# Patient Record
Sex: FEMALE | Race: WHITE | Employment: OTHER | URBAN - METROPOLITAN AREA
[De-identification: names, ages, dates, MRNs, and addresses within clinical notes are randomized per-mention and may not be internally consistent; named-entity substitution may affect disease eponyms.]

---

## 2017-04-20 ENCOUNTER — ALLSCRIPTS OFFICE VISIT (OUTPATIENT)
Dept: OTHER | Facility: OTHER | Age: 72
End: 2017-04-20

## 2017-04-20 ENCOUNTER — TRANSCRIBE ORDERS (OUTPATIENT)
Dept: ADMINISTRATIVE | Facility: HOSPITAL | Age: 72
End: 2017-04-20

## 2017-04-20 DIAGNOSIS — R41.3 MEMORY LOSS: Primary | ICD-10-CM

## 2017-04-20 DIAGNOSIS — R41.3 OTHER AMNESIA: ICD-10-CM

## 2017-05-09 ENCOUNTER — HOSPITAL ENCOUNTER (OUTPATIENT)
Dept: MRI IMAGING | Facility: HOSPITAL | Age: 72
Discharge: HOME | End: 2017-05-09
Attending: PSYCHIATRY & NEUROLOGY
Payer: COMMERCIAL

## 2017-05-09 DIAGNOSIS — R41.3 OTHER AMNESIA: ICD-10-CM

## 2017-05-09 PROCEDURE — 70551 MRI BRAIN STEM W/O DYE: CPT

## 2017-05-11 ENCOUNTER — GENERIC CONVERSION - ENCOUNTER (OUTPATIENT)
Dept: OTHER | Facility: OTHER | Age: 72
End: 2017-05-11

## 2017-05-23 ENCOUNTER — ALLSCRIPTS OFFICE VISIT (OUTPATIENT)
Dept: OTHER | Facility: OTHER | Age: 72
End: 2017-05-23

## 2017-07-14 ENCOUNTER — ALLSCRIPTS OFFICE VISIT (OUTPATIENT)
Dept: OTHER | Facility: OTHER | Age: 72
End: 2017-07-14

## 2017-08-15 ENCOUNTER — ALLSCRIPTS OFFICE VISIT (OUTPATIENT)
Dept: OTHER | Facility: OTHER | Age: 72
End: 2017-08-15

## 2017-08-15 DIAGNOSIS — R41.3 OTHER AMNESIA: ICD-10-CM

## 2017-08-15 DIAGNOSIS — Z13.220 ENCOUNTER FOR SCREENING FOR LIPOID DISORDERS: ICD-10-CM

## 2017-08-15 DIAGNOSIS — M81.0 AGE-RELATED OSTEOPOROSIS WITHOUT CURRENT PATHOLOGICAL FRACTURE: ICD-10-CM

## 2017-09-05 ENCOUNTER — GENERIC CONVERSION - ENCOUNTER (OUTPATIENT)
Dept: OTHER | Facility: OTHER | Age: 72
End: 2017-09-05

## 2017-09-21 ENCOUNTER — GENERIC CONVERSION - ENCOUNTER (OUTPATIENT)
Dept: OTHER | Facility: OTHER | Age: 72
End: 2017-09-21

## 2017-09-21 DIAGNOSIS — E78.2 MIXED HYPERLIPIDEMIA: ICD-10-CM

## 2017-10-26 ENCOUNTER — ALLSCRIPTS OFFICE VISIT (OUTPATIENT)
Dept: OTHER | Facility: OTHER | Age: 72
End: 2017-10-26

## 2017-10-27 NOTE — PROGRESS NOTES
Assessment  1  MCI (mild cognitive impairment) (331 83) (G31 84)    Plan   MCI (mild cognitive impairment)    · (Q) APOLIPOPROTEIN EVALUATION; Status:Active; Requested GLT:[id number];    Perform:[facility]; CQK:[id number]; Ordered; For:MCI (mild cognitive impairment); Ordered By:Anton Wilson;   · Continue with our present treatment plan ; Status:Complete;   Done: [id number]   Ordered; For:MCI (mild cognitive impairment); Ordered By:Anton Wilson; Follow-up visit in 3 months Evaluation and Treatment  Follow-up  Status: Hold For - Scheduling  Requested for: [id number]  Ordered; For: MCI (mild cognitive impairment); Ordered By: Agatha Marsh  Performed:   Due: 20FWQ1103     Discussion/Summary  Discussion Summary:   Patient suffers from mild cognitive impairment compounded with anxiety disorder and is advised to continue her present medications mainly in the form of nutritional supplements  She does not wish to take any medications  Apoprotein e- evaluation will also be requested and the patient is advised to return back to see me in 2-3 months  1       Chief Complaint  Chief Complaint Free Text Note Form: Patient returns in follow up for memory difficulties  History of Present Illness  HPI: Patient is here for a follow-up visit and was last seen by one of my colleagues 4 months ago with a history of memory difficulty, and had an extensive workup done in the form of an MRI of the brain which showed evidence of mild right hippocampal volume loss possibly congenital versus true volume loss due to degenerative process and a repeat MRI in one year was recommended  She also had neuropsychological testing which showed no evidence of any neuro degenerative process but revealed that she does have anxiety  Patient continues to experience difficulty with short-term memory, as well as forgetfulness, although she has not seen any worsening since her last visit   She is on several herbal supplements, and both her bed and suffered from Alzheimer's type of dementia which makes her extremely concerned  She is also requesting apoprotein e- 4 assay , and otherwise denies any new neurological symptoms  Review of Systems  Neurological ROS:   Constitutional: no fever, no chills, no recent weight gain, no recent weight loss, no complaints of feeling tired, no changes in appetite  HEENT:  no sinus problems, not feeling congested, no blurred vision, no dryness of the eyes, no eye pain, no hearing loss, no tinnitus, no mouth sores, no sore throat, no hoarseness, no dysphagia, no masses, no bleeding  Cardiovascular:  no chest pain or pressure, no palpitations present, the heart rate was not rapid or irregular, no swelling in the arms or legs, no poor circulation  Respiratory:  no unusual or persistant cough, no shortness of breath with or without exertion  Gastrointestinal:  no nausea, no vomiting, no diarrhea, no abdominal pain, no changes in bowel habits, no melena, no loss of bowel control  Genitourinary:  no incontinence, no feelings of urinary urgency, no increase in frequency, no urinary hesitancy, no dysuria, no hematuria  Musculoskeletal:  no arthralgias, no myalgias, no immobility or loss of function, no head/neck/back pain, no pain while walking  Integumentary  no masses, no rash, no skin lesions, no livedo reticularis  Psychiatric:  no anxiety, no depression, no mood swings, no psychiatric hospitalizations, no sleep problems  Endocrine  no unusual weight loss or gain, no excessive urination, no excessive thirst, no hair loss or gain, no hot or cold intolerance, no menstrual period change or irregularity, no loss of sexual ability or drive, no erection difficulty, no nipple discharge  Hematologic/Lymphatic:  no unusual bleeding, no tendency for easy bruising, no clotting skin or lumps  Neurological General: lightheadedness     Neurological Mental Status:  no confusion, no mood swings, no alteration or loss of consciousness, no difficulty expressing/understanding speech, no memory problems  Neurological Cranial Nerves:  no blurry or double vision, no loss of vision, no face drooping, no facial numbness or weakness, no taste or smell loss/changes, no hearing loss or ringing, no vertigo or dizziness, no dysphagia, no slurred speech  Neurological Motor findings include:  no tremor, no twitching, no cramping(pre/post exercise), no atrophy  Neurological Coordination: balance difficulties  Neurological Sensory:  no numbness, no pain, no tingling, does not fall when eyes closed or taking a shower  Neurological Gait:  no difficulty walking, not falling to one side, no sensation of being pushed, has not had falls  ROS Reviewed:   ROS reviewed  Active Problems  1  Basal cell carcinoma of forehead (173 31) (C44 319)   2  Dog Bite (E906 0)   3  History of Exposure to cigarette smoke (V87 39) (Z77 22)   4  Lipid screening (V77 91) (Z13 220)   5  Memory difficulties (780 93) (R41 3)   6  Mixed hyperlipidemia (272 2) (E78 2)   7  Osteoporosis (733 00) (M81 0)   8  Poor concentration (799 51) (R41 840)   9  Short-term memory loss (780 93) (R41 3)   10  Urinary urgency (788 63) (R39 15)    Past Medical History  1  History of Changing skin lesion (709 9) (L98 9)   2  H/O nonmelanoma skin cancer (V10 83) (M37 097)   3  History of diarrhea (V12 79) (Z87 898)   4  History of low back pain (V13 59) (Z87 39)   5  History of Polio (045 90) (A80 9)  Active Problems And Past Medical History Reviewed: The active problems and past medical history were reviewed and updated today  Surgical History  1  History of Anal Fissurectomy   2  History of Appendectomy   3  History of Cataract Surgery   4  History of Hysterectomy    Family History  Mother    1  Family history of    2  Family history of myocardial infarction (V17 3) (Z82 49)  Father    3  Family history of    4   Family history of malignant neoplasm (V16 9) (Z80 9)  Sister    5  Family history of    6  Family history of myocardial infarction (V17 3) (Z82 49)    Social History   · History of Exposure to cigarette smoke (V87 39) (Z77 22)   · Former smoker (V15 82) (D91 644)   · No illicit drug use   · Occasional alcohol use   · Self-employed   ·   Social History Reviewed: The social history was reviewed and updated today  Current Meds   1  Acetyl L-Carnitine 500 MG Oral Capsule; Therapy: (Recorded:2016) to Recorded   2  Calcium TABS; Therapy: (Recorded:2017) to Recorded   3  Chelated Magnesium TABS; TAKE 1 TABLET Daily TDD:500; Therapy: (Recorded:2016) to Recorded   4  Fish Oil CAPS; TAKE 1 CAPSULE Daily TDD:2500; Therapy: (Recorded:2017) to Recorded   5  Kelp 225 MCG TABS; TAKE 1 TABLET  3x a week; Therapy: (Recorded:2016) to Recorded   6  Lipoic Acid CAPS; Therapy: (Recorded:2016) to Recorded   7  Pantethine Plus TABS; Therapy: (Recorded:2017) to Recorded   8  Probiotic CAPS; Therapy: (Recorded:2016) to Recorded   9  Resveratrol CAPS; Therapy: (Recorded:2016) to Recorded   10  Turmeric Curcumin CAPS; TAKE 1 CAPSULE Daily 1 Meriva SF Curcumin  TDD:360; Therapy: (Recorded:2016) to Recorded   11  Vinpocetine Powder; Therapy: (Recorded:2016) to Recorded   12  Vitamin B12 TABS; Therapy: (Recorded:2016) to Recorded   13  Vitamin B6 TABS; Therapy: (Recorded:2016) to Recorded   14  Vitamin C TABS; Therapy: (Recorded:2017) to Recorded   15  Vitamin D3 5000 UNIT Oral Capsule; take 1 capsule daily; Therapy: (Recorded:2017) to Recorded   16  Vitamin E CAPS; Therapy: (Recorded:2016) to Recorded   17  Zinc CAPS; take 1 capsule daily; Therapy: (Recorded:2017) to Recorded  Medication List Reviewed: The medication list was reviewed and updated today  Allergies  1  Sulfamethoxazole POWD   2  Sulfa Drugs  Denied    3  Iron TABS   4  Pedros CAPS    Vitals  Signs   Recorded: 70IQE5791 03:37PM   Heart Rate: 74  Systolic: 144, LUE, Sitting  Diastolic: 62, LUE, Sitting  Height: 5 ft 4 in  Pain Scale: 0    Physical Exam    Constitutional   General appearance: No acute distress, well appearing and well nourished  Musculoskeletal   Gait and station: Normal gait, stance and balance  Muscle strength: Normal strength throughout  Muscle tone: No atrophy, abnormal movements, flaccidity, cogwheeling or spasticity  Neurologic   Orientation to person, place, and time: Normal     Recent and remote memory: Demonstrates normal memory  -- [place] cognitive assessment scale reveals a score of 30/30 as compared to worse similar score of 21/30 during her neuropsychological testing  Language: Names objects, able to repeat phrases and speaks spontaneously  3rd, 4th, and 6th cranial nerves: Normal     7th cranial nerve: Normal     Sensation: Normal     Reflexes: Normal     Coordination: Normal        Future Appointments    Date/Time Provider Specialty Site   01/18/2018 02:00 PM Ghada Lawrence MD Neurology [facility] [address] [place] [address]   10/31/2017 02:00 PM Rachael Fabry, [facility]   11/07/2017 01:45 PM JUAN Person  Dermatology [facility]     Signatures   Electronically signed by :  Marianela Leonard MD; Oct 26 2017  4:30PM EST                       (Author)

## 2017-10-31 ENCOUNTER — ALLSCRIPTS OFFICE VISIT (OUTPATIENT)
Dept: OTHER | Facility: OTHER | Age: 72
End: 2017-10-31

## 2017-11-01 NOTE — PROGRESS NOTES
Assessment  1  Memory difficulties (780 93) (R41 3)   2  Osteoporosis (733 00) (M81 0)   3  Poor concentration (799 51) (R41 840)   4  Short-term memory loss (780 93) (R41 3)    Discussion/Summary    Thyroid- normalterm memory- seeing Dr Checo Strong now  taking supplementup in 6 months  The patient was counseled regarding  Self Referrals: No      Chief Complaint  Patient is here today for follow up on medical issues      History of Present Illness  Patient is here for follow up   Since last visit, she has been seen by Neurology for her memory loss  She saw DR Carissa Khan ordered, looking for a lab that can do this genetic testing  distress with dating in her personal life low  Takes Pantothene which can suppress thyroid and adrenalswould like to re-review her labswok from August  Labs are all normal  Sed rate is normal--which had previously been elevatedto the gym 4-5 days a week  she does abd, aerobics, and weight training      Review of Systems    Constitutional: No fever, no chills, feels well, no tiredness, no recent weight gain or weight loss  Eyes: No complaints of eye pain, no red eyes, no eyesight problems, no discharge, no dry eyes, no itching of eyes  Cardiovascular: No complaints of slow heart rate, no fast heart rate, no chest pain, no palpitations, no leg claudication, no lower extremity edema  Respiratory: No complaints of shortness of breath, no wheezing, no cough, no SOB on exertion, no orthopnea, no PND  Musculoskeletal: No complaints of arthralgias, no myalgias, no joint swelling or stiffness, no limb pain or swelling  Neurological: memory loss, but-- as noted in HPI  Psychiatric: anxiety, but-- as noted in HPI  Active Problems  1  Basal cell carcinoma of forehead (173 31) (C44 319)   2  Dog Bite (E906 0)   3  History of Exposure to cigarette smoke (V87 39) (Z77 22)   4  Lipid screening (V77 91) (Z13 220)   5  MCI (mild cognitive impairment) (331 83) (G31 84)   6   Memory difficulties (780 93) (R41 3)   7  Mixed hyperlipidemia (272 2) (E78 2)   8  Osteoporosis (733 00) (M81 0)   9  Poor concentration (799 51) (R41 840)   10  Short-term memory loss (780 93) (R41 3)   11  Urinary urgency (788 63) (R39 15)    Past Medical History  1  History of Changing skin lesion (709 9) (L98 9)   2  H/O nonmelanoma skin cancer (V10 83) (G55 167)   3  History of diarrhea (V12 79) (Z87 898)   4  History of low back pain (V13 59) (Z87 39)   5  History of Polio (045 90) (A80 9)    The active problems and past medical history were reviewed and updated today  Surgical History  1  History of Anal Fissurectomy   2  History of Appendectomy   3  History of Cataract Surgery   4  History of Hysterectomy    The surgical history was reviewed and updated today  Family History  Mother    1  Family history of    2  Family history of myocardial infarction (V17 3) (Z82 49)  Father    3  Family history of    4  Family history of malignant neoplasm (V16 9) (Z80 9)  Sister    5  Family history of    6  Family history of myocardial infarction (V17 3) (Z82 49)    The family history was reviewed and updated today  Social History   · History of Exposure to cigarette smoke (V87 39) (Z77 22)   · Former smoker (V15 82) (Z26 168)   · No illicit drug use   · Occasional alcohol use   · Self-employed   ·   The social history was reviewed and updated today  The social history was reviewed and is unchanged  Current Meds   1  Acetyl L-Carnitine 500 MG Oral Capsule; Therapy: (Recorded:11Npu1084) to Recorded   2  Calcium TABS; Therapy: (Recorded:86Tbn1296) to Recorded   3  Chelated Magnesium TABS; TAKE 1 TABLET Daily TDD:500; Therapy: (Recorded:2016) to Recorded   4  Fish Oil CAPS; TAKE 1 CAPSULE Daily TDD:2500; Therapy: (Recorded:56Bpd0697) to Recorded   5  Kelp 225 MCG TABS; TAKE 1 TABLET  3x a week; Therapy: (Recorded:2016) to Recorded   6   Lipoic Acid CAPS; Therapy: (Recorded:05Apr2016) to Recorded   7  Pantethine Plus TABS; Therapy: (Recorded:20Apr2017) to Recorded   8  Probiotic CAPS; Therapy: (Recorded:05Apr2016) to Recorded   9  Resveratrol CAPS; Therapy: (Recorded:05Apr2016) to Recorded   10  Turmeric Curcumin CAPS; TAKE 1 CAPSULE Daily 1 Meriva SF Curcumin  TDD:360; Therapy: (Recorded:03Nov2016) to Recorded   11  Vinpocetine Powder; Therapy: (Recorded:05Apr2016) to Recorded   12  Vitamin B12 TABS; Therapy: (Recorded:05Apr2016) to Recorded   13  Vitamin B6 TABS; Therapy: (Recorded:05Apr2016) to Recorded   14  Vitamin C TABS; Therapy: (Recorded:77Gkj8657) to Recorded   15  Vitamin D3 5000 UNIT Oral Capsule; take 1 capsule daily; Therapy: (Recorded:20Apr2017) to Recorded   16  Vitamin E CAPS; Therapy: (Recorded:05Apr2016) to Recorded   17  Zinc CAPS; take 1 capsule daily; Therapy: (Recorded:26Oct2017) to Recorded    The medication list was reviewed and updated today  Allergies  1  Sulfamethoxazole POWD   2  Sulfa Drugs  Denied    3  Iron TABS   4  Orudis CAPS    Vitals  Vital Signs    Recorded: 49MAP5564 01:30PM   Temperature 97 7 F   Heart Rate 68   Systolic 603   Diastolic 62   Height 5 ft 3 5 in   Weight 121 lb 8 0 oz   BMI Calculated 21 18   BSA Calculated 1 57   O2 Saturation 95     Physical Exam    Constitutional   General appearance: No acute distress, well appearing and well nourished  Eyes   Conjunctiva and lids: No swelling, erythema or discharge  Pupils and irises: Equal, round and reactive to light  Pulmonary   Respiratory effort: No increased work of breathing or signs of respiratory distress  Auscultation of lungs: Clear to auscultation  Cardiovascular   Auscultation of heart: Normal rate and rhythm, normal S1 and S2, without murmurs  Examination of extremities for edema and/or varicosities: Abnormal  -- + LE edema     Musculoskeletal   Gait and station: Normal     Digits and nails: Normal without clubbing or cyanosis  Inspection/palpation of joints, bones, and muscles: Normal     Skin   Skin and subcutaneous tissue: Normal without rashes or lesions  Psychiatric   Orientation to person, place, and time: Normal     Mood and affect: Normal          Future Appointments    Date/Time Provider Specialty Site   01/18/2018 02:00 PM [facility] Hull [facility]   11/28/2017 02:00 PM DERECK Mehta  [facility]   11/07/2017 01:45 PM JUAN Skelton   Dermatology [address]     Signatures   Electronically signed by : Akanksha Loyola NP; Oct 31 2017  2:22PM EST                       (Author)    Electronically signed by : Senia Roblero MD; Oct 31 2017  4:41PM EST                       (Co-author)

## 2017-11-07 ENCOUNTER — ALLSCRIPTS OFFICE VISIT (OUTPATIENT)
Dept: OTHER | Facility: OTHER | Age: 72
End: 2017-11-07

## 2017-11-17 ENCOUNTER — ALLSCRIPTS OFFICE VISIT (OUTPATIENT)
Dept: OTHER | Facility: OTHER | Age: 72
End: 2017-11-17

## 2017-11-17 DIAGNOSIS — M79.89 OTHER SPECIFIED SOFT TISSUE DISORDERS (CODE): ICD-10-CM

## 2017-11-18 NOTE — PROGRESS NOTES
Assessment    1  Swelling of left thumb (524 17) (I66 98)    Plan  Swelling of left thumb    · Doxycycline Monohydrate 100 MG Oral Tablet; TAKE 1 TABLET TWICE DAILY   · Triple Antibiotic Plus 1 % External Ointment; APPLY SPARINGLY TO AFFECTEDAREA(S) TWICE DAILY   · * XR HAND 3+ VIEW LEFT; Status:Active; Requested for:17Nov2017;     Discussion/Summary    Infection of left thumb- given a script ifr topical antibiotic, if symptoms do not improve given a script ifr doxycycline at this time  fo symptoms do not improve  up as needed  Possible side effects of new medications were reviewed with the patient/guardian today  The treatment plan was reviewed with the patient/guardian  The patient/guardian understands and agrees with the treatment plan      Chief Complaint  Patient seen in office today for a c/o having a wound on left thumb - she stated that she accidently snipped it with a scissors  History of Present Illness  Patient is here because she cut a small flap of her skin on her left thumb with her scissors, she says that her Thumb has been red and erythematous since she had the cut and has been painful, red and swollen  Review of Systems   Constitutional: No fever, no chills, feels well, no tiredness, no recent weight gain or weight loss  Eyes: No complaints of eye pain, no red eyes, no eyesight problems, no discharge, no dry eyes, no itching of eyes  Cardiovascular: No complaints of slow heart rate, no fast heart rate, no chest pain, no palpitations, no leg claudication, no lower extremity edema  Respiratory: No complaints of shortness of breath, no wheezing, no cough, no SOB on exertion, no orthopnea, no PND  Musculoskeletal: as noted in HPI  Integumentary: as noted in HPI  Neurological: No complaints of headache, no confusion, no convulsions, no numbness, no dizziness or fainting, no tingling, no limb weakness, no difficulty walking  ROS reviewed  Active Problems  1   Basal cell carcinoma of forehead (173 31) (C44 319)   2  Dog Bite (E906 0)   3  History of Exposure to cigarette smoke (V87 39) (Z77 22)   4  Lipid screening (V77 91) (Z13 220)   5  MCI (mild cognitive impairment) (331 83) (G31 84)   6  Memory difficulties (780 93) (R41 3)   7  Mixed hyperlipidemia (272 2) (E78 2)   8  Osteoporosis (733 00) (M81 0)   9  Poor concentration (799 51) (R41 840)   10  Screening for skin condition (V82 0) (Z13 89)   11  Seborrheic keratosis (702 19) (L82 1)   12  Short-term memory loss (780 93) (R41 3)   13  Urinary urgency (788 63) (R39 15)    Past Medical History  1  History of Changing skin lesion (709 9) (L98 9)   2  H/O nonmelanoma skin cancer (V10 83) (A21 718)   3  History of diarrhea (V12 79) (Z87 898)   4  History of low back pain (V13 59) (Z87 39)   5  History of Polio (045 90) (A80 9)    The active problems and past medical history were reviewed and updated today  Surgical History  1  History of Anal Fissurectomy   2  History of Appendectomy   3  History of Cataract Surgery   4  History of Hysterectomy    Family History  Mother    1  Family history of    2  Family history of myocardial infarction (V17 3) (Z82 49)  Father    3  Family history of    4  Family history of malignant neoplasm (V16 9) (Z80 9)  Sister    5  Family history of    6  Family history of myocardial infarction (V17 3) (Z82 49)    Social History     · History of Exposure to cigarette smoke (V87 39) (Z77 22)   · Former smoker (V15 82) (T73 806)   · No illicit drug use   · Occasional alcohol use   · Self-employed   ·     Current Meds   1  Acetyl L-Carnitine 500 MG Oral Capsule; Therapy: (Recorded:93Wes1418) to Recorded   2  Calcium TABS; Therapy: (Recorded:40Fll2516) to Recorded   3  Chelated Magnesium TABS; TAKE 1 TABLET Daily TDD:500; Therapy: (Recorded:36Tdo4366) to Recorded   4  Fish Oil CAPS; TAKE 1 CAPSULE Daily TDD:2500; Therapy: (Recorded:2017) to Recorded   5   Kelp 225 MCG TABS; TAKE 1 TABLET  3x a week; Therapy: (Recorded:03Nov2016) to Recorded   6  Lipoic Acid CAPS; Therapy: (Recorded:05Apr2016) to Recorded   7  Pantethine Plus TABS; Therapy: (Recorded:20Apr2017) to Recorded   8  Probiotic CAPS; Therapy: (Recorded:05Apr2016) to Recorded   9  Resveratrol CAPS; Therapy: (Recorded:05Apr2016) to Recorded   10  Turmeric Curcumin CAPS; TAKE 1 CAPSULE Daily 1 Meriva SF Curcumin  TDD:360; Therapy: (Recorded:03Nov2016) to Recorded   11  Vinpocetine Powder; Therapy: (Recorded:05Apr2016) to Recorded   12  Vitamin B12 TABS; Therapy: (Recorded:05Apr2016) to Recorded   13  Vitamin B6 TABS; Therapy: (Recorded:05Apr2016) to Recorded   14  Vitamin C TABS; Therapy: (Recorded:15Kwk2356) to Recorded   15  Vitamin D3 5000 UNIT Oral Capsule; take 1 capsule daily; Therapy: (Recorded:20Apr2017) to Recorded   16  Vitamin E CAPS; Therapy: (Recorded:05Apr2016) to Recorded   17  Zinc CAPS; take 1 capsule daily; Therapy: (Recorded:26Oct2017) to Recorded    Allergies  1  Sulfamethoxazole POWD   2  Sulfa Drugs  Denied    3  Iron TABS   4  Orudis CAPS    Vitals  Vital Signs    Recorded: 88JNW1887 01:15PM   Temperature 98 6 F   Heart Rate 532   Systolic 240   Diastolic 70   Height 5 ft 3 5 in   Weight 124 lb 8 0 oz   BMI Calculated 21 71   BSA Calculated 1 58   O2 Saturation 98       Physical Exam   Constitutional  General appearance: No acute distress, well appearing and well nourished  Eyes  Conjunctiva and lids: No swelling, erythema or discharge  -- PERRLA  Pulmonary  Respiratory effort: No increased work of breathing or signs of respiratory distress  Auscultation of lungs: Clear to auscultation  Cardiovascular  Auscultation of heart: Normal rate and rhythm, normal S1 and S2, without murmurs  Musculoskeletal  Gait and station: Normal    Skin left thumb swelling and tenderness          Future Appointments    Date/Time Provider Specialty Site   01/18/2018 02:00 PM Meagan Ewing MD Neurology NEUROLOGY [facility]   11/28/2017 02:00 PM DERECK Cotto  Family Medicine [address] [place] [address]   11/28/2017 02:20 PM DERECK Cotto  [facility]   05/01/2018 01:50 PM JUAN Rolon   Dermatology [facility]       Signatures   Electronically signed by : Vishnu Og MD; Nov 17 2017  1:47PM EST                       (Author)

## 2017-11-28 ENCOUNTER — GENERIC CONVERSION - ENCOUNTER (OUTPATIENT)
Dept: OTHER | Facility: OTHER | Age: 72
End: 2017-11-28

## 2017-11-28 ENCOUNTER — ALLSCRIPTS OFFICE VISIT (OUTPATIENT)
Dept: OTHER | Facility: OTHER | Age: 72
End: 2017-11-28

## 2018-01-02 ENCOUNTER — TRANSCRIBE ORDERS (OUTPATIENT)
Dept: RADIOLOGY | Facility: CLINIC | Age: 73
End: 2018-01-02

## 2018-01-02 ENCOUNTER — ALLSCRIPTS OFFICE VISIT (OUTPATIENT)
Dept: OTHER | Facility: OTHER | Age: 73
End: 2018-01-02

## 2018-01-02 ENCOUNTER — HOSPITAL ENCOUNTER (OUTPATIENT)
Dept: MRI IMAGING | Facility: CLINIC | Age: 73
Discharge: HOME | End: 2018-01-02
Payer: COMMERCIAL

## 2018-01-02 DIAGNOSIS — S16.1XXA STRAIN OF MUSCLE, FASCIA AND TENDON AT NECK LEVEL, INITIAL ENCOUNTER: ICD-10-CM

## 2018-01-02 PROCEDURE — 72141 MRI NECK SPINE W/O DYE: CPT

## 2018-01-03 NOTE — PROGRESS NOTES
Assessment   1  MCI (mild cognitive impairment) (331 83) (G31 84)   2  Cervical strain (847 0) (S16 1XXA)    Plan    Cervical strain    · Continue with our present treatment plan ; Status:Complete;   Done: 62SYT1936   Ordered; For:Cervical strain; Ordered By:Anton Wilson;      * MRI CERVICAL SPINE WO CONTRAST; Status:Need Information - Financial Authorization; Requested FIS:[id number]; Perform:[facility]; ZII:[id number];GVGQQLI; For:Cervical strain; Ordered By:Anton Wilson;        Discussion/Summary   Discussion Summary:    Patient with a history of mild cognitive impairment and persistent cervical strain after a slip and fall injury is advised an MRI of the cervical spine to rule out any structural injury  She has no evidence of any radiculopathy at this time  Patient is advised control continue chiropractic treatments, she does not wish to take any medications, and she will continue home exercise program  She has an appointment to return back to see me in 1 month  Chief Complaint   Chief Complaint Free Text Note Form: Patient is here for follow up visit for her history of MCI  History of Present Illness   HPI: Patient is here for a follow-up visit and since her last visit she also had a trip and fall injury few weeks ago with resultant neck pain  Since then she has been seeing a chiropractor on a regular basis and continues to experience left-sided neck pain, radiating to the left shoulder and denies any motor or sensory symptoms in the upper lower extremities  She has been seeing the chiropractor 2 to 3 times a week with not much relief  She also denies any lower extremity symptoms of bladder bowel symptoms  From the cognitive standpoint she is remains stable with herbal supplementation and had an MRI of the brain which showed evidence of bilateral hippocampal volume loss without ex vacuo dilatation of the ventricles  Repeat MRI was recommended 1 year later   Patient also had a strong family history of dementia and apoprotein EEG phenotype was E3/E3  Patient was explained these test results  Review of Systems   Neurological ROS:      Constitutional: no fever, no chills, no recent weight gain, no recent weight loss, no complaints of feeling tired, no changes in appetite  HEENT: eyes trouble opening  Cardiovascular:  no chest pain or pressure, no palpitations present, the heart rate was not rapid or irregular, no swelling in the arms or legs, no poor circulation  Respiratory:  no unusual or persistant cough, no shortness of breath with or without exertion  Gastrointestinal: diarrhea  Genitourinary:  no incontinence, no feelings of urinary urgency, no increase in frequency, no urinary hesitancy, no dysuria, no hematuria  Musculoskeletal: head/neck/back pain  Integumentary  no masses, no rash, no skin lesions, no livedo reticularis  Psychiatric:  no anxiety, no depression, no mood swings, no psychiatric hospitalizations, no sleep problems  Endocrine  no unusual weight loss or gain, no excessive urination, no excessive thirst, no hair loss or gain, no hot or cold intolerance, no menstrual period change or irregularity, no loss of sexual ability or drive, no erection difficulty, no nipple discharge  Hematologic/Lymphatic:  no unusual bleeding, no tendency for easy bruising, no clotting skin or lumps  Neurological General: trouble falling asleep  Neurological Mental Status: memory problems  Neurological Cranial Nerves:  no blurry or double vision, no loss of vision, no face drooping, no facial numbness or weakness, no taste or smell loss/changes, no hearing loss or ringing, no vertigo or dizziness, no dysphagia, no slurred speech  Neurological Motor findings include:  no tremor, no twitching, no cramping(pre/post exercise), no atrophy  Neurological Coordination: balance difficulties-- and-- clumsiness  Neurological Sensory: numbness-- and-- tingling  Neurological Gait: has had falls  ROS Reviewed:    ROS reviewed  Active Problems   1  MCI (mild cognitive impairment) (331 83) (G31 84)   2  Memory difficulties (780 93) (R41 3)   3  Mixed hyperlipidemia (272 2) (E78 2)   4  Osteoporosis (733 00) (M81 0)   5  Poor concentration (799 51) (R41 840)   6  Short-term memory loss (780 93) (R41 3)   7  Swelling of left thumb (729 81) (M79 89)   8  Urinary urgency (788 63) (R39 15)    Past Medical History   1  History of Changing skin lesion (709 9) (L98 9)   2  H/O nonmelanoma skin cancer (V10 83) (Q46 327)   3  History of diarrhea (V12 79) (Z87 898)   4  History of low back pain (V13 59) (Z87 39)   5  History of Polio (045 90) (A80 9)    Surgical History   1  History of Anal Fissurectomy   2  History of Appendectomy   3  History of Cataract Surgery   4  History of Hysterectomy    Family History   Mother    1  Family history of    2  Family history of myocardial infarction (V17 3) (Z82 49)  Father    3  Family history of    4  Family history of malignant neoplasm (V16 9) (Z80 9)  Sister    5  Family history of    6  Family history of myocardial infarction (V17 3) (Z82 49)    Social History    · Former smoker (V15 82) (V87 361)   · No illicit drug use   · Occasional alcohol use   · Self-employed   ·   Social History Reviewed: The social history was reviewed and updated today  Current Meds    1  Acetyl L-Carnitine 500 MG Oral Capsule; Therapy: (Recorded:46Jcp5997) to Recorded   2  Calcium TABS; Therapy: (Recorded:36Faq8703) to Recorded   3  Chelated Magnesium TABS; TAKE 1 TABLET Daily TDD:500; Therapy: (Recorded:2016) to Recorded   4  Fish Oil CAPS; TAKE 1 CAPSULE Daily TDD:2500; Therapy: (Recorded:29Zjc3086) to Recorded   5  Kelp 225 MCG TABS; TAKE 1 TABLET  3x a week; Therapy: (Recorded:2016) to Recorded   6  Lipoic Acid CAPS;      Therapy: (Recorded:05Apr2016) to Recorded   7  Pantethine Plus TABS; Therapy: (Recorded:20Apr2017) to Recorded   8  Probiotic CAPS; Therapy: (Recorded:05Apr2016) to Recorded   9  Resveratrol CAPS; Therapy: (Recorded:05Apr2016) to Recorded   10  Turmeric Curcumin CAPS; TAKE 1 CAPSULE Daily 1 Meriva SF Curcumin  TDD:360; Therapy: (Recorded:03Nov2016) to Recorded   11  Vinpocetine Powder; Therapy: (Recorded:05Apr2016) to Recorded   12  Vitamin B12 TABS; Therapy: (Recorded:05Apr2016) to Recorded   13  Vitamin B6 TABS; Therapy: (Recorded:05Apr2016) to Recorded   14  Vitamin C TABS; Therapy: (Recorded:12Jda3121) to Recorded   15  Vitamin D3 5000 UNIT Oral Capsule; take 1 capsule daily; Therapy: (Recorded:20Apr2017) to Recorded   16  Vitamin E CAPS; Therapy: (Recorded:05Apr2016) to Recorded   17  Zinc CAPS; take 1 capsule daily; Therapy: (Recorded:26Oct2017) to Recorded  Medication List Reviewed: The medication list was reviewed and updated today  Allergies   1  Sulfamethoxazole POWD   2  Sulfa Drugs  Denied    3  Iron TABS   4  Orudis CAPS    Vitals   Signs   Recorded: 02Jan2018 01:52PM   Heart Rate: 77  Systolic: 989  Diastolic: 70  Height: 5 ft 3 5 in  Weight: 128 lb   BMI Calculated: 22 32  BSA Calculated: 1 61    Physical Exam        Constitutional      General appearance: No acute distress, well appearing and well nourished  Musculoskeletal      Gait and station: Normal gait, stance and balance  Muscle strength: Normal strength throughout  Muscle tone: No atrophy, abnormal movements, flaccidity, cogwheeling or spasticity  Neurologic      Orientation to person, place, and time: Normal        Language: Names objects, able to repeat phrases and speaks spontaneously         3rd, 4th, and 6th cranial nerves: Normal        7th cranial nerve: Normal        Sensation: Normal        Reflexes: Normal        Coordination: Normal   Patient has significant lower cervical paraspinal tenderness with associated spasm of the left trapezius  Range of motion in the cervical spine is preserved  Future Appointments      Date/Time Provider Specialty Site   02/06/2018 02:20 PM Mateus Haque MD Neurology [facility] [address] [place] [address]   01/23/2018 01:40 PM DERECK Arnold [facility]   05/01/2018 01:50 PM JUAN Mandel  Dermatology [facility]     Signatures    Electronically signed by :  Mingo Crain MD; Jan 2 2018  3:11PM EST                       (Author)

## 2018-01-12 VITALS — HEART RATE: 74 BPM | DIASTOLIC BLOOD PRESSURE: 62 MMHG | SYSTOLIC BLOOD PRESSURE: 116 MMHG | HEIGHT: 64 IN

## 2018-01-12 VITALS
SYSTOLIC BLOOD PRESSURE: 112 MMHG | HEIGHT: 63 IN | DIASTOLIC BLOOD PRESSURE: 66 MMHG | WEIGHT: 125 LBS | BODY MASS INDEX: 22.15 KG/M2 | HEART RATE: 72 BPM | RESPIRATION RATE: 16 BRPM

## 2018-01-12 NOTE — MISCELLANEOUS
Message  Discussed with the insurance physician, even after repeatedly telling them that I feel it is medically necessary they want to deny the MRI scan of the brain        Signatures   Electronically signed by : Junior Pedraza MD; Nov 16 2016  3:57PM EST                       (Author)

## 2018-01-13 VITALS
DIASTOLIC BLOOD PRESSURE: 62 MMHG | TEMPERATURE: 97.7 F | HEIGHT: 64 IN | OXYGEN SATURATION: 95 % | HEART RATE: 68 BPM | SYSTOLIC BLOOD PRESSURE: 112 MMHG | WEIGHT: 121.5 LBS | BODY MASS INDEX: 20.74 KG/M2

## 2018-01-13 VITALS
BODY MASS INDEX: 21 KG/M2 | HEIGHT: 64 IN | OXYGEN SATURATION: 97 % | DIASTOLIC BLOOD PRESSURE: 66 MMHG | WEIGHT: 123 LBS | SYSTOLIC BLOOD PRESSURE: 112 MMHG | TEMPERATURE: 97.8 F | HEART RATE: 66 BPM

## 2018-01-13 NOTE — PROGRESS NOTES
Discussion/Summary    Healthy female  Very good diet and exercise regimen  COntinue current vitamins and exercise regimen  Sees Neurology for short term memory deficit  Impression: Initial Annual Wellness Visit, with preventive exam as well as age and risk appropriate counseling completed  Cardiovascular screening and counseling: screening is current, counseling was given on maintaining a healthy diet and counseling was given on maintaining a healthy weight  Diabetes screening and counseling: screening is current and counseling was given on maintaining a healthy diet  Colorectal cancer screening and counseling: screening is current  Breast cancer screening and counseling: screening is current  Cervical cancer screening and counseling: screening is current  Osteoporosis screening and counseling: screening is current, counseling was given on obtaining adequate amounts of calcium and vitamin D on a daily basis and counseling was given on the importance of regular weightbearing exercise  Abdominal aortic aneurysm screening and counseling: screening is current  Glaucoma screening and counseling: screening is current  Self Referrals: No      Chief Complaint  Patient is here for Medicare wellness      History of Present Illness  Pt is here for Medicare Wellness  She feels well  She exercises regularly  The patient is being seen for the initial annual wellness visit  Medicare Screening and Risk Factors   Hospitalizations: no previous hospitalizations  Once per lifetime medicare screening tests: ECG has not been done and AAA screening US has not yet been done  Medicare Screening Tests Risk Questions   Abdominal aortic aneurysm risk assessment: none indicated  Osteoporosis risk assessment: , female gender, over 48years of age and the patient's weight is too low (<127 lbs)  HIV risk assessment: none indicated  Drug and Alcohol Use: The patient is a former cigarette smoker   The patient reports occasional alcohol use  She has never used illicit drugs  Diet and Physical Activity: Current diet includes well balanced meals, 3 servings of fruit per day, 6 servings of vegetables per day, 1 servings of meat per day, 2 cups of coffee per day and 1 cups of tea per day  She exercises 4 times per week  Exercise: walking, stretching, strength training 60 minutes per day  Mood Disorder and Cognitive Impairment Screening:   Depression screening  negative for symptoms  She denies feeling down, depressed, or hopeless over the past two weeks  She denies feeling little interest or pleasure in doing things over the past two weeks  Cognitive impairment screening: denies difficulty learning/retaining new information, denies difficulty handling complex tasks, denies difficulty with reasoning, denies difficulty with spatial ability and orientation, denies difficulty with language and denies difficulty with behavior  Functional Ability/Level of Safety: Hearing is normal bilaterally  She denies hearing difficulties  She does not use a hearing aid  The patient is currently able to do activities of daily living without limitations  Activities of daily living details: does not need help using the phone, no transportation help needed, does not need help shopping, no meal preparation help needed, does not need help doing housework, does not need help doing laundry, does not need help managing medications and does not need help managing money  Fall risk factors: The patient fell o times in the past 12 months  none  Injury History: no polypharmacy, no alcohol use, no mobility impairment, no antidepressant use, no deconditioning, no postural hypotension, no sedative use, no visual impairment, no urinary incontinence, no cognitive impairment, up and go test was normal and no previous fall     Home safety risk factors:  no unfamiliar surroundings, no loose rugs, no poor household lighting, no uneven floors, no household clutter, grab bars in the bathroom and handrails on the stairs  Advance Directives: Advance directives: no living will, no durable power of  for health care directives and no advance directives  end of life decisions were reviewed with the patient and I agree with the patient's decisions  Concerns with the patient's end of life decisions: Pt would like to be a Full Code  Co-Managers and Medical Equipment/Suppliers: See Patient Care Team   The patient is currently asymptomatic Symptoms Include:  Associated symptoms:  No associated symptoms are reported no pain from the injury  Urinary Incontinence Symptoms includes: urinary urgency        Patient Care Team    Care Team Member Role Specialty Office Number   Jewels PARDO  Dermatology [phone]   Yelena Cordoba MD Specialist Neurology [phone]   Pardeep COYLE  Family Medicine [phone]     Review of Systems    Constitutional: negative  Head and Face: negative  Eyes: negative  ENT: negative  Cardiovascular: negative  Respiratory: negative  Gastrointestinal: negative  Genitourinary: urinary urgency, but as noted in HPI  Musculoskeletal: negative  Integumentary and Breasts: negative  Neurological: as noted in HPI  Psychiatric: short term memory loss, but negative  Hematologic and Lymphatic: negative  Over the past 2 weeks, how often have you been bothered by the following problems? 1 ) Little interest or pleasure in doing things? Not at all    2 ) Feeling down, depressed or hopeless? Not at all    3 ) Trouble falling asleep or sleeping too much? Several days  4 ) Feeling tired or having little energy? Not at all    5 ) Poor appetite or overeating? Not at all    6 ) Feeling bad about yourself, or that you are a failure, or have let yourself or your family down? Not at all    7 ) Trouble concentrating on things, such as reading a newspaper or watching television?  Not at all    8 ) Moving or speaking so slowly that other people could have noticed, or the opposite, moving or speaking faster than usual? Not at all    9 ) Thoughts that you would be better off dead or of hurting yourself in some way? Not at all  Active Problems    1  MCI (mild cognitive impairment) (331 83) (G31 84)   2  Mixed hyperlipidemia (272 2) (E78 2)   3  Poor concentration (799 51) (R41 840)   4  Short-term memory loss (780 93) (R41 3)   5  Swelling of left thumb (729 81) (M79 89)    Past Medical History    1  History of Changing skin lesion (709 9) (L98 9)   2  H/O nonmelanoma skin cancer (V10 83) (Q30 228)   3  History of diarrhea (V12 79) (Z87 898)   4  History of low back pain (V13 59) (Z87 39)   5  History of Polio (045 90) (A80 9)    The active problems and past medical history were reviewed and updated today  Surgical History    1  History of Anal Fissurectomy   2  History of Appendectomy   3  History of Cataract Surgery   4  History of Hysterectomy    The surgical history was reviewed and updated today  Family History  Mother    1  Family history of    2  Family history of myocardial infarction (V17 3) (Z82 49)  Father    3  Family history of    4  Family history of malignant neoplasm (V16 9) (Z80 9)  Sister    5  Family history of    6  Family history of myocardial infarction (V17 3) (Z82 49)    The family history was reviewed and updated today  Social History    · Former smoker (Q15 95) (F56 085)   · No illicit drug use   · Occasional alcohol use   · Self-employed   ·   The social history was reviewed and updated today  The social history was reviewed and is unchanged  Current Meds   1  Acetyl L-Carnitine 500 MG Oral Capsule; Therapy: (Recorded:36Qca6530) to Recorded   2  Calcium TABS; Therapy: (Recorded:94Xwr3206) to Recorded   3  Chelated Magnesium TABS; TAKE 1 TABLET Daily TDD:500; Therapy: (Recorded:86Pxa6622) to Recorded   4   Doxycycline Monohydrate 100 MG Oral Tablet; TAKE 1 TABLET TWICE DAILY; Therapy: 20PIF3504 to (Evaluate:24Nov2017)  Requested for: 03GKT9032; Last   Rx:17Nov2017 Ordered   5  Fish Oil CAPS; TAKE 1 CAPSULE Daily TDD:2500; Therapy: (Recorded:26Oct2017) to Recorded   6  Kelp 225 MCG TABS; TAKE 1 TABLET  3x a week; Therapy: (Recorded:03Nov2016) to Recorded   7  Lipoic Acid CAPS; Therapy: (Recorded:05Apr2016) to Recorded   8  Pantethine Plus TABS; Therapy: (Recorded:20Apr2017) to Recorded   9  Probiotic CAPS; Therapy: (Recorded:05Apr2016) to Recorded   10  Resveratrol CAPS; Therapy: (Recorded:05Apr2016) to Recorded   11  Triple Antibiotic Plus 1 % External Ointment; APPLY SPARINGLY TO AFFECTED AREA(S)    TWICE DAILY; Therapy: 41SZQ9053 to (Last Rx:17Nov2017)  Requested for: 29JYS0817 Ordered   12  Turmeric Curcumin CAPS; TAKE 1 CAPSULE Daily 1 Meriva SF Curcumin  TDD:360; Therapy: (Recorded:03Nov2016) to Recorded   13  Vinpocetine Powder; Therapy: (Recorded:05Apr2016) to Recorded   14  Vitamin B12 TABS; Therapy: (Recorded:05Apr2016) to Recorded   15  Vitamin B6 TABS; Therapy: (Recorded:05Apr2016) to Recorded   16  Vitamin C TABS; Therapy: (Recorded:98Bwr6778) to Recorded   17  Vitamin D3 5000 UNIT Oral Capsule; take 1 capsule daily; Therapy: (Recorded:20Apr2017) to Recorded   18  Vitamin E CAPS; Therapy: (Recorded:05Apr2016) to Recorded   19  Zinc CAPS; take 1 capsule daily; Therapy: (Recorded:26Oct2017) to Recorded    The medication list was reviewed and updated today  Allergies    1  Sulfamethoxazole POWD   2  Sulfa Drugs  Denied    3  Iron TABS   4  Orudis CAPS    Immunizations  Influenza --- [facility]: Temporarily Deferred: Pt requests deferral, As of: [id number], Defer for 1  Years;  Heather Aguilar: [id number] Roes: 28-Oct-2014   PPSV --- [place]ge: 28-Oct-2014   Tetanus --- Series1: 20-Dec-2013   Zoster --- Series1: 03-Nov-2014     Vitals  Signs   Recorded: 45VBL9760 02:24PM   Temperature: 97 8 F  Heart Rate: 66  Systolic: 482  Diastolic: 66  Height: 5 ft 3 5 in  Weight: 123 lb   BMI Calculated: 21 45  BSA Calculated: 1 58  O2 Saturation: 97    Future Appointments    Date/Time Provider Specialty Site   01/18/2018 02:00 PM Bridgett De La Cruz MD [facility]   01/23/2018 01:40 PM [facility]   05/01/2018 01:50 PM JUAN Chadwick   [facility]     Signatures   Electronically signed by : Christy Martinez NP; Nov 28 2017  3:43PM EST                       (Author)    Electronically signed by : Rose Pena MD; Nov 28 2017  3:45PM EST                       (Co-author)

## 2018-01-14 VITALS
HEART RATE: 103 BPM | WEIGHT: 124.5 LBS | SYSTOLIC BLOOD PRESSURE: 116 MMHG | BODY MASS INDEX: 21.25 KG/M2 | DIASTOLIC BLOOD PRESSURE: 70 MMHG | TEMPERATURE: 98.6 F | OXYGEN SATURATION: 98 % | HEIGHT: 64 IN

## 2018-01-14 VITALS
SYSTOLIC BLOOD PRESSURE: 116 MMHG | HEART RATE: 73 BPM | WEIGHT: 127.13 LBS | OXYGEN SATURATION: 97 % | BODY MASS INDEX: 21.71 KG/M2 | HEIGHT: 64 IN | TEMPERATURE: 98.4 F | DIASTOLIC BLOOD PRESSURE: 64 MMHG

## 2018-01-14 VITALS
RESPIRATION RATE: 16 BRPM | BODY MASS INDEX: 21.51 KG/M2 | SYSTOLIC BLOOD PRESSURE: 128 MMHG | DIASTOLIC BLOOD PRESSURE: 70 MMHG | HEART RATE: 68 BPM | HEIGHT: 64 IN | WEIGHT: 126 LBS

## 2018-01-16 NOTE — PROGRESS NOTES
Assessment    1  Encounter for preventive health examination (V70 0) (Z00 00)   2  Osteoporosis (733 00) (M81 0)   3  Lipid screening (V77 91) (Z13 220)    Plan  Lipid screening    · (1) CBC/PLT/DIFF; Status:Active; Requested for:[id number];    · (1) COMPREHENSIVE METABOLIC PANEL; Status:Active; Requested for:[id number];    · (1) C-REACTIVE PROTEIN; Status:Active; Requested for:[id number];    · (1) SED RATE; Status:Active; Requested for:05Cjs7503;    · (1) T3 TOTAL; Status:Active; Requested for:[id number];    · (1) TSH WITH FT4 REFLEX; Status:Active; Requested for:[id number];    · (Q) CARDIO IQ ADVANCED LIPID PANEL AND INFLAMMATION PANEL; Status:Active -  Perform Order; Requested for:15Aug2017;   Lipid screening, Memory difficulties, Short-term memory loss    · (1) HOMOCYSTEINE; Status:Active; Requested for:15Aug2017;   Lipid screening, Osteoporosis    · (1) VITAMIN D 25-HYDROXY; Status:Active; Requested for:15Aug2017;     Discussion/Summary  health maintenance visit Currently, she eats a healthy diet, eats an adequate diet, eats a poor diet and has an adequate exercise regimen  the risks and benefits of cervical cancer screening were discussed Breast cancer screening: the risks and benefits of breast cancer screening were discussed and mammogram has been ordered  Osteoporosis screening: the risks and benefits of osteoporosis screening were discussed and the next bone mineral density test is due   Screening lab work includes hemoglobin, glucose, lipid profile and thyroid function testing  The immunizations are up to date  The patient was counseled regarding diagnostic results, prognosis, risks and benefits of treatment options  Possible side effects of new medications were reviewed with the patient/guardian today  The treatment plan was reviewed with the patient/guardian  The patient/guardian understands and agrees with the treatment plan      History of Present Illness  HM, Adult Female:  The patient is being seen for a health maintenance evaluation  The last health maintenance visit was one year(s) ago  General Health: The patient's health since the last visit is described as good  She has regular dental visits  She complains of vision problems  She denies hearing loss  Immunizations status: up to date  Lifestyle:  She consumes a diverse and healthy diet  She does not have any weight concerns  She exercises regularly  She does not use tobacco  She denies alcohol use  She denies drug use  Screening: cancer screening reviewed and current  metabolic screening reviewed and current  risk screening reviewed and current  HPI: Pt here for Physical  Pateint reports that overall she feels well  She takes multiple supplements  She does have symptoms of urinary urgency  She sees Neurology for short term memory loss and would like genetic testing for Alzheimers  She does have osteoporosis and has had most recent Dexa scan done Nov 2016  She refuses prescription therapy      Review of Systems    Constitutional: No fever, no chills, feels well, no tiredness, no recent weight gain or weight loss  Eyes: No complaints of eye pain, no red eyes, no eyesight problems, no discharge, no dry eyes, no itching of eyes  ENT: no complaints of earache, no loss of hearing, no nose bleeds, no nasal discharge, no sore throat, no hoarseness  Cardiovascular: No complaints of slow heart rate, no fast heart rate, no chest pain, no palpitations, no leg claudication, no lower extremity edema  Respiratory: No complaints of shortness of breath, no wheezing, no cough, no SOB on exertion, no orthopnea, no PND  Gastrointestinal: No complaints of abdominal pain, no constipation, no nausea or vomiting, no diarrhea, no bloody stools  Genitourinary: as noted in HPI  Musculoskeletal: No complaints of arthralgias, no myalgias, no joint swelling or stiffness, no limb pain or swelling     Integumentary: No complaints of skin rash or lesions, no itching, no skin wounds, no breast pain or lump  Neurological: as noted in HPI  Psychiatric: as noted in HPI  Active Problems    1  Basal cell carcinoma of forehead (173 31) (C44 319)   2  Dog Bite (E906 0)   3  History of Exposure to cigarette smoke (V87 39) (Z77 22)   4  Memory difficulties (780 93) (R41 3)   5  Osteoporosis (733 00) (M81 0)   6  Poor concentration (799 51) (R41 840)   7  Short-term memory loss (780 93) (R41 3)   8  Urinary urgency (788 63) (R39 15)    Past Medical History    · History of Changing skin lesion (709 9) (L98 9)   · H/O nonmelanoma skin cancer (V10 83) (X96 884)   · History of diarrhea (V12 79) (Z87 898)   · History of low back pain (V13 59) (Z87 39)   · History of Polio (045 90) (A80 9)    Surgical History    · History of Anal Fissurectomy   · History of Appendectomy   · History of Cataract Surgery   · History of Hysterectomy    Family History  Mother    · Family history of    · Family history of myocardial infarction (V17 3) (Z80 55)  Father    · Family history of    · Family history of malignant neoplasm (V16 9) (Z80 9)  Sister    · Family history of    · Family history of myocardial infarction (V17 3) (Z82 49)    Social History    · History of Exposure to cigarette smoke (V87 39) (Z77 22)   · Former smoker (V15 82) (A28 634)   · Occasional alcohol use   · wine   · Self-employed   ·     Current Meds   1  Acetyl L-Carnitine 500 MG Oral Capsule; Therapy: (Recorded:2016) to Recorded   2  Calcium TABS; Therapy: (Recorded:82Vng4831) to Recorded   3  Chelated Magnesium TABS; TAKE 1 TABLET Daily TDD:500; Therapy: (Recorded:2016) to Recorded   4  Chelated Magnesium TABS; Therapy: (Recorded:73Czb5099) to Recorded   5  Fish Oil CAPS; Therapy: (Recorded:09Qdk1392) to Recorded   6  Kelp 225 MCG TABS; TAKE 1 TABLET  3x a week; Therapy: (Recorded:2016) to Recorded   7  Lipoic Acid CAPS;    Therapy: (Recorded:2016) to Recorded   8  Omega-3 CAPS; Therapy: (Recorded:05Apr2016) to Recorded   9  Pantethine Plus TABS; Therapy: (Recorded:20Apr2017) to Recorded   10  Probiotic CAPS; Therapy: (Recorded:05Apr2016) to Recorded   11  Resveratrol CAPS; Therapy: (Recorded:05Apr2016) to Recorded   12  Turmeric Curcumin CAPS; TAKE 1 CAPSULE Daily 1 Meriva SF Curcumin  TDD:360; Therapy: (Recorded:03Nov2016) to Recorded   13  Vinpocetine Powder; Therapy: (Recorded:05Apr2016) to Recorded   14  Vitamin B12 TABS; Therapy: (Recorded:05Apr2016) to Recorded   15  Vitamin B6 TABS; Therapy: (Recorded:05Apr2016) to Recorded   16  Vitamin C TABS; Therapy: (Recorded:15Aug2017) to Recorded   17  Vitamin D3 5000 UNIT Oral Capsule; take 1 capsule daily; Therapy: (Recorded:20Apr2017) to Recorded   18  Vitamin E CAPS; Therapy: (Recorded:05Apr2016) to Recorded   19  Zinc CAPS; Therapy: (Recorded:20Apr2017) to Recorded    Allergies    1  Sulfamethoxazole POWD   2  Orudis CAPS   3  Sulfa Drugs  Denied    4  Iron TABS    Physical Exam    Constitutional   General appearance: No acute distress, well appearing and well nourished  Head and Face   Head and face: Normal     Palpation of the face and sinuses: No sinus tenderness  Eyes   Conjunctiva and lids: No swelling, erythema or discharge  Pupils and irises: Equal, round, reactive to light  Ophthalmoscopic examination: Normal fundi and optic discs  Ears, Nose, Mouth, and Throat   External inspection of ears and nose: Normal     Otoscopic examination: Tympanic membranes translucent with normal light reflex  Canals patent without erythema  Hearing: Normal     Nasal mucosa, septum, and turbinates: Normal without edema or erythema  Lips, teeth, and gums: Normal, good dentition  Oropharynx: Normal with no erythema, edema, exudate or lesions  Neck   Neck: Supple, symmetric, trachea midline, no masses  Thyroid: Normal, no thyromegaly      Pulmonary Respiratory effort: No increased work of breathing or signs of respiratory distress  Percussion of chest: Normal     Palpation of chest: Normal     Auscultation of lungs: Clear to auscultation  Cardiovascular   Palpation of heart: Normal PMI, no thrills  Auscultation of heart: Normal rate and rhythm, normal S1 and S2, no murmurs  Carotid pulses: 2+ bilaterally  Pedal pulses: 2+ bilaterally  Peripheral vascular exam: Normal     Examination of extremities for edema and/or varicosities: Normal     Abdomen   Abdomen: Non-tender, no masses  Liver and spleen: No hepatomegaly or splenomegaly  Lymphatic   Palpation of lymph nodes in neck: No lymphadenopathy  Palpation of lymph nodes in axillae: No lymphadenopathy  Musculoskeletal   Gait and station: Normal     Digits and nails: Normal without clubbing or cyanosis  Joints, bones, and muscles: Normal     Range of motion: Normal     Stability: Normal     Muscle strength/tone: Normal     Skin   Skin and subcutaneous tissue: Normal without rashes or lesions  Palpation of skin and subcutaneous tissue: Normal turgor  Neurologic   Cranial nerves: Cranial nerves II-XII intact  Cortical function: Normal mental status  Reflexes: 2+ and symmetric  Sensation: No sensory loss  Coordination: Normal finger to nose and heel to shin  Psychiatric   Judgment and insight: Normal     Orientation to person, place, and time: Normal     Recent and remote memory: Intact  Mood and affect: Normal        Future Appointments    Date/Time Provider Specialty Site   10/26/2017 03:40 PM Rosalinda Cherry MD [facility]rology [facility] [address]   09/05/2017 02:00 PM DERECK [facility]   11/07/2017 01:45 PM JUAN Lewis   Dermatology [facility]     Signatures   Electronically signed by : Adwoa Houston NP; Aug 15 2017  3:29PM EST                       (Author) Electronically signed by : Daniele Griffiths MD; Aug 15 2017  5:03PM EST                       (Co-author)

## 2018-01-22 VITALS
WEIGHT: 124.13 LBS | SYSTOLIC BLOOD PRESSURE: 114 MMHG | HEART RATE: 67 BPM | TEMPERATURE: 97.8 F | BODY MASS INDEX: 21.19 KG/M2 | HEIGHT: 64 IN | DIASTOLIC BLOOD PRESSURE: 68 MMHG | OXYGEN SATURATION: 97 %

## 2018-01-22 VITALS
TEMPERATURE: 98.4 F | HEIGHT: 64 IN | WEIGHT: 123.38 LBS | BODY MASS INDEX: 21.06 KG/M2 | OXYGEN SATURATION: 100 % | DIASTOLIC BLOOD PRESSURE: 64 MMHG | SYSTOLIC BLOOD PRESSURE: 118 MMHG | HEART RATE: 64 BPM

## 2018-01-22 VITALS
HEART RATE: 77 BPM | DIASTOLIC BLOOD PRESSURE: 70 MMHG | WEIGHT: 128 LBS | HEIGHT: 64 IN | BODY MASS INDEX: 21.85 KG/M2 | SYSTOLIC BLOOD PRESSURE: 120 MMHG

## 2018-01-22 VITALS
BODY MASS INDEX: 21 KG/M2 | WEIGHT: 123 LBS | TEMPERATURE: 97.8 F | OXYGEN SATURATION: 97 % | HEIGHT: 64 IN | HEART RATE: 66 BPM | DIASTOLIC BLOOD PRESSURE: 66 MMHG | SYSTOLIC BLOOD PRESSURE: 112 MMHG

## 2018-01-25 ENCOUNTER — OFFICE VISIT (OUTPATIENT)
Dept: FAMILY MEDICINE CLINIC | Facility: CLINIC | Age: 73
End: 2018-01-25
Payer: COMMERCIAL

## 2018-01-25 VITALS
HEIGHT: 63 IN | DIASTOLIC BLOOD PRESSURE: 82 MMHG | TEMPERATURE: 98.8 F | BODY MASS INDEX: 21.71 KG/M2 | OXYGEN SATURATION: 98 % | SYSTOLIC BLOOD PRESSURE: 116 MMHG | WEIGHT: 122.5 LBS | HEART RATE: 83 BPM | RESPIRATION RATE: 18 BRPM

## 2018-01-25 DIAGNOSIS — R10.32 LEFT LOWER QUADRANT PAIN: ICD-10-CM

## 2018-01-25 DIAGNOSIS — M81.0 AGE-RELATED OSTEOPOROSIS WITHOUT CURRENT PATHOLOGICAL FRACTURE: Primary | ICD-10-CM

## 2018-01-25 DIAGNOSIS — Z12.11 SCREENING FOR COLON CANCER: ICD-10-CM

## 2018-01-25 DIAGNOSIS — S16.1XXS STRAIN OF NECK MUSCLE, SEQUELA: ICD-10-CM

## 2018-01-25 PROBLEM — R41.840 POOR CONCENTRATION: Status: ACTIVE | Noted: 2017-08-15

## 2018-01-25 PROBLEM — E78.2 MIXED HYPERLIPIDEMIA: Status: ACTIVE | Noted: 2017-09-21

## 2018-01-25 PROBLEM — R41.3 SHORT-TERM MEMORY LOSS: Status: ACTIVE | Noted: 2017-08-15

## 2018-01-25 PROCEDURE — 99214 OFFICE O/P EST MOD 30 MIN: CPT | Performed by: NURSE PRACTITIONER

## 2018-01-25 RX ORDER — CALCIUM CARBONATE/VITAMIN D3 500-10/5ML
1 LIQUID (ML) ORAL DAILY
COMMUNITY

## 2018-01-25 RX ORDER — MAG HYDROX/ALUMINUM HYD/SIMETH 400-400-40
1 SUSPENSION, ORAL (FINAL DOSE FORM) ORAL DAILY
COMMUNITY

## 2018-01-25 RX ORDER — MULTIVITAMIN WITH IRON
TABLET ORAL
COMMUNITY
End: 2018-08-27 | Stop reason: CLARIF

## 2018-01-25 RX ORDER — MAGNESIUM 200 MG
TABLET ORAL 2 TIMES DAILY
Status: CANCELLED | OUTPATIENT
Start: 2018-01-25

## 2018-01-25 RX ORDER — MULTIVIT WITH MINERALS/LUTEIN
TABLET ORAL DAILY
COMMUNITY

## 2018-01-25 RX ORDER — MULTIVIT WITH MINERALS/LUTEIN
1000 TABLET ORAL DAILY
COMMUNITY

## 2018-01-25 NOTE — PROGRESS NOTES
Assessment/Plan:    No problem-specific Assessment & Plan notes found for this encounter  Diagnoses and all orders for this visit:    Age-related osteoporosis without current pathological fracture    Strain of neck muscle, sequela    Left lower quadrant pain    Screening for colon cancer  -     Cancel: Ambulatory referral to Gastroenterology; Future    Other orders  -     Cancel: Vitamin B-12 SUBL; Place under the tongue 2 (two) times a day      Neck: continue Chiopractor and exercise at gym  Osteoporosis- Be sure you are taking OTC calcium 1200mg daily with at last 800 iu Vitamin d3  Left lower quadrant abdominal pain- symptoms are not frequent  Monitor pattern and frequency  Colonoscopy- pt reports she is not due for testing for 10 years since previous  Subjective:      Patient ID: Irene Elam is a 67 y o  female  Pt is here for follow up  She tells me she fell face first about a month ago--at the pet store  She tripped going into the store  She saw Chiro and Massage therapy for whiplash injury  Pt is still having pain on the left side of her neck with pain level "6"  She still goes to he gym  She does feel a pulling sensation of the left side of the neck, depending on her mvmnt  She sees Dr Sweetie Justin 2-3 times a week  Pt having pain LLQ  Feels like a pain, occurs intermittently and unexpectedly  She has had this pain for over a year  Osteoporosis- Between March 2015-2016--3 7 percent decrease in bone mineral density of the left hip (she has brought copies of the report)   She chose to not use prescription medication  Next dexa is due November 2018  Other than this, she feels well  She takes many OTC supplements           The following portions of the patient's history were reviewed and updated as appropriate: allergies, current medications, past family history, past medical history, past social history, past surgical history and problem list     Review of Systems   Constitutional: Negative      HENT: Negative  Respiratory: Negative  Cardiovascular: Negative  Musculoskeletal: Positive for neck pain  Neurological: Negative for dizziness and light-headedness  Pt thinks she has cognitive impairment and stm loss--she sees Neurology         Objective:     Physical Exam   Constitutional: She is oriented to person, place, and time  She appears well-developed and well-nourished  HENT:   Head: Normocephalic and atraumatic  Eyes: Conjunctivae are normal  Pupils are equal, round, and reactive to light  Neck: Normal range of motion  Cardiovascular: Normal rate, regular rhythm and normal heart sounds  Pulmonary/Chest: Effort normal and breath sounds normal    Abdominal: Soft  Bowel sounds are normal    Musculoskeletal: Normal range of motion  Neurological: She is alert and oriented to person, place, and time  Skin: Skin is warm and dry  Psychiatric: She has a normal mood and affect  Her behavior is normal  Judgment and thought content normal    Nursing note and vitals reviewed

## 2018-01-25 NOTE — PATIENT INSTRUCTIONS
Calcium and Osteoporosis   WHAT YOU NEED TO KNOW:   Calcium is important for osteoporosis because calcium helps build bone mass  Osteoporosis is a long-term medical condition that causes your body to break down more bone than it makes  Your bones become weak, brittle, and more likely to fracture  DISCHARGE INSTRUCTIONS:   Follow up with your healthcare provider or dietitian as directed:  Write down your questions so you remember to ask them during your visits  Your calcium needs:   · Women:      ¨ 19 to 50 years: 1,000 mg    ¨ Over 50: 1,200 mg    ¨ Pregnant or breastfeeding, 19 years to 50 years: 1,000 mg    · Men:      ¨ 19 to 70: 1,000 mg    ¨ Over 70: 1,200 mg  Foods that are high in calcium: The following list shows the number of calcium milligrams (mg) per serving  Your dietitian or healthcare provider can help you create a balanced meal plan for your calcium needs  · Dairy:      ¨ 1 cup of low-fat plain yogurt (415 mg) or low-fat fruit yogurt (245 to 384 mg)    ¨ 1½ ounces of shredded cheddar cheese (306 mg) or part skim mozzarella cheese (275 mg)    ¨ 1 cup of skim, 2%, or whole milk (300 mg)    ¨ 1 cup of cottage cheese made with 2% milk fat (138 mg)    ¨ ½ cup of frozen yogurt (103 mg)    · Other foods:      ¨ 1 cup of calcium-fortified orange juice (300 mg)    ¨ ½ cup of cooked trace greens (220 mg)    ¨ 4 canned sardines, with bones (242 mg)    ¨ ½ cup of tofu (with added calcium) (204 mg)  How to get extra calcium:   · Add powdered milk to puddings, cocoa, custard, or hot cereal     · Sift powdered milk into flour when you make cakes, cookies, or breads  · Use low-fat or fat-free milk instead of water in pancake mix, mashed potatoes, pudding, or hot breakfast cereal     · Add low-fat or fat-free cheese to salad, soup, or pasta  · Add tofu (with added calcium) to vegetable stir-soto  · Take calcium supplements if you cannot get enough calcium from the foods you eat   Your body can absorb the most calcium from supplements when you take 500 mg or less at one time  Do not take more than 2,500 mg of calcium supplements each day  © 2017 [employer] Information is for End User's use only and may not be sold, redistributed or otherwise used for commercial purposes  All illustrations and images included in CareNotes® are the copyrighted property of [employer] M [employer]  or [employer]  The above information is an  only  It is not intended as medical advice for individual conditions or treatments  Talk to your doctor, nurse or pharmacist before following any medical regimen to see if it is safe and effective for you

## 2018-02-01 ENCOUNTER — OFFICE VISIT (OUTPATIENT)
Dept: NEUROLOGY | Facility: CLINIC | Age: 73
End: 2018-02-01
Payer: COMMERCIAL

## 2018-02-01 VITALS
WEIGHT: 126 LBS | HEART RATE: 68 BPM | SYSTOLIC BLOOD PRESSURE: 112 MMHG | BODY MASS INDEX: 22.32 KG/M2 | DIASTOLIC BLOOD PRESSURE: 54 MMHG

## 2018-02-01 DIAGNOSIS — M54.2 CERVICALGIA: Primary | ICD-10-CM

## 2018-02-01 DIAGNOSIS — G31.84 MCI (MILD COGNITIVE IMPAIRMENT): ICD-10-CM

## 2018-02-01 DIAGNOSIS — M47.812 SPONDYLOSIS OF CERVICAL REGION WITHOUT MYELOPATHY OR RADICULOPATHY: ICD-10-CM

## 2018-02-01 PROCEDURE — 99214 OFFICE O/P EST MOD 30 MIN: CPT | Performed by: PSYCHIATRY & NEUROLOGY

## 2018-02-01 RX ORDER — VINPOCETINE 100 %
40 POWDER (GRAM) MISCELLANEOUS
COMMUNITY
End: 2018-08-27 | Stop reason: CLARIF

## 2018-02-01 RX ORDER — MELOXICAM 7.5 MG/1
7.5 TABLET ORAL 2 TIMES DAILY PRN
Qty: 60 TABLET | Refills: 1 | Status: SHIPPED | OUTPATIENT
Start: 2018-02-01 | End: 2018-08-23

## 2018-02-01 RX ORDER — AMOXICILLIN 500 MG
1 CAPSULE ORAL DAILY
COMMUNITY

## 2018-02-01 RX ORDER — CYCLOBENZAPRINE HCL 5 MG
5 TABLET ORAL
Qty: 30 TABLET | Refills: 1 | Status: SHIPPED | OUTPATIENT
Start: 2018-02-01 | End: 2018-04-10

## 2018-02-01 RX ORDER — ACETYLCARNITINE 500 MG
2 CAPSULE ORAL
COMMUNITY
End: 2018-08-27 | Stop reason: CLARIF

## 2018-02-01 NOTE — PROGRESS NOTES
Progress Note - Neurology   Dhara Yañez 67 y o  female MRN: [id number]  Unit/Bed#:  Encounter: [id number]      Subjective:   Patient is here for a follow-up visit with a history of neck pain subsequent to a slip and fall injury and since her last visit has had worsening neck pain with difficulty maneuvering her left shoulder and arm at times in spite of going for chiropractic treatment as well as massage therapy  She did have an MRI of the cervical spine done in the interim  Which showed evidence of multilevel degenerative spondylitic changes with disc osteophytic complexes, multilevel neural foraminal stenosis more prominent on the left than the right at C4-C5  Patient denies any motor or sensory symptoms in the upper lower extremities or any bladder bowel symptoms  From the cognitive standpoint she has remained stable  ROS: 12 system cued query was unchanged from org  consult note  Vitals:   Vitals:    02/01/18 1531   BP: 112/54   Pulse: 68   ,Body mass index is 22 32 kg/m²      MEDS:   Current Outpatient Prescriptions:     Acetylcarnitine HCl (ACETYL L-CARNITINE) 500 MG CAPS, Take 2 capsules by mouth, Disp: , Rfl:     Alpha-Lipoic Acid (LIPOIC ACID PO), Take 1 capsule by mouth, Disp: , Rfl:     Amino Acids (L-CARNITINE PO), Take 1 tablet by mouth, Disp: , Rfl:     Ascorbic Acid (VITAMIN C) 100 MG tablet, Take by mouth, Disp: , Rfl:     Cholecalciferol (VITAMIN D3) 5000 units CAPS, Take 1 capsule by mouth daily, Disp: , Rfl:     Glycerylphosphorylcholine (ALPHA-GPC 50%) POWD, 1 tablet by Does not apply route, Disp: , Rfl:     Omega-3 Fatty Acids (FISH OIL) 1000 MG CPDR, Take 2 5 capsules by mouth, Disp: , Rfl:     pyridoxine (VITAMIN B6) 100 mg tablet, Take by mouth, Disp: , Rfl:     Vinpocetine POWD, 40 mg by Does not apply route, Disp: , Rfl:     vitamin E 600 UNIT capsule, Take by mouth, Disp: , Rfl:     Zinc 30 MG CAPS, Take 1 capsule by mouth daily, Disp: , Rfl:   :  Social History Social History Narrative    Per [employer]:     Self-employed          Physical Exam:  General appearance: alert, appears stated age and cooperative  Head: Normocephalic, without obvious abnormality, atraumatic    Neurologic:  On neurological examination she has evidence of severe cervical paraspinal tenderness, spasm along the trapezius muscles bilaterally, with difficulty with maneuvering her left arm due to pain in her neck  Patient otherwise is alert awake oriented, Pancho cognitive assessment scale reveals a score of 29/30, cranial nerve examination remains preserved, and on motor and sensory exam there is no evidence of any weakness in the upper lower extremities, deep tendon reflexes are hypoactive throughout and no sensory loss was noted in the extremities to pinprick and light touch  No evidence of any dysmetria and her gait was normal based  Lab Results: I have personally reviewed pertinent reports  Imaging Studies: I have personally reviewed pertinent reports  Assessment:  1  Worsening cervical strain with cervical spondylosis , aggravated by her slip and fall injury  2  Mild cognitive impairment, subjective  Plan:  Patient is advised to continue chiropractic treatments and Massage therapy at this time, and will also prescribe her Mobic 7 5 mg twice a day along with Flexeril 5 mg at bedtime  She is advised to return back to see me in 2 months  2/1/2018,3:51 PM    Dictation voice to text software has been used in the creation of this document  Please consider this in light of any contextual or grammatical errors

## 2018-02-22 ENCOUNTER — OFFICE VISIT (OUTPATIENT)
Dept: FAMILY MEDICINE CLINIC | Facility: CLINIC | Age: 73
End: 2018-02-22
Payer: COMMERCIAL

## 2018-02-22 VITALS
TEMPERATURE: 100.1 F | OXYGEN SATURATION: 98 % | WEIGHT: 132 LBS | DIASTOLIC BLOOD PRESSURE: 80 MMHG | SYSTOLIC BLOOD PRESSURE: 132 MMHG | RESPIRATION RATE: 18 BRPM | HEIGHT: 63 IN | HEART RATE: 86 BPM | BODY MASS INDEX: 23.39 KG/M2

## 2018-02-22 DIAGNOSIS — J10.1 INFLUENZA B: ICD-10-CM

## 2018-02-22 DIAGNOSIS — R68.89 FLU-LIKE SYMPTOMS: Primary | ICD-10-CM

## 2018-02-22 LAB
SL AMB POCT RAPID FLU A: NEGATIVE
SL AMB POCT RAPID FLU B: POSITIVE

## 2018-02-22 PROCEDURE — 99214 OFFICE O/P EST MOD 30 MIN: CPT | Performed by: NURSE PRACTITIONER

## 2018-02-22 PROCEDURE — 87804 INFLUENZA ASSAY W/OPTIC: CPT | Performed by: NURSE PRACTITIONER

## 2018-02-22 RX ORDER — TRETINOIN 0.05 G/100G
GEL TOPICAL
COMMUNITY
End: 2019-10-09 | Stop reason: ALTCHOICE

## 2018-02-22 RX ORDER — BROMPHENIRAMINE MALEATE, PSEUDOEPHEDRINE HYDROCHLORIDE, AND DEXTROMETHORPHAN HYDROBROMIDE 2; 30; 10 MG/5ML; MG/5ML; MG/5ML
5 SYRUP ORAL 4 TIMES DAILY PRN
Qty: 120 ML | Refills: 0 | Status: SHIPPED | OUTPATIENT
Start: 2018-02-22 | End: 2018-08-23

## 2018-02-22 RX ORDER — VITAMIN E ACET./SAFFLOWER OIL
OIL (ML) TOPICAL
COMMUNITY
End: 2019-04-01 | Stop reason: SDUPTHER

## 2018-02-22 RX ORDER — ALPHA LIPOIC ACID 150 MG
CAPSULE ORAL
COMMUNITY
End: 2019-04-01 | Stop reason: SDUPTHER

## 2018-02-22 RX ORDER — ADHESIVE TAPE 3"X 2.3 YD
TAPE, NON-MEDICATED TOPICAL
COMMUNITY
End: 2019-04-01 | Stop reason: SDUPTHER

## 2018-02-22 RX ORDER — ASCORBIC ACID 125 MG
TABLET,CHEWABLE ORAL
COMMUNITY

## 2018-02-22 RX ORDER — TURMERIC 100 %
POWDER (GRAM) MISCELLANEOUS
COMMUNITY

## 2018-02-22 RX ORDER — UREA 10 %
LOTION (ML) TOPICAL EVERY OTHER DAY
COMMUNITY

## 2018-02-22 RX ORDER — ACETYLCARNITINE 500 MG
CAPSULE ORAL EVERY MORNING
COMMUNITY

## 2018-02-22 RX ORDER — MAGNESIUM AMINO ACID CHELATE 100 MG
TABLET ORAL DAILY
COMMUNITY

## 2018-02-22 RX ORDER — VINPOCETINE 100 %
POWDER (GRAM) MISCELLANEOUS
COMMUNITY

## 2018-02-22 RX ORDER — MULTIVITAMIN WITH IRON
TABLET ORAL
COMMUNITY

## 2018-02-22 RX ORDER — UBIDECARENONE/VIT E ACET 100MG-5
CAPSULE ORAL
COMMUNITY

## 2018-02-22 NOTE — PATIENT INSTRUCTIONS
Rest  Increase fluids  Influenza   AMBULATORY CARE:   Influenza  (the flu) is an infection caused by the influenza virus  The flu is easily spread when an infected person coughs, sneezes, or has close contact with others  You may be able to spread the flu to others for 1 week or longer after signs or symptoms appear  Common signs and symptoms include the following:   · Fever and chills    · Headaches, body aches, and muscle or joint pain    · Cough, runny nose, and sore throat    · Loss of appetite, nausea, vomiting, or diarrhea    · Tiredness    · Trouble breathing  Call 911 for any of the following:   · You have trouble breathing, and your lips look purple or blue  · You have a seizure  Seek care immediately if:   · You are dizzy, or you are urinating less or not at all  · You have a headache with a stiff neck, and you feel tired or confused  · You have new pain or pressure in your chest     · Your symptoms, such as shortness of breath, vomiting, or diarrhea, get worse  · Your symptoms, such as fever and coughing, seem to get better, but then get worse  Contact your healthcare provider if:   · You have new muscle pain or weakness  · You have questions or concerns about your condition or care  Treatment for influenza  may include any of the following:  · Acetaminophen  decreases pain and fever  It is available without a doctor's order  Ask how much to take and how often to take it  Follow directions  Acetaminophen can cause liver damage if not taken correctly  · NSAIDs , such as ibuprofen, help decrease swelling, pain, and fever  This medicine is available with or without a doctor's order  NSAIDs can cause stomach bleeding or kidney problems in certain people  If you take blood thinner medicine, always ask your healthcare provider if NSAIDs are safe for you  Always read the medicine label and follow directions  · Antivirals  help fight a viral infection    Manage your symptoms: · Rest  as much as you can to help you recover  · Drink liquids as directed  to help prevent dehydration  Ask how much liquid to drink each day and which liquids are best for you  Prevent the spread of the flu:   · Wash your hands often  Use soap and water  Wash your hands after you use the bathroom, change a child's diapers, or sneeze  Wash your hands before you prepare or eat food  Use gel hand cleanser when soap and water are not available  Do not touch your eyes, nose, or mouth unless you have washed your hands first            · Cover your mouth when you sneeze or cough  Cough into a tissue or the bend of your arm  · Clean shared items with a germ-killing   Clean table surfaces, doorknobs, and light switches  Do not share towels, silverware, and dishes with people who are sick  Wash bed sheets, towels, silverware, and dishes with soap and water  · Wear a mask  over your mouth and nose if you are sick or are near anyone who is sick  · Stay away from others  if you are sick  · Influenza vaccine  helps prevent influenza (flu)  Everyone older than 6 months should get a yearly influenza vaccine  Get the vaccine as soon as it is available, usually in September or October each year  Follow up with your healthcare provider as directed:  Write down your questions so you remember to ask them during your visits  © 2017 [employer] Information is for End User's use only and may not be sold, redistributed or otherwise used for commercial purposes  All illustrations and images included in CareNotes® are the copyrighted property of [employer]  or [employer]  The above information is an  only  It is not intended as medical advice for individual conditions or treatments  Talk to your doctor, nurse or pharmacist before following any medical regimen to see if it is safe and effective for you

## 2018-02-22 NOTE — PROGRESS NOTES
Assessment/Plan:    No problem-specific Assessment & Plan notes found for this encounter  Diagnoses and all orders for this visit:    Flu-like symptoms  -     POCT rapid flu A and B  -     brompheniramine-pseudoephedrine-DM 30-2-10 MG/5ML syrup; Take 5 mL by mouth 4 (four) times a day as needed for allergies    Influenza B    Other orders  -     Menaquinone-7 (VITAMIN K2) 100 MCG CAPS;   -     Vit D-Vit E-Safflower Oil (VITAMINS E & D BEAUTY OIL) 400-50391 UNIT/52ML OIL; 1 cap(s)  -     vitamin B-12 (CYANOCOBALAMIN) 100 MCG tablet; Take by mouth  -     Vinpocetine POWD; by Does not apply route  -     Turmeric POWD; 1  -     tretinoin (ALTRALIN) 0 05 %; 1 caitie  -     Resveratrol 50 MG CAPS; Take by mouth  -     pyridoxine (VITAMIN B6) 100 mg tablet; 1 tab(s)  -     Probiotic Product (PROBIOTIC-10) CAPS; Take by mouth  -     Misc Natural Products (PANTETHINE PLUS) TABS; Take by mouth  -     DOCOSAHEXAENOIC ACID PO; 1 cap(s)  -     Magnesium Oxide 200 MG TABS; 1  -     Lipoic Acid 150 MG CAPS; Take by mouth  -     Cholecalciferol 63631 units TABS; 1 tab(s)  -     Acetylcarnitine HCl (ACETYL L-CARNITINE) 500 MG CAPS; Take by mouth  -     Calcium Carb-Cholecalciferol (CALCIUM 1000 + D) 1000-800 MG-UNIT TABS; Take by mouth  -     Chelated Magnesium 100 MG TABS; Take by mouth Daily          Subjective:      Patient ID: Harlan Osborne is a 68 y o  female  Pt is here to discuss cough and cold symptoms for two days  She has had low grade fever  She has itchy, scratchy throat, ear fullness, and  Sinus congestion with slight headache        The following portions of the patient's history were reviewed and updated as appropriate:   She  has a past medical history of Changing skin lesion; Mixed hyperlipidemia; Nonmelanoma skin cancer; Osteoporosis; Polio; and Poor concentration    She   Patient Active Problem List    Diagnosis Date Noted    Flu-like symptoms 02/22/2018    Influenza B 02/22/2018    Mixed hyperlipidemia 09/21/2017    Poor concentration 08/15/2017    Short-term memory loss 08/15/2017    Osteoporosis 12/17/2013     She  has a past surgical history that includes Anal fissurectomy; Appendectomy; Hysterectomy; and Cataract extraction  Her family history includes Cancer in her father; Heart attack in her mother and sister  She  reports that she has quit smoking  She has never used smokeless tobacco  She reports that she drinks alcohol  She reports that she does not use drugs    Current Outpatient Prescriptions   Medication Sig Dispense Refill    Acetylcarnitine HCl (ACETYL L-CARNITINE) 500 MG CAPS Take 2 capsules by mouth      Acetylcarnitine HCl (ACETYL L-CARNITINE) 500 MG CAPS Take by mouth      Alpha-Lipoic Acid (LIPOIC ACID PO) Take 1 capsule by mouth      Amino Acids (L-CARNITINE PO) Take 1 tablet by mouth      Ascorbic Acid (VITAMIN C) 100 MG tablet Take by mouth      brompheniramine-pseudoephedrine-DM 30-2-10 MG/5ML syrup Take 5 mL by mouth 4 (four) times a day as needed for allergies 120 mL 0    Calcium Carb-Cholecalciferol (CALCIUM 1000 + D) 1000-800 MG-UNIT TABS Take by mouth      Chelated Magnesium 100 MG TABS Take by mouth Daily      Cholecalciferol (VITAMIN D3) 5000 units CAPS Take 1 capsule by mouth daily      Cholecalciferol 37683 units TABS 1 tab(s)      cyclobenzaprine (FLEXERIL) 5 mg tablet Take 1 tablet (5 mg total) by mouth daily at bedtime for 30 days 30 tablet 1    DOCOSAHEXAENOIC ACID PO 1 cap(s)      Glycerylphosphorylcholine (ALPHA-GPC 50%) POWD 1 tablet by Does not apply route      Lipoic Acid 150 MG CAPS Take by mouth      Magnesium Oxide 200 MG TABS 1      meloxicam (MOBIC) 7 5 mg tablet Take 1 tablet (7 5 mg total) by mouth 2 (two) times a day as needed for moderate pain for up to 60 days 60 tablet 1    Menaquinone-7 (VITAMIN K2) 100 MCG CAPS       Misc Natural Products (PANTETHINE PLUS) TABS Take by mouth      Omega-3 Fatty Acids (FISH OIL) 1000 MG CPDR Take 2 5 capsules by mouth      Probiotic Product (PROBIOTIC-10) CAPS Take by mouth      pyridoxine (VITAMIN B6) 100 mg tablet Take by mouth      pyridoxine (VITAMIN B6) 100 mg tablet 1 tab(s)      Resveratrol 50 MG CAPS Take by mouth      tretinoin (ALTRALIN) 0 05 % 1 caitie      Turmeric POWD 1      Vinpocetine POWD 40 mg by Does not apply route      Vinpocetine POWD by Does not apply route      Vit D-Vit E-Safflower Oil (VITAMINS E & D BEAUTY OIL) 400-59688 UNIT/52ML OIL 1 cap(s)      vitamin B-12 (CYANOCOBALAMIN) 100 MCG tablet Take by mouth      vitamin E 600 UNIT capsule Take by mouth      Zinc 30 MG CAPS Take 1 capsule by mouth daily       No current facility-administered medications for this visit  Current Outpatient Prescriptions on File Prior to Visit   Medication Sig    Acetylcarnitine HCl (ACETYL L-CARNITINE) 500 MG CAPS Take 2 capsules by mouth    Alpha-Lipoic Acid (LIPOIC ACID PO) Take 1 capsule by mouth    Amino Acids (L-CARNITINE PO) Take 1 tablet by mouth    Ascorbic Acid (VITAMIN C) 100 MG tablet Take by mouth    Cholecalciferol (VITAMIN D3) 5000 units CAPS Take 1 capsule by mouth daily    cyclobenzaprine (FLEXERIL) 5 mg tablet Take 1 tablet (5 mg total) by mouth daily at bedtime for 30 days    Glycerylphosphorylcholine (ALPHA-GPC 50%) POWD 1 tablet by Does not apply route    meloxicam (MOBIC) 7 5 mg tablet Take 1 tablet (7 5 mg total) by mouth 2 (two) times a day as needed for moderate pain for up to 60 days    Omega-3 Fatty Acids (FISH OIL) 1000 MG CPDR Take 2 5 capsules by mouth    pyridoxine (VITAMIN B6) 100 mg tablet Take by mouth    Vinpocetine POWD 40 mg by Does not apply route    vitamin E 600 UNIT capsule Take by mouth    Zinc 30 MG CAPS Take 1 capsule by mouth daily     No current facility-administered medications on file prior to visit  She is allergic to atomoxetine hcl and sulfa antibiotics       Review of Systems   Constitutional: Positive for fever   Negative for fatigue  HENT: Positive for congestion, ear pain, postnasal drip, sinus pain, sinus pressure and sore throat  Respiratory: Positive for cough  Negative for chest tightness and shortness of breath  Cardiovascular: Negative for chest pain and palpitations  Gastrointestinal: Negative for abdominal pain  Skin: Negative for color change, pallor and rash  Allergic/Immunologic: Negative for immunocompromised state  Neurological: Negative for headaches  Hematological: Negative for adenopathy  Objective:      /80 (BP Location: Left arm, Patient Position: Sitting)   Pulse 86   Temp 100 1 °F (37 8 °C)   Resp 18   Ht 5' 3" (1 6 m)   Wt 59 9 kg (132 lb)   SpO2 98%   BMI 23 38 kg/m²          Physical Exam   Constitutional: She is oriented to person, place, and time  She appears well-developed and well-nourished  No distress  Appears mildly ill   HENT:   Head: Normocephalic and atraumatic  Right Ear: External ear normal    Left Ear: External ear normal    Nose: Nose normal    Mouth/Throat: Oropharynx is clear and moist  No oropharyngeal exudate  Clear rhinorrhea   Eyes: Conjunctivae and EOM are normal  Pupils are equal, round, and reactive to light  Neck: Normal range of motion  Neck supple  Cardiovascular: Normal rate, regular rhythm and normal heart sounds  Exam reveals no gallop and no friction rub  No murmur heard  Pulmonary/Chest: Effort normal and breath sounds normal  No respiratory distress  She has no wheezes  She has no rales  Abdominal: Soft  Musculoskeletal: Normal range of motion  She exhibits edema  ble edema   Lymphadenopathy:     She has no cervical adenopathy  Neurological: She is alert and oriented to person, place, and time  Skin: Skin is warm and dry  No rash noted  She is not diaphoretic  Psychiatric: She has a normal mood and affect  Her behavior is normal  Judgment and thought content normal    Nursing note and vitals reviewed

## 2018-02-26 ENCOUNTER — OFFICE VISIT (OUTPATIENT)
Dept: FAMILY MEDICINE CLINIC | Facility: CLINIC | Age: 73
End: 2018-02-26
Payer: COMMERCIAL

## 2018-02-26 VITALS
RESPIRATION RATE: 18 BRPM | OXYGEN SATURATION: 98 % | DIASTOLIC BLOOD PRESSURE: 72 MMHG | TEMPERATURE: 98.4 F | HEIGHT: 63 IN | BODY MASS INDEX: 22.57 KG/M2 | HEART RATE: 62 BPM | WEIGHT: 127.38 LBS | SYSTOLIC BLOOD PRESSURE: 114 MMHG

## 2018-02-26 DIAGNOSIS — R10.9 FLANK PAIN: Primary | ICD-10-CM

## 2018-02-26 LAB
SL AMB  POCT GLUCOSE, UA: NEGATIVE
SL AMB LEUKOCYTE ESTERASE,UA: NEGATIVE
SL AMB POCT BILIRUBIN,UA: NEGATIVE
SL AMB POCT BLOOD,UA: NEGATIVE
SL AMB POCT CLARITY,UA: CLEAR
SL AMB POCT COLOR,UA: NORMAL
SL AMB POCT KETONES,UA: 15
SL AMB POCT NITRITE,UA: NEGATIVE
SL AMB POCT PH,UA: 5
SL AMB POCT SPECIFIC GRAVITY,UA: 1
SL AMB POCT URINE PROTEIN: NEGATIVE
SL AMB POCT UROBILINOGEN: 0.2

## 2018-02-26 PROCEDURE — 81002 URINALYSIS NONAUTO W/O SCOPE: CPT | Performed by: NURSE PRACTITIONER

## 2018-02-26 PROCEDURE — 99214 OFFICE O/P EST MOD 30 MIN: CPT | Performed by: NURSE PRACTITIONER

## 2018-02-26 NOTE — PROGRESS NOTES
Assessment/Plan:    No problem-specific Assessment & Plan notes found for this encounter  Diagnoses and all orders for this visit:    Flank pain  -     POCT urine dip          Subjective:      Patient ID: Amber Santos is a 68 y o  female  Pt was seen last week and treated for Flu  She is here for reports of low back pain  She woke up during the night last night with pain of both flanks  She has been consuming adequate liquids  Motrin does help with pain  No further cough  She wakes up with it in am and it goes away during day and returns at night  Swelling in legs has resolved since she started using compression stockings as recommended by her Chiro    Pt is still ocncerned about her memory  She beleives she has short term memory loss  She has been seen by Neurology  She wants to come back to the office here next week to discuss other testing she would like to have done  The following portions of the patient's history were reviewed and updated as appropriate:   She  has a past medical history of Changing skin lesion; Mixed hyperlipidemia; Nonmelanoma skin cancer; Osteoporosis; Polio; and Poor concentration  She   Patient Active Problem List    Diagnosis Date Noted    Flank pain 02/26/2018    Flu-like symptoms 02/22/2018    Influenza B 02/22/2018    Mixed hyperlipidemia 09/21/2017    Poor concentration 08/15/2017    Short-term memory loss 08/15/2017    Osteoporosis 12/17/2013     She  has a past surgical history that includes Anal fissurectomy; Appendectomy; Hysterectomy; and Cataract extraction  Her family history includes Cancer in her father; Heart attack in her mother and sister  She  reports that she has quit smoking  She has never used smokeless tobacco  She reports that she drinks alcohol  She reports that she does not use drugs    Current Outpatient Prescriptions   Medication Sig Dispense Refill    Acetylcarnitine HCl (ACETYL L-CARNITINE) 500 MG CAPS Take 2 capsules by mouth  Acetylcarnitine HCl (ACETYL L-CARNITINE) 500 MG CAPS Take by mouth      Alpha-Lipoic Acid (LIPOIC ACID PO) Take 1 capsule by mouth      Amino Acids (L-CARNITINE PO) Take 1 tablet by mouth      Ascorbic Acid (VITAMIN C) 100 MG tablet Take by mouth      brompheniramine-pseudoephedrine-DM 30-2-10 MG/5ML syrup Take 5 mL by mouth 4 (four) times a day as needed for allergies 120 mL 0    Calcium Carb-Cholecalciferol (CALCIUM 1000 + D) 1000-800 MG-UNIT TABS Take by mouth      Chelated Magnesium 100 MG TABS Take by mouth Daily      Cholecalciferol (VITAMIN D3) 5000 units CAPS Take 1 capsule by mouth daily      Cholecalciferol 54694 units TABS 1 tab(s)      cyclobenzaprine (FLEXERIL) 5 mg tablet Take 1 tablet (5 mg total) by mouth daily at bedtime for 30 days 30 tablet 1    DOCOSAHEXAENOIC ACID PO 1 cap(s)      Glycerylphosphorylcholine (ALPHA-GPC 50%) POWD 1 tablet by Does not apply route      Lipoic Acid 150 MG CAPS Take by mouth      Magnesium Oxide 200 MG TABS 1      meloxicam (MOBIC) 7 5 mg tablet Take 1 tablet (7 5 mg total) by mouth 2 (two) times a day as needed for moderate pain for up to 60 days 60 tablet 1    Menaquinone-7 (VITAMIN K2) 100 MCG CAPS       Misc Natural Products (PANTETHINE PLUS) TABS Take by mouth      Omega-3 Fatty Acids (FISH OIL) 1000 MG CPDR Take 2 5 capsules by mouth      Probiotic Product (PROBIOTIC-10) CAPS Take by mouth      pyridoxine (VITAMIN B6) 100 mg tablet Take by mouth      pyridoxine (VITAMIN B6) 100 mg tablet 1 tab(s)      Resveratrol 50 MG CAPS Take by mouth      tretinoin (ALTRALIN) 0 05 % 1 caitie      Turmeric POWD 1      Vinpocetine POWD 40 mg by Does not apply route      Vinpocetine POWD by Does not apply route      Vit D-Vit E-Safflower Oil (VITAMINS E & D BEAUTY OIL) 400-96176 UNIT/52ML OIL 1 cap(s)      vitamin B-12 (CYANOCOBALAMIN) 100 MCG tablet Take by mouth      vitamin E 600 UNIT capsule Take by mouth      Zinc 30 MG CAPS Take 1 capsule by mouth daily       No current facility-administered medications for this visit        Current Outpatient Prescriptions on File Prior to Visit   Medication Sig    Acetylcarnitine HCl (ACETYL L-CARNITINE) 500 MG CAPS Take 2 capsules by mouth    Acetylcarnitine HCl (ACETYL L-CARNITINE) 500 MG CAPS Take by mouth    Alpha-Lipoic Acid (LIPOIC ACID PO) Take 1 capsule by mouth    Amino Acids (L-CARNITINE PO) Take 1 tablet by mouth    Ascorbic Acid (VITAMIN C) 100 MG tablet Take by mouth    brompheniramine-pseudoephedrine-DM 30-2-10 MG/5ML syrup Take 5 mL by mouth 4 (four) times a day as needed for allergies    Calcium Carb-Cholecalciferol (CALCIUM 1000 + D) 1000-800 MG-UNIT TABS Take by mouth    Chelated Magnesium 100 MG TABS Take by mouth Daily    Cholecalciferol (VITAMIN D3) 5000 units CAPS Take 1 capsule by mouth daily    Cholecalciferol 01403 units TABS 1 tab(s)    cyclobenzaprine (FLEXERIL) 5 mg tablet Take 1 tablet (5 mg total) by mouth daily at bedtime for 30 days    DOCOSAHEXAENOIC ACID PO 1 cap(s)    Glycerylphosphorylcholine (ALPHA-GPC 50%) POWD 1 tablet by Does not apply route    Lipoic Acid 150 MG CAPS Take by mouth    Magnesium Oxide 200 MG TABS 1    meloxicam (MOBIC) 7 5 mg tablet Take 1 tablet (7 5 mg total) by mouth 2 (two) times a day as needed for moderate pain for up to 60 days    Menaquinone-7 (VITAMIN K2) 100 MCG CAPS     Misc Natural Products (PANTETHINE PLUS) TABS Take by mouth    Omega-3 Fatty Acids (FISH OIL) 1000 MG CPDR Take 2 5 capsules by mouth    Probiotic Product (PROBIOTIC-10) CAPS Take by mouth    pyridoxine (VITAMIN B6) 100 mg tablet Take by mouth    pyridoxine (VITAMIN B6) 100 mg tablet 1 tab(s)    Resveratrol 50 MG CAPS Take by mouth    tretinoin (ALTRALIN) 0 05 % 1 caitie    Turmeric POWD 1    Vinpocetine POWD 40 mg by Does not apply route    Vinpocetine POWD by Does not apply route    Vit D-Vit E-Safflower Oil (VITAMINS E & D BEAUTY OIL) 400-72634 UNIT/52ML OIL 1 cap(s)    vitamin B-12 (CYANOCOBALAMIN) 100 MCG tablet Take by mouth    vitamin E 600 UNIT capsule Take by mouth    Zinc 30 MG CAPS Take 1 capsule by mouth daily     No current facility-administered medications on file prior to visit  She is allergic to atomoxetine hcl and sulfa antibiotics       Review of Systems   Constitutional: Negative for fatigue and fever  HENT: Negative  Respiratory: Negative  Cardiovascular: Negative  Genitourinary: Positive for flank pain  Negative for difficulty urinating, frequency and genital sores  Musculoskeletal:        Neck pain from previous fall Nov 28, 2017   Allergic/Immunologic: Negative for immunocompromised state  Neurological:        Memory deficit, short term   Hematological: Negative for adenopathy  Does not bruise/bleed easily  All other systems reviewed and are negative  Objective:      /72 (BP Location: Left arm, Patient Position: Sitting)   Pulse 62   Temp 98 4 °F (36 9 °C)   Resp 18   Ht 5' 3" (1 6 m)   Wt 57 8 kg (127 lb 6 oz)   SpO2 98%   BMI 22 56 kg/m²          Physical Exam   Constitutional: She is oriented to person, place, and time  She appears well-developed and well-nourished  No distress  HENT:   Head: Normocephalic and atraumatic  Right Ear: External ear normal    Left Ear: External ear normal    Nose: Nose normal    Mouth/Throat: Oropharynx is clear and moist  No oropharyngeal exudate  Eyes: Conjunctivae and EOM are normal  Pupils are equal, round, and reactive to light  Neck: Normal range of motion  Neck supple  Cardiovascular: Normal rate, regular rhythm and normal heart sounds  Exam reveals no gallop and no friction rub  No murmur heard  Pulmonary/Chest: Effort normal and breath sounds normal  No respiratory distress  She has no wheezes  She has no rales  Abdominal: Soft     Genitourinary:   Genitourinary Comments: + right flank pain to light percussion   Musculoskeletal: Normal range of motion  She exhibits edema  Edema is improved   Lymphadenopathy:     She has no cervical adenopathy  Neurological: She is alert and oriented to person, place, and time  Skin: Skin is warm and dry  No rash noted  She is not diaphoretic  Psychiatric: She has a normal mood and affect  Her behavior is normal  Judgment and thought content normal    Nursing note and vitals reviewed

## 2018-02-26 NOTE — PATIENT INSTRUCTIONS
Right flank pain- use Motrin for pain  Apply warm moist heat for comfort  Urinalysis is wnl     OK for Chiropractor

## 2018-03-08 ENCOUNTER — OFFICE VISIT (OUTPATIENT)
Dept: FAMILY MEDICINE CLINIC | Facility: CLINIC | Age: 73
End: 2018-03-08
Payer: COMMERCIAL

## 2018-03-08 VITALS
RESPIRATION RATE: 18 BRPM | HEART RATE: 75 BPM | OXYGEN SATURATION: 100 % | SYSTOLIC BLOOD PRESSURE: 112 MMHG | DIASTOLIC BLOOD PRESSURE: 72 MMHG | WEIGHT: 129 LBS | BODY MASS INDEX: 22.86 KG/M2 | HEIGHT: 63 IN | TEMPERATURE: 98.2 F

## 2018-03-08 DIAGNOSIS — R51.9 ACUTE NONINTRACTABLE HEADACHE, UNSPECIFIED HEADACHE TYPE: ICD-10-CM

## 2018-03-08 DIAGNOSIS — E78.2 MIXED HYPERLIPIDEMIA: ICD-10-CM

## 2018-03-08 DIAGNOSIS — E55.9 VITAMIN D DEFICIENCY: ICD-10-CM

## 2018-03-08 DIAGNOSIS — M81.0 AGE-RELATED OSTEOPOROSIS WITHOUT CURRENT PATHOLOGICAL FRACTURE: ICD-10-CM

## 2018-03-08 DIAGNOSIS — R41.840 POOR CONCENTRATION: ICD-10-CM

## 2018-03-08 DIAGNOSIS — R41.3 SHORT-TERM MEMORY LOSS: Primary | ICD-10-CM

## 2018-03-08 PROCEDURE — 99214 OFFICE O/P EST MOD 30 MIN: CPT | Performed by: NURSE PRACTITIONER

## 2018-03-08 NOTE — PATIENT INSTRUCTIONS
Memory Loss in Older Adults   AMBULATORY CARE:   Some memory loss  is common with aging  You may have sharp long-term memories from many years ago but have trouble remembering new information  Normal memory loss does not get worse and does not affect daily activities  Memory loss that gets worse over time or affects daily activities can be a sign of a serious medical problem, such as Alzheimer disease  Talk with your healthcare provider if you or someone close to you notices that your memory is worsening  Signs and symptoms of memory loss that may happen with aging:   · Not remembering where you put your keys or glasses    · Trouble recalling a familiar person's name, but then remembering it    · Trouble remembering why you walked into a room    · Missing an appointment because you forgot about it    · Forgetting someone's birthday or an anniversary  Signs and symptoms of severe memory loss: The following may be signs of a more serious health problem that needs treatment:  · Not knowing how to do something you used to do    · Trouble learning new facts, or trouble learning skills that need you to remember steps    · Trouble following directions, or getting lost, even in an area you know    · Not remembering if you took your medicine or finished a task    · Not being able to remember events from your past, such as a trip you took    · Thinking events that happened years ago happened recently    · Forgetting to bathe, brush your teeth, or do other daily care tasks    · Not recognizing a family member or friend you have known a long time  You or someone close to you should contact your healthcare provider if:   · You have new, sudden, or worsening memory problems  · You have questions or concerns about your condition or care  Follow up with your healthcare provider as directed: You may need to have regular memory tests to check for new or worsening problems   Write down your questions so you remember to ask them during your visits  Manage memory loss:  Some memory loss cannot be treated, but you may be able to stop it from getting worse  Your healthcare provider may need to stop or change certain medicines you are taking, or change the dose  The provider may also recommend vitamins or supplements to help improve your memory  The following are ways to help manage memory loss:  · Ask someone to help you if needed  Ask the person to help you create lists of things you need to do or set up medicine reminders  The person might be able to call you to remind you of an upcoming task, event, or anniversary  · Find a place for items you use often  You may be able to remember where your keys, wallet, glasses, or other items are if you create a place for each item  It may also help if you say that you are returning an item to its place  This may trigger your memory later when you are looking for the item  · Set up reminders  Calendars, timers, or alarm clocks can help you remember to do tasks such as taking your medicine  Some medicine dispensers can be set to sound an alarm when it is time to take the medicine  Containers are also available that are labelled for each day of the week  These containers can help you remember if you need to take the medicine or already took it  You may be able to set up reminders with your bank to help you remember to pay your bills on time  · Write down anything you need to remember  Examples include upcoming healthcare appointments and medication refills  Put the reminders where you will see them, such as on your bathroom mirror or refrigerator  You may want to make a list of things you need to do each day  You can cross the item off when the task is finished  · Create a quiet learning environment  This may help you remember new information more easily  Try to find a place where you will not be distracted by noise, light, or other people   Go slowly and repeat the information to help you remember  Prevent your memory loss from getting worse:   · Play brain games  Games such as crossword puzzles, jigsaw puzzles, or math games can help sharpen your memory  · Spend time with other people  This can help strengthen your memory, especially if you talk about past or upcoming events  · Take a class to learn something new  This will help you think in new ways and make your memory work harder  · Eat a variety of healthy foods  Healthy foods include fruits, vegetables, low-fat dairy products, lean meats, fish, whole-grain breads, and cooked beans  Eat more foods with high amounts of omega-3 fatty acids  Examples include salmon, tuna, walnuts, and flaxseeds  Ask your healthcare provider for a list of foods that contain fatty acids and how much you should eat each day  · Exercise regularly  Exercise improves blood flow and can help you think and remember more easily  Most healthy adults should try to get at least 30 minutes of exercise on most days of the week  Ask your healthcare provider how much exercise you need and which exercises are best for you  · Create a sleep routine  Try to go to bed and wake up at the same times each day  Sleep is important for memory  Talk to your healthcare provider if you are having trouble sleeping  · Do not smoke  Nicotine and other chemicals in cigarettes and cigars can reduce the amount of oxygen going to your brain  This can make memory problems worse  Ask your healthcare provider for information if you currently smoke and need help to quit  E-cigarettes or smokeless tobacco still contain nicotine  Talk to your healthcare provider before you use these products  · Limit or do not drink alcohol  Alcohol can lead to both short-term and long-term memory problems  Ask your healthcare provider if alcohol is safe for you, and how much is safe to drink    © 2017 [employer] Information is for End User's use only and may not be sold, redistributed or otherwise used for commercial purposes  All illustrations and images included in CareNotes® are the copyrighted property of [employer]  or [employer]  The above information is an  only  It is not intended as medical advice for individual conditions or treatments  Talk to your doctor, nurse or pharmacist before following any medical regimen to see if it is safe and effective for you

## 2018-03-08 NOTE — PROGRESS NOTES
Assessment/Plan:    No problem-specific Assessment & Plan notes found for this encounter  Diagnoses and all orders for this visit:    Short-term memory loss  -     C-reactive protein; Future  -     TSH, 3rd generation with T4 reflex; Future  -     Copper Level; Future  -     Selenium serum; Future  -     Vitamin B12; Future  -     High sensitivity CRP; Future  -     Gamma GT; Future  -     Comprehensive metabolic panel; Future    Acute nonintractable headache, unspecified headache type  -     C-reactive protein; Future  -     TSH, 3rd generation with T4 reflex; Future  -     Copper Level; Future  -     Selenium serum; Future  -     Vitamin B12; Future  -     High sensitivity CRP; Future  -     Gamma GT; Future  -     Comprehensive metabolic panel; Future    Vitamin D deficiency  -     C-reactive protein; Future  -     TSH, 3rd generation with T4 reflex; Future  -     Copper Level; Future  -     Selenium serum; Future  -     Vitamin B12; Future  -     High sensitivity CRP; Future  -     Gamma GT; Future  -     Comprehensive metabolic panel; Future  -     Vitamin D 25 hydroxy; Future    Poor concentration    Mixed hyperlipidemia    Age-related osteoporosis without current pathological fracture          Subjective:      Patient ID: Payton Navarro is a 68 y o  female  Pt is here because she has requests for labs and diagnostic testing  She reports that she has a headache which started yesterday  Headaches are not common for her  She has taken Ibuprofen and this provides only temporary relief  She has pain in the left side of her neck  This has been persistent since she fell a few months ago at a local store  She has been going to the gym  Pt would like Alzheimers screening testing done  She kiaes that she has Alzheimers  She does see Dr Waqar Shine She did have workup done by him  She is still requesting further labs  She has brought a paper with recommended screening testing   She has been taking OTC supplements like Simpositine  She had the APOE4 DNA testing done  She has had an MRI in the past which showed a small hippocampi  She has follow up with Dr Kelsey Short next month  The following portions of the patient's history were reviewed and updated as appropriate:   She  has a past medical history of Changing skin lesion; Mixed hyperlipidemia; Nonmelanoma skin cancer; Osteoporosis; Polio; and Poor concentration  She   Patient Active Problem List    Diagnosis Date Noted    Acute nonintractable headache 03/08/2018    Vitamin D deficiency 03/08/2018    Flank pain 02/26/2018    Flu-like symptoms 02/22/2018    Influenza B 02/22/2018    Mixed hyperlipidemia 09/21/2017    Poor concentration 08/15/2017    Short-term memory loss 08/15/2017    Osteoporosis 12/17/2013     She  has a past surgical history that includes Anal fissurectomy; Appendectomy; Hysterectomy; and Cataract extraction  Her family history includes Cancer in her father; Heart attack in her mother and sister  She  reports that she has quit smoking  She has never used smokeless tobacco  She reports that she drinks alcohol  She reports that she does not use drugs    Current Outpatient Prescriptions   Medication Sig Dispense Refill    Acetylcarnitine HCl (ACETYL L-CARNITINE) 500 MG CAPS Take 2 capsules by mouth      Acetylcarnitine HCl (ACETYL L-CARNITINE) 500 MG CAPS Take by mouth      Alpha-Lipoic Acid (LIPOIC ACID PO) Take 1 capsule by mouth      Amino Acids (L-CARNITINE PO) Take 1 tablet by mouth      Ascorbic Acid (VITAMIN C) 100 MG tablet Take by mouth      brompheniramine-pseudoephedrine-DM 30-2-10 MG/5ML syrup Take 5 mL by mouth 4 (four) times a day as needed for allergies 120 mL 0    Calcium Carb-Cholecalciferol (CALCIUM 1000 + D) 1000-800 MG-UNIT TABS Take by mouth      Chelated Magnesium 100 MG TABS Take by mouth Daily      Cholecalciferol (VITAMIN D3) 5000 units CAPS Take 1 capsule by mouth daily      Cholecalciferol 27389 units TABS 1 tab(s)      cyclobenzaprine (FLEXERIL) 5 mg tablet Take 1 tablet (5 mg total) by mouth daily at bedtime for 30 days 30 tablet 1    DOCOSAHEXAENOIC ACID PO 1 cap(s)      Glycerylphosphorylcholine (ALPHA-GPC 50%) POWD 1 tablet by Does not apply route      Lipoic Acid 150 MG CAPS Take by mouth      Magnesium Oxide 200 MG TABS 1      meloxicam (MOBIC) 7 5 mg tablet Take 1 tablet (7 5 mg total) by mouth 2 (two) times a day as needed for moderate pain for up to 60 days 60 tablet 1    Menaquinone-7 (VITAMIN K2) 100 MCG CAPS       Misc Natural Products (PANTETHINE PLUS) TABS Take by mouth      Omega-3 Fatty Acids (FISH OIL) 1000 MG CPDR Take 2 5 capsules by mouth      Probiotic Product (PROBIOTIC-10) CAPS Take by mouth      pyridoxine (VITAMIN B6) 100 mg tablet Take by mouth      pyridoxine (VITAMIN B6) 100 mg tablet 1 tab(s)      Resveratrol 50 MG CAPS Take by mouth      tretinoin (ALTRALIN) 0 05 % 1 caitie      Turmeric POWD 1      Vinpocetine POWD 40 mg by Does not apply route      Vinpocetine POWD by Does not apply route      Vit D-Vit E-Safflower Oil (VITAMINS E & D BEAUTY OIL) 400-74625 UNIT/52ML OIL 1 cap(s)      vitamin B-12 (CYANOCOBALAMIN) 100 MCG tablet Take by mouth      vitamin E 600 UNIT capsule Take by mouth      Zinc 30 MG CAPS Take 1 capsule by mouth daily       No current facility-administered medications for this visit        Current Outpatient Prescriptions on File Prior to Visit   Medication Sig    Acetylcarnitine HCl (ACETYL L-CARNITINE) 500 MG CAPS Take 2 capsules by mouth    Acetylcarnitine HCl (ACETYL L-CARNITINE) 500 MG CAPS Take by mouth    Alpha-Lipoic Acid (LIPOIC ACID PO) Take 1 capsule by mouth    Amino Acids (L-CARNITINE PO) Take 1 tablet by mouth    Ascorbic Acid (VITAMIN C) 100 MG tablet Take by mouth    brompheniramine-pseudoephedrine-DM 30-2-10 MG/5ML syrup Take 5 mL by mouth 4 (four) times a day as needed for allergies    Calcium Carb-Cholecalciferol (CALCIUM 1000 + D) 1000-800 MG-UNIT TABS Take by mouth    Chelated Magnesium 100 MG TABS Take by mouth Daily    Cholecalciferol (VITAMIN D3) 5000 units CAPS Take 1 capsule by mouth daily    Cholecalciferol 03826 units TABS 1 tab(s)    cyclobenzaprine (FLEXERIL) 5 mg tablet Take 1 tablet (5 mg total) by mouth daily at bedtime for 30 days    DOCOSAHEXAENOIC ACID PO 1 cap(s)    Glycerylphosphorylcholine (ALPHA-GPC 50%) POWD 1 tablet by Does not apply route    Lipoic Acid 150 MG CAPS Take by mouth    Magnesium Oxide 200 MG TABS 1    meloxicam (MOBIC) 7 5 mg tablet Take 1 tablet (7 5 mg total) by mouth 2 (two) times a day as needed for moderate pain for up to 60 days    Menaquinone-7 (VITAMIN K2) 100 MCG CAPS     Misc Natural Products (PANTETHINE PLUS) TABS Take by mouth    Omega-3 Fatty Acids (FISH OIL) 1000 MG CPDR Take 2 5 capsules by mouth    Probiotic Product (PROBIOTIC-10) CAPS Take by mouth    pyridoxine (VITAMIN B6) 100 mg tablet Take by mouth    pyridoxine (VITAMIN B6) 100 mg tablet 1 tab(s)    Resveratrol 50 MG CAPS Take by mouth    tretinoin (ALTRALIN) 0 05 % 1 caitie    Turmeric POWD 1    Vinpocetine POWD 40 mg by Does not apply route    Vinpocetine POWD by Does not apply route    Vit D-Vit E-Safflower Oil (VITAMINS E & D BEAUTY OIL) 400-97838 UNIT/52ML OIL 1 cap(s)    vitamin B-12 (CYANOCOBALAMIN) 100 MCG tablet Take by mouth    vitamin E 600 UNIT capsule Take by mouth    Zinc 30 MG CAPS Take 1 capsule by mouth daily     No current facility-administered medications on file prior to visit  She is allergic to atomoxetine hcl and sulfa antibiotics       Review of Systems   Constitutional: Negative for fatigue and fever  HENT: Negative for congestion, ear pain, postnasal drip, sinus pain, sinus pressure and sore throat  Respiratory: Negative for cough, chest tightness and shortness of breath  Cardiovascular: Positive for leg swelling   Negative for chest pain and palpitations  Gastrointestinal: Negative for abdominal pain  Musculoskeletal: Positive for neck pain and neck stiffness  Skin: Negative for color change, pallor and rash  Allergic/Immunologic: Negative for immunocompromised state  Neurological: Positive for headaches  Hematological: Negative for adenopathy  All other systems reviewed and are negative  Objective:      /72 (BP Location: Right arm, Patient Position: Sitting)   Pulse 75   Temp 98 2 °F (36 8 °C)   Resp 18   Ht 5' 3" (1 6 m)   Wt 58 5 kg (129 lb)   SpO2 100%   BMI 22 85 kg/m²          Physical Exam   Constitutional: She is oriented to person, place, and time  She appears well-developed and well-nourished  No distress  HENT:   Head: Normocephalic and atraumatic  Right Ear: External ear normal    Left Ear: External ear normal    Nose: Nose normal    Mouth/Throat: Oropharynx is clear and moist  No oropharyngeal exudate  Eyes: Conjunctivae and EOM are normal  Pupils are equal, round, and reactive to light  Neck: Normal range of motion  Neck supple  Cardiovascular: Normal rate, regular rhythm and normal heart sounds  Exam reveals no gallop and no friction rub  No murmur heard  Pulmonary/Chest: Effort normal and breath sounds normal  No respiratory distress  She has no wheezes  She has no rales  Abdominal: Soft  Musculoskeletal: Normal range of motion  She exhibits edema  Lymphadenopathy:     She has no cervical adenopathy  Neurological: She is alert and oriented to person, place, and time  Skin: Skin is warm and dry  No rash noted  She is not diaphoretic  Psychiatric: She has a normal mood and affect  Her behavior is normal  Judgment and thought content normal    Nursing note and vitals reviewed

## 2018-04-10 ENCOUNTER — OFFICE VISIT (OUTPATIENT)
Dept: NEUROLOGY | Facility: CLINIC | Age: 73
End: 2018-04-10
Payer: COMMERCIAL

## 2018-04-10 VITALS
WEIGHT: 128 LBS | DIASTOLIC BLOOD PRESSURE: 60 MMHG | BODY MASS INDEX: 21.85 KG/M2 | HEIGHT: 64 IN | SYSTOLIC BLOOD PRESSURE: 112 MMHG | HEART RATE: 85 BPM

## 2018-04-10 DIAGNOSIS — G31.84 MILD COGNITIVE IMPAIRMENT: Primary | ICD-10-CM

## 2018-04-10 DIAGNOSIS — S16.1XXD STRAIN OF NECK MUSCLE, SUBSEQUENT ENCOUNTER: ICD-10-CM

## 2018-04-10 PROCEDURE — 99213 OFFICE O/P EST LOW 20 MIN: CPT | Performed by: PSYCHIATRY & NEUROLOGY

## 2018-04-10 RX ORDER — CARNOSINE 100 %
POWDER (GRAM) MISCELLANEOUS
COMMUNITY

## 2018-04-10 NOTE — PROGRESS NOTES
Progress Note - Neurology   Junior Crouch 68 y o  female MRN: [id number]  Unit/Bed#:  Encounter: [id number]      Subjective:   Patient is here for a follow-up visit with a history of mild cognitive impairment, chronic cervical strain and has been going to chiropractic treatment as well as massage therapy on a regular basis with significant resolution of symptoms  She does not use any pain medications at this time  She denies any motor or sensory symptoms in the upper lower extremities and denies any worsening difficulty with her short-term memory  Is on a regular home exercise program     ROS:   Review of Systems   Constitutional: Negative  HENT: Negative  Eyes: Positive for itching  Respiratory: Negative  Cardiovascular: Positive for leg swelling  Gastrointestinal: Negative  Endocrine: Negative  Genitourinary: Negative  Musculoskeletal: Negative  Skin: Negative  Allergic/Immunologic: Negative  Neurological: Positive for dizziness  Hematological: Negative  Psychiatric/Behavioral: Positive for confusion  The patient is hyperactive  Vitals:   Vitals:    04/10/18 1417   BP: 112/60   Pulse: 85   ,Body mass index is 22 67 kg/m²      MEDS:      Current Outpatient Prescriptions:     Acetylcarnitine HCl (ACETYL L-CARNITINE) 500 MG CAPS, Take 2 capsules by mouth, Disp: , Rfl:     Alpha-Lipoic Acid (LIPOIC ACID PO), Take 1 capsule by mouth, Disp: , Rfl:     Amino Acids (L-CARNITINE PO), Take 1 tablet by mouth, Disp: , Rfl:     Ascorbic Acid (VITAMIN C) 100 MG tablet, Take by mouth, Disp: , Rfl:     Calcium Carb-Cholecalciferol (CALCIUM 1000 + D) 1000-800 MG-UNIT TABS, Take by mouth, Disp: , Rfl:     Chelated Magnesium 100 MG TABS, Take by mouth Daily, Disp: , Rfl:     Cholecalciferol (VITAMIN D3) 5000 units CAPS, Take 1 capsule by mouth daily, Disp: , Rfl:     Glycerylphosphorylcholine (ALPHA-GPC 50%) POWD, 1 tablet by Does not apply route, Disp: , Rfl:     L-Carnosine POWD, by Does not apply route, Disp: , Rfl:     Lipoic Acid 150 MG CAPS, Take by mouth, Disp: , Rfl:     Menaquinone-7 (VITAMIN K2) 100 MCG CAPS, , Disp: , Rfl:     Misc Natural Products (PANTETHINE PLUS) TABS, Take by mouth, Disp: , Rfl:     Omega-3 Fatty Acids (FISH OIL) 1000 MG CPDR, Take 2 5 capsules by mouth, Disp: , Rfl:     Probiotic Product (PROBIOTIC-10) CAPS, Take by mouth, Disp: , Rfl:     pyridoxine (VITAMIN B6) 100 mg tablet, Take by mouth, Disp: , Rfl:     Resveratrol 50 MG CAPS, Take by mouth, Disp: , Rfl:     Turmeric POWD, 1, Disp: , Rfl:     Vinpocetine POWD, 40 mg by Does not apply route, Disp: , Rfl:     Vit D-Vit E-Safflower Oil (VITAMINS E & D BEAUTY OIL) 400-49137 UNIT/52ML OIL, 1 cap(s), Disp: , Rfl:     vitamin B-12 (CYANOCOBALAMIN) 100 MCG tablet, Take by mouth, Disp: , Rfl:     vitamin E 600 UNIT capsule, Take by mouth, Disp: , Rfl:     Zinc 30 MG CAPS, Take 1 capsule by mouth daily, Disp: , Rfl:     Acetylcarnitine HCl (ACETYL L-CARNITINE) 500 MG CAPS, Take by mouth, Disp: , Rfl:     brompheniramine-pseudoephedrine-DM 30-2-10 MG/5ML syrup, Take 5 mL by mouth 4 (four) times a day as needed for allergies, Disp: 120 mL, Rfl: 0    Cholecalciferol 93459 units TABS, 1 tab(s), Disp: , Rfl:     DOCOSAHEXAENOIC ACID PO, 1 cap(s), Disp: , Rfl:     Magnesium Oxide 200 MG TABS, 1, Disp: , Rfl:     meloxicam (MOBIC) 7 5 mg tablet, Take 1 tablet (7 5 mg total) by mouth 2 (two) times a day as needed for moderate pain for up to 60 days, Disp: 60 tablet, Rfl: 1    pyridoxine (VITAMIN B6) 100 mg tablet, 1 tab(s), Disp: , Rfl:     tretinoin (ALTRALIN) 0 05 %, 1 caitie, Disp: , Rfl:     Vinpocetine POWD, by Does not apply route, Disp: , Rfl:   :    Physical Exam:  General appearance: alert, appears stated age and cooperative  Head: Normocephalic, without obvious abnormality, atraumatic    Neurologic:  No new deficits were noted on cranial nerve, motor or sensory examination    Her gait is normal based, she has no significant cervical paraspinal tenderness at this time  Lab Results: I have personally reviewed pertinent reports  Imaging Studies: I have personally reviewed pertinent reports  Assessment:  1  Cervical strain, with cervical DJD and symptoms of a left cervical radiculopathy which have improved  2  Mild cognitive impairment  Plan:  Patient is advised to continue present treatment including exercise program, Massage therapy and chiropractic treatments, will return back to see me in 6 months during which time he should also consider repeating her MRI of the brain to see for worsening hippocampal loss of volume         4/10/2018,2:33 PM    Dictation voice to text software has been used in the creation of this document  Please consider this in light of any contextual or grammatical errors

## 2018-05-17 ENCOUNTER — OFFICE VISIT (OUTPATIENT)
Dept: FAMILY MEDICINE CLINIC | Facility: CLINIC | Age: 73
End: 2018-05-17
Payer: COMMERCIAL

## 2018-05-17 VITALS
WEIGHT: 123 LBS | BODY MASS INDEX: 21 KG/M2 | OXYGEN SATURATION: 98 % | SYSTOLIC BLOOD PRESSURE: 112 MMHG | DIASTOLIC BLOOD PRESSURE: 62 MMHG | HEIGHT: 64 IN | TEMPERATURE: 98.9 F | RESPIRATION RATE: 18 BRPM | HEART RATE: 90 BPM

## 2018-05-17 DIAGNOSIS — M81.0 AGE-RELATED OSTEOPOROSIS WITHOUT CURRENT PATHOLOGICAL FRACTURE: ICD-10-CM

## 2018-05-17 DIAGNOSIS — R41.840 POOR CONCENTRATION: ICD-10-CM

## 2018-05-17 DIAGNOSIS — E78.2 MIXED HYPERLIPIDEMIA: Primary | ICD-10-CM

## 2018-05-17 DIAGNOSIS — E55.9 VITAMIN D DEFICIENCY: ICD-10-CM

## 2018-05-17 DIAGNOSIS — R41.3 SHORT-TERM MEMORY LOSS: ICD-10-CM

## 2018-05-17 DIAGNOSIS — Z72.51 HIGH RISK SEXUAL BEHAVIOR: ICD-10-CM

## 2018-05-17 PROCEDURE — 99214 OFFICE O/P EST MOD 30 MIN: CPT | Performed by: NURSE PRACTITIONER

## 2018-05-17 NOTE — PATIENT INSTRUCTIONS
Vitamin d def- check labs  Short term memory deficit- f/b Neurology  Small hippocampus- MRI to be done by Neurology in 6 months  HLD- check labs  Osteoporosis- femur neck and l3,4 had T scores >-3 1   Last Dexa was 2015  Continue exercise at the gym

## 2018-08-23 ENCOUNTER — OFFICE VISIT (OUTPATIENT)
Dept: FAMILY MEDICINE CLINIC | Facility: CLINIC | Age: 73
End: 2018-08-23
Payer: COMMERCIAL

## 2018-08-23 VITALS
WEIGHT: 125.13 LBS | OXYGEN SATURATION: 99 % | BODY MASS INDEX: 21.36 KG/M2 | RESPIRATION RATE: 18 BRPM | HEART RATE: 70 BPM | SYSTOLIC BLOOD PRESSURE: 114 MMHG | HEIGHT: 64 IN | DIASTOLIC BLOOD PRESSURE: 68 MMHG

## 2018-08-23 DIAGNOSIS — E78.2 MIXED HYPERLIPIDEMIA: ICD-10-CM

## 2018-08-23 DIAGNOSIS — R41.840 POOR CONCENTRATION: ICD-10-CM

## 2018-08-23 DIAGNOSIS — E55.9 VITAMIN D DEFICIENCY: Primary | ICD-10-CM

## 2018-08-23 DIAGNOSIS — R41.3 SHORT-TERM MEMORY LOSS: ICD-10-CM

## 2018-08-23 PROCEDURE — 99214 OFFICE O/P EST MOD 30 MIN: CPT | Performed by: NURSE PRACTITIONER

## 2018-08-23 NOTE — PATIENT INSTRUCTIONS
Pt is here for follow up  Pt has contusion of her right eye  She tells me that yesterday, while at the [facility], she struck her face on some exercise equipment  She did apply ice  She denies vision changes  OK to take Ibuprofen or Advil for pain  Short term memory loss and decreased concentration- Sees neurology   No deficits exhibited during exam  HLD- continue daily exercise and current eating plan

## 2018-08-23 NOTE — PROGRESS NOTES
Assessment/Plan:    No problem-specific Assessment & Plan notes found for this encounter  Diagnoses and all orders for this visit:    Vitamin D deficiency  -     Comprehensive metabolic panel; Future  -     Vitamin D 25 hydroxy; Future    Short-term memory loss  -     Comprehensive metabolic panel; Future    Poor concentration  -     Comprehensive metabolic panel; Future    Mixed hyperlipidemia  -     Comprehensive metabolic panel; Future    Other orders  -     MENAQUINONE-7 PO; 1 tab          Subjective:      Patient ID: Ajit Rosario is a 68 y o  female  Pt is here for follow up and review of labs  Labs were done but I am unable to view all of them today  CRP is <3 0  Homocysteine level is 9 0  Pt has complaints of decreased concentration and short term memory loss for quite some time  She has had MRI of brain and seen Neurology  Symptoms are not worsening  HLD- exercises daily and follows very good eating plan  Neck pain- she is still experiencing episodes of " electric shock" left side of neck  This occurs intermittently  The following portions of the patient's history were reviewed and updated as appropriate:   She  has a past medical history of Cervical strain; Changing skin lesion; MCI (mild cognitive impairment); Mixed hyperlipidemia; Nonmelanoma skin cancer; Osteoporosis; Polio; Poor concentration; and Swelling of left thumb  She   Patient Active Problem List    Diagnosis Date Noted    High risk sexual behavior 05/17/2018    Acute nonintractable headache 03/08/2018    Vitamin D deficiency 03/08/2018    Flank pain 02/26/2018    Flu-like symptoms 02/22/2018    Influenza B 02/22/2018    Mixed hyperlipidemia 09/21/2017    Poor concentration 08/15/2017    Short-term memory loss 08/15/2017    Osteoporosis 12/17/2013     She  has a past surgical history that includes Anal fissurectomy; Appendectomy; Hysterectomy; and Cataract extraction    Her family history includes Cancer in her father; Heart attack in her mother and sister  She  reports that she has quit smoking  She has never used smokeless tobacco  She reports that she drinks alcohol  She reports that she does not use drugs    Current Outpatient Prescriptions   Medication Sig Dispense Refill    Acetylcarnitine HCl (ACETYL L-CARNITINE) 500 MG CAPS Take 2 capsules by mouth      Acetylcarnitine HCl (ACETYL L-CARNITINE) 500 MG CAPS Take by mouth      Alpha-Lipoic Acid (LIPOIC ACID PO) Take 1 capsule by mouth      Amino Acids (L-CARNITINE PO) Take 1 tablet by mouth      Ascorbic Acid (VITAMIN C) 100 MG tablet Take by mouth      Calcium Carb-Cholecalciferol (CALCIUM 1000 + D) 1000-800 MG-UNIT TABS Take by mouth      Chelated Magnesium 100 MG TABS Take by mouth Daily      Cholecalciferol (VITAMIN D3) 5000 units CAPS Take 1 capsule by mouth daily      Cholecalciferol 76860 units TABS 1 tab(s)      DOCOSAHEXAENOIC ACID PO 1 cap(s)      Glycerylphosphorylcholine (ALPHA-GPC 50%) POWD 1 tablet by Does not apply route      L-Carnosine POWD by Does not apply route      Lipoic Acid 150 MG CAPS Take by mouth      Magnesium Oxide 200 MG TABS 1      Menaquinone-7 (VITAMIN K2) 100 MCG CAPS       MENAQUINONE-7 PO 1 tab      Misc Natural Products (PANTETHINE PLUS) TABS Take by mouth      Omega-3 Fatty Acids (FISH OIL) 1000 MG CPDR Take 2 5 capsules by mouth      Probiotic Product (PROBIOTIC-10) CAPS Take by mouth      pyridoxine (VITAMIN B6) 100 mg tablet Take by mouth      pyridoxine (VITAMIN B6) 100 mg tablet 1 tab(s)      Resveratrol 50 MG CAPS Take by mouth      tretinoin (ALTRALIN) 0 05 % 1 caitie      Turmeric POWD 1      Vinpocetine POWD 40 mg by Does not apply route      Vinpocetine POWD by Does not apply route      Vit D-Vit E-Safflower Oil (VITAMINS E & D BEAUTY OIL) 400-35583 UNIT/52ML OIL 1 cap(s)      vitamin B-12 (CYANOCOBALAMIN) 100 MCG tablet Take by mouth      vitamin E 600 UNIT capsule Take by mouth      Zinc 30 MG CAPS Take 1 capsule by mouth daily       No current facility-administered medications for this visit        Current Outpatient Prescriptions on File Prior to Visit   Medication Sig    Acetylcarnitine HCl (ACETYL L-CARNITINE) 500 MG CAPS Take 2 capsules by mouth    Acetylcarnitine HCl (ACETYL L-CARNITINE) 500 MG CAPS Take by mouth    Alpha-Lipoic Acid (LIPOIC ACID PO) Take 1 capsule by mouth    Amino Acids (L-CARNITINE PO) Take 1 tablet by mouth    Ascorbic Acid (VITAMIN C) 100 MG tablet Take by mouth    Calcium Carb-Cholecalciferol (CALCIUM 1000 + D) 1000-800 MG-UNIT TABS Take by mouth    Chelated Magnesium 100 MG TABS Take by mouth Daily    Cholecalciferol (VITAMIN D3) 5000 units CAPS Take 1 capsule by mouth daily    Cholecalciferol 24536 units TABS 1 tab(s)    DOCOSAHEXAENOIC ACID PO 1 cap(s)    Glycerylphosphorylcholine (ALPHA-GPC 50%) POWD 1 tablet by Does not apply route    L-Carnosine POWD by Does not apply route    Lipoic Acid 150 MG CAPS Take by mouth    Magnesium Oxide 200 MG TABS 1    Menaquinone-7 (VITAMIN K2) 100 MCG CAPS     Misc Natural Products (PANTETHINE PLUS) TABS Take by mouth    Omega-3 Fatty Acids (FISH OIL) 1000 MG CPDR Take 2 5 capsules by mouth    Probiotic Product (PROBIOTIC-10) CAPS Take by mouth    pyridoxine (VITAMIN B6) 100 mg tablet Take by mouth    pyridoxine (VITAMIN B6) 100 mg tablet 1 tab(s)    Resveratrol 50 MG CAPS Take by mouth    tretinoin (ALTRALIN) 0 05 % 1 caitie    Turmeric POWD 1    Vinpocetine POWD 40 mg by Does not apply route    Vinpocetine POWD by Does not apply route    Vit D-Vit E-Safflower Oil (VITAMINS E & D BEAUTY OIL) 400-64380 UNIT/52ML OIL 1 cap(s)    vitamin B-12 (CYANOCOBALAMIN) 100 MCG tablet Take by mouth    vitamin E 600 UNIT capsule Take by mouth    Zinc 30 MG CAPS Take 1 capsule by mouth daily    [DISCONTINUED] brompheniramine-pseudoephedrine-DM 30-2-10 MG/5ML syrup Take 5 mL by mouth 4 (four) times a day as needed for allergies    [DISCONTINUED] meloxicam (MOBIC) 7 5 mg tablet Take 1 tablet (7 5 mg total) by mouth 2 (two) times a day as needed for moderate pain for up to 60 days     No current facility-administered medications on file prior to visit  She is allergic to sulfa antibiotics       Review of Systems   Constitutional: Negative  Negative for fatigue and fever  HENT: Negative  Negative for congestion, dental problem and ear pain  Eyes: Positive for pain  Negative for visual disturbance  Injured eye at the gym yesterday   Respiratory: Negative for cough, shortness of breath and wheezing  Cardiovascular: Negative  Negative for chest pain and leg swelling  Gastrointestinal: Negative  Negative for abdominal pain, constipation, diarrhea and nausea  Endocrine: Negative for polydipsia and polyuria  Musculoskeletal: Negative  Negative for arthralgias and back pain  Skin: Negative  Negative for color change and rash  Allergic/Immunologic: Negative for immunocompromised state  Neurological: Negative  Negative for dizziness, light-headedness and headaches  Hematological: Negative for adenopathy  Psychiatric/Behavioral: Positive for decreased concentration  All other systems reviewed and are negative  Objective:      /68 (BP Location: Left arm, Patient Position: Sitting)   Pulse 70   Resp 18   Ht 5' 3 5" (1 613 m)   Wt 56 8 kg (125 lb 2 oz)   SpO2 99%   BMI 21 82 kg/m²          Physical Exam   Constitutional: She is oriented to person, place, and time  She appears well-developed and well-nourished  No distress  HENT:   Head: Normocephalic and atraumatic  Right Ear: External ear normal    Left Ear: External ear normal    Nose: Nose normal    Mouth/Throat: Oropharynx is clear and moist    Scleral hemorrhage of the right eye and periorbital ecchymosis   Eyes: Conjunctivae and EOM are normal  Pupils are equal, round, and reactive to light  Neck: Normal range of motion  Neck supple  No thyromegaly present  Cardiovascular: Normal rate, regular rhythm, normal heart sounds and intact distal pulses  Exam reveals no gallop and no friction rub  No murmur heard  Pulmonary/Chest: Effort normal and breath sounds normal  No respiratory distress  She has no wheezes  Abdominal: Soft  Bowel sounds are normal  She exhibits no distension  There is no tenderness  Musculoskeletal: Normal range of motion  She exhibits no edema, tenderness or deformity  Lymphadenopathy:     She has no cervical adenopathy  Neurological: She is alert and oriented to person, place, and time  Skin: Skin is warm and dry  No rash noted  She is not diaphoretic  No pallor  Psychiatric: She has a normal mood and affect  Her behavior is normal  Judgment and thought content normal    Nursing note and vitals reviewed

## 2018-08-27 ENCOUNTER — OFFICE VISIT (OUTPATIENT)
Dept: DERMATOLOGY | Facility: CLINIC | Age: 73
End: 2018-08-27
Payer: COMMERCIAL

## 2018-08-27 DIAGNOSIS — Z13.89 SCREENING FOR SKIN CONDITION: ICD-10-CM

## 2018-08-27 DIAGNOSIS — L82.1 SEBORRHEIC KERATOSIS: Primary | ICD-10-CM

## 2018-08-27 DIAGNOSIS — Z85.828 HISTORY OF SKIN CANCER: ICD-10-CM

## 2018-08-27 PROCEDURE — 99213 OFFICE O/P EST LOW 20 MIN: CPT | Performed by: DERMATOLOGY

## 2018-08-27 NOTE — PROGRESS NOTES
[facility]  [address] [place] [address]  [phone]  [phone]     MRN: [id number] : 1945  Encounter: [id number]  Patient Information: Deangelo Coreas  Chief complaint: six-month checkup    History of present illness:  80-year-old female presents for overall skin check previous history of skin cancer it has been 3 years since her last skin cancer no specific concerns noted  Past Medical History:   Diagnosis Date    Cervical strain     Changing skin lesion     MCI (mild cognitive impairment)     Mixed hyperlipidemia     Nonmelanoma skin cancer     Osteoporosis     Polio     Poor concentration     Swelling of left thumb      Past Surgical History:   Procedure Laterality Date    ANAL FISSURECTOMY      APPENDECTOMY      CATARACT EXTRACTION      HYSTERECTOMY       Social History   History   Alcohol Use    Yes     Comment: OCCASIONAL- WINE      History   Drug Use No     History   Smoking Status    Former Smoker   Smokeless Tobacco    Never Used     Family History   Problem Relation Age of Onset    Heart attack Mother     Cancer Father     Heart attack Sister      Meds/Allergies   Allergies   Allergen Reactions    Sulfa Antibiotics Other (See Comments)       Meds:  Prior to Admission medications    Medication Sig Start Date End Date Taking?  Authorizing Provider   Acetylcarnitine HCl (ACETYL L-CARNITINE) 500 MG CAPS Take by mouth   Yes Historical Provider, MD   Alpha-Lipoic Acid (LIPOIC ACID PO) Take 1 capsule by mouth   Yes Historical Provider, MD   Amino Acids (L-CARNITINE PO) Take 1 tablet by mouth   Yes Historical Provider, MD   Ascorbic Acid (VITAMIN C) 100 MG tablet Take by mouth   Yes Historical Provider, MD   Calcium Carb-Cholecalciferol (CALCIUM 1000 + D) 1000-800 MG-UNIT TABS Take by mouth   Yes Historical Provider, MD   Chelated Magnesium 100 MG TABS Take by mouth Daily   Yes Historical Provider, MD   Cholecalciferol (VITAMIN D3) 5000 units CAPS Take 1 capsule by mouth daily   Yes Historical Provider, MD   DOCOSAHEXAENOIC ACID PO 1 cap(s)   Yes Historical Provider, MD   Glycerylphosphorylcholine (ALPHA-GPC 50%) POWD 1 tablet by Does not apply route   Yes Historical Provider, MD   L-Carnosine POWD by Does not apply route   Yes Historical Provider, MD   Lipoic Acid 150 MG CAPS Take by mouth   Yes Historical Provider, MD   Magnesium Oxide 200 MG TABS 1   Yes Historical Provider, MD   Menaquinone-7 (VITAMIN K2) 100 MCG CAPS    Yes Historical Provider, MD   Misc Natural Products (PANTETHINE PLUS) TABS Take by mouth   Yes Historical Provider, MD   Omega-3 Fatty Acids (FISH OIL) 1000 MG CPDR Take 2 5 capsules by mouth   Yes Historical Provider, MD   Probiotic Product (PROBIOTIC-10) CAPS Take by mouth   Yes Historical Provider, MD   pyridoxine (VITAMIN B6) 100 mg tablet 1 tab(s)   Yes Historical Provider, MD   Resveratrol 50 MG CAPS Take by mouth   Yes Historical Provider, MD   tretinoin (ALTRALIN) 0 05 % 1 caitie   Yes Historical Provider, MD   Turmeric POWD 1   Yes Historical Provider, MD Antoine POWD by Does not apply route   Yes Historical Provider, MD   Vit D-Vit E-Safflower Oil (VITAMINS E & D BEAUTY OIL) 400-02979 UNIT/52ML OIL 1 cap(s)   Yes Historical Provider, MD   vitamin B-12 (CYANOCOBALAMIN) 100 MCG tablet Take by mouth   Yes Historical Provider, MD   vitamin E 600 UNIT capsule Take by mouth   Yes Historical Provider, MD   Zinc 30 MG CAPS Take 1 capsule by mouth daily   Yes Historical Provider, MD   Acetylcarnitine HCl (ACETYL L-CARNITINE) 500 MG CAPS Take 2 capsules by mouth  8/27/18 Yes Historical Provider, MD   Cholecalciferol 41563 units TABS 1 tab(s)  8/27/18 Yes Historical Provider, MD   MENAQUINONE-7 PO 1 tab  8/27/18 Yes Historical Provider, MD   pyridoxine (VITAMIN B6) 100 mg tablet Take by mouth  8/27/18 Yes Historical Provider, MD Antoine POWD 40 mg by Does not apply route  8/27/18 Yes Historical Provider, MD       Subjective:     Review of Systems:    General: negative for - chills, fatigue, fever,  weight gain or weight loss  Psychological: negative for - anxiety, behavioral disorder, concentration difficulties, decreased libido, depression, irritability, memory difficulties, mood swings, sleep disturbances or suicidal ideation  ENT: negative for - hearing difficulties , nasal congestion, nasal discharge, oral lesions, sinus pain, sneezing, sore throat  Allergy and Immunology: negative for - hives, insect bite sensitivity,  Hematological and Lymphatic: negative for - bleeding problems, blood clots,bruising, swollen lymph nodes  Endocrine: negative for - hair pattern changes, hot flashes, malaise/lethargy, mood swings, palpitations, polydipsia/polyuria, skin changes, temperature intolerance or unexpected weight change  Respiratory: negative for - cough, hemoptysis, orthopnea, shortness of breath, or wheezing  Cardiovascular: negative for - chest pain, dyspnea on exertion, edema,  Gastrointestinal: negative for - abdominal pain, nausea/vomiting  Genito-Urinary: negative for - dysuria, incontinence, irregular/heavy menses or urinary frequency/urgency  Musculoskeletal: negative for - gait disturbance, joint pain, joint stiffness, joint swelling, muscle pain, muscular weakness  Dermatological:  As in HPI  Neurological: negative for confusion, dizziness, headaches, impaired coordination/balance, memory loss, numbness/tingling, seizures, speech problems, tremors or weakness       Objective: There were no vitals taken for this visit      Physical Exam:    General Appearance:    Alert, cooperative, no distress   Head:    Normocephalic, without obvious abnormality, atraumatic           Skin:   A full skin exam was performed including scalp, head scalp, eyes, ears, nose, lips, neck, chest, axilla, abdomen, back, buttocks, bilateral upper extremities, bilateral lower extremities, hands, feet, fingers, toes, fingernails, and toenails A good ahead previous sites of skin cancer well healed without recurrence normal keratotic papules greasy stuck on appearance nothing else atypical noted on exam     Assessment:     1  Seborrheic keratosis     2  Screening for skin condition     3  History of skin cancer           Plan:   Seborrheic keratosis patient reassured these are normal growths we acquire with age no treatment needed  History of skin cancer in no recurrence nothing else atypical sunblock recommended follow-up in 1 year  Screening for dermatologic disorders nothing else of concern noted on complete exam follow-up in 1 year      Zuleika Abel MD  8/27/2018,2:49 PM    Portions of the record may have been created with voice recognition software   Occasional wrong word or "sound a like" substitutions may have occurred due to the inherent limitations of voice recognition software   Read the chart carefully and recognize, using context, where substitutions have occurred

## 2018-09-07 ENCOUNTER — HOSPITAL ENCOUNTER (EMERGENCY)
Facility: HOSPITAL | Age: 73
Discharge: HOME | End: 2018-09-07
Attending: EMERGENCY MEDICINE
Payer: COMMERCIAL

## 2018-09-07 ENCOUNTER — APPOINTMENT (EMERGENCY)
Dept: CT IMAGING | Facility: HOSPITAL | Age: 73
End: 2018-09-07
Payer: COMMERCIAL

## 2018-09-07 VITALS
TEMPERATURE: 98 F | WEIGHT: 122 LBS | HEART RATE: 64 BPM | OXYGEN SATURATION: 100 % | SYSTOLIC BLOOD PRESSURE: 167 MMHG | HEIGHT: 64 IN | BODY MASS INDEX: 20.83 KG/M2 | DIASTOLIC BLOOD PRESSURE: 74 MMHG | RESPIRATION RATE: 18 BRPM

## 2018-09-07 DIAGNOSIS — S09.90XA MINOR HEAD INJURY, INITIAL ENCOUNTER: Primary | ICD-10-CM

## 2018-09-07 DIAGNOSIS — S01.81XA CHIN LACERATION, INITIAL ENCOUNTER: ICD-10-CM

## 2018-09-07 DIAGNOSIS — S00.83XA CONTUSION OF JAW, INITIAL ENCOUNTER: ICD-10-CM

## 2018-09-07 DIAGNOSIS — R91.1 INCIDENTAL PULMONARY NODULE: ICD-10-CM

## 2018-09-07 PROCEDURE — 70486 CT MAXILLOFACIAL W/O DYE: CPT

## 2018-09-07 PROCEDURE — 70450 CT HEAD/BRAIN W/O DYE: CPT

## 2018-09-07 PROCEDURE — 72125 CT NECK SPINE W/O DYE: CPT

## 2018-09-07 PROCEDURE — 99283 EMERGENCY DEPT VISIT LOW MDM: CPT

## 2018-09-07 RX ORDER — HYDROCODONE BITARTRATE AND ACETAMINOPHEN 5; 325 MG/1; MG/1
1 TABLET ORAL EVERY 6 HOURS PRN
Qty: 8 TABLET | Refills: 0 | Status: SHIPPED | OUTPATIENT
Start: 2018-09-07 | End: 2018-09-09

## 2018-09-07 RX ORDER — BACITRACIN, NEOMYCIN, POLYMYXIN B 400; 3.5; 5 [USP'U]/G; MG/G; [USP'U]/G
1 OINTMENT TOPICAL ONCE
Status: COMPLETED | OUTPATIENT
Start: 2018-09-07 | End: 2018-09-07

## 2018-09-07 RX ORDER — NAPROXEN 250 MG/1
250 TABLET ORAL 2 TIMES DAILY WITH MEALS
Qty: 20 TABLET | Refills: 0 | Status: SHIPPED | OUTPATIENT
Start: 2018-09-07 | End: 2022-06-08

## 2018-09-07 RX ORDER — LIDOCAINE HYDROCHLORIDE 10 MG/ML
10 INJECTION, SOLUTION EPIDURAL; INFILTRATION; INTRACAUDAL; PERINEURAL ONCE
Status: DISCONTINUED | OUTPATIENT
Start: 2018-09-07 | End: 2018-09-07 | Stop reason: HOSPADM

## 2018-09-07 RX ADMIN — Medication 1 APPLICATION: at 14:29

## 2018-09-07 RX ADMIN — BACITRACIN, NEOMYCIN, POLYMYXIN B 1 SMALL APPLICATION: 400; 3.5; 5 OINTMENT TOPICAL at 15:48

## 2018-09-07 NOTE — DISCHARGE INSTRUCTIONS
Please return if he developed worsening or other concerning symptoms, otherwise the stitches are to come out approximately 5-7 days by Urgent Care your family doctor or the emergency department, if you still have discomfort in 1 week please follow up with the oral maxillofacial surgeon for re-evaluation as instructed as discussed  Also please follow up in approximately 6 months to have repeat CT scan for the incidental noted pulmonary nodule as discussed      Contusion in [facility]:   A contusion is a bruise that appears on your skin after an injury  A bruise happens when small blood vessels tear but skin does not  When blood vessels tear, blood leaks into nearby tissue, such as soft tissue or muscle  DISCHARGE INSTRUCTIONS:   Return to the emergency department if:   · You have new trouble moving the injured area  · You have tingling or numbness in or near the injured area  · Your hand or foot below the bruise gets cold or turns pale  Contact your healthcare provider if:   · You find a new lump in the injured area  · Your symptoms do not improve with treatment after 4 to 5 days  · You have questions or concerns about your condition or care  Medicines: You may need any of the following:  · NSAIDs  help decrease swelling and pain or fever  This medicine is available with or without a doctor's order  NSAIDs can cause stomach bleeding or kidney problems in certain people  If you take blood thinner medicine, always ask your healthcare provider if NSAIDs are safe for you  Always read the medicine label and follow directions  · Prescription pain medicine  may be given  Do not wait until the pain is severe before you take your medicine  · Take your medicine as directed  Contact your healthcare provider if you think your medicine is not helping or if you have side effects  Tell him of her if you are allergic to any medicine   Keep a list of the medicines, vitamins, and herbs you take  Include the amounts, and when and why you take them  Bring the list or the pill bottles to follow-up visits  Carry your medicine list with you in case of an emergency  Follow up with your healthcare provider as directed: You may need to return within a week to check your injury again  Write down your questions so you remember to ask them during your visits  Help a contusion heal:   · Rest the injured area  or use it less than usual  If you bruised your leg or foot, you may need crutches or a cane to help you walk  This will help you keep weight off your injured body part  · Apply ice  to decrease swelling and pain  Ice may also help prevent tissue damage  Use an ice pack, or put crushed ice in a plastic bag  Cover it with a towel and place it on your bruise for 15 to 20 minutes every hour or as directed  · Use compression  to support the area and decrease swelling  Wrap an elastic bandage around the area over the bruised muscle  Make sure the bandage is not too tight  You should be able to fit 1 finger between the bandage and your skin  · Elevate (raise) your injured body part  above the level of your heart to help decrease pain and swelling  Use pillows, blankets, or rolled towels to elevate the area as often as you can  · Do not drink alcohol  as directed  Alcohol may slow healing  · Do not stretch injured muscles  right after your injury  Ask your healthcare provider when and how you may safely stretch after your injury  Gentle stretches can help increase your flexibility  · Do not massage the area or put heating pads  on the bruise right after your injury  Heat and massage may slow healing  Your healthcare provider may tell you to apply heat after several days  At that time, heat will start to help the injury heal   Prevent another contusion:   · Stretch and warm up before you play sports or exercise  · Wear protective gear when you play sports   Examples are shin guards and padding  · If you begin a new physical activity, start slowly to give your body a chance to adjust   © 2017 [employer] Information is for End User's use only and may not be sold, redistributed or otherwise used for commercial purposes  All illustrations and images included in CareNotes® are the copyrighted property of [employer]  or [employer]  The above information is an  only  It is not intended as medical advice for individual conditions or treatments  Please return if you worsening or other concerning symptoms otherwise the stitches are to come out 5-7 days by her primary care doctor urgent care or the emergency department he have persistent discomfort in her jaw after 1 week please follow up with the oral maxillofacial surgeon Talk to your doctor, nurse or pharmacist before following any medical regimen to see if it is safe and effective for you  Head Injury   WHAT YOU NEED TO KNOW:   A head injury is most often caused by a blow to the head  This may occur from a fall, bicycle injury, sports injury, being struck in the head, or a motor vehicle accident  DISCHARGE INSTRUCTIONS:   Call 911 or have someone else call for any of the following:   · You cannot be woken  · You have a seizure  · You stop responding to others or you faint  · You have blurry or double vision  · Your speech becomes slurred or confused  · You have arm or leg weakness, loss of feeling, or new problems with coordination  · Your pupils are larger than usual or one pupil is a different size than the other  · You have blood or clear fluid coming out of your ears or nose  Return to the emergency department if:   · You have repeated or forceful vomiting  · You feel confused  · Your headache gets worse or becomes severe      · You or someone caring for you notices that you are harder to wake than usual   Contact your healthcare provider if:   · Your symptoms last longer than 6 weeks after the injury  · You have questions or concerns about your condition or care  Medicines:   · Acetaminophen  decreases pain  Acetaminophen is available without a doctor's order  Ask how much to take and how often to take it  Follow directions  Acetaminophen can cause liver damage if not taken correctly  · Take your medicine as directed  Contact your healthcare provider if you think your medicine is not helping or if you have side effects  Tell him or her if you are allergic to any medicine  Keep a list of the medicines, vitamins, and herbs you take  Include the amounts, and when and why you take them  Bring the list or the pill bottles to follow-up visits  Carry your medicine list with you in case of an emergency  Self-care:   · Rest  or do quiet activities for 24 to 48 hours  Limit your time watching TV, using the computer, or doing tasks that require a lot of thinking  Slowly return to your normal activities as directed  Do not play sports or do activities that may cause you to get hit in the head  Ask your healthcare provider when you can return to sports  · Apply ice  on your head for 15 to 20 minutes every hour or as directed  Use an ice pack, or put crushed ice in a plastic bag  Cover it with a towel before you apply it to your skin  Ice helps prevent tissue damage and decreases swelling and pain  · Have someone stay with you for 24 hours  or as directed  This person can monitor you for complications and call [phone]  When you are awake the person should ask you a few questions to see if you are thinking clearly  An example would be to ask your name or your address  Prevent another head injury:   · Wear a helmet that fits properly  Do this when you play sports, or ride a bike, scooter, or skateboard  Helmets help decrease your risk of a serious head injury  Talk to your healthcare provider about other ways you can protect yourself if you play sports      · Wear your seat belt every time you are in a car  This helps to decrease your risk for a head injury if you are in a car accident  Follow up with your healthcare provider as directed:  Write down your questions so you remember to ask them during your visits  © 2017 [employer] Information is for End User's use only and may not be sold, redistributed or otherwise used for commercial purposes  All illustrations and images included in CareNotes® are the copyrighted property of [employer]  or [employer]  The above information is an  only  It is not intended as medical advice for individual conditions or treatments  Talk to your doctor, nurse or pharmacist before following any medical regimen to see if it is safe and effective for you  Facial Laceration   WHAT YOU NEED TO KNOW:   A facial laceration is a tear or cut in the skin caused by blunt or shearing forces, or sharp objects  Facial lacerations may be closed within 24 hours of injury  DISCHARGE INSTRUCTIONS:   Return to the emergency department if:   · You have a fever and the wound is painful, warm, or swollen  The wound area may be red, or fluid may come out of it  · You have heavy bleeding or bleeding that does not stop after 10 minutes of holding firm, direct pressure over the wound  Contact your healthcare provider if:   · Your wound reopens or your tape comes off  · Your wound is very painful  · Your wound is not healing, or you think there is an object in the wound  · The skin around your wound stays numb  · You have questions or concerns about your condition or care  Medicines:   · Antibiotics  may be given to prevent an infection if your wound was deep and had to be cleaned out  · Take your medicine as directed  Contact your healthcare provider if you think your medicine is not helping or if you have side effects  Tell him of her if you are allergic to any medicine   Keep a list of the medicines, vitamins, and herbs you take  Include the amounts, and when and why you take them  Bring the list or the pill bottles to follow-up visits  Carry your medicine list with you in case of an emergency  Care for your wound:  Care for your wound as directed to prevent infection and help it heal  Wash your hands with soap and warm water before and after you care for your wound  You may need to keep the wound dry for the first 24 to 48 hours  When your healthcare provider says it is okay, wash around your wound with soap and water, or as directed  Gently pat the area dry  Do not use alcohol or hydrogen peroxide to clean your wound unless you are directed to  · Do not take aspirin or NSAIDs for 24 hours after being injured  Aspirin and NSAIDs can increase blood flow  Your laceration may continue to bleed  · Do not take hot showers, eat or drink hot foods and liquids for 48 hours after being injured  Also, do not use a heating pad near your laceration  The heat can cause swelling in and around your laceration  · If your wound was covered with a bandage,  leave your bandage on as long as directed  Bandages keep your wound clean and protected  They can also prevent swelling  Ask when and how to change your bandage  Be careful not to apply the bandage or tape too tightly  This could cut off blood flow and cause more injury  · If your wound was closed with stitches,  keep your wound clean  Your healthcare provider may recommend that you apply antibiotic ointment after you clean your wound  · If your wound was closed with wound tape or medical strips,  keep the area clean and dry  The strips will usually fall off on their own after several days  · If your wound was closed with tissue glue,  do not use any ointments or lotions on the area  You may shower, but do not swim or soak in a bathtub  Gently pat the area dry after you take a shower  Do not pick at or scrub the glue area    Decrease scarring: The skin in the area of your wound may turn a different color if it is exposed to direct sunlight  After your wound is healed, use sunscreen over the area when you are out in the sun  You should do this for at least 6 months to 1 year after your injury  Some wounds scar less if they are covered while they heal   Follow up with your healthcare provider as directed: You may need to follow up with your healthcare provider in 24 to 48 hours to have your wound checked for infection  You may need to return in 3 to 5 days if you have stitches that need to be removed  Write down your questions so you remember to ask them during your visits  © 2017 [employer] Information is for End User's use only and may not be sold, redistributed or otherwise used for commercial purposes  All illustrations and images included in CareNotes® are the copyrighted property of [employer]  or [employer]  The above information is an  only  It is not intended as medical advice for individual conditions or treatments  Talk to your doctor, nurse or pharmacist before following any medical regimen to see if it is safe and effective for you  Care For Your Stitches   WHAT YOU NEED TO KNOW:   Stitches, or sutures, are used to close cuts and wounds on the skin  Stitches need to be removed after your wound has healed  DISCHARGE INSTRUCTIONS:   Return to the emergency department if:   · Your stitches come apart  · Blood soaks through your bandages  · You suddenly cannot move your injured joint  · You have sudden numbness around your wound  · You see red streaks coming from your wound  Contact your healthcare provider if:   · You have a fever and chills  · Your wound is red, warm, swollen, or leaking pus  · There is a bad smell coming from your wound  · You have increased pain in the wound area  · You have questions or concerns about your condition or care    Care for your stitches:  Keep your stitches clean and dry  You may need to cover your stitches with a bandage for 24 to 48 hours, or as directed  Do not bump or hit the suture area, as this could open the wound  Do not trim or shorten the ends of your stitches  If they rub on your clothing, put a gauze bandage between the stitches and your clothes  Clean your wound:  Carefully wash your wound with soap and water  For mouth and lip wounds, rinse your mouth after meals and at bedtime  Ask your healthcare provider what to use to rinse your mouth  If you have a scalp wound, you may gently wash your hair every 2 days with mild shampoo  Do not use hair products, such as hair spray  Help your wound heal:   · Elevate  your wound above the level of your heart as often as you can  This will help decrease swelling and pain  Prop your wound on pillows or blankets to keep it elevated comfortably  · Limit activity  Do not stretch the skin around your wound  This will help prevent bleeding and swelling of the wound area  Follow up with your healthcare provider as directed: You may need to return to have your stitches removed  Write down your questions so you remember to ask them during your visits  © 2017 [facility] Information is for End User's use only and may not be sold, redistributed or otherwise used for commercial purposes  All illustrations and images included in CareNotes® are the copyrighted property of [employer]  or [employer]  The above information is an  only  It is not intended as medical advice for individual conditions or treatments  Talk to your doctor, nurse or pharmacist before following any medical regimen to see if it is safe and effective for you

## 2018-09-07 NOTE — ED PROVIDER NOTES
History  Chief Complaint   Patient presents with    Facial Laceration     Pt presents with chin laceration and left jaw pain  Pt states she tripped on a cord and fell hitting her chin and head on the floor this afternoon  Pt denies LOC  - thinners  Pt states, "my jaw feels misaligned "      72-year-old female denies significant past medical history presenting for evaluation chin injury after falling approximately an hour half ago  The patient reports that she was at home she tripped on electrical cord she fell directly on her chin struck the back her head on the way down  She had immediate pain localized to her left posterior scalp and immediate neck that has subsequently improved however she since that time she feels as though her teeth are malaligned and she has some mild pain in her angle of her left mandible as well as her left TMJ area  She came the emergency department for further evaluation she does note a approximately 2 cm linear laceration on her inferior chin, as well as a minimal superficial abrasion on her right anterior knee without tenderness or pain,  otherwise she denies severe headache blood thinners she denies weakness paresthesias or anesthesia she denies anterior neck pain, auditory visual or balance complaint, denies dental injury, broken teeth, difficulty swallowing difficulty completely opening or closing her mouth intraoral injury or bleeding, denies syncope chest pain cough shortness of breath abdominal back pain other muscle skeletal complaint or injury  Complete review systems otherwise negative as noted  Her last tetanus shot was 2 years ago            Prior to Admission Medications   Prescriptions Last Dose Informant Patient Reported? Taking?    Acetylcarnitine HCl (ACETYL L-CARNITINE) 500 MG CAPS   Yes No   Sig: Take by mouth   Alpha-Lipoic Acid (LIPOIC ACID PO)  Self Yes No   Sig: Take 1 capsule by mouth   Amino Acids (L-CARNITINE PO)  Self Yes No   Sig: Take 1 tablet by mouth Ascorbic Acid (VITAMIN C) 100 MG tablet   Yes No   Sig: Take by mouth   Calcium Carb-Cholecalciferol (CALCIUM 1000 + D) 1000-800 MG-UNIT TABS   Yes No   Sig: Take by mouth   Chelated Magnesium 100 MG TABS   Yes No   Sig: Take by mouth Daily   Cholecalciferol (VITAMIN D3) 5000 units CAPS   Yes No   Sig: Take 1 capsule by mouth daily   DOCOSAHEXAENOIC ACID PO   Yes No   Si cap(s)   Glycerylphosphorylcholine (ALPHA-GPC 50%) POWD  Self Yes No   Si tablet by Does not apply route   L-Carnosine POWD  Self Yes No   Sig: by Does not apply route   Lipoic Acid 150 MG CAPS   Yes No   Sig: Take by mouth   Magnesium Oxide 200 MG TABS   Yes No   Si   Menaquinone-7 (VITAMIN K2) 100 MCG CAPS   Yes No   Misc Natural Products (PANTETHINE PLUS) TABS   Yes No   Sig: Take by mouth   Omega-3 Fatty Acids (FISH OIL) 1000 MG CPDR  Self Yes No   Sig: Take 2 5 capsules by mouth   Probiotic Product (PROBIOTIC-10) CAPS   Yes No   Sig: Take by mouth   Resveratrol 50 MG CAPS   Yes No   Sig: Take by mouth   Turmeric POWD   Yes No   Si   Vinpocetine POWD   Yes No   Sig: by Does not apply route   Vit D-Vit E-Safflower Oil (VITAMINS E & D BEAUTY OIL) 400-52387 UNIT/52ML OIL   Yes No   Si cap(s)   Zinc 30 MG CAPS   Yes No   Sig: Take 1 capsule by mouth daily   pyridoxine (VITAMIN B6) 100 mg tablet   Yes No   Si tab(s)   tretinoin (ALTRALIN) 0 05 %   Yes No   Si caitie   vitamin B-12 (CYANOCOBALAMIN) 100 MCG tablet   Yes No   Sig: Take by mouth   vitamin E 600 UNIT capsule   Yes No   Sig: Take by mouth      Facility-Administered Medications: None       Past Medical History:   Diagnosis Date    Cervical strain     Changing skin lesion     Hyperlipidemia     MCI (mild cognitive impairment)     Mixed hyperlipidemia     Nonmelanoma skin cancer     BCC    Osteoporosis     Polio     Poor concentration     Swelling of left thumb        Past Surgical History:   Procedure Laterality Date    ANAL FISSURECTOMY      APPENDECTOMY      CATARACT EXTRACTION      HYSTERECTOMY         Family History   Problem Relation Age of Onset    Heart attack Mother     Cancer Father     Heart attack Sister      I have reviewed and agree with the history as documented  Social History   Substance Use Topics    Smoking status: Former Smoker    Smokeless tobacco: Never Used    Alcohol use Yes      Comment: OCCASIONAL- WINE         Review of Systems   Constitutional: Negative for activity change, appetite change, chills and fever  HENT: Negative for congestion, drooling, ear discharge, ear pain, facial swelling, hearing loss, mouth sores, nosebleeds, rhinorrhea, sore throat and voice change  Teeth feel malaligned, left jaw pain   Eyes: Negative for photophobia, pain and visual disturbance  Respiratory: Negative for cough and shortness of breath  Cardiovascular: Negative for chest pain and palpitations  Gastrointestinal: Negative for abdominal pain, diarrhea, nausea and vomiting  Genitourinary: Negative for dysuria, frequency and urgency  Musculoskeletal: Positive for neck stiffness  Negative for arthralgias, back pain, gait problem, joint swelling, myalgias and neck pain  Skin: Positive for wound  Negative for color change and rash  Neurological: Positive for headaches  Negative for dizziness, weakness and numbness  Hematological: Negative for adenopathy  Does not bruise/bleed easily  Psychiatric/Behavioral: Negative for agitation and behavioral problems  All other systems reviewed and are negative  Physical Exam  Physical Exam   Constitutional: She is oriented to person, place, and time  She appears well-developed and well-nourished  No distress  Very well-appearing pleasant conversational no acute distress   HENT:   Head: Normocephalic     Right Ear: External ear normal    Left Ear: External ear normal    Patient has minimal tenderness on her posterior scalp appears atraumatic she also had minimally tender across her posterior superior neck no discrete midline bony tenderness no step-off or deformities there is no anterior neck pain or tenderness, she has a 2 cm linear gaping laceration on the inferior portion of her chin with some mild associated tenderness, minimal tenderness along the TMJ joint as well as the left angle of the mandible otherwise she has able to completely open and close her mouth there is no trismus dentition is intact there are no loose or chipped teeth there is no intraoral injury or bleeding a drooling stridor trismus she has phonating fluently TMs are clear bilaterally head neck and face otherwise unremarkable   Eyes: EOM are normal  Pupils are equal, round, and reactive to light  Neck: Normal range of motion  Neck supple  No tracheal deviation present  Cardiovascular: Normal rate, regular rhythm and normal heart sounds  Exam reveals no gallop and no friction rub  No murmur heard  Pulmonary/Chest: Effort normal and breath sounds normal  She has no wheezes  She has no rales  Abdominal: Soft  Bowel sounds are normal  She exhibits no distension  There is no tenderness  There is no rebound and no guarding  Musculoskeletal:   No tenderness over her back thoracic or lumbar spine no tenderness over her abdomen she has full active and passive range of motion globally the upper lower extremities without pain tenderness or injury no other acute ischemic infectious traumatic or inflammatory findings other absolutely minimal abrasion on her right anterior knee with out tenderness   Neurological: She is alert and oriented to person, place, and time  No cranial nerve deficit  She exhibits normal muscle tone  Coordination normal    Skin: Skin is warm and dry  No rash noted  Psychiatric: She has a normal mood and affect  Her behavior is normal    Nursing note and vitals reviewed        Vital Signs  ED Triage Vitals   Temperature Pulse Respirations Blood Pressure SpO2   09/07/18 1347 09/07/18 1343 09/07/18 1343 09/07/18 1343 09/07/18 1343   98 °F (36 7 °C) 64 18 167/74 100 %      Temp Source Heart Rate Source Patient Position - Orthostatic VS BP Location FiO2 (%)   09/07/18 1347 09/07/18 1343 09/07/18 1343 09/07/18 1343 --   Oral Monitor Lying Right arm       Pain Score       09/07/18 1343       7           Vitals:    09/07/18 1343   BP: 167/74   Pulse: 64   Patient Position - Orthostatic VS: Lying       Visual Acuity      ED Medications  Medications   lidocaine (PF) (XYLOCAINE-MPF) 1 % injection 10 mL (not administered)   LET gel 1 application (1 application Topical Given 9/7/18 1429)   neomycin-bacitracin-polymyxin b (NEOSPORIN) ointment 1 small application (1 small application Topical Given 9/7/18 1548)       Diagnostic Studies  Results Reviewed     None                 CT facial bones without contrast   Final Result by Mayte Alba MD (09/07 2007)      1  No fracture  2   Moderate paranasal sinus mucosal disease with complete left frontal sinus opacification  Workstation performed: [id number]         CT spine cervical without contrast   Final Result by Mayte Alba MD (09/07 7867)      1  No cervical spine fracture or traumatic malalignment  2   Right upper lobe nodule and groundglass opacities  Based on current Fleischner Society 2017 Guidelines on incidental pulmonary nodule, followup noncontrast CT is recommended at 6-12 months from the initial examination to confirm persistence; if    stable at that time, additional followup CT is recommended for every 2 years until 5 years of stability is demonstrated  The study was marked in EPIC for significant notification  Workstation performed: [id number]         CT head without contrast   Final Result by Mayte Alba MD (09/07 1440)      No acute intracranial abnormality  Microangiopathic changes                    Workstation performed: [id number]                    Procedures  Lac Repair  Date/Time: 9/7/2018 3:36 PM  Performed by: Real Fisher  Authorized by: Real Fisher   Consent: Verbal consent obtained  Risks and benefits: risks, benefits and alternatives were discussed  Consent given by: patient  Patient understanding: patient states understanding of the procedure being performed  Patient consent: the patient's understanding of the procedure matches consent given  Required items: required blood products, implants, devices, and special equipment available  Body area: head/neck  Location details: chin  Laceration length: 2 cm  Foreign bodies: no foreign bodies  Tendon involvement: none  Nerve involvement: none  Vascular damage: no  Anesthesia: local infiltration    Anesthesia:  Local Anesthetic: LET (lido,epi,tetracaine) and lidocaine 1% without epinephrine  Anesthetic total: 2 mL    Wound Dehiscence:  Superficial Wound Dehiscence: simple closure      Procedure Details:  Preparation: Patient was prepped and draped in the usual sterile fashion    Irrigation solution: saline  Irrigation method: syringe  Amount of cleaning: extensive  Debridement: none  Degree of undermining: none  Skin closure: 6-0 Prolene  Number of sutures: 6  Technique: simple  Approximation: close  Approximation difficulty: simple  Dressing: antibiotic ointment  Patient tolerance: Patient tolerated the procedure well with no immediate complications  Comments: Area was prepped and draped in usual fashion, wound was extensively irrigated explored to the base, repaired as noted, given extensive discharge return follow-up instructions wound care instructions             Phone Contacts  ED Phone Contact    ED Course  ED Course as of Sep 07 1637   Fri Sep 07, 2018   1537 No other injuries on re-evaluation patient underwear stands and agrees to follow up for pulmonary nodule repeat CT scan 6 months close return instructions all agreeable plan                                MDM  Number of Diagnoses or Management Options  Chin laceration, initial encounter:   Contusion of jaw, initial encounter:   Incidental pulmonary nodule:   Minor head injury, initial encounter:   Diagnosis management comments: 72-year-old female without significant past medical history with mechanical fall directly onto chin did apparently strike the back of her head, there is no loss of consciousness she was able to get up immediately she notes that she has a laceration on the inferior portion of her chin and she felt like her teeth felt mildly malaligned but she has no trismus or difficulty opening or closing her mouth no tooth fracture or other tooth injury, mild discomfort the back of her head otherwise no other acute injuries complaints or concerns, on exam here she is afebrile normal vital signs the exception of mildly elevated blood pressure she is clinically well-appearing conversational, her complete head-to-toe exam is as noted, given her advanced age mechanism of injury reported posterior headache and her posterior upper neck discomfort will include CT of her head neck in addition to her face, her tetanus is up-to-date she is declining pain medicine at this time her wound will repair primary closure, disposition pending further evaluation reassessment    CritCare Time    Disposition  Final diagnoses:   Minor head injury, initial encounter   Contusion of jaw, initial encounter   Chin laceration, initial encounter   Incidental pulmonary nodule     Time reflects when diagnosis was documented in both MDM as applicable and the Disposition within this note     Time User Action Codes Description Comment    9/7/2018  3:37 PM Fany Coronel Add [S09 90XA] Minor head injury, initial encounter     9/7/2018  3:38 PM Tyra Kendall Figures Add [S00 83XA] Contusion of jaw, initial encounter     9/7/2018  3:38 PM [facility], initial encounter     9/7/2018  3:38 PM Tyra Kendall Figures Add [R91 1] Incidental pulmonary nodule       ED Disposition     ED Disposition Condition Comment    Discharge  Flaca Earing discharge to home/self care  Condition at discharge: Good        Follow-up Information     Follow up With Specialties Details Why Contact Info Additional Information    [facility] Emergency [facility]  If symptoms worsen [address] [facility] [place] [address]  [phone] [place] [facility], [address], [place], [address], [address]  Nurse Practitioner In 1 week  [address] [facility]  [address] [facility]  [address]       Jackie Diaz MD Oral Maxillofacial Surgery, Oral Surgery In 1 week As needed [address] [facility] [address]             Discharge Medication List as of 9/7/2018  3:41 PM      START taking these medications    Details   HYDROcodone-acetaminophen (NORCO) 5-325 mg per tablet Take 1 tablet by mouth every 6 (six) hours as needed for pain for up to 2 days Max Daily Amount: 4 tablets, Starting Fri 9/7/2018, Until Sun 9/9/2018, Print      naproxen (NAPROSYN) 250 mg tablet Take 1 tablet (250 mg total) by mouth 2 (two) times a day with meals for 10 days, Starting Fri 9/7/2018, Until Mon 9/17/2018, Print         CONTINUE these medications which have NOT CHANGED    Details   Acetylcarnitine HCl (ACETYL L-CARNITINE) 500 MG CAPS Take by mouth, Historical Med      !!  Alpha-Lipoic Acid (LIPOIC ACID PO) Take 1 capsule by mouth, Historical Med      Amino Acids (L-CARNITINE PO) Take 1 tablet by mouth, Historical Med      Ascorbic Acid (VITAMIN C) 100 MG tablet Take by mouth, Historical Med      Calcium Carb-Cholecalciferol (CALCIUM 1000 + D) 1000-800 MG-UNIT TABS Take by mouth, Historical Med      Chelated Magnesium 100 MG TABS Take by mouth Daily, Historical Med      Cholecalciferol (VITAMIN D3) 5000 units CAPS Take 1 capsule by mouth daily, Historical Med      DOCOSAHEXAENOIC ACID PO 1 cap(s), Historical Med Glycerylphosphorylcholine (ALPHA-GPC 50%) POWD 1 tablet by Does not apply route, Historical Med      L-Carnosine POWD by Does not apply route, Historical Med      !! Lipoic Acid 150 MG CAPS Take by mouth, Historical Med      Magnesium Oxide 200 MG TABS 1, Historical Med      Menaquinone-7 (VITAMIN K2) 100 MCG CAPS Historical Med      Misc Natural Products (PANTETHINE PLUS) TABS Take by mouth, Historical Med      Omega-3 Fatty Acids (FISH OIL) 1000 MG CPDR Take 2 5 capsules by mouth, Historical Med      Probiotic Product (PROBIOTIC-10) CAPS Take by mouth, Historical Med      pyridoxine (VITAMIN B6) 100 mg tablet 1 tab(s), Historical Med      Resveratrol 50 MG CAPS Take by mouth, Historical Med      tretinoin (ALTRALIN) 0 05 % 1 caitie, Historical Med      Turmeric POWD 1, Historical Med      Vinpocetine POWD by Does not apply route, Historical Med      Vit D-Vit E-Safflower Oil (VITAMINS E & D BEAUTY OIL) 400-85190 UNIT/52ML OIL 1 cap(s), Historical Med      vitamin B-12 (CYANOCOBALAMIN) 100 MCG tablet Take by mouth, Historical Med      vitamin E 600 UNIT capsule Take by mouth, Historical Med      Zinc 30 MG CAPS Take 1 capsule by mouth daily, Historical Med       !! - Potential duplicate medications found  Please discuss with provider  No discharge procedures on file      ED Provider  Electronically Signed by           Barbara Bennett DO  09/07/18 8968

## 2018-09-12 ENCOUNTER — OFFICE VISIT (OUTPATIENT)
Dept: FAMILY MEDICINE CLINIC | Facility: CLINIC | Age: 73
End: 2018-09-12
Payer: COMMERCIAL

## 2018-09-12 DIAGNOSIS — Z48.02 VISIT FOR SUTURE REMOVAL: Primary | ICD-10-CM

## 2018-09-12 PROCEDURE — 99214 OFFICE O/P EST MOD 30 MIN: CPT | Performed by: NURSE PRACTITIONER

## 2018-09-12 NOTE — PROGRESS NOTES
Assessment/Plan:    No problem-specific Assessment & Plan notes found for this encounter  Diagnoses and all orders for this visit:    Visit for suture removal          Subjective:      Patient ID: Deangelo Coreas is a 68 y o  female  Pt is here for suture removal  She fell by tripping over the cord and she struck the floor  This occurred 5 days ago  Pt had CT of face and head and these were all wnl  She has had no tenderness or discharge of the wound  The following portions of the patient's history were reviewed and updated as appropriate:   She  has a past medical history of Cervical strain; Changing skin lesion; Hyperlipidemia; MCI (mild cognitive impairment); Mixed hyperlipidemia; Nonmelanoma skin cancer; Osteoporosis; Polio; Poor concentration; and Swelling of left thumb  She   Patient Active Problem List    Diagnosis Date Noted    Visit for suture removal 09/12/2018    Seborrheic keratosis 08/27/2018    High risk sexual behavior 05/17/2018    Acute nonintractable headache 03/08/2018    Vitamin D deficiency 03/08/2018    Flank pain 02/26/2018    Flu-like symptoms 02/22/2018    Influenza B 02/22/2018    Mixed hyperlipidemia 09/21/2017    Poor concentration 08/15/2017    Short-term memory loss 08/15/2017    Osteoporosis 12/17/2013     She  has a past surgical history that includes Anal fissurectomy; Appendectomy; Hysterectomy; and Cataract extraction  Her family history includes Cancer in her father; Heart attack in her mother and sister  She  reports that she has quit smoking  She has never used smokeless tobacco  She reports that she drinks alcohol  She reports that she does not use drugs    Current Outpatient Prescriptions   Medication Sig Dispense Refill    Acetylcarnitine HCl (ACETYL L-CARNITINE) 500 MG CAPS Take by mouth      Alpha-Lipoic Acid (LIPOIC ACID PO) Take 1 capsule by mouth      Amino Acids (L-CARNITINE PO) Take 1 tablet by mouth      Ascorbic Acid (VITAMIN C) 100 MG tablet Take by mouth      Calcium Carb-Cholecalciferol (CALCIUM 1000 + D) 1000-800 MG-UNIT TABS Take by mouth      Chelated Magnesium 100 MG TABS Take by mouth Daily      Cholecalciferol (VITAMIN D3) 5000 units CAPS Take 1 capsule by mouth daily      DOCOSAHEXAENOIC ACID PO 1 cap(s)      Glycerylphosphorylcholine (ALPHA-GPC 50%) POWD 1 tablet by Does not apply route      L-Carnosine POWD by Does not apply route      Lipoic Acid 150 MG CAPS Take by mouth      Magnesium Oxide 200 MG TABS 1      Menaquinone-7 (VITAMIN K2) 100 MCG CAPS       Misc Natural Products (PANTETHINE PLUS) TABS Take by mouth      naproxen (NAPROSYN) 250 mg tablet Take 1 tablet (250 mg total) by mouth 2 (two) times a day with meals for 10 days 20 tablet 0    Omega-3 Fatty Acids (FISH OIL) 1000 MG CPDR Take 2 5 capsules by mouth      Probiotic Product (PROBIOTIC-10) CAPS Take by mouth      pyridoxine (VITAMIN B6) 100 mg tablet 1 tab(s)      Resveratrol 50 MG CAPS Take by mouth      tretinoin (ALTRALIN) 0 05 % 1 caitie      Turmeric POWD 1      Vinpocetine POWD by Does not apply route      Vit D-Vit E-Safflower Oil (VITAMINS E & D BEAUTY OIL) 400-43538 UNIT/52ML OIL 1 cap(s)      vitamin B-12 (CYANOCOBALAMIN) 100 MCG tablet Take by mouth      vitamin E 600 UNIT capsule Take by mouth      Zinc 30 MG CAPS Take 1 capsule by mouth daily       No current facility-administered medications for this visit        Current Outpatient Prescriptions on File Prior to Visit   Medication Sig    Acetylcarnitine HCl (ACETYL L-CARNITINE) 500 MG CAPS Take by mouth    Alpha-Lipoic Acid (LIPOIC ACID PO) Take 1 capsule by mouth    Amino Acids (L-CARNITINE PO) Take 1 tablet by mouth    Ascorbic Acid (VITAMIN C) 100 MG tablet Take by mouth    Calcium Carb-Cholecalciferol (CALCIUM 1000 + D) 1000-800 MG-UNIT TABS Take by mouth    Chelated Magnesium 100 MG TABS Take by mouth Daily    Cholecalciferol (VITAMIN D3) 5000 units CAPS Take 1 capsule by mouth daily    DOCOSAHEXAENOIC ACID PO 1 cap(s)    Glycerylphosphorylcholine (ALPHA-GPC 50%) POWD 1 tablet by Does not apply route    L-Carnosine POWD by Does not apply route    Lipoic Acid 150 MG CAPS Take by mouth    Magnesium Oxide 200 MG TABS 1    Menaquinone-7 (VITAMIN K2) 100 MCG CAPS     Misc Natural Products (PANTETHINE PLUS) TABS Take by mouth    naproxen (NAPROSYN) 250 mg tablet Take 1 tablet (250 mg total) by mouth 2 (two) times a day with meals for 10 days    Omega-3 Fatty Acids (FISH OIL) 1000 MG CPDR Take 2 5 capsules by mouth    Probiotic Product (PROBIOTIC-10) CAPS Take by mouth    pyridoxine (VITAMIN B6) 100 mg tablet 1 tab(s)    Resveratrol 50 MG CAPS Take by mouth    tretinoin (ALTRALIN) 0 05 % 1 caitie    Turmeric POWD 1    Vinpocetine POWD by Does not apply route    Vit D-Vit E-Safflower Oil (VITAMINS E & D BEAUTY OIL) 400-24474 UNIT/52ML OIL 1 cap(s)    vitamin B-12 (CYANOCOBALAMIN) 100 MCG tablet Take by mouth    vitamin E 600 UNIT capsule Take by mouth    Zinc 30 MG CAPS Take 1 capsule by mouth daily     No current facility-administered medications on file prior to visit  She is allergic to sulfa antibiotics       Review of Systems   Constitutional: Negative for fever  Eyes: Negative for visual disturbance  Skin: Positive for wound  Sutures to chin   Allergic/Immunologic: Negative for immunocompromised state  Hematological: Negative for adenopathy  All other systems reviewed and are negative  Objective: There were no vitals taken for this visit  Physical Exam   Constitutional: She is oriented to person, place, and time  She appears well-developed and well-nourished  No distress  HENT:   Head: Normocephalic and atraumatic  Eyes: Conjunctivae are normal  Pupils are equal, round, and reactive to light  Pulmonary/Chest: Effort normal    Musculoskeletal: Normal range of motion     Neurological: She is alert and oriented to person, place, and time  Skin: Skin is warm and dry  6 sutures removed from bottom of chin  Wound is approximated without redness  Psychiatric: She has a normal mood and affect  Her behavior is normal  Judgment and thought content normal    Nursing note and vitals reviewed

## 2018-09-12 NOTE — PATIENT INSTRUCTIONS
Sutures were removed  Wound is approximated without sign of infection  Pt advised to wash normally with soap and water

## 2018-09-18 ENCOUNTER — HOSPITAL ENCOUNTER (EMERGENCY)
Facility: HOSPITAL | Age: 73
Discharge: HOME | End: 2018-09-18
Attending: EMERGENCY MEDICINE | Admitting: EMERGENCY MEDICINE
Payer: COMMERCIAL

## 2018-09-18 ENCOUNTER — APPOINTMENT (EMERGENCY)
Dept: CT IMAGING | Facility: HOSPITAL | Age: 73
End: 2018-09-18
Payer: COMMERCIAL

## 2018-09-18 VITALS
BODY MASS INDEX: 22.1 KG/M2 | DIASTOLIC BLOOD PRESSURE: 56 MMHG | HEART RATE: 62 BPM | OXYGEN SATURATION: 100 % | WEIGHT: 126.76 LBS | TEMPERATURE: 98 F | RESPIRATION RATE: 16 BRPM | SYSTOLIC BLOOD PRESSURE: 120 MMHG
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
## 2018-09-18 DIAGNOSIS — S09.93XA FACIAL INJURY, INITIAL ENCOUNTER: Primary | ICD-10-CM

## 2018-09-18 PROCEDURE — 99283 EMERGENCY DEPT VISIT LOW MDM: CPT

## 2018-09-18 PROCEDURE — 70450 CT HEAD/BRAIN W/O DYE: CPT

## 2018-09-18 PROCEDURE — 70486 CT MAXILLOFACIAL W/O DYE: CPT

## 2018-09-18 RX ORDER — GINSENG 100 MG
1 CAPSULE ORAL ONCE
Status: COMPLETED | OUTPATIENT
Start: 2018-09-18 | End: 2018-09-18

## 2018-09-18 RX ADMIN — BACITRACIN ZINC 1 LARGE APPLICATION: 500 OINTMENT TOPICAL at 23:47

## 2018-09-18 RX ADMIN — BACITRACIN ZINC 1 SMALL APPLICATION: 500 OINTMENT TOPICAL at 23:03

## 2018-09-19 NOTE — ED PROVIDER NOTES
History  Chief Complaint   Patient presents with    Fall     Patient tripped on curb and fell scraping nose  No LOC  No blood thinners  A 79-year-old female presents after fall  She states she tripped over a curb landing on the bridge of her nose  No loss of consciousness  No blood thinners  Did not feel lightheaded or dizzy prior to the fall  No other injuries  Sustained abrasion to her nose  Pain  is aching, nonradiating, moderate over her nose  Tetanus up to date  Prior to Admission Medications   Prescriptions Last Dose Informant Patient Reported? Taking?    Acetylcarnitine HCl (ACETYL L-CARNITINE) 500 MG CAPS   Yes No   Sig: Take by mouth   Alpha-Lipoic Acid (LIPOIC ACID PO)  Self Yes No   Sig: Take 1 capsule by mouth   Amino Acids (L-CARNITINE PO)  Self Yes No   Sig: Take 1 tablet by mouth   Ascorbic Acid (VITAMIN C) 100 MG tablet   Yes No   Sig: Take by mouth   Calcium Carb-Cholecalciferol (CALCIUM 1000 + D) 1000-800 MG-UNIT TABS   Yes No   Sig: Take by mouth   Chelated Magnesium 100 MG TABS   Yes No   Sig: Take by mouth Daily   Cholecalciferol (VITAMIN D3) 5000 units CAPS   Yes No   Sig: Take 1 capsule by mouth daily   DOCOSAHEXAENOIC ACID PO   Yes No   Si cap(s)   Glycerylphosphorylcholine (ALPHA-GPC 50%) POWD  Self Yes No   Si tablet by Does not apply route   L-Carnosine POWD  Self Yes No   Sig: by Does not apply route   Lipoic Acid 150 MG CAPS   Yes No   Sig: Take by mouth   Magnesium Oxide 200 MG TABS   Yes No   Si   Menaquinone-7 (VITAMIN K2) 100 MCG CAPS   Yes No   Misc Natural Products (PANTETHINE PLUS) TABS   Yes No   Sig: Take by mouth   Omega-3 Fatty Acids (FISH OIL) 1000 MG CPDR  Self Yes No   Sig: Take 2 5 capsules by mouth   Probiotic Product (PROBIOTIC-10) CAPS   Yes No   Sig: Take by mouth   Resveratrol 50 MG CAPS   Yes No   Sig: Take by mouth   Turmeric POWD   Yes No   Si   Vinpocetine POWD   Yes No   Sig: by Does not apply route   Vit D-Vit E-Safflower Oil (VITAMINS E & D BEAUTY OIL) 400-55220 UNIT/52ML OIL   Yes No   Si cap(s)   Zinc 30 MG CAPS   Yes No   Sig: Take 1 capsule by mouth daily   naproxen (NAPROSYN) 250 mg tablet   No No   Sig: Take 1 tablet (250 mg total) by mouth 2 (two) times a day with meals for 10 days   pyridoxine (VITAMIN B6) 100 mg tablet   Yes No   Si tab(s)   tretinoin (ALTRALIN) 0 05 %   Yes No   Si caitie   vitamin B-12 (CYANOCOBALAMIN) 100 MCG tablet   Yes No   Sig: Take by mouth   vitamin E 600 UNIT capsule   Yes No   Sig: Take by mouth      Facility-Administered Medications: None       Past Medical History:   Diagnosis Date    Cervical strain     Changing skin lesion     Hyperlipidemia     MCI (mild cognitive impairment)     Mixed hyperlipidemia     Nonmelanoma skin cancer     BCC    Osteoporosis     Polio     Poor concentration     Swelling of left thumb        Past Surgical History:   Procedure Laterality Date    ANAL FISSURECTOMY      APPENDECTOMY      CATARACT EXTRACTION      HYSTERECTOMY         Family History   Problem Relation Age of Onset    Heart attack Mother     Cancer Father     Heart attack Sister      I have reviewed and agree with the history as documented  Social History   Substance Use Topics    Smoking status: Former Smoker    Smokeless tobacco: Never Used    Alcohol use Yes      Comment: OCCASIONAL- WINE         Review of Systems   Constitutional: Negative for chills and fever  HENT: Negative for dental problem and ear pain  Head injury   Eyes: Negative for pain and redness  Respiratory: Negative for cough and shortness of breath  Cardiovascular: Negative for chest pain and palpitations  Gastrointestinal: Negative for abdominal pain and nausea  Endocrine: Negative for polydipsia and polyphagia  Genitourinary: Negative for dysuria and frequency  Musculoskeletal: Negative for arthralgias and joint swelling  Skin: Negative for color change and rash  Neurological: Negative for dizziness and headaches  Psychiatric/Behavioral: Negative for behavioral problems and confusion  All other systems reviewed and are negative  Physical Exam  Physical Exam   Constitutional: She is oriented to person, place, and time  She appears well-developed and well-nourished  No distress  HENT:   Right Ear: External ear normal    Left Ear: External ear normal    Nose: Nose normal    Abrasion over bridge of nose with bleeding controlled   Eyes: Conjunctivae and EOM are normal  Pupils are equal, round, and reactive to light  Neck: Normal range of motion  Neck supple  No JVD present  Cardiovascular: Normal rate, regular rhythm and normal heart sounds  No murmur heard  Pulmonary/Chest: Effort normal and breath sounds normal  No respiratory distress  She has no wheezes  Abdominal: Soft  Bowel sounds are normal  She exhibits no distension  There is no tenderness  Musculoskeletal: Normal range of motion  She exhibits no edema  Neurological: She is alert and oriented to person, place, and time  No cranial nerve deficit  CN II-XII intact grossly, no focal deficits  Normal strength and sensation in b/l upper and lower extremities  Normal FNF and rapid alternating hand movements   Skin: Skin is warm and dry  Capillary refill takes less than 2 seconds  She is not diaphoretic  Psychiatric: She has a normal mood and affect  Her behavior is normal    Nursing note and vitals reviewed        Vital Signs  ED Triage Vitals [09/18/18 2213]   Temperature Pulse Respirations Blood Pressure SpO2   98 °F (36 7 °C) 69 18 149/65 100 %      Temp Source Heart Rate Source Patient Position - Orthostatic VS BP Location FiO2 (%)   Oral Monitor Sitting Right arm --      Pain Score       5           Vitals:    09/18/18 2213   BP: 149/65   Pulse: 69   Patient Position - Orthostatic VS: Sitting       Visual Acuity      ED Medications  Medications   bacitracin topical ointment 1 large application (not administered)   bacitracin topical ointment 1 small application (1 small application Topical Given 9/18/18 0768)       Diagnostic Studies  Results Reviewed     None                 CT head without contrast   Final Result by Zainab Wade MD (09/18 2258)      No acute intracranial abnormality  Moderate microvascular ischemic disease  Workstation performed: [id number]         CT facial bones without contrast   Final Result by Zainab Wade MD (09/18 2253)      No evidence of acute traumatic injury to the facial bones  Workstation performed: [id number]                    Procedures  Procedures       Phone Contacts  ED Phone Contact    ED Course                               MDM  Number of Diagnoses or Management Options  Facial injury, initial encounter:   Diagnosis management comments: 69 yo F presents with facial injury, mechanical fall after tripping on curb and hitting nose  CT head/maxillofacial negative  Bacitracin for abrasion over nose  No other injuries  CritCare Time    Disposition  Final diagnoses:   Facial injury, initial encounter   Fall, initial encounter     Time reflects when diagnosis was documented in both MDM as applicable and the Disposition within this note     Time User Action Codes Description Comment    9/18/2018 11:16 PM Rose Brownlee [[id number]] Facial injury, initial encounter     9/18/2018 11:21 PM Rose Shoemaker Add Lolita Estrada Rumpf, initial encounter       ED Disposition     ED Disposition Condition Comment    Discharge  Carry Marcus discharge to home/self care  Condition at discharge: Good        Follow-up Information     Follow up With Specialties Details Why [facility], [address] [place] [address] Nurse Practitioner  As needed 21 [phone]  [address] [facility]  [address]  [phone]            Patient's Medications   Discharge Prescriptions    No medications on file     No discharge procedures on file      ED Provider  Electronically Signed by           Chitra Serna MD  09/18/18 5958

## 2018-09-20 ENCOUNTER — OFFICE VISIT (OUTPATIENT)
Dept: NEUROLOGY | Facility: CLINIC | Age: 73
End: 2018-09-20
Payer: COMMERCIAL

## 2018-09-20 VITALS
WEIGHT: 123 LBS | HEART RATE: 82 BPM | DIASTOLIC BLOOD PRESSURE: 60 MMHG | HEIGHT: 64 IN | SYSTOLIC BLOOD PRESSURE: 112 MMHG | BODY MASS INDEX: 21 KG/M2

## 2018-09-20 DIAGNOSIS — G31.84 MCI (MILD COGNITIVE IMPAIRMENT): ICD-10-CM

## 2018-09-20 DIAGNOSIS — S16.1XXD STRAIN OF NECK MUSCLE, SUBSEQUENT ENCOUNTER: Primary | ICD-10-CM

## 2018-09-20 PROBLEM — S16.1XXA CERVICAL STRAIN: Status: ACTIVE | Noted: 2018-09-20

## 2018-09-20 PROCEDURE — 99214 OFFICE O/P EST MOD 30 MIN: CPT | Performed by: PSYCHIATRY & NEUROLOGY

## 2018-09-20 RX ORDER — CYCLOBENZAPRINE HCL 5 MG
5 TABLET ORAL
Qty: 30 TABLET | Refills: 1 | Status: SHIPPED | OUTPATIENT
Start: 2018-09-20 | End: 2019-10-09 | Stop reason: ALTCHOICE

## 2018-09-25 ENCOUNTER — OFFICE VISIT (OUTPATIENT)
Dept: FAMILY MEDICINE CLINIC | Facility: CLINIC | Age: 73
End: 2018-09-25
Payer: COMMERCIAL

## 2018-09-25 VITALS
OXYGEN SATURATION: 100 % | DIASTOLIC BLOOD PRESSURE: 58 MMHG | SYSTOLIC BLOOD PRESSURE: 118 MMHG | HEIGHT: 63 IN | BODY MASS INDEX: 21.65 KG/M2 | TEMPERATURE: 97.3 F | HEART RATE: 79 BPM | WEIGHT: 122.2 LBS

## 2018-09-25 DIAGNOSIS — R41.82 ALTERED MENTAL STATUS, UNSPECIFIED ALTERED MENTAL STATUS TYPE: ICD-10-CM

## 2018-09-25 DIAGNOSIS — R29.6 FREQUENT FALLS: Primary | ICD-10-CM

## 2018-09-25 PROCEDURE — 1036F TOBACCO NON-USER: CPT | Performed by: NURSE PRACTITIONER

## 2018-09-25 PROCEDURE — 1160F RVW MEDS BY RX/DR IN RCRD: CPT | Performed by: NURSE PRACTITIONER

## 2018-09-25 PROCEDURE — 3008F BODY MASS INDEX DOCD: CPT | Performed by: NURSE PRACTITIONER

## 2018-09-25 PROCEDURE — 4040F PNEUMOC VAC/ADMIN/RCVD: CPT | Performed by: NURSE PRACTITIONER

## 2018-09-25 PROCEDURE — 99214 OFFICE O/P EST MOD 30 MIN: CPT | Performed by: NURSE PRACTITIONER

## 2018-09-25 NOTE — PATIENT INSTRUCTIONS
Frequent falls- I have concerns regarding patient falls  She has had two CT scans of the head this month and the results are consistent with her age and do not explain the falls  I have advised that she follow up with her Neurologist sooner than her April visit  Labs show slightly elevated Apolipoprotein- this is an indicator of low to moderate risk for cardiac event

## 2018-09-25 NOTE — PROGRESS NOTES
Assessment/Plan:    No problem-specific Assessment & Plan notes found for this encounter  Diagnoses and all orders for this visit:    Frequent falls          Subjective:      Patient ID: Love Musa is a 68 y o  female  Pt is here because she wants to reviews labs that were done in July  Labs were specifically requested by the patient  CRP <3 0  Homocysteine 9 3 Total Chol 273  hdl-125  Tg- 79  LDL-130 ratio 2 2   Apolipoprotein- 104   STD screening- negative  Pt has had 3 fall /accidents in the past month  The first accident was at the gym and she developed a contusion on her face from walking into a bar at the gym  The next week, she tripped over a cord and needed stitches to her chin  A few days ago, she fell off a curb at [employer] and she has abrasions to her face and hands  She believes the first incident was truly an accident  She feels as though she is having delayed in her reflexes causing the other two falls  The following portions of the patient's history were reviewed and updated as appropriate:   She  has a past medical history of Cervical strain; Changing skin lesion; Hyperlipidemia; MCI (mild cognitive impairment); Mixed hyperlipidemia; Nonmelanoma skin cancer; Osteoporosis; Polio; Poor concentration; and Swelling of left thumb    She   Patient Active Problem List    Diagnosis Date Noted    Frequent falls 09/25/2018    Cervical strain 09/20/2018    MCI (mild cognitive impairment) 09/20/2018    Visit for suture removal 09/12/2018    Seborrheic keratosis 08/27/2018    High risk sexual behavior 05/17/2018    Acute nonintractable headache 03/08/2018    Vitamin D deficiency 03/08/2018    Flank pain 02/26/2018    Flu-like symptoms 02/22/2018    Influenza B 02/22/2018    Mixed hyperlipidemia 09/21/2017    Poor concentration 08/15/2017    Short-term memory loss 08/15/2017    Osteoporosis 12/17/2013     She  has a past surgical history that includes Anal fissurectomy; Appendectomy; Hysterectomy; and Cataract extraction  Her family history includes Cancer in her father; Heart attack in her mother and sister  She  reports that she has quit smoking  She has never used smokeless tobacco  She reports that she drinks alcohol  She reports that she does not use drugs    Current Outpatient Prescriptions   Medication Sig Dispense Refill    Acetylcarnitine HCl (ACETYL L-CARNITINE) 500 MG CAPS Take by mouth      Alpha-Lipoic Acid (LIPOIC ACID PO) Take 1 capsule by mouth      Amino Acids (L-CARNITINE PO) Take 1 tablet by mouth      Ascorbic Acid (VITAMIN C) 1000 MG tablet Take by mouth      Calcium Carb-Cholecalciferol (CALCIUM 1000 + D) 1000-800 MG-UNIT TABS Take by mouth      Chelated Magnesium 100 MG TABS Take by mouth Daily      Cholecalciferol (VITAMIN D3) 5000 units CAPS Take 1 capsule by mouth daily      cyclobenzaprine (FLEXERIL) 5 mg tablet Take 1 tablet (5 mg total) by mouth daily at bedtime as needed for muscle spasms 30 tablet 1    Glycerylphosphorylcholine (ALPHA-GPC 50%) POWD 1 tablet by Does not apply route      L-Carnosine POWD by Does not apply route      Lipoic Acid 150 MG CAPS Take by mouth      Magnesium Oxide 200 MG TABS 1      Menaquinone-7 (VITAMIN K2) 100 MCG CAPS       Misc Natural Products (PANTETHINE PLUS) TABS Take by mouth      naproxen (NAPROSYN) 250 mg tablet Take 1 tablet (250 mg total) by mouth 2 (two) times a day with meals for 10 days 20 tablet 0    Omega-3 Fatty Acids (FISH OIL) 1000 MG CPDR Take 2 5 capsules by mouth      Probiotic Product (PROBIOTIC-10) CAPS Take by mouth      pyridoxine (VITAMIN B6) 100 mg tablet 1 tab(s)      Resveratrol 50 MG CAPS Take by mouth      tretinoin (ALTRALIN) 0 05 % 1 caitie      Turmeric POWD 1      Vinpocetine POWD by Does not apply route      Vit D-Vit E-Safflower Oil (VITAMINS E & D BEAUTY OIL) 400-79930 UNIT/52ML OIL 1 cap(s)      vitamin B-12 (CYANOCOBALAMIN) 100 MCG tablet Take by mouth      vitamin E 600 UNIT capsule Take by mouth      Zinc 30 MG CAPS Take 1 capsule by mouth daily       No current facility-administered medications for this visit  Current Outpatient Prescriptions on File Prior to Visit   Medication Sig    Acetylcarnitine HCl (ACETYL L-CARNITINE) 500 MG CAPS Take by mouth    Alpha-Lipoic Acid (LIPOIC ACID PO) Take 1 capsule by mouth    Amino Acids (L-CARNITINE PO) Take 1 tablet by mouth    Ascorbic Acid (VITAMIN C) 1000 MG tablet Take by mouth    Calcium Carb-Cholecalciferol (CALCIUM 1000 + D) 1000-800 MG-UNIT TABS Take by mouth    Chelated Magnesium 100 MG TABS Take by mouth Daily    Cholecalciferol (VITAMIN D3) 5000 units CAPS Take 1 capsule by mouth daily    cyclobenzaprine (FLEXERIL) 5 mg tablet Take 1 tablet (5 mg total) by mouth daily at bedtime as needed for muscle spasms    Glycerylphosphorylcholine (ALPHA-GPC 50%) POWD 1 tablet by Does not apply route    L-Carnosine POWD by Does not apply route    Lipoic Acid 150 MG CAPS Take by mouth    Magnesium Oxide 200 MG TABS 1    Menaquinone-7 (VITAMIN K2) 100 MCG CAPS     Misc Natural Products (PANTETHINE PLUS) TABS Take by mouth    naproxen (NAPROSYN) 250 mg tablet Take 1 tablet (250 mg total) by mouth 2 (two) times a day with meals for 10 days    Omega-3 Fatty Acids (FISH OIL) 1000 MG CPDR Take 2 5 capsules by mouth    Probiotic Product (PROBIOTIC-10) CAPS Take by mouth    pyridoxine (VITAMIN B6) 100 mg tablet 1 tab(s)    Resveratrol 50 MG CAPS Take by mouth    tretinoin (ALTRALIN) 0 05 % 1 caitie    Turmeric POWD 1    Vinpocetine POWD by Does not apply route    Vit D-Vit E-Safflower Oil (VITAMINS E & D BEAUTY OIL) 400-07329 UNIT/52ML OIL 1 cap(s)    vitamin B-12 (CYANOCOBALAMIN) 100 MCG tablet Take by mouth    vitamin E 600 UNIT capsule Take by mouth    Zinc 30 MG CAPS Take 1 capsule by mouth daily     No current facility-administered medications on file prior to visit        She is allergic to sulfa antibiotics       Review of Systems   Constitutional: Negative  Negative for fatigue and unexpected weight change  HENT: Negative  Negative for congestion  Eyes: Negative  Negative for visual disturbance  Respiratory: Negative for cough, shortness of breath and wheezing  Cardiovascular: Negative  Negative for chest pain and leg swelling  Gastrointestinal: Negative  Negative for abdominal pain  Musculoskeletal: Negative  Negative for arthralgias and myalgias  Skin: Negative  Negative for rash  Allergic/Immunologic: Negative for immunocompromised state  Neurological: Negative  Negative for seizures, syncope and weakness  Memory loss   Hematological: Negative for adenopathy  Psychiatric/Behavioral: Negative  Negative for confusion and sleep disturbance  All other systems reviewed and are negative  Objective:      /78   Pulse 79   Temp (!) 97 3 °F (36 3 °C)   Ht 5' 3" (1 6 m)   Wt 55 4 kg (122 lb 3 2 oz)   SpO2 100%   BMI 21 65 kg/m²          Physical Exam   Constitutional: She is oriented to person, place, and time  She appears well-developed and well-nourished  No distress  HENT:   Head: Normocephalic and atraumatic  Mouth/Throat: Oropharynx is clear and moist  No oropharyngeal exudate  Eyes: Conjunctivae are normal  Pupils are equal, round, and reactive to light  Neck: Normal range of motion  Neck supple  Cardiovascular: Normal rate, regular rhythm and normal heart sounds  Exam reveals no gallop and no friction rub  No murmur heard  Pulmonary/Chest: Effort normal and breath sounds normal  No respiratory distress  She has no wheezes  She has no rales  Abdominal: Soft  Musculoskeletal: Normal range of motion  Lymphadenopathy:     She has no cervical adenopathy  Neurological: She is alert and oriented to person, place, and time  Skin: Skin is warm and dry  No rash noted  She is not diaphoretic     Psychiatric: She has a normal mood and affect  Her behavior is normal  Judgment and thought content normal    Nursing note and vitals reviewed

## 2018-09-26 ENCOUNTER — APPOINTMENT (OUTPATIENT)
Dept: LAB | Facility: CLINIC | Age: 73
End: 2018-09-26
Payer: COMMERCIAL

## 2018-09-26 PROCEDURE — 80307 DRUG TEST PRSMV CHEM ANLYZR: CPT | Performed by: NURSE PRACTITIONER

## 2018-09-28 LAB
AMPHETAMINES UR QL SCN: NEGATIVE NG/ML
BARBITURATES UR QL SCN: NEGATIVE NG/ML
BENZODIAZ UR QL SCN: NEGATIVE NG/ML
BZE UR QL: NEGATIVE NG/ML
CANNABINOIDS UR QL SCN: NEGATIVE NG/ML
METHADONE UR QL SCN: NEGATIVE NG/ML
OPIATES UR QL: NEGATIVE NG/ML
PCP UR QL: NEGATIVE NG/ML
PROPOXYPH UR QL: NEGATIVE NG/ML

## 2019-02-25 ENCOUNTER — TELEPHONE (OUTPATIENT)
Dept: NEUROLOGY | Facility: CLINIC | Age: 74
End: 2019-02-25

## 2019-02-25 NOTE — TELEPHONE ENCOUNTER
Patient states she needs all her records faxed to her   I informed her I didn't see that we received any requests from her   She is aware that we need a signed JOELLEN on file and then we can fax records the  is requesting  Patient verbalized understanding

## 2019-02-25 NOTE — TELEPHONE ENCOUNTER
Patient stopped by the office to sign a Medical Release of Records  Copies of office notes given to patient, Medical release scan into patient chart under Registration and JOELLEN      Thank you

## 2019-02-25 NOTE — TELEPHONE ENCOUNTER
pt called back and states that she would like office notes released to her   i made her aware that she could come to the office and fill out OJELLEN and have last 2 office notes printed for her at that time  she requested that i call adolph office and make then aware that she would be coming to office for this  celia at [facility] made aware

## 2019-04-01 ENCOUNTER — OFFICE VISIT (OUTPATIENT)
Dept: NEUROLOGY | Facility: CLINIC | Age: 74
End: 2019-04-01
Payer: COMMERCIAL

## 2019-04-01 VITALS
BODY MASS INDEX: 21 KG/M2 | DIASTOLIC BLOOD PRESSURE: 70 MMHG | HEIGHT: 64 IN | HEART RATE: 76 BPM | WEIGHT: 123 LBS | SYSTOLIC BLOOD PRESSURE: 110 MMHG

## 2019-04-01 DIAGNOSIS — S16.1XXD STRAIN OF NECK MUSCLE, SUBSEQUENT ENCOUNTER: Primary | ICD-10-CM

## 2019-04-01 PROBLEM — G31.84 MCI (MILD COGNITIVE IMPAIRMENT): Status: RESOLVED | Noted: 2018-09-20 | Resolved: 2019-04-01

## 2019-04-01 PROCEDURE — 99213 OFFICE O/P EST LOW 20 MIN: CPT | Performed by: PSYCHIATRY & NEUROLOGY

## 2019-09-11 ENCOUNTER — TELEPHONE (OUTPATIENT)
Dept: FAMILY MEDICINE CLINIC | Facility: CLINIC | Age: 74
End: 2019-09-11

## 2019-10-08 NOTE — PROGRESS NOTES
Assessment/Plan:       Diagnoses and all orders for this visit:    URI with cough and congestion  -     azithromycin (ZITHROMAX) 250 mg tablet; Take 2 tablets today then 1 tablet daily x 4 days  -     Homocysteine, serum; Future  -     C-reactive protein; Future    Welcome to Medicare preventive visit    Exposure to mold  -     azithromycin (ZITHROMAX) 250 mg tablet; Take 2 tablets today then 1 tablet daily x 4 days  -     Homocysteine, serum; Future  -     C-reactive protein; Future    Vitamin D deficiency  -     Comprehensive metabolic panel; Future  -     Vitamin D 25 hydroxy; Future  -     Homocysteine, serum; Future  -     C-reactive protein; Future    Mixed hyperlipidemia  -     Comprehensive metabolic panel; Future  -     Lipid panel; Future  -     Homocysteine, serum; Future  -     C-reactive protein; Future  -     NMR LipoProfilee With Insuline Resistance Markers (With Graph); Future    Need for hepatitis C screening test  -     Hepatitis C antibody; Future  -     Homocysteine, serum; Future  -     C-reactive protein; Future    Screening for osteoporosis  -     DXA bone density spine hip and pelvis; Future  -     Homocysteine, serum; Future  -     C-reactive protein; Future    Other orders  -     Prenatal Vit w/Hg-Tnksmpuke-KN (TL FOLATE PO); Take by mouth  -     NATURAL VITAMIN E MOISTURIZING GEL; Apply topically  -     ALFALFA PO; Take by mouth  -     Artificial Tear Solution (JUST TEARS EYE DROPS OP); Apply to eye  -     PANTETHINE PO; Take by mouth  -     patient supplied medication        No problem-specific Assessment & Plan notes found for this encounter  Subjective:      Patient ID: Rikki Aviles is a 76 y o  female  Patient is here after not being here for a year  She continues her vitamin supplements  She continues to go to the gym at least three days a week  Currently she has cough and nasal congestion  Last month, she was helping do some work around the house   They were cleaning out insulation from under the house  She says that a "poof" of insulation blew out from under the house into her  She then developed a dry cough , followed by nasal congestion  She started taking Robitussin DM last week and now cough is productive  Four days ago, she felt pain in her ears  She has not had cough or cold for well over a year  She is due for medicare wellness  She declines influenza and pneumococcal vaccination  She declines TDap  She has mammogram scheduled for 2020  She had Dex[facility] scan done at [facility]      The following portions of the patient's history were reviewed and updated as appropriate:   She has a past medical history of Cervical strain, Changing skin lesion, Hyperlipidemia, MCI (mild cognitive impairment), Mixed hyperlipidemia, Nonmelanoma skin cancer, Osteoporosis, Polio, Poor concentration, and Swelling of left thumb ,  does not have any pertinent problems on file  ,   has a past surgical history that includes Anal fissurectomy; Appendectomy; Hysterectomy; and Cataract extraction  ,  family history includes Cancer in her father; Heart attack in her mother and sister  ,   reports that she has quit smoking  She has never used smokeless tobacco  She reports that she drinks alcohol  She reports that she does not use drugs  ,  is allergic to sulfa antibiotics     Current Outpatient Medications   Medication Sig Dispense Refill    Acetylcarnitine HCl (ACETYL L-CARNITINE) 500 MG CAPS Take by mouth every morning       ALFALFA PO Take by mouth      Alpha-Lipoic Acid (LIPOIC ACID PO) Take 1 capsule by mouth      Amino Acids (L-CARNITINE PO) Take 1 tablet by mouth      Artificial Tear Solution (JUST TEARS EYE DROPS OP) Apply to eye      Ascorbic Acid (VITAMIN C) 1000 MG tablet Take 1,000 mg by mouth daily       Calcium Carb-Cholecalciferol (CALCIUM 1000 + D) 1000-800 MG-UNIT TABS Take by mouth      Chelated Magnesium 100 MG TABS Take by mouth Daily      Cholecalciferol (VITAMIN D3) 5000 units CAPS Take 1 capsule by mouth daily      Glycerylphosphorylcholine (ALPHA-GPC 50%) POWD 1 tablet by Does not apply route      Iodine, Kelp, (KELP PO) Take by mouth daily      L-Carnosine POWD by Does not apply route      Menaquinone-7 (VITAMIN K2) 100 MCG CAPS       Misc Natural Products (PANTETHINE PLUS) TABS Take by mouth      NATURAL VITAMIN E MOISTURIZING GEL Apply topically      Omega-3 Fatty Acids (FISH OIL) 1200 MG CAPS Take 1 capsule by mouth daily       PANTETHINE PO Take by mouth      patient supplied medication       Prenatal Vit w/Am-Ihnnpzpyb-JL (TL FOLATE PO) Take by mouth      Probiotic Product (PROBIOTIC-10) CAPS Take by mouth      pyridoxine (VITAMIN B6) 100 mg tablet 1 tab(s)      Resveratrol 50 MG CAPS Take by mouth      Turmeric POWD 1      Vinpocetine POWD by Does not apply route      vitamin B-12 (CYANOCOBALAMIN) 100 MCG tablet Take by mouth every other day       vitamin E, tocopherol, 1,000 units capsule Take by mouth daily       Zinc 30 MG CAPS Take 1 capsule by mouth daily      azithromycin (ZITHROMAX) 250 mg tablet Take 2 tablets today then 1 tablet daily x 4 days 6 tablet 0    naproxen (NAPROSYN) 250 mg tablet Take 1 tablet (250 mg total) by mouth 2 (two) times a day with meals for 10 days 20 tablet 0     No current facility-administered medications for this visit  Review of Systems   Constitutional: Negative for fatigue and fever  HENT: Positive for congestion, ear pain, sore throat and voice change  Negative for postnasal drip, sinus pressure and sinus pain  Respiratory: Positive for cough  Negative for chest tightness, shortness of breath, wheezing and stridor  Cardiovascular: Positive for leg swelling  Negative for chest pain and palpitations  Gastrointestinal: Negative for abdominal distention, abdominal pain, nausea and rectal pain  Endocrine: Negative for polyuria  Musculoskeletal: Negative for arthralgias, back pain and gait problem     Skin: Negative for color change, pallor and rash  Allergic/Immunologic: Negative for immunocompromised state  Neurological: Negative for headaches  Hematological: Negative for adenopathy  Psychiatric/Behavioral: Positive for decreased concentration  All other systems reviewed and are negative  Objective:  Vitals:    10/09/19 1514   BP: 110/62   Pulse: 80   Resp: 16   Temp: (!) 97 2 °F (36 2 °C)   SpO2: 97%   Weight: 53 4 kg (117 lb 12 8 oz)   Height: 5' 3 5" (1 613 m)     Body mass index is 20 54 kg/m²  Physical Exam   Constitutional: She is oriented to person, place, and time  She appears well-developed and well-nourished  No distress  HENT:   Head: Normocephalic and atraumatic  Right Ear: External ear normal    Left Ear: External ear normal    Nose: Nose normal    Mouth/Throat: Oropharynx is clear and moist  No oropharyngeal exudate  Eyes: Pupils are equal, round, and reactive to light  Conjunctivae and EOM are normal  Right eye exhibits no discharge  Left eye exhibits no discharge  No scleral icterus  Neck: Normal range of motion  Neck supple  No thyromegaly present  Cardiovascular: Normal rate, regular rhythm, normal heart sounds and intact distal pulses  Exam reveals no gallop and no friction rub  No murmur heard  Pulmonary/Chest: Effort normal and breath sounds normal  No respiratory distress  She has no wheezes  She has no rales  Abdominal: Soft  Bowel sounds are normal  She exhibits no distension  There is no tenderness  There is no guarding  Musculoskeletal: Normal range of motion  She exhibits edema  She exhibits no tenderness or deformity  NP ble edema   Lymphadenopathy:     She has no cervical adenopathy  Neurological: She is alert and oriented to person, place, and time  She displays normal reflexes  No cranial nerve deficit  Coordination normal    Skin: Skin is warm and dry  Capillary refill takes less than 2 seconds  No rash noted  She is not diaphoretic  Psychiatric: She has a normal mood and affect  Her behavior is normal  Judgment and thought content normal    Nursing note and vitals reviewed  Assessment and Plan:     Problem List Items Addressed This Visit        Respiratory    URI with cough and congestion - Primary    Relevant Medications    azithromycin (ZITHROMAX) 250 mg tablet    Other Relevant Orders    Homocysteine, serum    C-reactive protein       Other    Mixed hyperlipidemia    Relevant Orders    Comprehensive metabolic panel    Lipid panel    Homocysteine, serum    C-reactive protein    NMR LipoProfilee With Insuline Resistance Markers (With Graph)    Vitamin D deficiency    Relevant Orders    Comprehensive metabolic panel    Vitamin D 25 hydroxy    Homocysteine, serum    C-reactive protein    Exposure to mold    Relevant Medications    azithromycin (ZITHROMAX) 250 mg tablet    Other Relevant Orders    Homocysteine, serum    C-reactive protein    Need for hepatitis C screening test    Relevant Orders    Hepatitis C antibody    Homocysteine, serum    C-reactive protein    Screening for osteoporosis    Relevant Orders    DXA bone density spine hip and pelvis    Homocysteine, serum    C-reactive protein           Preventive health issues were discussed with patient, and age appropriate screening tests were ordered as noted in patient's After Visit Summary  Personalized health advice and appropriate referrals for health education or preventive services given if needed, as noted in patient's After Visit Summary       History of Present Illness:     Patient presents for Medicare Annual Wellness visit    Patient Care Team:  Maame Carrillo as PCP - General (Nurse Practitioner)  MD Bella Clark MD Bettie Redwood, [address] [place] [address] (Nurse Practitioner)     Problem List:     Patient Active Problem List   Diagnosis    Mixed hyperlipidemia    Osteoporosis    Poor concentration    Short-term memory loss    Flu-like symptoms    Influenza B    Flank pain    Acute nonintractable headache    Vitamin D deficiency    High risk sexual behavior    Seborrheic keratosis    Visit for suture removal    Cervical strain    Frequent falls    URI with cough and congestion    Exposure to mold    Need for hepatitis C screening test    Screening for osteoporosis      Past Medical and Surgical History:     Past Medical History:   Diagnosis Date    Cervical strain     Changing skin lesion     Hyperlipidemia     MCI (mild cognitive impairment)     Mixed hyperlipidemia     Nonmelanoma skin cancer     BCC    Osteoporosis     Polio     Poor concentration     Swelling of left thumb      Past Surgical History:   Procedure Laterality Date    ANAL FISSURECTOMY      APPENDECTOMY      CATARACT EXTRACTION      HYSTERECTOMY        Family History:     Family History   Problem Relation Age of Onset    Heart attack Mother     Cancer Father     Heart attack Sister       Social History:     Social History     Socioeconomic History    Marital status:       Spouse name: Not on file    Number of children: Not on file    Years of education: Not on file    Highest education level: Not on file   Occupational History    Not on file   Social Needs    Financial resource strain: Not on file    Food insecurity:     Worry: Not on file     Inability: Not on file    Transportation needs:     Medical: Not on file     Non-medical: Not on file   Tobacco Use    Smoking status: Former Smoker    Smokeless tobacco: Never Used   Substance and Sexual Activity    Alcohol use: Yes     Comment: OCCASIONAL- WINE     Drug use: No    Sexual activity: Not on file   Lifestyle    Physical activity:     Days per week: Not on file     Minutes per session: Not on file    Stress: Not on file   Relationships    Social connections:     Talks on phone: Not on file     Gets together: Not on file     Attends Bahai service: Not on file     Active member of club or organization: Not on file     Attends meetings of clubs or organizations: Not on file     Relationship status: Not on file    Intimate partner violence:     Fear of current or ex partner: Not on file     Emotionally abused: Not on file     Physically abused: Not on file     Forced sexual activity: Not on file   Other Topics Concern    Not on file   Social History Narrative    Per [employer]:     Self-employed            Medications and Allergies:     Current Outpatient Medications   Medication Sig Dispense Refill    Acetylcarnitine HCl (ACETYL L-CARNITINE) 500 MG CAPS Take by mouth every morning       ALFALFA PO Take by mouth      Alpha-Lipoic Acid (LIPOIC ACID PO) Take 1 capsule by mouth      Amino Acids (L-CARNITINE PO) Take 1 tablet by mouth      Artificial Tear Solution (JUST TEARS EYE DROPS OP) Apply to eye      Ascorbic Acid (VITAMIN C) 1000 MG tablet Take 1,000 mg by mouth daily       Calcium Carb-Cholecalciferol (CALCIUM 1000 + D) 1000-800 MG-UNIT TABS Take by mouth      Chelated Magnesium 100 MG TABS Take by mouth Daily      Cholecalciferol (VITAMIN D3) 5000 units CAPS Take 1 capsule by mouth daily      Glycerylphosphorylcholine (ALPHA-GPC 50%) POWD 1 tablet by Does not apply route      Iodine, Kelp, (KELP PO) Take by mouth daily      L-Carnosine POWD by Does not apply route      Menaquinone-7 (VITAMIN K2) 100 MCG CAPS       Misc Natural Products (PANTETHINE PLUS) TABS Take by mouth      NATURAL VITAMIN E MOISTURIZING GEL Apply topically      Omega-3 Fatty Acids (FISH OIL) 1200 MG CAPS Take 1 capsule by mouth daily       PANTETHINE PO Take by mouth      patient supplied medication       Prenatal Vit w/Ar-Lrhvvloug-OK (TL FOLATE PO) Take by mouth      Probiotic Product (PROBIOTIC-10) CAPS Take by mouth      pyridoxine (VITAMIN B6) 100 mg tablet 1 tab(s)      Resveratrol 50 MG CAPS Take by mouth      Turmeric POWD 1      Vinpocetine POWD by Does not apply route      vitamin B-12 (CYANOCOBALAMIN) 100 MCG tablet Take by mouth every other day       vitamin E, tocopherol, 1,000 units capsule Take by mouth daily       Zinc 30 MG CAPS Take 1 capsule by mouth daily      azithromycin (ZITHROMAX) 250 mg tablet Take 2 tablets today then 1 tablet daily x 4 days 6 tablet 0    naproxen (NAPROSYN) 250 mg tablet Take 1 tablet (250 mg total) by mouth 2 (two) times a day with meals for 10 days 20 tablet 0     No current facility-administered medications for this visit  Allergies   Allergen Reactions    Sulfa Antibiotics Other (See Comments)      Immunizations:     Immunization History   Administered Date(s) Administered    Influenza Split High Dose Preservative Free IM 11/07/2013, 10/28/2014    Pneumococcal Polysaccharide PPV23 10/28/2014    Tetanus, adsorbed 12/20/2013    Zoster 11/03/2014      Health Maintenance:         Topic Date Due    Hepatitis C Screening  1945    DXA SCAN  11/11/2018    MAMMOGRAM  05/04/2020 (Originally 4/17/2019)    CRC Screening: Colonoscopy  07/22/2025     There are no preventive care reminders to display for this patient  Medicare Health Risk Assessment:     /62   Pulse 80   Temp (!) 97 2 °F (36 2 °C)   Resp 16   Ht 5' 3 5" (1 613 m)   Wt 53 4 kg (117 lb 12 8 oz)   SpO2 97%   BMI 20 54 kg/m²      Mauricio Rodriguez is here for her Welcome to Medicare visit  Health Risk Assessment:   Patient rates overall health as very good  Patient feels that their physical health rating is same  Eyesight was rated as same  Hearing was rated as same  Patient feels that their emotional and mental health rating is slightly worse  Pain experienced in the last 7 days has been none  Patient states that she has experienced no weight loss or gain in last 6 months  Depression Screening:   PHQ-2 Score: 2      Fall Risk Screening:    In the past year, patient has experienced: no history of falling in past year      Urinary Incontinence Screening:   Patient has not leaked urine accidently in the last six months  Home Safety:  Patient does not have trouble with stairs inside or outside of their home  Patient has working smoke alarms and has no working carbon monoxide detector  Home safety hazards include: loose rugs on the floor  Nutrition:   Current diet is Low Cholesterol, Low Saturated Fat and Low Carb  Medications:   Patient is currently taking over-the-counter supplements  OTC medications include: see medication list  Patient is able to manage medications  Activities of Daily Living (ADLs)/Instrumental Activities of Daily Living (IADLs):   Walk and transfer into and out of bed and chair?: Yes  Dress and groom yourself?: Yes    Bathe or shower yourself?: Yes    Feed yourself?  Yes  Do your laundry/housekeeping?: Yes  Manage your money, pay your bills and track your expenses?: Yes  Make your own meals?: Yes    Do your own shopping?: Yes    Previous Hospitalizations:   Any hospitalizations or ED visits within the last 12 months?: No      Advance Care Planning:   Living will: No    Durable POA for healthcare: No    Advanced directive: No    Advanced directive counseling given: Yes    Five wishes given: Yes    Patient declined ACP directive: No    End of Life Decisions reviewed with patient: Yes    Provider agrees with end of life decisions: Yes      Cognitive Screening:   Provider or family/friend/caregiver concerned regarding cognition?: No    PREVENTIVE SCREENINGS      Cardiovascular Screening:    General: Screening Not Indicated, History Lipid Disorder and Risks and Benefits Discussed    Due for: Lipid Panel      Diabetes Screening:     General: Risks and Benefits Discussed    Due for: Blood Glucose      Colorectal Cancer Screening:     General: Screening Current      Breast Cancer Screening:     General: Screening Current      Cervical Cancer Screening:    General: Screening Not Indicated      Osteoporosis Screening:    General: Screening Not Indicated, History Osteoporosis and Risks and Benefits Discussed    Due for: Bone Density Ultrasound      Abdominal Aortic Aneurysm (AAA) Screening:        General: Screening Not Indicated      Lung Cancer Screening:     General: Screening Not Indicated      Hepatitis C Screening:    General: Risks and Benefits Discussed    Hep C Screening Accepted: Yes      Other Counseling Topics:   Car/seat belt/driving safety, skin self-exam, sunscreen and calcium and vitamin D intake and regular weightbearing exercise         MARS Smith

## 2019-10-09 ENCOUNTER — TELEPHONE (OUTPATIENT)
Dept: FAMILY MEDICINE CLINIC | Facility: CLINIC | Age: 74
End: 2019-10-09

## 2019-10-09 ENCOUNTER — OFFICE VISIT (OUTPATIENT)
Dept: FAMILY MEDICINE CLINIC | Facility: CLINIC | Age: 74
End: 2019-10-09
Payer: COMMERCIAL

## 2019-10-09 VITALS
BODY MASS INDEX: 20.11 KG/M2 | SYSTOLIC BLOOD PRESSURE: 110 MMHG | HEIGHT: 64 IN | WEIGHT: 117.8 LBS | HEART RATE: 80 BPM | RESPIRATION RATE: 16 BRPM | TEMPERATURE: 97.2 F | OXYGEN SATURATION: 97 % | DIASTOLIC BLOOD PRESSURE: 62 MMHG

## 2019-10-09 DIAGNOSIS — Z11.59 NEED FOR HEPATITIS C SCREENING TEST: ICD-10-CM

## 2019-10-09 DIAGNOSIS — J06.9 URI WITH COUGH AND CONGESTION: Primary | ICD-10-CM

## 2019-10-09 DIAGNOSIS — E55.9 VITAMIN D DEFICIENCY: ICD-10-CM

## 2019-10-09 DIAGNOSIS — Z00.00 WELCOME TO MEDICARE PREVENTIVE VISIT: ICD-10-CM

## 2019-10-09 DIAGNOSIS — E78.2 MIXED HYPERLIPIDEMIA: ICD-10-CM

## 2019-10-09 DIAGNOSIS — Z77.120 EXPOSURE TO MOLD: ICD-10-CM

## 2019-10-09 DIAGNOSIS — Z13.820 SCREENING FOR OSTEOPOROSIS: ICD-10-CM

## 2019-10-09 PROCEDURE — 1125F AMNT PAIN NOTED PAIN PRSNT: CPT | Performed by: NURSE PRACTITIONER

## 2019-10-09 PROCEDURE — 99214 OFFICE O/P EST MOD 30 MIN: CPT | Performed by: NURSE PRACTITIONER

## 2019-10-09 PROCEDURE — 1170F FXNL STATUS ASSESSED: CPT | Performed by: NURSE PRACTITIONER

## 2019-10-09 PROCEDURE — G0438 PPPS, INITIAL VISIT: HCPCS | Performed by: NURSE PRACTITIONER

## 2019-10-09 RX ORDER — AZITHROMYCIN 250 MG/1
TABLET, FILM COATED ORAL
Qty: 6 TABLET | Refills: 0 | Status: SHIPPED | OUTPATIENT
Start: 2019-10-09 | End: 2019-10-13

## 2019-10-09 NOTE — TELEPHONE ENCOUNTER
Estuardo Mcneal would like to know if she can still take the benadryl dm with medication she received today

## 2019-10-09 NOTE — PATIENT INSTRUCTIONS
URI with cough and congestion- recent exposure to mold  Start antibiotics due to length and progression of symptoms  Drink plenty of water and avoid dairy and sugar because this can create mucous  Hyperlipidemia-? Status  Vit d def-needs labs  Osteoporosis screening- due  Mammogram  Pt declines until 2020  Medicare Preventive Visit Patient Instructions  Thank you for completing your Welcome to Medicare Visit or Medicare Annual Wellness Visit today  Your next wellness visit will be due in one year (10/9/2020)  The screening/preventive services that you may require over the next 5-10 years are detailed below  Some tests may not apply to you based off risk factors and/or age  Screening tests ordered at today's visit but not completed yet may show as past due  Also, please note that scanned in results may not display below  Preventive Screenings:  Service Recommendations Previous Testing/Comments   Colorectal Cancer Screening  * Colonoscopy    * Fecal Occult Blood Test (FOBT)/Fecal Immunochemical Test (FIT)  * Fecal DNA/Cologuard Test  * Flexible Sigmoidoscopy Age: 54-65 years old   Colonoscopy: every 10 years (may be performed more frequently if at higher risk)  OR  FOBT/FIT: every 1 year  OR  Cologuard: every 3 years  OR  Sigmoidoscopy: every 5 years  Screening may be recommended earlier than age 48 if at higher risk for colorectal cancer  Also, an individualized decision between you and your healthcare provider will decide whether screening between the ages of 74-80 would be appropriate  Colonoscopy: 07/22/2015  FOBT/FIT: Not on file  Cologuard: Not on file  Sigmoidoscopy: Not on file         Breast Cancer Screening Age: 36 years old  Frequency: every 1-2 years  Not required if history of left and right mastectomy Mammogram: 04/17/2018       Cervical Cancer Screening Between the ages of 21-29, pap smear recommended once every 3 years     Between the ages of 33-67, can perform pap smear with HPV co-testing every 5 years  Recommendations may differ for women with a history of total hysterectomy, cervical cancer, or abnormal pap smears in past  Pap Smear: Not on file       Hepatitis C Screening Once for adults born between 1945 and 1965  More frequently in patients at high risk for Hepatitis C Hep C Antibody: Not on file       Diabetes Screening 1-2 times per year if you're at risk for diabetes or have pre-diabetes Fasting glucose: No results in last 5 years   A1C: No results in last 5 years       Cholesterol Screening Once every 5 years if you don't have a lipid disorder  May order more often based on risk factors  Lipid panel: Not on file         Other Preventive Screenings Covered by Medicare:  1  Abdominal Aortic Aneurysm (AAA) Screening: covered once if your at risk  You're considered to be at risk if you have a family history of AAA  2  Lung Cancer Screening: covers low dose CT scan once per year if you meet all of the following conditions: (1) Age 50-69; (2) No signs or symptoms of lung cancer; (3) Current smoker or have quit smoking within the last 15 years; (4) You have a tobacco smoking history of at least 30 pack years (packs per day multiplied by number of years you smoked); (5) You get a written order from a healthcare provider  3  Glaucoma Screening: covered annually if you're considered high risk: (1) You have diabetes OR (2) Family history of glaucoma OR (3)  aged 48 and older OR (3)  American aged 72 and older  3  Osteoporosis Screening: covered every 2 years if you meet one of the following conditions: (1) You're estrogen deficient and at risk for osteoporosis based off medical history and other findings; (2) Have a vertebral abnormality; (3) On glucocorticoid therapy for more than 3 months; (4) Have primary hyperparathyroidism; (5) On osteoporosis medications and need to assess response to drug therapy     · Last bone density test (DXA Scan): 11/11/2016   5  HIV Screening: covered annually if you're between the age of 15-65  Also covered annually if you are younger than 13 and older than 72 with risk factors for HIV infection  For pregnant patients, it is covered up to 3 times per pregnancy  Immunizations:  Immunization Recommendations   Influenza Vaccine Annual influenza vaccination during flu season is recommended for all persons aged >= 6 months who do not have contraindications   Pneumococcal Vaccine (Prevnar and Pneumovax)  * Prevnar = PCV13  * Pneumovax = PPSV23   Adults 25-60 years old: 1-3 doses may be recommended based on certain risk factors  Adults 72 years old: Prevnar (PCV13) vaccine recommended followed by Pneumovax (PPSV23) vaccine  If already received PPSV23 since turning 65, then PCV13 recommended at least one year after PPSV23 dose  Hepatitis B Vaccine 3 dose series if at intermediate or high risk (ex: diabetes, end stage renal disease, liver disease)   Tetanus (Td) Vaccine - COST NOT COVERED BY MEDICARE PART B Following completion of primary series, a booster dose should be given every 10 years to maintain immunity against tetanus  Td may also be given as tetanus wound prophylaxis  Tdap Vaccine - COST NOT COVERED BY MEDICARE PART B Recommended at least once for all adults  For pregnant patients, recommended with each pregnancy  Shingles Vaccine (Shingrix) - COST NOT COVERED BY MEDICARE PART B  2 shot series recommended in those aged 48 and above     Health Maintenance Due:      Topic Date Due    Hepatitis C Screening  1945    DXA SCAN  11/11/2018    MAMMOGRAM  04/17/2019    CRC Screening: Colonoscopy  07/22/2025     Immunizations Due:      Topic Date Due    DTaP,Tdap,and Td Vaccines (1 - Tdap) 12/21/2013    Pneumococcal Vaccine: 65+ Years (2 of 2 - PCV13) 10/28/2015    INFLUENZA VACCINE  07/01/2019     Advance Directives   What are advance directives? Advance directives are legal documents that state your wishes and plans for medical care  These plans are made ahead of time in case you lose your ability to make decisions for yourself  Advance directives can apply to any medical decision, such as the treatments you want, and if you want to donate organs  What are the types of advance directives? There are many types of advance directives, and each state has rules about how to use them  You may choose a combination of any of the following:  · Living will: This is a written record of the treatment you want  You can also choose which treatments you do not want, which to limit, and which to stop at a certain time  This includes surgery, medicine, IV fluid, and tube feedings  · Durable power of  for healthcare [place] [facility]): This is a written record that states who you want to make healthcare choices for you when you are unable to make them for yourself  This person, called a proxy, is usually a family member or a friend  You may choose more than 1 proxy  · Do not resuscitate (DNR) order:  A DNR order is used in case your heart stops beating or you stop breathing  It is a request not to have certain forms of treatment, such as CPR  A DNR order may be included in other types of advance directives  · Medical directive: This covers the care that you want if you are in a coma, near death, or unable to make decisions for yourself  You can list the treatments you want for each condition  Treatment may include pain medicine, surgery, blood transfusions, dialysis, IV or tube feedings, and a ventilator (breathing machine)  · Values history: This document has questions about your views, beliefs, and how you feel and think about life  This information can help others choose the care that you would choose  Why are advance directives important? An advance directive helps you control your care  Although spoken wishes may be used, it is better to have your wishes written down  Spoken wishes can be misunderstood, or not followed   Treatments may be given even if you do not want them  An advance directive may make it easier for your family to make difficult choices about your care  © Copyright [facility] 2018 Information is for End User's use only and may not be sold, redistributed or otherwise used for commercial purposes   All illustrations and images included in CareNotes® are the copyrighted property of A D A M , Inc  or [address] [place] [address]

## 2019-10-10 ENCOUNTER — LAB (OUTPATIENT)
Dept: LAB | Facility: CLINIC | Age: 74
End: 2019-10-10
Payer: COMMERCIAL

## 2019-10-10 DIAGNOSIS — Z77.120 EXPOSURE TO MOLD: ICD-10-CM

## 2019-10-10 DIAGNOSIS — E78.2 MIXED HYPERLIPIDEMIA: ICD-10-CM

## 2019-10-10 DIAGNOSIS — E55.9 VITAMIN D DEFICIENCY: ICD-10-CM

## 2019-10-10 DIAGNOSIS — Z13.820 SCREENING FOR OSTEOPOROSIS: ICD-10-CM

## 2019-10-10 DIAGNOSIS — Z11.59 NEED FOR HEPATITIS C SCREENING TEST: ICD-10-CM

## 2019-10-10 DIAGNOSIS — J06.9 URI WITH COUGH AND CONGESTION: ICD-10-CM

## 2019-10-10 LAB
25(OH)D3 SERPL-MCNC: 59.5 NG/ML (ref 30–100)
ALBUMIN SERPL BCP-MCNC: 3.3 G/DL (ref 3.5–5)
ALP SERPL-CCNC: 67 U/L (ref 46–116)
ALT SERPL W P-5'-P-CCNC: 20 U/L (ref 12–78)
ANION GAP SERPL CALCULATED.3IONS-SCNC: 4 MMOL/L (ref 4–13)
AST SERPL W P-5'-P-CCNC: 14 U/L (ref 5–45)
BILIRUB SERPL-MCNC: 0.5 MG/DL (ref 0.2–1)
BUN SERPL-MCNC: 13 MG/DL (ref 5–25)
CALCIUM SERPL-MCNC: 9.9 MG/DL (ref 8.3–10.1)
CHLORIDE SERPL-SCNC: 106 MMOL/L (ref 100–108)
CHOLEST SERPL-MCNC: 256 MG/DL (ref 50–200)
CO2 SERPL-SCNC: 29 MMOL/L (ref 21–32)
CREAT SERPL-MCNC: 0.75 MG/DL (ref 0.6–1.3)
CRP SERPL QL: 13.2 MG/L
GFR SERPL CREATININE-BSD FRML MDRD: 79 ML/MIN/1.73SQ M
GLUCOSE P FAST SERPL-MCNC: 103 MG/DL (ref 65–99)
HCYS SERPL-SCNC: 6.9 UMOL/L (ref 3.7–11.2)
HDLC SERPL-MCNC: 96 MG/DL (ref 40–60)
LDLC SERPL CALC-MCNC: 145 MG/DL (ref 0–100)
NONHDLC SERPL-MCNC: 160 MG/DL
POTASSIUM SERPL-SCNC: 4.1 MMOL/L (ref 3.5–5.3)
PROT SERPL-MCNC: 7.4 G/DL (ref 6.4–8.2)
SODIUM SERPL-SCNC: 139 MMOL/L (ref 136–145)
TRIGL SERPL-MCNC: 74 MG/DL

## 2019-10-10 PROCEDURE — 83090 ASSAY OF HOMOCYSTEINE: CPT

## 2019-10-10 PROCEDURE — 82306 VITAMIN D 25 HYDROXY: CPT

## 2019-10-10 PROCEDURE — 86140 C-REACTIVE PROTEIN: CPT

## 2019-10-10 PROCEDURE — 86803 HEPATITIS C AB TEST: CPT

## 2019-10-10 PROCEDURE — 80053 COMPREHEN METABOLIC PANEL: CPT

## 2019-10-10 PROCEDURE — 36415 COLL VENOUS BLD VENIPUNCTURE: CPT

## 2019-10-10 PROCEDURE — 80061 LIPID PANEL: CPT

## 2019-10-10 PROCEDURE — 83704 LIPOPROTEIN BLD QUAN PART: CPT

## 2019-10-11 LAB — HCV AB SER QL: NORMAL

## 2019-10-14 LAB — MISCELLANEOUS LAB TEST RESULT: NORMAL

## 2019-10-15 ENCOUNTER — OFFICE VISIT (OUTPATIENT)
Dept: FAMILY MEDICINE CLINIC | Facility: CLINIC | Age: 74
End: 2019-10-15
Payer: COMMERCIAL

## 2019-10-15 VITALS
SYSTOLIC BLOOD PRESSURE: 112 MMHG | HEART RATE: 76 BPM | WEIGHT: 117.38 LBS | BODY MASS INDEX: 20.04 KG/M2 | RESPIRATION RATE: 18 BRPM | OXYGEN SATURATION: 97 % | HEIGHT: 64 IN | DIASTOLIC BLOOD PRESSURE: 64 MMHG

## 2019-10-15 DIAGNOSIS — R41.840 POOR CONCENTRATION: ICD-10-CM

## 2019-10-15 DIAGNOSIS — R79.82 ELEVATED C-REACTIVE PROTEIN (CRP): ICD-10-CM

## 2019-10-15 DIAGNOSIS — E55.9 VITAMIN D DEFICIENCY: Primary | ICD-10-CM

## 2019-10-15 DIAGNOSIS — E78.2 MIXED HYPERLIPIDEMIA: ICD-10-CM

## 2019-10-15 DIAGNOSIS — R41.3 SHORT-TERM MEMORY LOSS: ICD-10-CM

## 2019-10-15 PROCEDURE — 99214 OFFICE O/P EST MOD 30 MIN: CPT | Performed by: NURSE PRACTITIONER

## 2019-10-15 PROCEDURE — 3008F BODY MASS INDEX DOCD: CPT | Performed by: NURSE PRACTITIONER

## 2019-10-15 NOTE — PATIENT INSTRUCTIONS
Poor concentration and ST memory loss- f/b Neurology  Hyperlipidemia- results of labs as follows  She does follow low cholesterol diet and diet high in fruits and veggies  Additional lab testing has been ordered- [facility] Lipid panel  Patient is requesting repeat CRP, Homocysteine, and sed rate in one month  Colonel Odonnell- has resolved  Our office will call with lab results  Results for Mayra Leon (MRN [id number]) as of 10/15/2019 14:17   Ref   Range 10/10/2019 13:59   Sodium Latest Ref Range: 136 - 145 mmol/L 139   Potassium Latest Ref Range: 3 5 - 5 3 mmol/L 4 1   Chloride Latest Ref Range: 100 - 108 mmol/L 106   CO2 Latest Ref Range: 21 - 32 mmol/L 29   Anion Gap Latest Ref Range: 4 - 13 mmol/L 4   BUN Latest Ref Range: 5 - 25 mg/dL 13   Creatinine Latest Ref Range: 0 60 - 1 30 mg/dL 0 75   GLUCOSE FASTING Latest Ref Range: 65 - 99 mg/dL 103 (H)   Calcium Latest Ref Range: 8 3 - 10 1 mg/dL 9 9   AST Latest Ref Range: 5 - 45 U/L 14   ALT Latest Ref Range: 12 - 78 U/L 20   Alkaline Phosphatase Latest Ref Range: 46 - 116 U/L 67   Total Protein Latest Ref Range: 6 4 - 8 2 g/dL 7 4   Albumin Latest Ref Range: 3 5 - 5 0 g/dL 3 3 (L)   TOTAL BILIRUBIN Latest Ref Range: 0 20 - 1 00 mg/dL 0 50   eGFR Latest Units: ml/min/1 73sq m 79   Cholesterol Latest Ref Range: 50 - 200 mg/dL 256 (H)   Triglycerides Latest Ref Range: <=150 mg/dL 74   HDL Latest Ref Range: 40 - 60 mg/dL 96 (H)   Non-HDL Cholesterol Latest Units: mg/dl 160   LDL Direct Latest Ref Range: 0 - 100 mg/dL 145 (H)   Vit D, 25-Hydroxy Latest Ref Range: 30 0 - 100 0 ng/mL 59 5   HOMOCYST(E)INE, P/S Latest Ref Range: 3 7 - 11 2 umol/L 6 9   HEPATITIS C ANTIBODY Latest Ref Range: Non-reactive  Non-reactive   C-REACTIVE PROTEIN Latest Ref Range: <3 0 mg/L 13 2 (H)     LipoProfile:  LDL- 1595  LDL-C- 144  HDL- 94  TG- 82  T Chol- 254

## 2019-10-15 NOTE — PROGRESS NOTES
Assessment/Plan:       Diagnoses and all orders for this visit:    Vitamin D deficiency    Mixed hyperlipidemia  -     Cardio IQ(R) Advanced Lipid Panel; Future    Poor concentration    Short-term memory loss    Elevated C-reactive protein (CRP)  -     C-reactive protein; Future  -     Sedimentation rate, automated; Future  -     Homocysteine, serum; Future        No problem-specific Assessment & Plan notes found for this encounter  Subjective:      Patient ID: Rikki Aviles is a 76 y o  female  Patient is here for one week follow up and lab review  Patient requested certain lab testing in additional to routine tests  Patient had exposure to mold and was seen in the office last week for UR symptoms  She was treated with antibiotics for superimposed bacterial infection  Within a day or so her symptoms resolved  Upon reviewing her current labs  Her lipid panel is elevated  She is now requesting Cardio IQ lipid panel         Results for Angelia Bejarano (MRN [id number]) as of 10/15/2019 14:17    10/10/2019 13:59  Sodium: 139  Potassium: 4 1  Chloride: 106  CO2: 29  Anion Gap: 4  BUN: 13  Creatinine: 0 75  GLUCOSE FASTIN (H)  Calcium: 9 9  AST: 14  ALT: 20  Alkaline Phosphatase: 67  Total Protein: 7 4  Albumin: 3 3 (L)  TOTAL BILIRUBIN: 0 50  eGFR: 79  Cholesterol: 256 (H)  Triglycerides: 74  HDL: 96 (H)  Non-HDL Cholesterol: 160  LDL Direct: 145 (H)  Vit D, 25-Hydroxy: 59 5  HOMOCYST(E)INE, P/S: 6 9  HEPATITIS C ANTIBODY: Non-reactive  C-REACTIVE PROTEIN: 13 2 (H)    LipoProfile:  LDL- 1595  LDL-C- 144  HDL- 94  TG- 82  T Chol- 254        The following portions of the patient's history were reviewed and updated as appropriate:   She has a past medical history of Cervical strain, Changing skin lesion, Hyperlipidemia, MCI (mild cognitive impairment), Mixed hyperlipidemia, Nonmelanoma skin cancer, Osteoporosis, Polio, Poor concentration, and Swelling of left thumb ,  does not have any pertinent problems on file  ,   has a past surgical history that includes Anal fissurectomy; Appendectomy; Hysterectomy; and Cataract extraction  ,  family history includes Cancer in her father; Heart attack in her mother and sister  ,   reports that she has quit smoking  She has never used smokeless tobacco  She reports that she drinks alcohol  She reports that she does not use drugs  ,  is allergic to sulfa antibiotics     Current Outpatient Medications   Medication Sig Dispense Refill    Acetylcarnitine HCl (ACETYL L-CARNITINE) 500 MG CAPS Take by mouth every morning       ALFALFA PO Take by mouth      Alpha-Lipoic Acid (LIPOIC ACID PO) Take 1 capsule by mouth      Amino Acids (L-CARNITINE PO) Take 1 tablet by mouth      Artificial Tear Solution (JUST TEARS EYE DROPS OP) Apply to eye      Ascorbic Acid (VITAMIN C) 1000 MG tablet Take 1,000 mg by mouth daily       Calcium Carb-Cholecalciferol (CALCIUM 1000 + D) 1000-800 MG-UNIT TABS Take by mouth      Chelated Magnesium 100 MG TABS Take by mouth Daily      Cholecalciferol (VITAMIN D3) 5000 units CAPS Take 1 capsule by mouth daily      Glycerylphosphorylcholine (ALPHA-GPC 50%) POWD 1 tablet by Does not apply route      Iodine, Kelp, (KELP PO) Take by mouth daily      L-Carnosine POWD by Does not apply route      Menaquinone-7 (VITAMIN K2) 100 MCG CAPS       Misc Natural Products (PANTETHINE PLUS) TABS Take by mouth      naproxen (NAPROSYN) 250 mg tablet Take 1 tablet (250 mg total) by mouth 2 (two) times a day with meals for 10 days 20 tablet 0    NATURAL VITAMIN E MOISTURIZING GEL Apply topically      Omega-3 Fatty Acids (FISH OIL) 1200 MG CAPS Take 1 capsule by mouth daily       PANTETHINE PO Take by mouth      patient supplied medication       Prenatal Vit w/Dn-Rozbicclq-OK (TL FOLATE PO) Take by mouth      Probiotic Product (PROBIOTIC-10) CAPS Take by mouth      pyridoxine (VITAMIN B6) 100 mg tablet 1 tab(s)      Resveratrol 50 MG CAPS Take by mouth  Turmeric POWD 1      Vinpocetine POWD by Does not apply route      vitamin B-12 (CYANOCOBALAMIN) 100 MCG tablet Take by mouth every other day       vitamin E, tocopherol, 1,000 units capsule Take by mouth daily       Zinc 30 MG CAPS Take 1 capsule by mouth daily       No current facility-administered medications for this visit  Review of Systems   Constitutional: Negative  Negative for fatigue and fever  HENT: Negative  Negative for congestion  Eyes: Negative  Negative for visual disturbance  Respiratory: Negative for cough, chest tightness, shortness of breath and wheezing  Cardiovascular: Negative  Negative for chest pain, palpitations and leg swelling  Gastrointestinal: Negative  Negative for abdominal pain, blood in stool, diarrhea and nausea  Endocrine: Negative for polydipsia, polyphagia and polyuria  Genitourinary: Negative for difficulty urinating and flank pain  Musculoskeletal: Negative  Negative for arthralgias, back pain and myalgias  Skin: Negative  Negative for color change, pallor and rash  Allergic/Immunologic: Negative for immunocompromised state  Neurological: Negative  Negative for dizziness, weakness, light-headedness, numbness and headaches  Hematological: Negative for adenopathy  Psychiatric/Behavioral: Negative  Negative for confusion, decreased concentration and sleep disturbance  All other systems reviewed and are negative  Objective:  Vitals:    10/15/19 1412   BP: 112/64   BP Location: Left arm   Patient Position: Sitting   Pulse: 76   Resp: 18   SpO2: 97%   Weight: 53 2 kg (117 lb 6 oz)   Height: 5' 3 5" (1 613 m)     Body mass index is 20 47 kg/m²  Physical Exam   Constitutional: She is oriented to person, place, and time  She appears well-developed and well-nourished  No distress  HENT:   Head: Normocephalic and atraumatic     Right Ear: External ear normal    Left Ear: External ear normal    Nose: Nose normal  Mouth/Throat: Oropharynx is clear and moist  No oropharyngeal exudate  Eyes: Pupils are equal, round, and reactive to light  Conjunctivae are normal  No scleral icterus  Neck: Normal range of motion  Neck supple  No JVD present  No thyromegaly present  Cardiovascular: Normal rate, regular rhythm and normal heart sounds  Exam reveals no gallop and no friction rub  No murmur heard  Pulmonary/Chest: Effort normal and breath sounds normal  No respiratory distress  She exhibits no tenderness  Abdominal: Soft  Bowel sounds are normal    Musculoskeletal: Normal range of motion  She exhibits no edema  Lymphadenopathy:     She has no cervical adenopathy  Neurological: She is alert and oriented to person, place, and time  Coordination normal    Skin: Skin is warm and dry  Capillary refill takes less than 2 seconds  No rash noted  She is not diaphoretic  Psychiatric: She has a normal mood and affect  Her behavior is normal  Judgment and thought content normal    Nursing note and vitals reviewed

## 2019-11-07 ENCOUNTER — HOSPITAL ENCOUNTER (OUTPATIENT)
Dept: MAMMOGRAPHY | Facility: CLINIC | Age: 74
Discharge: HOME | End: 2019-11-07
Payer: COMMERCIAL

## 2019-11-07 DIAGNOSIS — Z13.820 SCREENING FOR OSTEOPOROSIS: ICD-10-CM

## 2019-11-07 PROCEDURE — 77080 DXA BONE DENSITY AXIAL: CPT

## 2019-11-11 ENCOUNTER — LAB (OUTPATIENT)
Dept: LAB | Facility: CLINIC | Age: 74
End: 2019-11-11
Payer: COMMERCIAL

## 2019-11-11 DIAGNOSIS — E78.2 MIXED HYPERLIPIDEMIA: ICD-10-CM

## 2019-11-11 DIAGNOSIS — R79.82 ELEVATED C-REACTIVE PROTEIN (CRP): ICD-10-CM

## 2019-11-11 LAB
CRP SERPL QL: <3 MG/L
ERYTHROCYTE [SEDIMENTATION RATE] IN BLOOD: 10 MM/HOUR (ref 0–20)
HCYS SERPL-SCNC: 9.1 UMOL/L (ref 3.7–11.2)

## 2019-11-11 PROCEDURE — 80061 LIPID PANEL: CPT

## 2019-11-11 PROCEDURE — 83695 ASSAY OF LIPOPROTEIN(A): CPT

## 2019-11-11 PROCEDURE — 36415 COLL VENOUS BLD VENIPUNCTURE: CPT

## 2019-11-11 PROCEDURE — 83090 ASSAY OF HOMOCYSTEINE: CPT

## 2019-11-11 PROCEDURE — 82172 ASSAY OF APOLIPOPROTEIN: CPT

## 2019-11-11 PROCEDURE — 83704 LIPOPROTEIN BLD QUAN PART: CPT

## 2019-11-11 PROCEDURE — 86140 C-REACTIVE PROTEIN: CPT

## 2019-11-11 PROCEDURE — 85652 RBC SED RATE AUTOMATED: CPT

## 2019-11-21 LAB — MISCELLANEOUS LAB TEST RESULT: NORMAL

## 2020-01-31 ENCOUNTER — TELEPHONE (OUTPATIENT)
Dept: NEUROLOGY | Facility: CLINIC | Age: 75
End: 2020-01-31

## 2020-01-31 NOTE — TELEPHONE ENCOUNTER
Received medical records request from Elaine Harvey asking for patients medical records  Request scanned in to chart and faxed to [phone]  Omayra Bartholomew Send to:   Yvonne Oshea, & [address], [address] [facility]    Faxed to [phone] 01/31/2020

## 2020-10-21 ENCOUNTER — OFFICE VISIT (OUTPATIENT)
Dept: FAMILY MEDICINE CLINIC | Facility: CLINIC | Age: 75
End: 2020-10-21
Payer: COMMERCIAL

## 2020-10-21 VITALS
HEIGHT: 64 IN | SYSTOLIC BLOOD PRESSURE: 104 MMHG | BODY MASS INDEX: 20.49 KG/M2 | HEART RATE: 78 BPM | RESPIRATION RATE: 18 BRPM | DIASTOLIC BLOOD PRESSURE: 62 MMHG | WEIGHT: 120 LBS | OXYGEN SATURATION: 98 %

## 2020-10-21 DIAGNOSIS — S00.83XA CONTUSION OF FOREHEAD, INITIAL ENCOUNTER: Primary | ICD-10-CM

## 2020-10-21 PROCEDURE — 1036F TOBACCO NON-USER: CPT | Performed by: NURSE PRACTITIONER

## 2020-10-21 PROCEDURE — 1101F PT FALLS ASSESS-DOCD LE1/YR: CPT | Performed by: NURSE PRACTITIONER

## 2020-10-21 PROCEDURE — 1160F RVW MEDS BY RX/DR IN RCRD: CPT | Performed by: NURSE PRACTITIONER

## 2020-10-21 PROCEDURE — 3288F FALL RISK ASSESSMENT DOCD: CPT | Performed by: NURSE PRACTITIONER

## 2020-10-21 PROCEDURE — 99213 OFFICE O/P EST LOW 20 MIN: CPT | Performed by: NURSE PRACTITIONER

## 2020-10-21 PROCEDURE — 3725F SCREEN DEPRESSION PERFORMED: CPT | Performed by: NURSE PRACTITIONER

## 2020-11-20 ENCOUNTER — TELEPHONE (OUTPATIENT)
Dept: FAMILY MEDICINE CLINIC | Facility: CLINIC | Age: 75
End: 2020-11-20

## 2020-11-23 DIAGNOSIS — M79.10 MYALGIA: Primary | ICD-10-CM

## 2020-11-23 DIAGNOSIS — R53.83 FATIGUE, UNSPECIFIED TYPE: ICD-10-CM

## 2020-11-30 DIAGNOSIS — E55.9 VITAMIN D DEFICIENCY: Primary | ICD-10-CM

## 2020-12-01 ENCOUNTER — LAB (OUTPATIENT)
Dept: LAB | Facility: CLINIC | Age: 75
End: 2020-12-01
Payer: COMMERCIAL

## 2020-12-01 DIAGNOSIS — E55.9 VITAMIN D DEFICIENCY: ICD-10-CM

## 2020-12-01 DIAGNOSIS — M79.10 MYALGIA: ICD-10-CM

## 2020-12-01 DIAGNOSIS — R53.83 FATIGUE, UNSPECIFIED TYPE: ICD-10-CM

## 2020-12-01 LAB
25(OH)D3 SERPL-MCNC: 61.5 NG/ML (ref 30–100)
CRP SERPL QL: 4.8 MG/L
ERYTHROCYTE [SEDIMENTATION RATE] IN BLOOD: 37 MM/HOUR (ref 0–29)

## 2020-12-01 PROCEDURE — 86140 C-REACTIVE PROTEIN: CPT

## 2020-12-01 PROCEDURE — 82306 VITAMIN D 25 HYDROXY: CPT

## 2020-12-01 PROCEDURE — 36415 COLL VENOUS BLD VENIPUNCTURE: CPT

## 2020-12-01 PROCEDURE — 85652 RBC SED RATE AUTOMATED: CPT

## 2020-12-01 PROCEDURE — 86430 RHEUMATOID FACTOR TEST QUAL: CPT

## 2020-12-02 DIAGNOSIS — M79.10 MYALGIA: ICD-10-CM

## 2020-12-02 DIAGNOSIS — R70.0 ELEVATED SED RATE: ICD-10-CM

## 2020-12-02 DIAGNOSIS — R79.82 ELEVATED C-REACTIVE PROTEIN (CRP): Primary | ICD-10-CM

## 2020-12-02 LAB — RHEUMATOID FACT SER QL LA: NEGATIVE

## 2020-12-03 ENCOUNTER — TELEPHONE (OUTPATIENT)
Dept: FAMILY MEDICINE CLINIC | Facility: CLINIC | Age: 75
End: 2020-12-03

## 2020-12-03 DIAGNOSIS — R70.0 ELEVATED SED RATE: ICD-10-CM

## 2020-12-03 DIAGNOSIS — R79.82 ELEVATED C-REACTIVE PROTEIN (CRP): Primary | ICD-10-CM

## 2020-12-03 DIAGNOSIS — M79.10 MYALGIA: ICD-10-CM

## 2020-12-28 ENCOUNTER — APPOINTMENT (OUTPATIENT)
Dept: LAB | Facility: CLINIC | Age: 75
End: 2020-12-28
Payer: COMMERCIAL

## 2020-12-28 ENCOUNTER — TRANSCRIBE ORDERS (OUTPATIENT)
Dept: ADMINISTRATIVE | Facility: HOSPITAL | Age: 75
End: 2020-12-28

## 2020-12-28 ENCOUNTER — APPOINTMENT (OUTPATIENT)
Dept: RADIOLOGY | Facility: CLINIC | Age: 75
End: 2020-12-28
Payer: COMMERCIAL

## 2020-12-28 DIAGNOSIS — M25.511 RIGHT SHOULDER PAIN, UNSPECIFIED CHRONICITY: ICD-10-CM

## 2020-12-28 DIAGNOSIS — M25.512 LEFT SHOULDER PAIN, UNSPECIFIED CHRONICITY: ICD-10-CM

## 2020-12-28 DIAGNOSIS — M25.50 PAIN IN JOINT, MULTIPLE SITES: ICD-10-CM

## 2020-12-28 DIAGNOSIS — M25.50 PAIN IN JOINT, MULTIPLE SITES: Primary | ICD-10-CM

## 2020-12-28 DIAGNOSIS — M79.10 MYALGIA: ICD-10-CM

## 2020-12-28 LAB
ALBUMIN SERPL BCP-MCNC: 3.5 G/DL (ref 3.5–5)
ALP SERPL-CCNC: 72 U/L (ref 46–116)
ALT SERPL W P-5'-P-CCNC: 24 U/L (ref 12–78)
ANION GAP SERPL CALCULATED.3IONS-SCNC: 4 MMOL/L (ref 4–13)
AST SERPL W P-5'-P-CCNC: 22 U/L (ref 5–45)
BACTERIA UR QL AUTO: ABNORMAL /HPF
BASOPHILS # BLD AUTO: 0.02 THOUSANDS/ΜL (ref 0–0.1)
BASOPHILS NFR BLD AUTO: 0 % (ref 0–1)
BILIRUB SERPL-MCNC: 0.68 MG/DL (ref 0.2–1)
BILIRUB UR QL STRIP: NEGATIVE
BUN SERPL-MCNC: 16 MG/DL (ref 5–25)
CALCIUM SERPL-MCNC: 9.8 MG/DL (ref 8.3–10.1)
CAOX CRY URNS QL MICRO: ABNORMAL /HPF
CHLORIDE SERPL-SCNC: 106 MMOL/L (ref 100–108)
CK SERPL-CCNC: 43 U/L (ref 26–192)
CLARITY UR: CLEAR
CO2 SERPL-SCNC: 28 MMOL/L (ref 21–32)
COLOR UR: YELLOW
CREAT SERPL-MCNC: 0.65 MG/DL (ref 0.6–1.3)
CRP SERPL QL: <3 MG/L
EOSINOPHIL # BLD AUTO: 0.08 THOUSAND/ΜL (ref 0–0.61)
EOSINOPHIL NFR BLD AUTO: 2 % (ref 0–6)
ERYTHROCYTE [DISTWIDTH] IN BLOOD BY AUTOMATED COUNT: 13.3 % (ref 11.6–15.1)
ERYTHROCYTE [SEDIMENTATION RATE] IN BLOOD: 25 MM/HOUR (ref 0–29)
GFR SERPL CREATININE-BSD FRML MDRD: 87 ML/MIN/1.73SQ M
GLUCOSE P FAST SERPL-MCNC: 97 MG/DL (ref 65–99)
GLUCOSE UR STRIP-MCNC: NEGATIVE MG/DL
HCT VFR BLD AUTO: 41.6 % (ref 34.8–46.1)
HGB BLD-MCNC: 13 G/DL (ref 11.5–15.4)
HGB UR QL STRIP.AUTO: NEGATIVE
IMM GRANULOCYTES # BLD AUTO: 0.01 THOUSAND/UL (ref 0–0.2)
IMM GRANULOCYTES NFR BLD AUTO: 0 % (ref 0–2)
KETONES UR STRIP-MCNC: ABNORMAL MG/DL
LEUKOCYTE ESTERASE UR QL STRIP: ABNORMAL
LYMPHOCYTES # BLD AUTO: 1.45 THOUSANDS/ΜL (ref 0.6–4.47)
LYMPHOCYTES NFR BLD AUTO: 28 % (ref 14–44)
MCH RBC QN AUTO: 28.5 PG (ref 26.8–34.3)
MCHC RBC AUTO-ENTMCNC: 31.3 G/DL (ref 31.4–37.4)
MCV RBC AUTO: 91 FL (ref 82–98)
MONOCYTES # BLD AUTO: 0.39 THOUSAND/ΜL (ref 0.17–1.22)
MONOCYTES NFR BLD AUTO: 8 % (ref 4–12)
NEUTROPHILS # BLD AUTO: 3.19 THOUSANDS/ΜL (ref 1.85–7.62)
NEUTS SEG NFR BLD AUTO: 62 % (ref 43–75)
NITRITE UR QL STRIP: NEGATIVE
NON-SQ EPI CELLS URNS QL MICRO: ABNORMAL /HPF
NRBC BLD AUTO-RTO: 0 /100 WBCS
PH UR STRIP.AUTO: 6 [PH]
PLATELET # BLD AUTO: 340 THOUSANDS/UL (ref 149–390)
PMV BLD AUTO: 9.3 FL (ref 8.9–12.7)
POTASSIUM SERPL-SCNC: 4.1 MMOL/L (ref 3.5–5.3)
PROT SERPL-MCNC: 7 G/DL (ref 6.4–8.2)
PROT UR STRIP-MCNC: ABNORMAL MG/DL
RBC # BLD AUTO: 4.56 MILLION/UL (ref 3.81–5.12)
RBC #/AREA URNS AUTO: ABNORMAL /HPF
SODIUM SERPL-SCNC: 138 MMOL/L (ref 136–145)
SP GR UR STRIP.AUTO: 1.03 (ref 1–1.03)
UROBILINOGEN UR QL STRIP.AUTO: 0.2 E.U./DL
WBC # BLD AUTO: 5.14 THOUSAND/UL (ref 4.31–10.16)
WBC #/AREA URNS AUTO: ABNORMAL /HPF

## 2020-12-28 PROCEDURE — 85025 COMPLETE CBC W/AUTO DIFF WBC: CPT

## 2020-12-28 PROCEDURE — 81001 URINALYSIS AUTO W/SCOPE: CPT | Performed by: INTERNAL MEDICINE

## 2020-12-28 PROCEDURE — 80053 COMPREHEN METABOLIC PANEL: CPT

## 2020-12-28 PROCEDURE — 86140 C-REACTIVE PROTEIN: CPT

## 2020-12-28 PROCEDURE — 73030 X-RAY EXAM OF SHOULDER: CPT

## 2020-12-28 PROCEDURE — 73502 X-RAY EXAM HIP UNI 2-3 VIEWS: CPT

## 2020-12-28 PROCEDURE — 86200 CCP ANTIBODY: CPT

## 2020-12-28 PROCEDURE — 36415 COLL VENOUS BLD VENIPUNCTURE: CPT

## 2020-12-28 PROCEDURE — 85652 RBC SED RATE AUTOMATED: CPT

## 2020-12-28 PROCEDURE — 86038 ANTINUCLEAR ANTIBODIES: CPT

## 2020-12-28 PROCEDURE — 82550 ASSAY OF CK (CPK): CPT

## 2020-12-29 LAB — CCP IGA+IGG SERPL IA-ACNC: 7 UNITS (ref 0–19)

## 2020-12-30 LAB — RYE IGE QN: NEGATIVE

## 2021-02-11 ENCOUNTER — TELEPHONE (OUTPATIENT)
Dept: FAMILY MEDICINE CLINIC | Facility: CLINIC | Age: 76
End: 2021-02-11

## 2021-02-11 NOTE — TELEPHONE ENCOUNTER
Pt stops in and asks for a covid test because she is 75 and feels she should have one  She may have been exposed in general but she doesn't know for sure  She has NOT been around anyone that came back positive  I explained no reason wasn't sure if you would order it but asked that I ask you

## 2021-02-11 NOTE — TELEPHONE ENCOUNTER
If she has any symptoms suggestive of Covid needs an appt  Or exposed to anybody confirmed to have covid needs testing otherwise no

## 2021-02-12 DIAGNOSIS — Z23 ENCOUNTER FOR IMMUNIZATION: ICD-10-CM

## 2021-02-19 NOTE — TELEPHONE ENCOUNTER
Pt notified - she was not happy about not getting an order but will go to a pharmacy for one  If she still wants to get it

## 2021-03-31 ENCOUNTER — TRANSCRIBE ORDERS (OUTPATIENT)
Dept: ADMINISTRATIVE | Facility: HOSPITAL | Age: 76
End: 2021-03-31

## 2021-03-31 ENCOUNTER — APPOINTMENT (OUTPATIENT)
Dept: LAB | Facility: CLINIC | Age: 76
End: 2021-03-31
Payer: COMMERCIAL

## 2021-03-31 DIAGNOSIS — N39.0 URINARY TRACT INFECTION WITHOUT HEMATURIA, SITE UNSPECIFIED: Primary | ICD-10-CM

## 2021-03-31 DIAGNOSIS — N39.0 URINARY TRACT INFECTION WITHOUT HEMATURIA, SITE UNSPECIFIED: ICD-10-CM

## 2021-03-31 LAB
BACTERIA UR QL AUTO: ABNORMAL /HPF
BILIRUB UR QL STRIP: NEGATIVE
CLARITY UR: ABNORMAL
COLOR UR: YELLOW
GLUCOSE UR STRIP-MCNC: NEGATIVE MG/DL
HGB UR QL STRIP.AUTO: NEGATIVE
HYALINE CASTS #/AREA URNS LPF: ABNORMAL /LPF
KETONES UR STRIP-MCNC: NEGATIVE MG/DL
LEUKOCYTE ESTERASE UR QL STRIP: ABNORMAL
NITRITE UR QL STRIP: NEGATIVE
NON-SQ EPI CELLS URNS QL MICRO: ABNORMAL /HPF
PH UR STRIP.AUTO: 6.5 [PH]
PROT UR STRIP-MCNC: NEGATIVE MG/DL
RBC #/AREA URNS AUTO: ABNORMAL /HPF
SP GR UR STRIP.AUTO: 1.02 (ref 1–1.03)
UROBILINOGEN UR QL STRIP.AUTO: 0.2 E.U./DL
WBC #/AREA URNS AUTO: ABNORMAL /HPF

## 2021-03-31 PROCEDURE — 87086 URINE CULTURE/COLONY COUNT: CPT

## 2021-03-31 PROCEDURE — 81001 URINALYSIS AUTO W/SCOPE: CPT | Performed by: INTERNAL MEDICINE

## 2021-04-01 LAB — BACTERIA UR CULT: NORMAL

## 2021-06-14 ENCOUNTER — RA CDI HCC (OUTPATIENT)
Dept: OTHER | Facility: HOSPITAL | Age: 76
End: 2021-06-14

## 2021-06-15 NOTE — PROGRESS NOTES
[facility] 75  coding opportunities          Chart reviewed, no opportunity found: CHART REVIEWED, NO OPPORTUNITY FOUND                     Patients insurance company: [employer],

## 2021-06-24 ENCOUNTER — HOSPITAL ENCOUNTER (OUTPATIENT)
Dept: MAMMOGRAPHY | Facility: CLINIC | Age: 76
Discharge: HOME | End: 2021-06-24
Payer: COMMERCIAL

## 2021-06-24 DIAGNOSIS — M81.0 AGE-RELATED OSTEOPOROSIS WITHOUT CURRENT PATHOLOGICAL FRACTURE: ICD-10-CM

## 2021-06-24 PROCEDURE — 77080 DXA BONE DENSITY AXIAL: CPT

## 2021-06-30 NOTE — PROGRESS NOTES
Assessment/Plan:    No problem-specific Assessment & Plan notes found for this encounter  Diagnoses and all orders for this visit:    Mixed hyperlipidemia  -     Cardio IQ(R) Advanced Lipid Panel; Future  -     C-reactive protein; Future  -     Homocysteine, serum; Future  -     Vitamin D 25 hydroxy; Future  -     Comprehensive metabolic panel; Future    Poor concentration  -     Cardio IQ(R) Advanced Lipid Panel; Future  -     C-reactive protein; Future  -     Homocysteine, serum; Future  -     Vitamin D 25 hydroxy; Future  -     Comprehensive metabolic panel; Future    Short-term memory loss  -     Cardio IQ(R) Advanced Lipid Panel; Future  -     C-reactive protein; Future  -     Homocysteine, serum; Future  -     Vitamin D 25 hydroxy; Future  -     Comprehensive metabolic panel; Future    Vitamin D deficiency  -     Cardio IQ(R) Advanced Lipid Panel; Future  -     C-reactive protein; Future  -     Homocysteine, serum; Future  -     Vitamin D 25 hydroxy; Future  -     Comprehensive metabolic panel; Future    Age-related osteoporosis without current pathological fracture  -     DXA bone density spine hip and pelvis; Future    High risk sexual behavior          Subjective:      Patient ID: Chari Dangelo is a 68 y o  female  Pt is here for follow up  Hyperlipidemia- previous labs normal in Oct  Pt would like labs rechecked  Mild cognitive impairment- f/b dr Pallavi Cline  Pt has a list of labs which she has requested  Pt requests BP check  Reading in office is wnl  Pt in the past dx with small Hippocampus  She has f/u with Neurology in 6 months and will have repeat MRI then   t is in a new relationship and would like STD screenings        The following portions of the patient's history were reviewed and updated as appropriate:   She  has a past medical history of Cervical strain; Changing skin lesion; MCI (mild cognitive impairment); Mixed hyperlipidemia; Nonmelanoma skin cancer; Osteoporosis; Polio;  Poor concentration; and Swelling of left thumb  She   Patient Active Problem List    Diagnosis Date Noted    High risk sexual behavior 05/17/2018    Acute nonintractable headache 03/08/2018    Vitamin D deficiency 03/08/2018    Flank pain 02/26/2018    Flu-like symptoms 02/22/2018    Influenza B 02/22/2018    Mixed hyperlipidemia 09/21/2017    Poor concentration 08/15/2017    Short-term memory loss 08/15/2017    Osteoporosis 12/17/2013     She  has a past surgical history that includes Anal fissurectomy; Appendectomy; Hysterectomy; and Cataract extraction  Her family history includes Cancer in her father; Heart attack in her mother and sister  She  reports that she has quit smoking  She has never used smokeless tobacco  She reports that she drinks alcohol  She reports that she does not use drugs    Current Outpatient Prescriptions   Medication Sig Dispense Refill    Acetylcarnitine HCl (ACETYL L-CARNITINE) 500 MG CAPS Take 2 capsules by mouth      Acetylcarnitine HCl (ACETYL L-CARNITINE) 500 MG CAPS Take by mouth      Alpha-Lipoic Acid (LIPOIC ACID PO) Take 1 capsule by mouth      Amino Acids (L-CARNITINE PO) Take 1 tablet by mouth      Ascorbic Acid (VITAMIN C) 100 MG tablet Take by mouth      brompheniramine-pseudoephedrine-DM 30-2-10 MG/5ML syrup Take 5 mL by mouth 4 (four) times a day as needed for allergies 120 mL 0    Calcium Carb-Cholecalciferol (CALCIUM 1000 + D) 1000-800 MG-UNIT TABS Take by mouth      Chelated Magnesium 100 MG TABS Take by mouth Daily      Cholecalciferol (VITAMIN D3) 5000 units CAPS Take 1 capsule by mouth daily      Cholecalciferol 90323 units TABS 1 tab(s)      DOCOSAHEXAENOIC ACID PO 1 cap(s)      Glycerylphosphorylcholine (ALPHA-GPC 50%) POWD 1 tablet by Does not apply route      L-Carnosine POWD by Does not apply route      Lipoic Acid 150 MG CAPS Take by mouth      Magnesium Oxide 200 MG TABS 1      meloxicam (MOBIC) 7 5 mg tablet Take 1 tablet (7 5 mg total) by mouth 2 (two) times a day as needed for moderate pain for up to 60 days 60 tablet 1    Menaquinone-7 (VITAMIN K2) 100 MCG CAPS       Misc Natural Products (PANTETHINE PLUS) TABS Take by mouth      Omega-3 Fatty Acids (FISH OIL) 1000 MG CPDR Take 2 5 capsules by mouth      Probiotic Product (PROBIOTIC-10) CAPS Take by mouth      pyridoxine (VITAMIN B6) 100 mg tablet Take by mouth      pyridoxine (VITAMIN B6) 100 mg tablet 1 tab(s)      Resveratrol 50 MG CAPS Take by mouth      tretinoin (ALTRALIN) 0 05 % 1 caitie      Turmeric POWD 1      Vinpocetine POWD 40 mg by Does not apply route      Vinpocetine POWD by Does not apply route      Vit D-Vit E-Safflower Oil (VITAMINS E & D BEAUTY OIL) 400-62741 UNIT/52ML OIL 1 cap(s)      vitamin B-12 (CYANOCOBALAMIN) 100 MCG tablet Take by mouth      vitamin E 600 UNIT capsule Take by mouth      Zinc 30 MG CAPS Take 1 capsule by mouth daily       No current facility-administered medications for this visit        Current Outpatient Prescriptions on File Prior to Visit   Medication Sig    Acetylcarnitine HCl (ACETYL L-CARNITINE) 500 MG CAPS Take 2 capsules by mouth    Acetylcarnitine HCl (ACETYL L-CARNITINE) 500 MG CAPS Take by mouth    Alpha-Lipoic Acid (LIPOIC ACID PO) Take 1 capsule by mouth    Amino Acids (L-CARNITINE PO) Take 1 tablet by mouth    Ascorbic Acid (VITAMIN C) 100 MG tablet Take by mouth    brompheniramine-pseudoephedrine-DM 30-2-10 MG/5ML syrup Take 5 mL by mouth 4 (four) times a day as needed for allergies    Calcium Carb-Cholecalciferol (CALCIUM 1000 + D) 1000-800 MG-UNIT TABS Take by mouth    Chelated Magnesium 100 MG TABS Take by mouth Daily    Cholecalciferol (VITAMIN D3) 5000 units CAPS Take 1 capsule by mouth daily    Cholecalciferol 13969 units TABS 1 tab(s)    DOCOSAHEXAENOIC ACID PO 1 cap(s)    Glycerylphosphorylcholine (ALPHA-GPC 50%) POWD 1 tablet by Does not apply route    L-Carnosine POWD by Does not apply route    Lipoic Acid 150 MG CAPS Take by mouth    Magnesium Oxide 200 MG TABS 1    meloxicam (MOBIC) 7 5 mg tablet Take 1 tablet (7 5 mg total) by mouth 2 (two) times a day as needed for moderate pain for up to 60 days    Menaquinone-7 (VITAMIN K2) 100 MCG CAPS     Misc Natural Products (PANTETHINE PLUS) TABS Take by mouth    Omega-3 Fatty Acids (FISH OIL) 1000 MG CPDR Take 2 5 capsules by mouth    Probiotic Product (PROBIOTIC-10) CAPS Take by mouth    pyridoxine (VITAMIN B6) 100 mg tablet Take by mouth    pyridoxine (VITAMIN B6) 100 mg tablet 1 tab(s)    Resveratrol 50 MG CAPS Take by mouth    tretinoin (ALTRALIN) 0 05 % 1 caitie    Turmeric POWD 1    Vinpocetine POWD 40 mg by Does not apply route    Vinpocetine POWD by Does not apply route    Vit D-Vit E-Safflower Oil (VITAMINS E & D BEAUTY OIL) 400-97665 UNIT/52ML OIL 1 cap(s)    vitamin B-12 (CYANOCOBALAMIN) 100 MCG tablet Take by mouth    vitamin E 600 UNIT capsule Take by mouth    Zinc 30 MG CAPS Take 1 capsule by mouth daily     No current facility-administered medications on file prior to visit  She is allergic to sulfa antibiotics       Review of Systems   Constitutional: Negative  HENT: Negative  Eyes: Negative  Respiratory: Negative for cough  Cardiovascular: Negative  Gastrointestinal: Negative  Endocrine: Negative  Musculoskeletal: Negative  Negative for neck pain and neck stiffness  Skin: Negative  Allergic/Immunologic: Negative  Negative for immunocompromised state  Neurological: Positive for dizziness  Episodic dizziness  Pt thinks it may be related to her oTC supplements  Hematological: Negative for adenopathy  Does not bruise/bleed easily  Psychiatric/Behavioral: Negative  Reports short term memory loss and mild cognitive impairment, f/b neurology   All other systems reviewed and are negative          Objective:      /62 (BP Location: Right arm, Patient Position: Sitting)   Pulse 90   Temp 98 9 °F (37 2 °C)   Resp 18   Ht 5' 3 5" (1 613 m)   Wt 55 8 kg (123 lb)   SpO2 98%   BMI 21 45 kg/m²          Physical Exam   Constitutional: She is oriented to person, place, and time  She appears well-developed and well-nourished  No distress  HENT:   Head: Normocephalic and atraumatic  Right Ear: External ear normal    Left Ear: External ear normal    Nose: Nose normal    Mouth/Throat: Oropharynx is clear and moist    Eyes: Conjunctivae and EOM are normal  Pupils are equal, round, and reactive to light  Neck: Normal range of motion  Neck supple  No thyromegaly present  Cardiovascular: Normal rate, regular rhythm and normal heart sounds  No murmur heard  Pulmonary/Chest: Effort normal and breath sounds normal  No respiratory distress  She has no wheezes  Abdominal: Soft  Bowel sounds are normal  She exhibits no distension  There is no tenderness  Musculoskeletal: Normal range of motion  She exhibits no edema, tenderness or deformity  Lymphadenopathy:     She has no cervical adenopathy  Neurological: She is alert and oriented to person, place, and time  Skin: Skin is warm and dry  No rash noted  No pallor  Psychiatric: She has a normal mood and affect  Her behavior is normal  Judgment and thought content normal    Nursing note and vitals reviewed  (3) adequate

## 2021-12-09 ENCOUNTER — OFFICE VISIT (OUTPATIENT)
Dept: FAMILY MEDICINE CLINIC | Facility: CLINIC | Age: 76
End: 2021-12-09
Payer: COMMERCIAL

## 2021-12-09 VITALS
OXYGEN SATURATION: 98 % | HEIGHT: 64 IN | DIASTOLIC BLOOD PRESSURE: 78 MMHG | WEIGHT: 133.2 LBS | BODY MASS INDEX: 22.74 KG/M2 | HEART RATE: 76 BPM | TEMPERATURE: 97.4 F | SYSTOLIC BLOOD PRESSURE: 122 MMHG

## 2021-12-09 DIAGNOSIS — Z13.1 SCREENING FOR DIABETES MELLITUS: ICD-10-CM

## 2021-12-09 DIAGNOSIS — R70.0 ELEVATED SED RATE: ICD-10-CM

## 2021-12-09 DIAGNOSIS — R53.83 FATIGUE, UNSPECIFIED TYPE: Primary | ICD-10-CM

## 2021-12-09 DIAGNOSIS — Z00.00 MEDICARE ANNUAL WELLNESS VISIT, SUBSEQUENT: ICD-10-CM

## 2021-12-09 DIAGNOSIS — Z12.31 SCREENING MAMMOGRAM FOR BREAST CANCER: ICD-10-CM

## 2021-12-09 DIAGNOSIS — E78.2 MIXED HYPERLIPIDEMIA: ICD-10-CM

## 2021-12-09 DIAGNOSIS — E55.9 VITAMIN D DEFICIENCY: ICD-10-CM

## 2021-12-09 DIAGNOSIS — E83.42 HYPOMAGNESEMIA: ICD-10-CM

## 2021-12-09 DIAGNOSIS — M54.9 MID BACK PAIN: ICD-10-CM

## 2021-12-09 DIAGNOSIS — R79.82 ELEVATED C-REACTIVE PROTEIN (CRP): ICD-10-CM

## 2021-12-09 PROBLEM — R10.9 FLANK PAIN: Status: RESOLVED | Noted: 2018-02-26 | Resolved: 2021-12-09

## 2021-12-09 PROBLEM — J10.1 INFLUENZA B: Status: RESOLVED | Noted: 2018-02-22 | Resolved: 2021-12-09

## 2021-12-09 PROBLEM — Z11.59 NEED FOR HEPATITIS C SCREENING TEST: Status: RESOLVED | Noted: 2019-10-09 | Resolved: 2021-12-09

## 2021-12-09 PROBLEM — S00.83XA FOREHEAD CONTUSION: Status: RESOLVED | Noted: 2020-10-21 | Resolved: 2021-12-09

## 2021-12-09 PROBLEM — R51.9 ACUTE NONINTRACTABLE HEADACHE: Status: RESOLVED | Noted: 2018-03-08 | Resolved: 2021-12-09

## 2021-12-09 PROBLEM — J06.9 URI WITH COUGH AND CONGESTION: Status: RESOLVED | Noted: 2019-10-09 | Resolved: 2021-12-09

## 2021-12-09 PROBLEM — R68.89 FLU-LIKE SYMPTOMS: Status: RESOLVED | Noted: 2018-02-22 | Resolved: 2021-12-09

## 2021-12-09 PROCEDURE — G0439 PPPS, SUBSEQ VISIT: HCPCS | Performed by: FAMILY MEDICINE

## 2021-12-09 PROCEDURE — 3288F FALL RISK ASSESSMENT DOCD: CPT | Performed by: FAMILY MEDICINE

## 2021-12-09 PROCEDURE — 1170F FXNL STATUS ASSESSED: CPT | Performed by: FAMILY MEDICINE

## 2021-12-09 PROCEDURE — G0444 DEPRESSION SCREEN ANNUAL: HCPCS | Performed by: FAMILY MEDICINE

## 2021-12-09 PROCEDURE — 99213 OFFICE O/P EST LOW 20 MIN: CPT | Performed by: FAMILY MEDICINE

## 2021-12-09 PROCEDURE — 1036F TOBACCO NON-USER: CPT | Performed by: FAMILY MEDICINE

## 2021-12-09 PROCEDURE — 3725F SCREEN DEPRESSION PERFORMED: CPT | Performed by: FAMILY MEDICINE

## 2021-12-09 PROCEDURE — 1125F AMNT PAIN NOTED PAIN PRSNT: CPT | Performed by: FAMILY MEDICINE

## 2021-12-09 PROCEDURE — 1160F RVW MEDS BY RX/DR IN RCRD: CPT | Performed by: FAMILY MEDICINE

## 2021-12-28 LAB
25(OH)D3 SERPL-MCNC: 53 NG/ML (ref 30–100)
ALBUMIN SERPL-MCNC: 4.1 G/DL (ref 3.6–5.1)
ALBUMIN/GLOB SERPL: 1.6 (CALC) (ref 1–2.5)
ALP SERPL-CCNC: 77 U/L (ref 37–153)
ALT SERPL-CCNC: 16 U/L (ref 6–29)
APO B SERPL-MCNC: 121 MG/DL
AST SERPL-CCNC: 24 U/L (ref 10–35)
BILIRUB SERPL-MCNC: 0.9 MG/DL (ref 0.2–1.2)
BUN SERPL-MCNC: 15 MG/DL (ref 7–25)
BUN/CREAT SERPL: NORMAL (CALC) (ref 6–22)
CALCIUM SERPL-MCNC: 10.2 MG/DL (ref 8.6–10.4)
CHLORIDE SERPL-SCNC: 102 MMOL/L (ref 98–110)
CHOLEST SERPL-MCNC: 290 MG/DL
CHOLEST/HDLC SERPL: 2.8 CALC
CO2 SERPL-SCNC: 29 MMOL/L (ref 20–32)
CREAT SERPL-MCNC: 0.66 MG/DL (ref 0.6–0.93)
CRP SERPL-MCNC: 1.1 MG/L
GLOBULIN SER CALC-MCNC: 2.5 G/DL (CALC) (ref 1.9–3.7)
GLUCOSE SERPL-MCNC: 98 MG/DL (ref 65–99)
HCYS SERPL-SCNC: 12.7 UMOL/L
HDLC SERPL-MCNC: 104 MG/DL
HLD.LARGE SERPL-SCNC: 6402 NMOL/L
LDL SERPL QN: 224.2 ANGSTROM
LDL SERPL-SCNC: 1358 NMOL/L
LDL SMALL SERPL-SCNC: 165 NMOL/L
LDLC REAL SIZE PAT SERPL: ABNORMAL PATTERN
LDLC SERPL CALC-MCNC: 168 MG/DL (CALC)
LPA SERPL-SCNC: 30 NMOL/L
MAGNESIUM SERPL-MCNC: 2.1 MG/DL (ref 1.5–2.5)
NONHDLC SERPL-MCNC: 186 MG/DL (CALC)
POTASSIUM SERPL-SCNC: 4.4 MMOL/L (ref 3.5–5.3)
PROT SERPL-MCNC: 6.6 G/DL (ref 6.1–8.1)
SL AMB EGFR AFRICAN AMERICAN: 99 ML/MIN/1.73M2
SL AMB EGFR NON AFRICAN AMERICAN: 86 ML/MIN/1.73M2
SL AMB LDL MEDIUM: 241 NMOL/L
SODIUM SERPL-SCNC: 139 MMOL/L (ref 135–146)
TRIGL SERPL-MCNC: 76 MG/DL
TSH SERPL-ACNC: 0.81 MIU/L (ref 0.4–4.5)
VIT B12 SERPL-MCNC: 1208 PG/ML (ref 200–1100)

## 2022-02-25 NOTE — PROGRESS NOTES
Progress Note - Neurology   Danilo Sood 68 y o  female MRN: [id number]  Unit/Bed#:  Encounter: [id number]      Subjective:   Patient is here for a follow-up visit with a history of mild cognitive impairment, and over the last 1 month has had 2 falls mostly as trip and falls, for which she was seen in the emergency room for facial injuries  Patient did have a mild headache at the onset of the 2nd fall but denies any subsequent symptoms of headaches, blurred vision, diplopia, perioral numbness vertigo or dizziness, difficulty with concentration and in the emergency room had a CT scan of the head on 2 occasions as well as a CT scan of the cervical spine and CT scan of the facial bones with no evidence of any fractures  She did suffer lacerations to the forehead as well as the chin which were sutured  Patient has been experiencing mildly worsening neck pain on the left side, with tingling sensations along the left trapezius and the nape of the neck intermittently and denies any motor or sensory symptoms in the upper lower extremities  She has a history of cervical DJD with disc osteophytic complexes at multiple levels but no evidence of any spinal stenosis or cervical radiculopathy  ROS:   Review of Systems   Constitutional: Negative  Negative for appetite change and fever  HENT: Positive for tinnitus  Negative for hearing loss, trouble swallowing and voice change  Eyes: Negative  Negative for photophobia and pain  Respiratory: Negative  Negative for shortness of breath  Cardiovascular: Negative  Negative for chest pain and palpitations  Gastrointestinal: Negative  Negative for abdominal pain, nausea and vomiting  Endocrine: Negative  Negative for cold intolerance and heat intolerance  Genitourinary: Negative  Negative for dysuria, frequency and urgency  Musculoskeletal: Positive for back pain  Negative for myalgias and neck pain  Left upper back pain   Skin: Negative    Negative for rash  Neurological: Positive for dizziness  Negative for tremors, seizures, syncope, facial asymmetry, speech difficulty, weakness, light-headedness, numbness and headaches  Hematological: Negative  Does not bruise/bleed easily  Psychiatric/Behavioral: Positive for confusion  Negative for hallucinations and sleep disturbance  The patient is not nervous/anxious  Vitals:   Vitals:    09/20/18 1526   BP: 112/60   Pulse: 82   ,Body mass index is 21 11 kg/m²      MEDS:      Current Outpatient Prescriptions:     Acetylcarnitine HCl (ACETYL L-CARNITINE) 500 MG CAPS, Take by mouth, Disp: , Rfl:     Alpha-Lipoic Acid (LIPOIC ACID PO), Take 1 capsule by mouth, Disp: , Rfl:     Amino Acids (L-CARNITINE PO), Take 1 tablet by mouth, Disp: , Rfl:     Ascorbic Acid (VITAMIN C) 1000 MG tablet, Take by mouth, Disp: , Rfl:     Calcium Carb-Cholecalciferol (CALCIUM 1000 + D) 1000-800 MG-UNIT TABS, Take by mouth, Disp: , Rfl:     Chelated Magnesium 100 MG TABS, Take by mouth Daily, Disp: , Rfl:     Cholecalciferol (VITAMIN D3) 5000 units CAPS, Take 1 capsule by mouth daily, Disp: , Rfl:     Glycerylphosphorylcholine (ALPHA-GPC 50%) POWD, 1 tablet by Does not apply route, Disp: , Rfl:     L-Carnosine POWD, by Does not apply route, Disp: , Rfl:     Lipoic Acid 150 MG CAPS, Take by mouth, Disp: , Rfl:     Magnesium Oxide 200 MG TABS, 1, Disp: , Rfl:     Menaquinone-7 (VITAMIN K2) 100 MCG CAPS, , Disp: , Rfl:     Misc Natural Products (PANTETHINE PLUS) TABS, Take by mouth, Disp: , Rfl:     Omega-3 Fatty Acids (FISH OIL) 1000 MG CPDR, Take 2 5 capsules by mouth, Disp: , Rfl:     Probiotic Product (PROBIOTIC-10) CAPS, Take by mouth, Disp: , Rfl:     pyridoxine (VITAMIN B6) 100 mg tablet, 1 tab(s), Disp: , Rfl:     Resveratrol 50 MG CAPS, Take by mouth, Disp: , Rfl:     tretinoin (ALTRALIN) 0 05 %, 1 caitie, Disp: , Rfl:     Turmeric POWD, 1, Disp: , Rfl:     Vinpocetine POWD, by Does not apply route, Disp: , Rfl:     Vit D-Vit E-Safflower Oil (VITAMINS E & D BEAUTY OIL) 400-06538 UNIT/52ML OIL, 1 cap(s), Disp: , Rfl:     vitamin B-12 (CYANOCOBALAMIN) 100 MCG tablet, Take by mouth, Disp: , Rfl:     vitamin E 600 UNIT capsule, Take by mouth, Disp: , Rfl:     Zinc 30 MG CAPS, Take 1 capsule by mouth daily, Disp: , Rfl:     naproxen (NAPROSYN) 250 mg tablet, Take 1 tablet (250 mg total) by mouth 2 (two) times a day with meals for 10 days, Disp: 20 tablet, Rfl: 0  :    Physical Exam:  General appearance: alert, appears stated age and cooperative  Head: Normocephalic, without obvious abnormality, atraumatic    Neurologic:  Patient is alert awake oriented, high functions are intact, Jay cognitive assessment score was 30/30  speech is fluent  No evidence of any aphasia or dysarthria  Cranial nerve examination reveals visual fields are full to threat, pupils equal and reactive, extraocular movements intact, fundi showed sharp disc margins, sensation in the V1 V2 V3 distribution is symmetric, no obvious facial asymmetry noted,Hearing is preserved, tongue is midline and gag is adequate, shoulder shrug is symmetric bilaterally  Motor examination reveals normal tone and bulk, no evidence of any drift to the outstretched extremities, strength is 5/5 preserved bilaterally in both upper and lower extremities, deep tendon reflexes are intact, toes are downgoing  Sensory examination to pinprick light touch proprioception and vibration is preserved bilaterally, patient does not extinguish double simultaneous stimuli  Coordination no evidence of any finger-to-nose dysmetria, no evidence of any dysdiadochokinesia,  Gait is normal based Romberg sign is negative  Patient has evidence of left-sided lower cervical paraspinal tenderness as well as tenderness along the left trapezius  No bruits were appreciable in the neck  Lab Results: I have personally reviewed pertinent reports    Imaging Studies: I have personally reviewed pertinent reports  Assessment:  1  Cervical strain with recent worsening, as a result of recurrent falls  2  Mild cognitive impairment  Plan:  Patient was advised short course of physical therapy and will also prescribe Flexeril 5 mg at bedtime to be used on a p r n  basis  Home exercise program is encouraged and she will return back to see me in 6 months  Patient is advised to call me if she notices any further  worsening symptoms  9/20/2018,3:29 PM    Dictation voice to text software has been used in the creation of this document  Please consider this in light of any contextual or grammatical errors  96

## 2022-06-03 ENCOUNTER — RA CDI HCC (OUTPATIENT)
Dept: OTHER | Facility: HOSPITAL | Age: 77
End: 2022-06-03

## 2022-06-04 NOTE — PROGRESS NOTES
[facility] 75  coding opportunities       Chart reviewed, no opportunity found:   [address]        Patients Insurance     Medicare Insurance: [employer]

## 2022-06-08 ENCOUNTER — OFFICE VISIT (OUTPATIENT)
Dept: FAMILY MEDICINE CLINIC | Facility: CLINIC | Age: 77
End: 2022-06-08
Payer: COMMERCIAL

## 2022-06-08 VITALS
BODY MASS INDEX: 23.6 KG/M2 | HEART RATE: 72 BPM | SYSTOLIC BLOOD PRESSURE: 127 MMHG | OXYGEN SATURATION: 100 % | TEMPERATURE: 97.5 F | DIASTOLIC BLOOD PRESSURE: 81 MMHG | HEIGHT: 63 IN | WEIGHT: 133.2 LBS

## 2022-06-08 DIAGNOSIS — Z13.1 SCREENING FOR DIABETES MELLITUS: ICD-10-CM

## 2022-06-08 DIAGNOSIS — E83.42 HYPOMAGNESEMIA: ICD-10-CM

## 2022-06-08 DIAGNOSIS — R79.82 ELEVATED C-REACTIVE PROTEIN (CRP): ICD-10-CM

## 2022-06-08 DIAGNOSIS — R53.83 FATIGUE, UNSPECIFIED TYPE: ICD-10-CM

## 2022-06-08 DIAGNOSIS — R60.0 LOCALIZED EDEMA: ICD-10-CM

## 2022-06-08 DIAGNOSIS — E55.9 VITAMIN D DEFICIENCY: ICD-10-CM

## 2022-06-08 DIAGNOSIS — R06.81 EPISODE OF APNEA: Primary | ICD-10-CM

## 2022-06-08 DIAGNOSIS — E78.2 MIXED HYPERLIPIDEMIA: ICD-10-CM

## 2022-06-08 PROCEDURE — 99214 OFFICE O/P EST MOD 30 MIN: CPT | Performed by: FAMILY MEDICINE

## 2022-06-08 PROCEDURE — 1036F TOBACCO NON-USER: CPT | Performed by: FAMILY MEDICINE

## 2022-06-08 PROCEDURE — 1160F RVW MEDS BY RX/DR IN RCRD: CPT | Performed by: FAMILY MEDICINE

## 2022-06-08 NOTE — PROGRESS NOTES
Assessment/Plan:    No problem-specific Assessment & Plan notes found for this encounter  Diagnoses and all orders for this visit:    Episode of apnea  -     Ambulatory Referral to Sleep Medicine; Future    Fatigue, unspecified type  -     TSH, 3rd generation with Free T4 reflex; Future  -     Vitamin B12; Future  -     C-reactive protein; Future  -     Homocysteine, serum; Future  -     Comprehensive metabolic panel; Future    Mixed hyperlipidemia  -     Cardio IQ(R) Advanced Lipid Panel; Future    Vitamin D deficiency  -     Vitamin D 25 hydroxy; Future    Elevated C-reactive protein (CRP)  -     C-reactive protein; Future  -     Homocysteine, serum; Future    Screening for diabetes mellitus    Hypomagnesemia  -     Magnesium; Future    Localized edema  recommended for her to elevate legs above heart level  Recommend to wear compression stockings daily off at night  Follow up in 6 months        Subjective:      Patient ID: Rikki Aviles is a 68 y o  female  Patient is here for a follow up  She had an episode where she stopped breathing for a few minutes at night and felt like choking  She says that it occurred once  She is requesting blood tests to be done  Also has left leg swelling  Had Elenora Case in the past and her left calf is smaller than the right  The following portions of the patient's history were reviewed and updated as appropriate:   She  has a past medical history of Cervical strain, Changing skin lesion, Hyperlipidemia, MCI (mild cognitive impairment), Mixed hyperlipidemia, Nonmelanoma skin cancer, Osteoporosis, Polio, Poor concentration, and Swelling of left thumb    She   Patient Active Problem List    Diagnosis Date Noted    Episode of apnea 06/08/2022    Localized edema 06/08/2022    Fatigue 12/09/2021    Mid back pain 12/09/2021    Elevated C-reactive protein (CRP) 12/09/2021    Medicare annual wellness visit, subsequent 12/09/2021    Exposure to mold 10/09/2019  Screening for osteoporosis 10/09/2019    Frequent falls 09/25/2018    Cervical strain 09/20/2018    Visit for suture removal 09/12/2018    Seborrheic keratosis 08/27/2018    High risk sexual behavior 05/17/2018    Vitamin D deficiency 03/08/2018    Mixed hyperlipidemia 09/21/2017    Poor concentration 08/15/2017    Short-term memory loss 08/15/2017    Osteoporosis 12/17/2013     She  has a past surgical history that includes Anal fissurectomy; Appendectomy; Hysterectomy; and Cataract extraction  Her family history includes Cancer in her father; Heart attack in her mother and sister  She  reports that she has quit smoking  Her smoking use included cigarettes  She has a 40 00 pack-year smoking history  She has never used smokeless tobacco  She reports current alcohol use  She reports that she does not use drugs    Current Outpatient Medications   Medication Sig Dispense Refill    Acetylcarnitine HCl (ACETYL L-CARNITINE) 500 MG CAPS Take by mouth every morning       Alpha-Lipoic Acid (LIPOIC ACID PO) Take 1 capsule by mouth      Amino Acids (L-CARNITINE PO) Take 1 tablet by mouth      Artificial Tear Solution (JUST TEARS EYE DROPS OP) Apply to eye      Ascorbic Acid (VITAMIN C) 1000 MG tablet Take 1,000 mg by mouth daily       Calcium Carb-Cholecalciferol (CALCIUM 1000 + D) 1000-800 MG-UNIT TABS Take by mouth      Chelated Magnesium 100 MG TABS Take by mouth Daily      Cholecalciferol (VITAMIN D3) 5000 units CAPS Take 1 capsule by mouth daily      Glycerylphosphorylcholine (ALPHA-GPC 50%) POWD 1 tablet by Does not apply route      Iodine, Kelp, (KELP PO) Take by mouth daily      L-Carnosine POWD by Does not apply route      Menaquinone-7 (VITAMIN K2) 100 MCG CAPS       Misc Natural Products (GLUCOSAMINE CHOND CMP TRIPLE PO) Take by mouth      Misc Natural Products (PANTETHINE PLUS) TABS Take by mouth      NATURAL VITAMIN E MOISTURIZING GEL Apply topically      Omega-3 Fatty Acids (FISH OIL) 1200 MG CAPS Take 1 capsule by mouth daily       PANTETHINE PO Take by mouth      patient supplied medication       PREBIOTIC PRODUCT PO Take by mouth      Probiotic Product (PROBIOTIC-10) CAPS Take by mouth      pyridoxine (VITAMIN B6) 100 mg tablet 1 tab(s)      Resveratrol 50 MG CAPS Take by mouth      Turmeric POWD 1      Vinpocetine POWD by Does not apply route      vitamin B-12 (CYANOCOBALAMIN) 100 MCG tablet Take by mouth every other day       vitamin E, tocopherol, 1,000 units capsule Take by mouth daily       Zinc 30 MG CAPS Take 1 capsule by mouth daily       No current facility-administered medications for this visit       Current Outpatient Medications on File Prior to Visit   Medication Sig    Acetylcarnitine HCl (ACETYL L-CARNITINE) 500 MG CAPS Take by mouth every morning     Alpha-Lipoic Acid (LIPOIC ACID PO) Take 1 capsule by mouth    Amino Acids (L-CARNITINE PO) Take 1 tablet by mouth    Artificial Tear Solution (JUST TEARS EYE DROPS OP) Apply to eye    Ascorbic Acid (VITAMIN C) 1000 MG tablet Take 1,000 mg by mouth daily     Calcium Carb-Cholecalciferol (CALCIUM 1000 + D) 1000-800 MG-UNIT TABS Take by mouth    Chelated Magnesium 100 MG TABS Take by mouth Daily    Cholecalciferol (VITAMIN D3) 5000 units CAPS Take 1 capsule by mouth daily    Glycerylphosphorylcholine (ALPHA-GPC 50%) POWD 1 tablet by Does not apply route    Iodine, Kelp, (KELP PO) Take by mouth daily    L-Carnosine POWD by Does not apply route    Menaquinone-7 (VITAMIN K2) 100 MCG CAPS     Misc Natural Products (GLUCOSAMINE CHOND CMP TRIPLE PO) Take by mouth    Misc Natural Products (PANTETHINE PLUS) TABS Take by mouth    NATURAL VITAMIN E MOISTURIZING GEL Apply topically    Omega-3 Fatty Acids (FISH OIL) 1200 MG CAPS Take 1 capsule by mouth daily     PANTETHINE PO Take by mouth    patient supplied medication     PREBIOTIC PRODUCT PO Take by mouth    Probiotic Product (PROBIOTIC-10) CAPS Take by mouth    pyridoxine (VITAMIN B6) 100 mg tablet 1 tab(s)    Resveratrol 50 MG CAPS Take by mouth    Turmeric POWD 1    Vinpocetine POWD by Does not apply route    vitamin B-12 (CYANOCOBALAMIN) 100 MCG tablet Take by mouth every other day     vitamin E, tocopherol, 1,000 units capsule Take by mouth daily     Zinc 30 MG CAPS Take 1 capsule by mouth daily    [DISCONTINUED] ALFALFA PO Take by mouth    [DISCONTINUED] naproxen (NAPROSYN) 250 mg tablet Take 1 tablet (250 mg total) by mouth 2 (two) times a day with meals for 10 days    [DISCONTINUED] Prenatal Vit w/[id number] (TL FOLATE PO) Take by mouth     No current facility-administered medications on file prior to visit  She is allergic to sulfa antibiotics       Review of Systems   Constitutional: Negative for activity change, appetite change, fatigue and fever  HENT: Negative for congestion and ear discharge  Respiratory: Negative for cough and shortness of breath  Cardiovascular: Negative for chest pain and palpitations  Gastrointestinal: Negative for diarrhea and nausea  Musculoskeletal: Positive for joint swelling  Negative for arthralgias and back pain  Skin: Negative for color change and rash  Neurological: Negative for dizziness and headaches  Psychiatric/Behavioral: Negative for agitation and behavioral problems  Objective:      /81   Pulse 72   Temp 97 5 °F (36 4 °C)   Ht 5' 3 25" (1 607 m)   Wt 60 4 kg (133 lb 3 2 oz)   SpO2 100%   BMI 23 41 kg/m²          Physical Exam  Constitutional:       General: She is not in acute distress  Appearance: She is well-developed  She is not diaphoretic  HENT:      Head: Normocephalic and atraumatic  Nose: Nose normal    Eyes:      Conjunctiva/sclera: Conjunctivae normal       Pupils: Pupils are equal, round, and reactive to light  Cardiovascular:      Rate and Rhythm: Normal rate and regular rhythm  Heart sounds: Normal heart sounds   No murmur heard   Pulmonary:      Effort: Pulmonary effort is normal  No respiratory distress  Breath sounds: Normal breath sounds  No wheezing  Abdominal:      General: Bowel sounds are normal  There is no distension  Palpations: Abdomen is soft  Tenderness: There is no abdominal tenderness  Musculoskeletal:         General: Normal range of motion  Cervical back: Normal range of motion  Left lower leg: Edema present  Skin:     General: Skin is warm and dry  Findings: No erythema or rash  Neurological:      Mental Status: She is alert and oriented to person, place, and time     Psychiatric:         Behavior: Behavior normal

## 2022-08-22 NOTE — PROGRESS NOTES
Assessment  1  Seborrheic keratosis (702 19) (L82 1)   2  Screening for skin condition (V82 0) (Z13 89)   3  H/O nonmelanoma skin cancer (V10 83) (Z85 828)    Plan   · Follow-up visit in 6 months Evaluation and Treatment  Follow-up  Status: Hold For -  Scheduling  Requested for: [id number]   · Use a sun block product with an SPF of 15 or more ; Status:Complete;   Done:  [id number]    Discussion/Summary  Discussion Summary- [facility] Derm:   Assessment #1: Screening for dermatologic disorders  Care Plan:   Nothing else of concern noted on complete exam follow-up in 6 months  Assessment #2: Seborrheic keratosis  Care Plan:   Patient reassured these are normal growths we acquire with age no treatment needed  Assessment #3: History of skin cancer  Care Plan:   No recurrence nothing else atypical sunblock recommended follow-up in 6 months if no problems at that time yearly follow up  Chief Complaint  Chief Complaint Free Text Note Form: Patient here for 6 month check up      History of Present Illness  HPI: 51-year-old female presents for follow-up for previously treated basal cell carcinoma no specific concerns noted      Review of Systems  Complete Female Dermatology [facility] Patient:   Constitutional: Denies constitutional symptoms  Eyes: Denies eye symptoms  ENT:  denies ear symptoms, nasal symptoms, mouth or throat symptoms  Cardiovascular: Denies cardiovascular symptoms  Respiratory: Denies respiratory symptoms  Gastrointestinal: Denies gastrointestinal symptoms  Musculoskeletal: Denies musculoskeletal symptoms  Integumentary: Denies skin, hair and nail symptoms  Neurological: Denies neurologic symptoms  Psychiatric: Denies psychiatric symptoms  Endocrine: Denies endocrine symptoms  Hematologic/Lymphatic: Denies hematologic symptoms  Active Problems  1  Basal cell carcinoma of forehead (173 31) (C44 319)   2  Dog Bite (E906 0)   3   History of Exposure to cigarette smoke [facility] Medicine  Consult Note    Patient Name: Vick Gonzalez  MRN: [id number]  Admission Date: 8/22/2022  Hospital Length of Stay: 0 days  Attending Physician: Titi Christian MD   Primary Care Provider: Emanuel Lopez MD           Patient information was obtained from patient and past medical records.     Consults  Subjective:     Principal Problem: Myeloradiculopathy    Chief Complaint: No chief complaint on file.       HPI: 64 y.o. male with a past medical history significant for DM II, HTN, HLD, liver transplant (2/2 end-stage liver disease hepatitis C and prior alcohol abuse), who saw NSGY for C4-C7 ACDF for management of myeloradiculopathy. Patient presented with signs and symptoms of left hand pain and myelopathy. MRI revealed severe canal stenosis C4-C7. Patient was admitted by NSGY for surgical intervention. [facility] has been consulted for medical management.         Past Medical History:   Diagnosis Date    Anemia     Anxiety     Chronic pain syndrome 7/13/2011    CKD (chronic kidney disease), stage III     Diabetes mellitus type II, uncontrolled     Discitis of lumbosacral region 1/16/2015    ED (erectile dysfunction)     Encounter for blood transfusion     Genital herpes     Gout, arthritis     History of alcohol abuse     History of hepatitis C, s/p successful Rx w/ SVR24 (cure) - 5/2018 8/23/2011    Completed 24wks Epclusa + RBV w/SVR12 - 2/2018  -     History of positive PPD, treatment status unknown     Pulmonary granulomas, negative sputum cultures for AFB and indeterminate quantferon test    History of substance abuse     Hypertension     Hypothyroidism     Liver replaced by transplant 8/23/2011    DATE: 12/16/2013  LIVER BIOPSY : REASON:  hep C staging  PATHOLOGY COMMITTEE NOTE/PLAN: :grade  1 / stage 1        Pancreatitis 2016    Peptic ulcer disease        Past Surgical History:   Procedure Laterality Date    CARPAL TUNNEL RELEASE Left 10/29/2021     (V87 39) (Z77 22)   4  Lipid screening (V77 91) (Z13 220)   5  MCI (mild cognitive impairment) (331 83) (G31 84)   6  Memory difficulties (780 93) (R41 3)   7  Mixed hyperlipidemia (272 2) (E78 2)   8  Osteoporosis (733 00) (M81 0)   9  Poor concentration (799 51) (R41 840)   10  Short-term memory loss (780 93) (R41 3)   11  Urinary urgency (788 63) (R39 15)    Past Medical History  1  History of Changing skin lesion (709 9) (L98 9)   2  H/O nonmelanoma skin cancer (V10 83) (I94 819)   3  History of diarrhea (V12 79) (Z87 898)   4  History of low back pain (V13 59) (Z87 39)   5  History of Polio (045 90) (A80 9)  Past Medical History Reviewed- Derm:   The past medical history was reviewed  Surgical History  1  History of Anal Fissurectomy   2  History of Appendectomy   3  History of Cataract Surgery   4  History of Hysterectomy  Surgical History Reviewed [address] [place] [address] [address]- Derm:   Surgical History reviewed      Family History  Mother    1  Family history of    2  Family history of myocardial infarction (V17 3) (Z82 49)  Father    3  Family history of    4  Family history of malignant neoplasm (V16 9) (Z80 9)  Sister    5  Family history of    6  Family history of myocardial infarction (V17 3) (Z82 49)  Family History Reviewed- Derm:   Family History was reviewed      Social History   · History of Exposure to cigarette smoke (V87 39) (Z77 22)   · Former smoker (V15 82) (Y90 186)   · No illicit drug use   · Occasional alcohol use   · Self-employed   ·   Social History Reviewed [address] [place] [address] 14- Derm: The social history was reviewed      Current Meds   1  Acetyl L-Carnitine 500 MG Oral Capsule; Therapy: (Recorded:[id number]) to Recorded   2  Calcium TABS; Therapy: (Recorded:[id number]) to Recorded   3  Chelated Magnesium TABS; TAKE 1 TABLET Daily TDD:500; Therapy: (Recorded:[id number]) to Recorded   4  Fish Oil CAPS; TAKE 1 CAPSULE Daily TDD:2500; Therapy: (Recorded:78Tko3541) to Recorded   5  Procedure: RELEASE, CARPAL TUNNEL;  Surgeon: Jameson Alejo Jr., MD;  Location: [facility] OR;  Service: Orthopedics;  Laterality: Left;    CHOLECYSTECTOMY      INJECTION OF JOINT Right 12/2/2019    Procedure: INJECTION, JOINT SI;  Surgeon: Thu Penn MD;  Location: [facility] PAIN [facility]GT;  Service: Pain Management;  Laterality: Right;  RT SI JNT INJ    LIVER TRANSPLANT  06/2010    SPINE SURGERY         Review of patient's allergies indicates:  No Known Allergies    No current facility-administered medications on file prior to encounter.     Current Outpatient Medications on File Prior to Encounter   Medication Sig    gabapentin (NEURONTIN) 800 MG tablet TAKE 1 TABLET BY MOUTH THREE TIMES A DAY    insulin glargine U-300 conc (TOUJEO MAX U-300 SOLOSTAR) 300 unit/mL (3 mL) insulin pen Inject 40 Units into the skin once daily.    insulin lispro 100 unit/mL pen Inject 20 Units into the skin 3 (three) times daily with meals.    levothyroxine (SYNTHROID) 88 MCG tablet TAKE 1 TABLET BY MOUTH EVERY DAY (Patient taking differently: Take 88 mcg by mouth before breakfast.)    lisinopriL (PRINIVIL,ZESTRIL) 40 MG tablet TAKE 1 TABLET BY MOUTH EVERY DAY    metoprolol succinate (TOPROL-XL) 200 MG 24 hr tablet Take 1 tablet (200 mg total) by mouth once daily.    NIFEdipine (ADALAT CC) 60 MG TbSR TAKE 1 TABLET BY MOUTH EVERY DAY    sertraline (ZOLOFT) 100 MG tablet TAKE 1 TABLET (100 MG TOTAL) BY MOUTH ONCE DAILY.    tacrolimus (PROGRAF) 1 MG Cap TAKE 2 CAPSULES (2 MG TOTAL) BY MOUTH IN THE MORNING & TAKE 1 CAPSULE (1 MG TOTAL) IN THE EVENING.    albuterol (VENTOLIN HFA) 90 mcg/actuation inhaler Inhale 2 puffs into the lungs every 6 (six) hours as needed for Wheezing or Shortness of Breath. Rescue    allopurinoL (ZYLOPRIM) 100 MG tablet TAKE 1 TABLET BY MOUTH TWICE A DAY (Patient taking differently: Take 100 mg by mouth 2 (two) times a day.)    aluminum-magnesium hydroxide-simethicone (MAALOX) 200-200-20 mg/5 mL Susp Take 30  "mLs by mouth 4 (four) times daily before meals and nightly.    aspirin 81 MG Chew Take 1 tablet (81 mg total) by mouth once daily. (Patient taking differently: Take 81 mg by mouth once daily. Resume after surgery)    BD ULTRA-FINE MENDY PEN NEEDLES 32 gauge x 5/32" Ndle     blood sugar diagnostic (TRUE METRIX GLUCOSE TEST STRIP) Strp USE 3 TIMES DAILY TO TEST BLOOD GLUCOSE LEVEL    calcium carbonate-vitamin D3 (CALCIUM 600 WITH VITAMIN D3) 600 mg(1,500mg) -400 unit Chew Take 1 tablet by mouth once daily.     flash glucose sensor (FREESTYLE PALLAVI 2 SENSOR) Kit One sensor every 14 days    gabapentin (NEURONTIN) 300 MG capsule Take 1 capsule (300 mg total) by mouth every evening. (Patient not taking: Reported on 8/16/2022)    glucose 4 GM chewable tablet Take 4 tablets (16 g total) by mouth as needed for Low blood sugar.    glucose 4 GM chewable tablet Take 4 tablets (16 g total) by mouth as needed for Low blood sugar (If having symptoms of blurry vision, palpitations, confusion, shakiness.  Please check sugars and if sugar below 70 please take 4 tablets and re-check sugar everry 15 minutes until sugars are above 70 and symptoms resolve.).    HYDROcodone-acetaminophen (NORCO) 5-325 mg per tablet 22 tablets.    lancets 30 gauge Misc 1 lancet by Misc.(Non-Drug; Combo Route) route 4 (four) times daily before meals and nightly.    multivitamin (THERAGRAN) per tablet Take 1 tablet by mouth once daily.     NOVOFINE PLUS 32 gauge x 1/6" Ndle     ondansetron (ZOFRAN-ODT) 4 MG TbDL Take 1 tablet (4 mg total) by mouth every 6 (six) hours as needed (Nausea).    pen needle, diabetic (BD ULTRA-FINE MENDY PEN NEEDLE) 32 gauge x 5/32" Ndle TEST WITH NOVOLOG THREE TIMES A DAY WITH MEALS AND WITH LEVEMIR AT BEDTIME    rosuvastatin (CRESTOR) 5 MG tablet TAKE 1 TABLET BY MOUTH EVERY DAY    traZODone (DESYREL) 50 MG tablet TAKE 1 TABLET (50 MG TOTAL) BY MOUTH EVERY EVENING.    TRUE METRIX GLUCOSE METER Misc TEST 4 (FOUR) " Kelp 225 MCG TABS; TAKE 1 TABLET  3x a week; Therapy: (Recorded:03Nov2016) to Recorded   6  Lipoic Acid CAPS; Therapy: (Recorded:05Apr2016) to Recorded   7  Pantethine Plus TABS; Therapy: (Recorded:20Apr2017) to Recorded   8  Probiotic CAPS; Therapy: (Recorded:05Apr2016) to Recorded   9  Resveratrol CAPS; Therapy: (Recorded:05Apr2016) to Recorded   10  Turmeric Curcumin CAPS; TAKE 1 CAPSULE Daily 1 Meriva SF Curcumin  TDD:360; Therapy: (Recorded:03Nov2016) to Recorded   11  Vinpocetine Powder; Therapy: (Recorded:05Apr2016) to Recorded   12  Vitamin B12 TABS; Therapy: (Recorded:05Apr2016) to Recorded   13  Vitamin B6 TABS; Therapy: (Recorded:05Apr2016) to Recorded   14  Vitamin C TABS; Therapy: (Recorded:34Zwb6628) to Recorded   15  Vitamin D3 5000 UNIT Oral Capsule; take 1 capsule daily; Therapy: (Recorded:20Apr2017) to Recorded   16  Vitamin E CAPS; Therapy: (Recorded:05Apr2016) to Recorded   17  Zinc CAPS; take 1 capsule daily; Therapy: (Recorded:26Oct2017) to Recorded  Medication List Reviewed: The medication list was reviewed and updated today  Allergies  1  Sulfamethoxazole POWD   2  Sulfa Drugs  Denied    3  Iron TABS   4  Orudis CAPS    Physical Exam    Constitutional   General appearance: Appears healthy and well developed  Lymphatic   No visible disturbance  Musculoskeletal   Digits and nails: No clubbing, cyanosis or edema  Cutaneous and nail exam normal     Skin   Scalp skin texture and hair distribution: Normal skin texture on scalp, normal hair distribution  Head: Normal turgor, no rashes, no lesions  Neck: Normal turgor, no rashes, no lesions  Chest: Normal turgor, no rashes, no lesions  Abdomen: Normal turgor, no rashes, no lesions  Back: Normal turgor, no rashes, no lesions  Right upper extremity: Normal turgor, no rashes, no lesions  Left upper extremity: Normal turgor, no rashes, no lesions      Right lower extremity: Normal turgor, TIMES DAILY BEFORE MEALS AND NIGHTLY.    [DISCONTINUED] insulin aspart U-100 (NOVOLOG FLEXPEN U-100 INSULIN) 100 unit/mL (3 mL) InPn pen Inject 20 Units into the skin 3 (three) times daily with meals.     Family History       Problem Relation (Age of Onset)    Cancer Mother, Maternal Uncle (82)    Diabetes Mother, Sister    Drug abuse Daughter    Heart disease Mother          Tobacco Use    Smoking status: Former Smoker    Smokeless tobacco: Never Used   Substance and Sexual Activity    Alcohol use: No     Comment: over 5 years ago, none currently    Drug use: Not Currently     Comment: Former cocaine use    Sexual activity: Yes       Review of Systems:  GEN: Negative unless otherwise stated in history of present illness  HEENT: Negative unless otherwise stated in the history of present illness  NECK: Negative unless otherwise stated in history of present illness  RESPIRATORY: Negative unless otherwise stated in history of present illness  CARDIOVASCULAR: Negative unless otherwise stated in history of present illness  GI: Negative unless otherwise stated in history of present illness  : Negative unless otherwise stated in history of present illness  EXTR: Negative unless otherwise stated in history of present illness  SKIN: Negative unless otherwise stated in history of present illness  MUSCULOSKELETAL: Negative unless otherwise stated in history of present illness  NEURO: Negative unless otherwise stated in history of present illness  PSYCH: Negative unless otherwise stated in history of present illness  Except as documented, all other systems reviewed and negative      Objective:     Vital Signs (Most Recent):  Temp: 98.8 °F (37.1 °C) (08/22/22 1618)  Pulse: 101 (08/22/22 1618)  Resp: 18 (08/22/22 1651)  BP: (!) 184/110 (08/22/22 1618)  SpO2: 98 % (08/22/22 1618)   Vital Signs (24h Range):  Temp:  [96.8 °F (36 °C)-98.8 °F (37.1 °C)] 98.8 °F (37.1 °C)  Pulse:  [] 101  Resp:  [10-32] 18  SpO2:  [96  no rashes, no lesions  Left lower extremity: Normal turgor, no rashes, no lesions  Neuro/Psych   Alert and oriented x 3  Displays comfort and cooperation during encounterl  Affect is normal     Finding Previous site of skin cancer well-healed without recurrence normal keratotic papule greasy stuck on appearance nothing else atypical noted on complete exam       Future Appointments    Date/Time Provider Specialty Site   11/28/2017 02:00 PM DERECK Meredith P   [address]   01/18/2018 02:00 PM Sanjeev Membreno MD Neurology [facility]     Signatures   Electronically signed by : JUAN Guo ; Nov 7 2017  1:51PM EST                       (Author) %-100 %] 98 %  BP: (153-219)/() 184/110     Weight: 108.9 kg (240 lb)  Body mass index is 30.81 kg/m².    Physical Exam:  GEN:  No acute distress, lying in bed   HEENT:  Normocephalic, atraumatic, extra ocular movements intact drain place  NECK:  Supple, good range of motion  RESPIRATORY:  Clear to auscultation ant, symmetrical chest rise  CARDIOVASCULAR:  Tachycardic, +s1/2  GI:  Soft, nontender, bowel sounds decrased  : No flank tenderness, normal genitalia  EXT: No edema, pulses palpated  SKIN: Warm, dry, no rashes  MUSCULOSKELETAL: hand- strength R>L no apparent atrophy  NEURO:  Alert, oriented, answering questions appropriately  PSYCH:  Mood & affect appropriate, pleasant    Significant Labs:    A1C: No results for input(s): HGBA1C in the last 24 hours.  Blood Culture: No results for input(s): LABBLOO in the last 24 hours.  BMP/CMP:   Recent Labs   Lab 08/22/22  1329      K 4.3      CO2 20*   *   BUN 14   CREATININE 1.1   CALCIUM 9.4   ANIONGAP 13     CBC:   Recent Labs   Lab 08/22/22  1329   WBC 8.45   HGB 12.7*   HCT 37.9*   *     Cardiac Markers: No results for input(s): CKMB, MYOGLOBIN, BNP, TROPISTAT in the last 24 hours.  Coagulation: No results for input(s): PT, INR, APTT in the last 24 hours.  Lactic Acid: No results for input(s): LACTATE in the last 24 hours.  Lipid Panel: No results for input(s): CHOL, HDL, LDLCALC, TRIG, CHOLHDL in the last 24 hours.  Magnesium: No results for input(s): MG in the last 24 hours.  POCT Glucose:   Recent Labs   Lab 08/22/22  0642   POCTGLUCOSE 66*     Troponin: No results for input(s): TROPONINI in the last 24 hours.  TSH: No results for input(s): TSH in the last 24 hours.  Urine Culture: No results for input(s): LABURIN in the last 24 hours.  Urine Studies: No results for input(s): COLORU, APPEARANCEUA, PHUR, SPECGRAV, PROTEINUA, GLUCUA, KETONESU, BILIRUBINUA, OCCULTUA, NITRITE, UROBILINOGEN, LEUKOCYTESUR, RBCUA, WBCUA, BACTERIA,  SQUAMEPITHEL, HYALINECASTS, WRIGHTSTUR in the last 24 hours.    Significant Imaging:     SURG FL Surgery Fluoro Usage  See OP Notes for results.     IMPRESSION: See OP Notes for results.     This procedure was auto-finalized by: Virtual Radiologist            Assessment/Plan:     * Myeloradiculopathy  C4, C5, C6, C7 anterior cervical discectomy and fusion  Procedure: ACDF 4-7    See Op Note for complete details.   Further mgnt per NSGY    Diabetes mellitus type II, uncontrolled  Patient's FSGs are uncontrolled due to hyperglycemia on current medication regimen.  Last A1c reviewed-   Lab Results   Component Value Date    HGBA1C 9.7 (H) 08/16/2022     Most recent fingerstick glucose reviewed-   Recent Labs   Lab 08/22/22  0642   POCTGLUCOSE 66*     Current correctional scale  Medium  Maintain anti-hyperglycemic dose as follows-   Antihyperglycemics (From admission, onward)            Start     Stop Route Frequency Ordered    08/22/22 1340  insulin aspart U-100 pen 1-10 Units         -- SubQ Before meals & nightly PRN 08/22/22 1243        Hold Oral hypoglycemics while patient is in the hospital.  Consult DM educator     Essential hypertension  Resume home meds.   Adjust as needed       Thrombocytopenia  Appears chronic   Monitor while on heparin products   No mucocutaneous bleeding       Hypothyroidism  Resume synthroid         VTE Risk Mitigation (From admission, onward)         Ordered     heparin (porcine) injection 5,000 Units  Every 8 hours         08/22/22 1556     Place sequential compression device  Until discontinued         08/22/22 0557                    Thank you for your consult. I will follow-up with patient. Please contact us if you have any additional questions.    Iglesia Powell MD  Department of Hospital Medicine   [facility]

## 2022-10-12 ENCOUNTER — OFFICE VISIT (OUTPATIENT)
Dept: URGENT CARE | Facility: CLINIC | Age: 77
End: 2022-10-12
Payer: COMMERCIAL

## 2022-10-12 VITALS — OXYGEN SATURATION: 99 % | HEART RATE: 95 BPM | TEMPERATURE: 97.4 F | RESPIRATION RATE: 18 BRPM

## 2022-10-12 DIAGNOSIS — K59.00 CONSTIPATION, UNSPECIFIED CONSTIPATION TYPE: Primary | ICD-10-CM

## 2022-10-12 PROBLEM — Z00.00 MEDICARE ANNUAL WELLNESS VISIT, SUBSEQUENT: Status: RESOLVED | Noted: 2021-12-09 | Resolved: 2022-10-12

## 2022-10-12 PROCEDURE — S9088 SERVICES PROVIDED IN URGENT: HCPCS

## 2022-10-12 PROCEDURE — 99213 OFFICE O/P EST LOW 20 MIN: CPT

## 2022-10-12 NOTE — PATIENT INSTRUCTIONS
Over the counter MiraLax  May try prune juice    Take Senna (Senokot) 15mg once daily at bedtime    Drink plenty of water  Consume recommended amount of dietary fiber (20-35g per day)  May add bulk fiber (Ex: Metamucil or Citrucel if experiencing gas)  Follow up with gastroenterologist if symptoms don't resolve    Follow up with PCP in 3-5 days  Proceed to the ER with worsening symptoms

## 2022-10-12 NOTE — PROGRESS NOTES
[facility]        NAME: Dhara Yañez is a 68 y o  female  : 1945    MRN: [id number]  DATE: 2022  TIME: 7:51 PM    Assessment and Plan   Constipation, unspecified constipation type [K59 00]  1  Constipation, unspecified constipation type       Abdomen soft, non tender, non distended  Recommend OTC stool softners/laxatives  Discussed proceeding to the ER if she experiences severe abdominal pain  Patient Instructions     Over the counter MiraLax  May try prune juice    Take Senna (Senokot) 15mg once daily at bedtime    Drink plenty of water  Consume recommended amount of dietary fiber (20-35g per day)  May add bulk fiber (Ex: Metamucil or Citrucel if experiencing gas)  Follow up with gastroenterologist if symptoms don't resolve    Follow up with PCP in 3-5 days  Proceed to the ER with worsening symptoms  Chief Complaint     Chief Complaint   Patient presents with   • Constipation     X2 days  Tried Fleet enema, states she had a bm but still feeling pain  Nausea resolves  States she is feeling pressure  History of Present Illness       The patient presents today with her significant other for complaints of constipation x 2 days  This afternoon, she tried a fleet enema and was able to have a bowel movement afterwards  She reports the stool was a larger ball followed by several smaller formed hard stools  She had another small bowel movement in the clinic this evening  She is still feeling some rectal pressure  Earlier she was also experiencing 9/10 abdominal pain, but since she had some bowel movements, her pain is at a 6/10 currently  She also noticed bright red blood on the toilet paper when she wiped, but reports a history of hemorrhoids  She denies history of constipation, or stomach conditions including diverticulitis  Review of Systems   Review of Systems   Constitutional: Negative for chills, fatigue and fever  HENT: Negative for congestion      Eyes: Negative  Respiratory: Negative for cough and shortness of breath  Cardiovascular: Negative for chest pain and palpitations  Gastrointestinal: Positive for blood in stool, constipation and nausea (resolved)  Negative for abdominal distention, abdominal pain, diarrhea and vomiting  Genitourinary: Negative for difficulty urinating  Musculoskeletal: Negative for myalgias  Skin: Negative for rash  Allergic/Immunologic: Negative for environmental allergies  Neurological: Negative for dizziness and headaches  Psychiatric/Behavioral: Negative            Current Medications       Current Outpatient Medications:   •  Acetylcarnitine HCl (ACETYL L-CARNITINE) 500 MG CAPS, Take by mouth every morning , Disp: , Rfl:   •  Alpha-Lipoic Acid (LIPOIC ACID PO), Take 1 capsule by mouth, Disp: , Rfl:   •  Amino Acids (L-CARNITINE PO), Take 1 tablet by mouth, Disp: , Rfl:   •  Artificial Tear Solution (JUST TEARS EYE DROPS OP), Apply to eye, Disp: , Rfl:   •  Ascorbic Acid (VITAMIN C) 1000 MG tablet, Take 1,000 mg by mouth daily , Disp: , Rfl:   •  Calcium Carb-Cholecalciferol 1000-800 MG-UNIT TABS, Take by mouth, Disp: , Rfl:   •  Chelated Magnesium 100 MG TABS, Take by mouth Daily, Disp: , Rfl:   •  Cholecalciferol (VITAMIN D3) 5000 units CAPS, Take 1 capsule by mouth daily, Disp: , Rfl:   •  Glycerylphosphorylcholine POWD, 1 tablet by Does not apply route, Disp: , Rfl:   •  Iodine, Kelp, (KELP PO), Take by mouth daily, Disp: , Rfl:   •  L-Carnosine POWD, by Does not apply route, Disp: , Rfl:   •  Menaquinone-7 (VITAMIN K2) 100 MCG CAPS, , Disp: , Rfl:   •  Misc Natural Products (GLUCOSAMINE CHOND CMP TRIPLE PO), Take by mouth, Disp: , Rfl:   •  Misc Natural Products (PANTETHINE PLUS) TABS, Take by mouth, Disp: , Rfl:   •  NATURAL VITAMIN E MOISTURIZING GEL, Apply topically, Disp: , Rfl:   •  Omega-3 Fatty Acids (FISH OIL) 1200 MG CAPS, Take 1 capsule by mouth daily , Disp: , Rfl:   •  PANTETHINE PO, Take by mouth, Disp: , Rfl:   •  patient supplied medication, , Disp: , Rfl:   •  PREBIOTIC PRODUCT PO, Take by mouth, Disp: , Rfl:   •  Probiotic Product (PROBIOTIC-10) CAPS, Take by mouth, Disp: , Rfl:   •  pyridoxine (VITAMIN B6) 100 mg tablet, 1 tab(s), Disp: , Rfl:   •  Resveratrol 50 MG CAPS, Take by mouth, Disp: , Rfl:   •  Turmeric POWD, 1, Disp: , Rfl:   •  Vinpocetine POWD, by Does not apply route, Disp: , Rfl:   •  vitamin B-12 (CYANOCOBALAMIN) 100 MCG tablet, Take by mouth every other day , Disp: , Rfl:   •  vitamin E, tocopherol, 1,000 units capsule, Take by mouth daily , Disp: , Rfl:   •  Zinc 30 MG CAPS, Take 1 capsule by mouth daily, Disp: , Rfl:     Current Allergies     Allergies as of 10/12/2022 - Reviewed 10/12/2022   Allergen Reaction Noted   • Sulfa antibiotics Other (See Comments) 06/08/2015            The following portions of the patient's history were reviewed and updated as appropriate: allergies, current medications, past family history, past medical history, past social history, past surgical history and problem list      Past Medical History:   Diagnosis Date   • Cervical strain    • Changing skin lesion    • Hyperlipidemia    • MCI (mild cognitive impairment)    • Mixed hyperlipidemia    • Nonmelanoma skin cancer     BCC   • Osteoporosis    • Polio    • Poor concentration    • Swelling of left thumb        Past Surgical History:   Procedure Laterality Date   • ANAL FISSURECTOMY     • APPENDECTOMY     • CATARACT EXTRACTION     • HYSTERECTOMY         Family History   Problem Relation Age of Onset   • Heart attack Mother    • Cancer Father    • Heart attack Sister          Medications have been verified  Objective   Pulse 95   Temp (!) 97 4 °F (36 3 °C)   Resp 18   SpO2 99%        Physical Exam     Physical Exam  Vitals and nursing note reviewed  Constitutional:       General: She is not in acute distress  Appearance: Normal appearance  She is not ill-appearing     HENT:      Head: Normocephalic and atraumatic  Right Ear: External ear normal       Left Ear: External ear normal       Nose: Nose normal       Mouth/Throat:      Lips: Pink  Mouth: Mucous membranes are moist       Pharynx: Oropharynx is clear  Eyes:      General: Vision grossly intact  Extraocular Movements: Extraocular movements intact  Pupils: Pupils are equal, round, and reactive to light  Cardiovascular:      Rate and Rhythm: Normal rate and regular rhythm  Heart sounds: Normal heart sounds  No murmur heard  Pulmonary:      Effort: Pulmonary effort is normal       Breath sounds: Normal breath sounds  Abdominal:      General: Abdomen is flat  Bowel sounds are normal  There is no distension  Palpations: Abdomen is soft  Tenderness: There is no abdominal tenderness  There is no guarding or rebound  Musculoskeletal:         General: Normal range of motion  Cervical back: Normal range of motion  Skin:     General: Skin is warm  Findings: No rash  Neurological:      Mental Status: She is alert and oriented to person, place, and time  Motor: Motor function is intact     Psychiatric:         Attention and Perception: Attention normal          Mood and Affect: Mood normal

## 2022-12-07 ENCOUNTER — APPOINTMENT (OUTPATIENT)
Dept: LAB | Facility: CLINIC | Age: 77
End: 2022-12-07

## 2022-12-07 DIAGNOSIS — E78.2 MIXED HYPERLIPIDEMIA: ICD-10-CM

## 2022-12-07 DIAGNOSIS — E83.42 HYPOMAGNESEMIA: ICD-10-CM

## 2022-12-07 DIAGNOSIS — R53.83 FATIGUE, UNSPECIFIED TYPE: ICD-10-CM

## 2022-12-07 DIAGNOSIS — E55.9 VITAMIN D DEFICIENCY: ICD-10-CM

## 2022-12-07 DIAGNOSIS — R79.82 ELEVATED C-REACTIVE PROTEIN (CRP): ICD-10-CM

## 2022-12-07 DIAGNOSIS — Z13.1 SCREENING FOR DIABETES MELLITUS: ICD-10-CM

## 2022-12-07 LAB
25(OH)D3 SERPL-MCNC: 44.5 NG/ML (ref 30–100)
ALBUMIN SERPL BCP-MCNC: 3.5 G/DL (ref 3.5–5)
ALP SERPL-CCNC: 91 U/L (ref 46–116)
ALT SERPL W P-5'-P-CCNC: 35 U/L (ref 12–78)
ANION GAP SERPL CALCULATED.3IONS-SCNC: 3 MMOL/L (ref 4–13)
AST SERPL W P-5'-P-CCNC: 28 U/L (ref 5–45)
BILIRUB SERPL-MCNC: 0.62 MG/DL (ref 0.2–1)
BUN SERPL-MCNC: 12 MG/DL (ref 5–25)
CALCIUM SERPL-MCNC: 9.8 MG/DL (ref 8.3–10.1)
CHLORIDE SERPL-SCNC: 110 MMOL/L (ref 96–108)
CHOLEST SERPL-MCNC: 305 MG/DL
CO2 SERPL-SCNC: 28 MMOL/L (ref 21–32)
CREAT SERPL-MCNC: 0.69 MG/DL (ref 0.6–1.3)
CRP SERPL QL: <3 MG/L
GFR SERPL CREATININE-BSD FRML MDRD: 84 ML/MIN/1.73SQ M
GLUCOSE P FAST SERPL-MCNC: 106 MG/DL (ref 65–99)
HCYS SERPL-SCNC: 11.1 UMOL/L (ref 3.7–11.2)
HDLC SERPL-MCNC: 111 MG/DL
LDLC SERPL CALC-MCNC: 178 MG/DL (ref 0–100)
MAGNESIUM SERPL-MCNC: 2.1 MG/DL (ref 1.6–2.6)
NONHDLC SERPL-MCNC: 194 MG/DL
POTASSIUM SERPL-SCNC: 4 MMOL/L (ref 3.5–5.3)
PROT SERPL-MCNC: 6.8 G/DL (ref 6.4–8.4)
SODIUM SERPL-SCNC: 141 MMOL/L (ref 135–147)
TRIGL SERPL-MCNC: 81 MG/DL
TSH SERPL DL<=0.05 MIU/L-ACNC: 3.34 UIU/ML (ref 0.45–4.5)
VIT B12 SERPL-MCNC: 1028 PG/ML (ref 100–900)

## 2022-12-16 LAB — MISCELLANEOUS LAB TEST RESULT: NORMAL

## 2023-01-03 ENCOUNTER — RA CDI HCC (OUTPATIENT)
Dept: OTHER | Facility: HOSPITAL | Age: 78
End: 2023-01-03

## 2023-01-03 NOTE — PROGRESS NOTES
[facility]  coding opportunities       Chart reviewed, no opportunity found:   [address]        Patients Insurance     Medicare Insurance: [employer]

## 2023-01-11 ENCOUNTER — OFFICE VISIT (OUTPATIENT)
Dept: FAMILY MEDICINE CLINIC | Facility: CLINIC | Age: 78
End: 2023-01-11

## 2023-01-11 VITALS
WEIGHT: 136 LBS | SYSTOLIC BLOOD PRESSURE: 118 MMHG | DIASTOLIC BLOOD PRESSURE: 70 MMHG | HEART RATE: 81 BPM | HEIGHT: 63 IN | BODY MASS INDEX: 24.1 KG/M2 | OXYGEN SATURATION: 98 % | TEMPERATURE: 97.3 F

## 2023-01-11 DIAGNOSIS — Z81.8 FAMILY HISTORY OF DEMENTIA: ICD-10-CM

## 2023-01-11 DIAGNOSIS — R73.01 IMPAIRED FASTING GLUCOSE: Primary | ICD-10-CM

## 2023-01-11 DIAGNOSIS — R06.81 EPISODE OF APNEA: ICD-10-CM

## 2023-01-11 DIAGNOSIS — S09.90XA TRAUMATIC INJURY OF HEAD, INITIAL ENCOUNTER: ICD-10-CM

## 2023-01-11 PROBLEM — Z48.02 VISIT FOR SUTURE REMOVAL: Status: RESOLVED | Noted: 2018-09-12 | Resolved: 2023-01-11

## 2023-01-11 PROBLEM — R53.83 FATIGUE: Status: RESOLVED | Noted: 2021-12-09 | Resolved: 2023-01-11

## 2023-01-11 NOTE — PROGRESS NOTES
Name: Garland Simental      : 1945      MRN: [id number]  Encounter Provider: Darryl Rosado MD  Encounter Date: 2023   Encounter department: [address] [place] [address]     1  Impaired fasting glucose  -     Basic metabolic panel; Future  -     Hemoglobin A1C; Future    2  Episode of apnea  -     Ambulatory Referral to Sleep Medicine; Future    3  Family history of dementia  -     Apolipoprotein Evaluation; Future    4  Traumatic injury of head, initial encounter  -     CT head wo contrast; Future; Expected date: 2023    Follow up in 6 months or as needed       Subjective     Patient is here to follow up for blood work which showed pre-diabetes  She denies any symptoms related to this  Had a episode of apnea while sleeping  Denies any excessive daytime sleepiness  Has a FHx significant for Dementia  Had a fall with head trauma with LOC for a few seconds back in 2022 at home felt backwards while walking in her house and hit the back of her head  She did not go to the doctor or the ER at that time  She has been more forgetful since that episode  Denies any Nausea, vomiting or changes in vision  Review of Systems   Constitutional: Negative for activity change, appetite change, fatigue and fever  HENT: Negative for congestion and ear discharge  Respiratory: Positive for apnea  Negative for cough and shortness of breath  Cardiovascular: Negative for chest pain and palpitations  Gastrointestinal: Negative for diarrhea and nausea  Musculoskeletal: Negative for arthralgias and back pain  Skin: Negative for color change and rash  Neurological: Negative for dizziness and headaches  Psychiatric/Behavioral: Positive for behavioral problems, confusion and decreased concentration  Negative for agitation         Past Medical History:   Diagnosis Date   • Cervical strain    • Changing skin lesion    • Hyperlipidemia    • MCI (mild cognitive impairment)    • Mixed hyperlipidemia    • Nonmelanoma skin cancer     BCC   • Osteoporosis    • Polio    • Poor concentration    • Swelling of left thumb      Past Surgical History:   Procedure Laterality Date   • ANAL FISSURECTOMY     • APPENDECTOMY     • CATARACT EXTRACTION     • HYSTERECTOMY       Family History   Problem Relation Age of Onset   • Heart attack Mother    • Cancer Father    • Heart attack Sister      Social History     Socioeconomic History   • Marital status:      Spouse name: Not on file   • Number of children: Not on file   • Years of education: Not on file   • Highest education level: Not on file   Occupational History   • Not on file   Tobacco Use   • Smoking status: Former     Packs/day: 2 00     Years: 20 00     Pack years: 40 00     Types: Cigarettes   • Smokeless tobacco: Never   Vaping Use   • Vaping Use: Never used   Substance and Sexual Activity   • Alcohol use: Yes     Comment: OCCASIONAL- WINE    • Drug use: No   • Sexual activity: Not on file   Other Topics Concern   • Not on file   Social History Narrative    Per [employer]:     Self-employed          Social Determinants of Health     Financial Resource Strain: Low Risk    • Difficulty of Paying Living Expenses: Not very hard   Food Insecurity: Not on file   Transportation Needs: No Transportation Needs   • Lack of Transportation (Medical): No   • Lack of Transportation (Non-Medical):  No   Physical Activity: Not on file   Stress: Not on file   Social Connections: Not on file   Intimate Partner Violence: Not on file   Housing Stability: Not on file     Current Outpatient Medications on File Prior to Visit   Medication Sig   • Acetylcarnitine HCl (ACETYL L-CARNITINE) 500 MG CAPS Take by mouth every morning    • Alpha-Lipoic Acid (LIPOIC ACID PO) Take 1 capsule by mouth   • Amino Acids (L-CARNITINE PO) Take 1 tablet by mouth   • Artificial Tear Solution (JUST TEARS EYE DROPS OP) Apply to eye   • Ascorbic Acid (VITAMIN C) 1000 MG tablet Take 1,000 mg by mouth daily    • Calcium Carb-Cholecalciferol 1000-800 MG-UNIT TABS Take by mouth   • Chelated Magnesium 100 MG TABS Take by mouth Daily   • Cholecalciferol (VITAMIN D3) 5000 units CAPS Take 1 capsule by mouth daily   • Glycerylphosphorylcholine POWD 1 tablet by Does not apply route   • Iodine, Kelp, (KELP PO) Take by mouth daily   • L-Carnosine POWD by Does not apply route   • Menaquinone-7 (VITAMIN K2) 100 MCG CAPS    • Misc Natural Products (GLUCOSAMINE CHOND CMP TRIPLE PO) Take by mouth   • Misc Natural Products (PANTETHINE PLUS) TABS Take by mouth   • NATURAL VITAMIN E MOISTURIZING GEL Apply topically   • Omega-3 Fatty Acids (FISH OIL) 1200 MG CAPS Take 1 capsule by mouth daily    • PANTETHINE PO Take by mouth   • patient supplied medication    • PREBIOTIC PRODUCT PO Take by mouth   • Probiotic Product (PROBIOTIC-10) CAPS Take by mouth   • pyridoxine (VITAMIN B6) 100 mg tablet 1 tab(s)   • Resveratrol 50 MG CAPS Take by mouth   • Turmeric POWD 1   • Vinpocetine POWD by Does not apply route   • vitamin B-12 (CYANOCOBALAMIN) 100 MCG tablet Take by mouth every other day    • vitamin E, tocopherol, 1,000 units capsule Take by mouth daily    • Zinc 30 MG CAPS Take 1 capsule by mouth daily     Allergies   Allergen Reactions   • Sulfa Antibiotics Other (See Comments)     Immunization History   Administered Date(s) Administered   • Influenza Split High Dose Preservative Free IM 11/07/2013, 10/28/2014   • Pneumococcal Polysaccharide PPV23 10/28/2014   • Tetanus, adsorbed 12/20/2013   • Zoster 11/03/2014       Objective     /70 (BP Location: Left arm, Patient Position: Sitting, Cuff Size: Large)   Pulse 81   Temp (!) 97 3 °F (36 3 °C)   Ht 5' 3" (1 6 m)   Wt 61 7 kg (136 lb)   SpO2 98%   BMI 24 09 kg/m²     Physical Exam  Constitutional:       General: She is not in acute distress  Appearance: She is well-developed  She is not diaphoretic  HENT:      Head: Normocephalic and atraumatic  Nose: Nose normal    Eyes:      Conjunctiva/sclera: Conjunctivae normal       Pupils: Pupils are equal, round, and reactive to light  Cardiovascular:      Rate and Rhythm: Normal rate and regular rhythm  Heart sounds: Normal heart sounds  No murmur heard  Pulmonary:      Effort: Pulmonary effort is normal  No respiratory distress  Breath sounds: Normal breath sounds  No wheezing  Abdominal:      General: Bowel sounds are normal  There is no distension  Palpations: Abdomen is soft  Tenderness: There is no abdominal tenderness  Skin:     General: Skin is warm and dry  Findings: No erythema or rash  Neurological:      General: No focal deficit present  Mental Status: She is alert and oriented to person, place, and time  Mental status is at baseline  Cranial Nerves: No cranial nerve deficit  Sensory: No sensory deficit  Motor: No weakness        Coordination: Coordination normal       Gait: Gait normal        Phani Wiley MD

## 2023-01-11 NOTE — PROGRESS NOTES
Assessment and Plan:     Problem List Items Addressed This Visit    None       Preventive health issues were discussed with patient, and age appropriate screening tests were ordered as noted in patient's After Visit Summary  Personalized health advice and appropriate referrals for health education or preventive services given if needed, as noted in patient's After Visit Summary  History of Present Illness:     Patient presents for a Medicare Wellness Visit    HPI   Patient Care Team:  Patrick Stout MD as PCP - General (Family Medicine)  Darcy Killian MD as PCP - [address] [place] [address] (RTE)  [facility]  MD Balbir Barnhart MD     Review of Systems:     Review of Systems     Problem List:     Patient Active Problem List   Diagnosis   • Mixed hyperlipidemia   • Osteoporosis   • Poor concentration   • Short-term memory loss   • Vitamin D deficiency   • High risk sexual behavior   • Seborrheic keratosis   • Visit for suture removal   • Cervical strain   • Frequent falls   • Exposure to mold   • Screening for osteoporosis   • Fatigue   • Mid back pain   • Elevated C-reactive protein (CRP)   • Episode of apnea   • Localized edema      Past Medical and Surgical History:     Past Medical History:   Diagnosis Date   • Cervical strain    • Changing skin lesion    • Hyperlipidemia    • MCI (mild cognitive impairment)    • Mixed hyperlipidemia    • Nonmelanoma skin cancer     BCC   • Osteoporosis    • Polio    • Poor concentration    • Swelling of left thumb      Past Surgical History:   Procedure Laterality Date   • ANAL FISSURECTOMY     • APPENDECTOMY     • CATARACT EXTRACTION     • HYSTERECTOMY        Family History:     Family History   Problem Relation Age of Onset   • Heart attack Mother    • Cancer Father    • Heart attack Sister       Social History:     Social History     Socioeconomic History   • Marital status:       Spouse name: Not on file   • Number of children: Not on file   • Years of education: Not on file   • Highest education level: Not on file   Occupational History   • Not on file   Tobacco Use   • Smoking status: Former     Packs/day: 2 00     Years: 20 00     Pack years: 40 00     Types: Cigarettes   • Smokeless tobacco: Never   Vaping Use   • Vaping Use: Never used   Substance and Sexual Activity   • Alcohol use: Yes     Comment: OCCASIONAL- WINE    • Drug use: No   • Sexual activity: Not on file   Other Topics Concern   • Not on file   Social History Narrative    Per [employer]:     Self-employed          Social Determinants of Health     Financial Resource Strain: Not on file   Food Insecurity: Not on file   Transportation Needs: Not on file   Physical Activity: Not on file   Stress: Not on file   Social Connections: Not on file   Intimate Partner Violence: Not on file   Housing Stability: Not on file      Medications and Allergies:     Current Outpatient Medications   Medication Sig Dispense Refill   • Acetylcarnitine HCl (ACETYL L-CARNITINE) 500 MG CAPS Take by mouth every morning      • Alpha-Lipoic Acid (LIPOIC ACID PO) Take 1 capsule by mouth     • Amino Acids (L-CARNITINE PO) Take 1 tablet by mouth     • Artificial Tear Solution (JUST TEARS EYE DROPS OP) Apply to eye     • Ascorbic Acid (VITAMIN C) 1000 MG tablet Take 1,000 mg by mouth daily      • Calcium Carb-Cholecalciferol 1000-800 MG-UNIT TABS Take by mouth     • Chelated Magnesium 100 MG TABS Take by mouth Daily     • Cholecalciferol (VITAMIN D3) 5000 units CAPS Take 1 capsule by mouth daily     • Glycerylphosphorylcholine POWD 1 tablet by Does not apply route     • Iodine, Kelp, (KELP PO) Take by mouth daily     • L-Carnosine POWD by Does not apply route     • Menaquinone-7 (VITAMIN K2) 100 MCG CAPS      • Misc Natural Products (GLUCOSAMINE CHOND CMP TRIPLE PO) Take by mouth     • Misc Natural Products (PANTETHINE PLUS) TABS Take by mouth     • NATURAL VITAMIN E MOISTURIZING GEL Apply topically     • Omega-3 Fatty Acids (FISH OIL) 1200 MG CAPS Take 1 capsule by mouth daily      • PANTETHINE PO Take by mouth     • patient supplied medication      • PREBIOTIC PRODUCT PO Take by mouth     • Probiotic Product (PROBIOTIC-10) CAPS Take by mouth     • pyridoxine (VITAMIN B6) 100 mg tablet 1 tab(s)     • Resveratrol 50 MG CAPS Take by mouth     • Turmeric POWD 1     • Vinpocetine POWD by Does not apply route     • vitamin B-12 (CYANOCOBALAMIN) 100 MCG tablet Take by mouth every other day      • vitamin E, tocopherol, 1,000 units capsule Take by mouth daily      • Zinc 30 MG CAPS Take 1 capsule by mouth daily       No current facility-administered medications for this visit  Allergies   Allergen Reactions   • Sulfa Antibiotics Other (See Comments)      Immunizations:     Immunization History   Administered Date(s) Administered   • Influenza Split High Dose Preservative Free IM 11/07/2013, 10/28/2014   • Pneumococcal Polysaccharide PPV23 10/28/2014   • Tetanus, adsorbed 12/20/2013   • Zoster 11/03/2014      Health Maintenance:         Topic Date Due   • Breast Cancer Screening: Mammogram  04/17/2019   • DXA SCAN  06/24/2023   • Hepatitis C Screening  Completed   • Colorectal Cancer Screening  Discontinued         Topic Date Due   • COVID-19 Vaccine (1) Never done   • Pneumococcal Vaccine: 65+ Years (2 - PCV) 10/28/2015   • Influenza Vaccine (1) 09/01/2022      Medicare Screening Tests and Risk Assessments:     Trae Gonzalez is here for her Subsequent Wellness visit  Health Risk Assessment:   Patient rates overall health as very good  Patient feels that their physical health rating is same  Patient is satisfied with their life  Eyesight was rated as same  Hearing was rated as same  Patient feels that their emotional and mental health rating is same  Patients states they are never, rarely angry  Patient states they are never, rarely unusually tired/fatigued   Pain experienced in the last 7 days has been none  Patient states that she has experienced no weight loss or gain in last 6 months  Fall Risk Screening: In the past year, patient has experienced: history of falling in past year    Number of falls: 1  Injured during fall?: Yes    Feels unsteady when standing or walking?: No    Worried about falling?: No      Urinary Incontinence Screening:   Patient has not leaked urine accidently in the last six months  Home Safety:  Patient does not have trouble with stairs inside or outside of their home  Patient has working smoke alarms and has working carbon monoxide detector  Home safety hazards include: none  Nutrition:   Current diet is Regular  Medications:   Patient is not currently taking any over-the-counter supplements  Patient is able to manage medications  Activities of Daily Living (ADLs)/Instrumental Activities of Daily Living (IADLs):   Walk and transfer into and out of bed and chair?: Yes  Dress and groom yourself?: Yes    Bathe or shower yourself?: Yes    Feed yourself?  Yes  Do your laundry/housekeeping?: Yes  Manage your money, pay your bills and track your expenses?: Yes  Make your own meals?: Yes    Do your own shopping?: Yes    Previous Hospitalizations:   Any hospitalizations or ED visits within the last 12 months?: No      Advance Care Planning:     Five wishes given: No      PREVENTIVE SCREENINGS      Cardiovascular Screening:    General: History Lipid Disorder    Due for: Lipid Panel      Diabetes Screening:     General: Screening Current    Due for: Blood Glucose      Colorectal Cancer Screening:     General: Screening Current      Breast Cancer Screening:       Due for: Mammogram        Cervical Cancer Screening:    General: Screening Not Indicated      Osteoporosis Screening:    General: Screening Not Indicated and History Osteoporosis      Abdominal Aortic Aneurysm (AAA) Screening:        General: Screening Not Indicated      Lung Cancer Screening:     General: Screening Not Indicated      Hepatitis C Screening:    General: Screening Current    Hep C Screening Accepted: No     Screening, Brief Intervention, and Referral to Treatment (SBIRT)    Screening  Typical number of drinks in a day: 1  Typical number of drinks in a week: 0  Interpretation: Low risk drinking behavior  Single Item Drug Screening:  How often have you used an illegal drug (including marijuana) or a prescription medication for non-medical reasons in the past year? never    Single Item Drug Screen Score: 0  Interpretation: Negative screen for possible drug use disorder    No results found  Physical Exam:     There were no vitals taken for this visit      Physical Exam     Melchor Dalal MD

## 2023-01-11 NOTE — PATIENT INSTRUCTIONS
Medicare Preventive Visit Patient Instructions  Thank you for completing your Welcome to Medicare Visit or Medicare Annual Wellness Visit today  Your next wellness visit will be due in one year (1/12/2024)  The screening/preventive services that you may require over the next 5-10 years are detailed below  Some tests may not apply to you based off risk factors and/or age  Screening tests ordered at today's visit but not completed yet may show as past due  Also, please note that scanned in results may not display below  Preventive Screenings:  Service Recommendations Previous Testing/Comments   Colorectal Cancer Screening  * Colonoscopy    * Fecal Occult Blood Test (FOBT)/Fecal Immunochemical Test (FIT)  * Fecal DNA/Cologuard Test  * Flexible Sigmoidoscopy Age: 39-70 years old   Colonoscopy: every 10 years (may be performed more frequently if at higher risk)  OR  FOBT/FIT: every 1 year  OR  Cologuard: every 3 years  OR  Sigmoidoscopy: every 5 years  Screening may be recommended earlier than age 39 if at higher risk for colorectal cancer  Also, an individualized decision between you and your healthcare provider will decide whether screening between the ages of 74-80 would be appropriate  Colonoscopy: 07/22/2015  FOBT/FIT: Not on file  Cologuard: Not on file  Sigmoidoscopy: Not on file    Screening Current     Breast Cancer Screening Age: 36 years old  Frequency: every 1-2 years  Not required if history of left and right mastectomy Mammogram: 04/17/2018    Due for Mammogram   Cervical Cancer Screening Between the ages of 21-29, pap smear recommended once every 3 years  Between the ages of 33-67, can perform pap smear with HPV co-testing every 5 years     Recommendations may differ for women with a history of total hysterectomy, cervical cancer, or abnormal pap smears in past  Pap Smear: Not on file    Screening Not Indicated   Hepatitis C Screening Once for adults born between 1945 and 1965  More frequently in patients at high risk for Hepatitis C Hep C Antibody: 10/10/2019    Screening Current   Diabetes Screening 1-2 times per year if you're at risk for diabetes or have pre-diabetes Fasting glucose: 106 mg/dL (12/7/2022)  A1C: No results in last 5 years (No results in last 5 years)  Screening Current  Due for Blood Glucose   Cholesterol Screening Once every 5 years if you don't have a lipid disorder  May order more often based on risk factors  Lipid panel: 12/07/2022    History Lipid Disorder  Due for Lipid Panel     Other Preventive Screenings Covered by Medicare:  1  Abdominal Aortic Aneurysm (AAA) Screening: covered once if your at risk  You're considered to be at risk if you have a family history of AAA  2  Lung Cancer Screening: covers low dose CT scan once per year if you meet all of the following conditions: (1) Age 50-69; (2) No signs or symptoms of lung cancer; (3) Current smoker or have quit smoking within the last 15 years; (4) You have a tobacco smoking history of at least 20 pack years (packs per day multiplied by number of years you smoked); (5) You get a written order from a healthcare provider  3  Glaucoma Screening: covered annually if you're considered high risk: (1) You have diabetes OR (2) Family history of glaucoma OR (3)  aged 48 and older OR (3)  American aged 72 and older  3  Osteoporosis Screening: covered every 2 years if you meet one of the following conditions: (1) You're estrogen deficient and at risk for osteoporosis based off medical history and other findings; (2) Have a vertebral abnormality; (3) On glucocorticoid therapy for more than 3 months; (4) Have primary hyperparathyroidism; (5) On osteoporosis medications and need to assess response to drug therapy  · Last bone density test (DXA Scan): 06/24/2021   5  HIV Screening: covered annually if you're between the age of 15-65   Also covered annually if you are younger than 13 and older than 72 with risk factors for HIV infection  For pregnant patients, it is covered up to 3 times per pregnancy  Immunizations:  Immunization Recommendations   Influenza Vaccine Annual influenza vaccination during flu season is recommended for all persons aged >= 6 months who do not have contraindications   Pneumococcal Vaccine   * Pneumococcal conjugate vaccine = PCV13 (Prevnar 13), PCV15 (Vaxneuvance), PCV20 (Prevnar 20)  * Pneumococcal polysaccharide vaccine = PPSV23 (Pneumovax) Adults 25-60 years old: 1-3 doses may be recommended based on certain risk factors  Adults 72 years old: 1-2 doses may be recommended based off what pneumonia vaccine you previously received   Hepatitis B Vaccine 3 dose series if at intermediate or high risk (ex: diabetes, end stage renal disease, liver disease)   Tetanus (Td) Vaccine - COST NOT COVERED BY MEDICARE PART B Following completion of primary series, a booster dose should be given every 10 years to maintain immunity against tetanus  Td may also be given as tetanus wound prophylaxis  Tdap Vaccine - COST NOT COVERED BY MEDICARE PART B Recommended at least once for all adults  For pregnant patients, recommended with each pregnancy  Shingles Vaccine (Shingrix) - COST NOT COVERED BY MEDICARE PART B  2 shot series recommended in those aged 48 and above     Health Maintenance Due:      Topic Date Due   • Breast Cancer Screening: Mammogram  04/17/2019   • DXA SCAN  06/24/2023   • Hepatitis C Screening  Completed   • Colorectal Cancer Screening  Discontinued     Immunizations Due:      Topic Date Due   • COVID-19 Vaccine (1) Never done   • Pneumococcal Vaccine: 65+ Years (2 - PCV) 10/28/2015   • Influenza Vaccine (1) 09/01/2022     Advance Directives   What are advance directives? Advance directives are legal documents that state your wishes and plans for medical care  These plans are made ahead of time in case you lose your ability to make decisions for yourself   Advance directives can apply to any medical decision, such as the treatments you want, and if you want to donate organs  What are the types of advance directives? There are many types of advance directives, and each state has rules about how to use them  You may choose a combination of any of the following:  · Living will: This is a written record of the treatment you want  You can also choose which treatments you do not want, which to limit, and which to stop at a certain time  This includes surgery, medicine, IV fluid, and tube feedings  · Durable power of  for healthcare [facility] [place]): This is a written record that states who you want to make healthcare choices for you when you are unable to make them for yourself  This person, called a proxy, is usually a family member or a friend  You may choose more than 1 proxy  · Do not resuscitate (DNR) order:  A DNR order is used in case your heart stops beating or you stop breathing  It is a request not to have certain forms of treatment, such as CPR  A DNR order may be included in other types of advance directives  · Medical directive: This covers the care that you want if you are in a coma, near death, or unable to make decisions for yourself  You can list the treatments you want for each condition  Treatment may include pain medicine, surgery, blood transfusions, dialysis, IV or tube feedings, and a ventilator (breathing machine)  · Values history: This document has questions about your views, beliefs, and how you feel and think about life  This information can help others choose the care that you would choose  Why are advance directives important? An advance directive helps you control your care  Although spoken wishes may be used, it is better to have your wishes written down  Spoken wishes can be misunderstood, or not followed  Treatments may be given even if you do not want them  An advance directive may make it easier for your family to make difficult choices about your care     Fall Prevention    Fall prevention  includes ways to make your home and other areas safer  It also includes ways you can move more carefully to prevent a fall  Health conditions that cause changes in your blood pressure, vision, or muscle strength and coordination may increase your risk for falls  Medicines may also increase your risk for falls if they make you dizzy, weak, or sleepy  Fall prevention tips:   · Stand or sit up slowly  · Use assistive devices as directed  · Wear shoes that fit well and have soles that   · Wear a personal alarm  · Stay active  · Manage your medical conditions  Home Safety Tips:  · Add items to prevent falls in the bathroom  · Keep paths clear  · Install bright lights in your home  · Keep items you use often on shelves within reach  · Paint or place reflective tape on the edges of your stairs  © Copyright [employer] 2018 Information is for End User's use only and may not be sold, redistributed or otherwise used for commercial purposes   All illustrations and images included in CareNotes® are the copyrighted property of [employer]  or [address] [place] [address] [employer]

## 2023-01-20 LAB
BUN SERPL-MCNC: 11 MG/DL (ref 7–25)
BUN/CREAT SERPL: NORMAL (CALC) (ref 6–22)
CALCIUM SERPL-MCNC: 10.4 MG/DL (ref 8.6–10.4)
CHLORIDE SERPL-SCNC: 103 MMOL/L (ref 98–110)
CO2 SERPL-SCNC: 30 MMOL/L (ref 20–32)
CREAT SERPL-MCNC: 0.71 MG/DL (ref 0.6–1)
GFR/BSA.PRED SERPLBLD CYS-BASED-ARV: 88 ML/MIN/1.73M2
GLUCOSE SERPL-MCNC: 87 MG/DL (ref 65–99)
HBA1C MFR BLD: 5.6 % OF TOTAL HGB
POTASSIUM SERPL-SCNC: 5.1 MMOL/L (ref 3.5–5.3)
SODIUM SERPL-SCNC: 140 MMOL/L (ref 135–146)

## 2023-01-26 DIAGNOSIS — H91.93 DECREASED HEARING OF BOTH EARS: Primary | ICD-10-CM

## 2023-02-07 LAB
APOE ALLELE E2+E3+E4 BLD/T: NORMAL
BUN SERPL-MCNC: 11 MG/DL (ref 7–25)
BUN/CREAT SERPL: NORMAL (CALC) (ref 6–22)
CALCIUM SERPL-MCNC: 10.4 MG/DL (ref 8.6–10.4)
CHLORIDE SERPL-SCNC: 103 MMOL/L (ref 98–110)
CO2 SERPL-SCNC: 30 MMOL/L (ref 20–32)
CREAT SERPL-MCNC: 0.71 MG/DL (ref 0.6–1)
GFR/BSA.PRED SERPLBLD CYS-BASED-ARV: 88 ML/MIN/1.73M2
GLUCOSE SERPL-MCNC: 87 MG/DL (ref 65–99)
HBA1C MFR BLD: 5.6 % OF TOTAL HGB
POTASSIUM SERPL-SCNC: 5.1 MMOL/L (ref 3.5–5.3)
SODIUM SERPL-SCNC: 140 MMOL/L (ref 135–146)

## 2023-03-02 ENCOUNTER — OFFICE VISIT (OUTPATIENT)
Dept: URGENT CARE | Facility: CLINIC | Age: 78
End: 2023-03-02

## 2023-03-02 ENCOUNTER — HOSPITAL ENCOUNTER (EMERGENCY)
Facility: HOSPITAL | Age: 78
Discharge: HOME | End: 2023-03-02
Attending: EMERGENCY MEDICINE

## 2023-03-02 ENCOUNTER — APPOINTMENT (EMERGENCY)
Dept: VASCULAR ULTRASOUND | Facility: HOSPITAL | Age: 78
End: 2023-03-02

## 2023-03-02 VITALS
DIASTOLIC BLOOD PRESSURE: 62 MMHG | SYSTOLIC BLOOD PRESSURE: 133 MMHG | RESPIRATION RATE: 18 BRPM | OXYGEN SATURATION: 100 % | TEMPERATURE: 99.4 F | HEART RATE: 70 BPM

## 2023-03-02 VITALS
SYSTOLIC BLOOD PRESSURE: 122 MMHG | HEART RATE: 95 BPM | TEMPERATURE: 98.1 F | RESPIRATION RATE: 19 BRPM | OXYGEN SATURATION: 97 % | DIASTOLIC BLOOD PRESSURE: 58 MMHG

## 2023-03-02 DIAGNOSIS — M79.605 PAIN AND SWELLING OF LEFT LOWER EXTREMITY: Primary | ICD-10-CM

## 2023-03-02 DIAGNOSIS — M79.89 PAIN AND SWELLING OF LEFT LOWER EXTREMITY: Primary | ICD-10-CM

## 2023-03-02 DIAGNOSIS — L03.90 CELLULITIS: Primary | ICD-10-CM

## 2023-03-02 RX ORDER — CEPHALEXIN 250 MG/1
500 CAPSULE ORAL ONCE
Status: COMPLETED | OUTPATIENT
Start: 2023-03-02 | End: 2023-03-02

## 2023-03-02 RX ORDER — CEPHALEXIN 500 MG/1
500 CAPSULE ORAL EVERY 6 HOURS SCHEDULED
Qty: 28 CAPSULE | Refills: 0 | Status: SHIPPED | OUTPATIENT
Start: 2023-03-02 | End: 2023-03-09

## 2023-03-02 RX ADMIN — CEPHALEXIN 500 MG: 250 CAPSULE ORAL at 17:10

## 2023-03-02 NOTE — PROGRESS NOTES
[email or web address]        NAME: Yuliana Garzon is a 66 y o  female  : 1945    MRN: [id number]  DATE: 2023  TIME: 2:31 PM    Assessment and Plan   Pain and swelling of left lower extremity [M79 605, M79 89]  1  Pain and swelling of left lower extremity              Patient Instructions   There are no Patient Instructions on file for this visit  Follow up with PCP in 3-5 days  Proceed to  ER if symptoms worsen  Chief Complaint     Chief Complaint   Patient presents with   • Cellulitis     Pt c/o Left lower ext redness and warm that started a couple days ago         History of Present Illness       Patient presents with 1 5 months ago developing "pink spots" over the heel/ankle  States a few days ago foot felt cold when going to be  Later developed warmth in the foot/ankle and started to notice some spots of redness  Has 2-3/10 pain in the ankle noted  History of polio when she was 9 causing difference in size in the left lower extremity size  Denies calf pain, SOB, chest pains, dyspnea, fevers, fatigue, feeling run down  Denies history of clotting abnormalities, recent travel, recent vascular injury  Review of Systems   Review of Systems   Constitutional: Negative for fever  Respiratory: Negative for shortness of breath  Cardiovascular: Negative for chest pain and palpitations  Musculoskeletal: Positive for myalgias  Skin: Positive for rash  Neurological: Negative for weakness           Current Medications       Current Outpatient Medications:   •  Acetylcarnitine HCl (ACETYL L-CARNITINE) 500 MG CAPS, Take by mouth every morning , Disp: , Rfl:   •  Alpha-Lipoic Acid (LIPOIC ACID PO), Take 1 capsule by mouth, Disp: , Rfl:   •  Amino Acids (L-CARNITINE PO), Take 1 tablet by mouth, Disp: , Rfl:   •  Artificial Tear Solution (JUST TEARS EYE DROPS OP), Apply to eye, Disp: , Rfl:   •  Ascorbic Acid (VITAMIN C) 1000 MG tablet, Take 1,000 mg by mouth daily , Disp: , Rfl: •  Calcium Carb-Cholecalciferol 1000-800 MG-UNIT TABS, Take by mouth, Disp: , Rfl:   •  Chelated Magnesium 100 MG TABS, Take by mouth Daily, Disp: , Rfl:   •  Cholecalciferol (VITAMIN D3) 5000 units CAPS, Take 1 capsule by mouth daily, Disp: , Rfl:   •  Glycerylphosphorylcholine POWD, 1 tablet by Does not apply route, Disp: , Rfl:   •  Iodine, Kelp, (KELP PO), Take by mouth daily, Disp: , Rfl:   •  L-Carnosine POWD, by Does not apply route, Disp: , Rfl:   •  Menaquinone-7 (VITAMIN K2) 100 MCG CAPS, , Disp: , Rfl:   •  Misc Natural Products (GLUCOSAMINE CHOND CMP TRIPLE PO), Take by mouth, Disp: , Rfl:   •  Misc Natural Products (PANTETHINE PLUS) TABS, Take by mouth, Disp: , Rfl:   •  NATURAL VITAMIN E MOISTURIZING GEL, Apply topically, Disp: , Rfl:   •  Omega-3 Fatty Acids (FISH OIL) 1200 MG CAPS, Take 1 capsule by mouth daily , Disp: , Rfl:   •  PANTETHINE PO, Take by mouth, Disp: , Rfl:   •  patient supplied medication, , Disp: , Rfl:   •  PREBIOTIC PRODUCT PO, Take by mouth, Disp: , Rfl:   •  Probiotic Product (PROBIOTIC-10) CAPS, Take by mouth, Disp: , Rfl:   •  pyridoxine (VITAMIN B6) 100 mg tablet, 1 tab(s), Disp: , Rfl:   •  Resveratrol 50 MG CAPS, Take by mouth, Disp: , Rfl:   •  Turmeric POWD, 1, Disp: , Rfl:   •  Vinpocetine POWD, by Does not apply route, Disp: , Rfl:   •  vitamin B-12 (CYANOCOBALAMIN) 100 MCG tablet, Take by mouth every other day , Disp: , Rfl:   •  vitamin E, tocopherol, 1,000 units capsule, Take by mouth daily , Disp: , Rfl:   •  Zinc 30 MG CAPS, Take 1 capsule by mouth daily, Disp: , Rfl:     Current Allergies     Allergies as of 03/02/2023 - Reviewed 01/11/2023   Allergen Reaction Noted   • Sulfa antibiotics Other (See Comments) 06/08/2015            The following portions of the patient's history were reviewed and updated as appropriate: allergies, current medications, past family history, past medical history, past social history, past surgical history and problem list      Past Medical History:   Diagnosis Date   • Cervical strain    • Changing skin lesion    • Hyperlipidemia    • MCI (mild cognitive impairment)    • Mixed hyperlipidemia    • Nonmelanoma skin cancer     BCC   • Osteoporosis    • Polio    • Poor concentration    • Swelling of left thumb        Past Surgical History:   Procedure Laterality Date   • ANAL FISSURECTOMY     • APPENDECTOMY     • CATARACT EXTRACTION     • HYSTERECTOMY         Family History   Problem Relation Age of Onset   • Heart attack Mother    • Cancer Father    • Heart attack Sister          Medications have been verified  Objective   /62   Pulse 70   Temp 99 4 °F (37 4 °C) (Temporal)   Resp 18   SpO2 100%        Physical Exam     Physical Exam  Constitutional:       Appearance: Normal appearance  Musculoskeletal:         General: Swelling and tenderness present  Normal range of motion  Comments: Calf pain  Left lower extremity with mild swelling and mild erythema  Skin:     Findings: Rash present  Neurological:      Mental Status: She is alert     Psychiatric:         Mood and Affect: Mood normal          Behavior: Behavior normal

## 2023-03-02 NOTE — ED NOTES
Alert and oriented x4  Gait steady in no acute distress   Discharged to home with written and verbal instructions by the provider     Gayathri Monge RN  03/02/23 [id number]

## 2023-03-02 NOTE — ED PROVIDER NOTES
History  Chief Complaint   Patient presents with   • Leg Pain     A few days noticed little pink spot on left leg, warm spot on leg  Was seen at [facility] who sent her to ED  17-year-old female presents to the ER for evaluation of leg pain  Patient stated that she was seen at urgent care and told to come to the ER for further evaluation  Patient does have history of polio in her left leg when she was 5years old  She has since then had a size difference in her left lower leg compared to the right  States that a few days ago she started to notice that her left medial malleolus area was warm  Since she has had polio in that left leg she normally has to use a heating blanket on that foot because it is chronically cold however she has not been using it recently due to the warmth of the ankle  Today she complains that the rash is now more tender to touch  Pain is a 2 out of 10 and constant  No ecchymosis, wound, drainage of left ankle  Patient denies shortness of breath, chest pain, fevers, fatigue, malaise  Patient stated that she does not have any past medical history such as clotting abnormalities  Patient has not had recent long travels, vascular injury or surgery  History provided by:  Patient  Leg Pain  Associated symptoms: no fever        Prior to Admission Medications   Prescriptions Last Dose Informant Patient Reported? Taking?    Acetylcarnitine HCl (ACETYL L-CARNITINE) 500 MG CAPS   Yes No   Sig: Take by mouth every morning    Alpha-Lipoic Acid (LIPOIC ACID PO)   Yes No   Sig: Take 1 capsule by mouth   Amino Acids (L-CARNITINE PO)   Yes No   Sig: Take 1 tablet by mouth   Artificial Tear Solution (JUST TEARS EYE DROPS OP)   Yes No   Sig: Apply to eye   Ascorbic Acid (VITAMIN C) 1000 MG tablet   Yes No   Sig: Take 1,000 mg by mouth daily    Calcium Carb-Cholecalciferol 1000-800 MG-UNIT TABS   Yes No   Sig: Take by mouth   Chelated Magnesium 100 MG TABS   Yes No   Sig: Take by mouth Daily Cholecalciferol (VITAMIN D3) 5000 units CAPS   Yes No   Sig: Take 1 capsule by mouth daily   Glycerylphosphorylcholine POWD   Yes No   Si tablet by Does not apply route   Iodine, Kelp, (KELP PO)   Yes No   Sig: Take by mouth daily   L-Carnosine POWD   Yes No   Sig: by Does not apply route   Menaquinone-7 (VITAMIN K2) 100 MCG CAPS   Yes No   Misc Natural Products (GLUCOSAMINE CHOND CMP TRIPLE PO)   Yes No   Sig: Take by mouth   Misc Natural Products (PANTETHINE PLUS) TABS   Yes No   Sig: Take by mouth   NATURAL VITAMIN E MOISTURIZING GEL   Yes No   Sig: Apply topically   Omega-3 Fatty Acids (FISH OIL) 1200 MG CAPS   Yes No   Sig: Take 1 capsule by mouth daily    PANTETHINE PO   Yes No   Sig: Take by mouth   PREBIOTIC PRODUCT PO   Yes No   Sig: Take by mouth   Probiotic Product (PROBIOTIC-10) CAPS   Yes No   Sig: Take by mouth   Resveratrol 50 MG CAPS   Yes No   Sig: Take by mouth   Turmeric POWD   Yes No   Si   Vinpocetine POWD   Yes No   Sig: by Does not apply route   Zinc 30 MG CAPS   Yes No   Sig: Take 1 capsule by mouth daily   patient supplied medication   Yes No   pyridoxine (VITAMIN B6) 100 mg tablet   Yes No   Si tab(s)   vitamin B-12 (CYANOCOBALAMIN) 100 MCG tablet   Yes No   Sig: Take by mouth every other day    vitamin E, tocopherol, 1,000 units capsule   Yes No   Sig: Take by mouth daily       Facility-Administered Medications: None       Past Medical History:   Diagnosis Date   • Cervical strain    • Changing skin lesion    • Hyperlipidemia    • MCI (mild cognitive impairment)    • Mixed hyperlipidemia    • Nonmelanoma skin cancer     BCC   • Osteoporosis    • Polio    • Poor concentration    • Swelling of left thumb        Past Surgical History:   Procedure Laterality Date   • ANAL FISSURECTOMY     • APPENDECTOMY     • CATARACT EXTRACTION     • HYSTERECTOMY         Family History   Problem Relation Age of Onset   • Heart attack Mother    • Cancer Father    • Heart attack Sister      I have reviewed and agree with the history as documented  E-Cigarette/Vaping   • E-Cigarette Use Never User      E-Cigarette/Vaping Substances     Social History     Tobacco Use   • Smoking status: Former     Packs/day: 2 00     Years: 20 00     Pack years: 40 00     Types: Cigarettes   • Smokeless tobacco: Never   Vaping Use   • Vaping Use: Never used   Substance Use Topics   • Alcohol use: Yes     Comment: OCCASIONAL- WINE    • Drug use: No       Review of Systems   Constitutional: Negative for chills and fever  Respiratory: Negative for cough and shortness of breath  Cardiovascular: Negative for chest pain and palpitations  Gastrointestinal: Negative for abdominal pain  Musculoskeletal: Positive for myalgias  Skin: Positive for rash  Neurological: Negative for weakness and headaches  Physical Exam  Physical Exam  HENT:      Head: Normocephalic  Right Ear: External ear normal       Left Ear: External ear normal    Cardiovascular:      Rate and Rhythm: Normal rate and regular rhythm  Pulses: Normal pulses  Heart sounds: Normal heart sounds  Pulmonary:      Effort: Pulmonary effort is normal       Breath sounds: Normal breath sounds  Abdominal:      General: Abdomen is flat  Palpations: Abdomen is soft  Musculoskeletal:         General: Tenderness present  No deformity or signs of injury  Normal range of motion  Cervical back: Normal range of motion  Left ankle: Tenderness present over the medial malleolus (Over rash)  Normal pulse  Skin:     General: Skin is warm  Capillary Refill: Capillary refill takes less than 2 seconds  Findings: Rash present  Comments: Left medial malleolus area of erythema, warmth  No open wound, ecchymosis, drainage  Neurological:      Mental Status: She is alert and oriented to person, place, and time  Mental status is at baseline     Psychiatric:         Mood and Affect: Mood normal          Vital Signs  ED Triage Vitals [03/02/23 1529]   Temperature Pulse Respirations Blood Pressure SpO2   98 1 °F (36 7 °C) 95 19 122/58 97 %      Temp src Heart Rate Source Patient Position - Orthostatic VS BP Location FiO2 (%)   -- Monitor Sitting Left arm --      Pain Score       --           Vitals:    03/02/23 1529   BP: 122/58   Pulse: 95   Patient Position - Orthostatic VS: Sitting         Visual Acuity      ED Medications  Medications   cephalexin (KEFLEX) capsule 500 mg (500 mg Oral Given 3/2/23 1710)       Diagnostic Studies  Results Reviewed     None                 VAS lower limb venous duplex study, unilateral/limited    (Results Pending)              Procedures  Procedures         ED Course                                             Medical Decision Making  66-year-old female presents to the ER for evaluation of left medial malleolus rash  This patient presents with initial presentation of local erythema, warmth, swelling concerning for cellulitis  Sensitivity/pain to light touch around the erythematous area  No lymphangitis spread visible and no palpable fluid pockets or fluctuance concerning for abscess noted  Low concern for osteomyelitis  Patient had a vascular duplex of the left lower leg to rule out DVT  Vascular ultrasound was negative for DVT  No immune compromise, bullae, pain out of proportion, or rapid progression concerning for necrotizing fasciitis  Patient to be discharged home with Keflex and follow-up with primary care doctor  Patient given strict return precautions to the ER for worsening symptoms or questions or concerns that arise at home  Cellulitis: acute illness or injury  Amount and/or Complexity of Data Reviewed  ECG/medicine tests: ordered  Risk  Prescription drug management            Disposition  Final diagnoses:   Cellulitis     Time reflects when diagnosis was documented in both MDM as applicable and the Disposition within this note     Time User Action Codes Description Comment 3/2/2023  4:50 PM Madeline Hesham Add [E09 20] Cellulitis       ED Disposition     ED Disposition   Discharge    Condition   Stable    Date/Time   Thu Mar 2, 2023  4:50 PM    Comment   Mat Gearing discharge to home/self care  Follow-up Information     Follow up With Specialties Details Why [address], [address] [facility]  [address] [facility]  [address] [address]  [phone]       [facility] Emergency Department Emergency Medicine  If symptoms worsen [address] Emergency Department, [address] [place] [address]          Patient's Medications   Discharge Prescriptions    CEPHALEXIN (KEFLEX) 500 MG CAPSULE    Take 1 capsule (500 mg total) by mouth every 6 (six) hours for 7 days       Start Date: 3/2/2023  End Date: 3/9/2023       Order Dose: 500 mg       Quantity: 28 capsule    Refills: 0       No discharge procedures on file      PDMP Review     None          ED Provider  Electronically Signed by           Jennifer Brito  03/02/23 [id number]

## 2023-03-18 NOTE — ED ATTENDING ATTESTATION
3/2/2023  IOsmin MD, saw and evaluated the patient  I have discussed the patient with the resident/non-physician practitioner and agree with the resident's/non-physician practitioner's findings, Plan of Care, and MDM as documented in the resident's/non-physician practitioner's note, except where noted  All available labs and Radiology studies were reviewed  I was present for key portions of any procedure(s) performed by the resident/non-physician practitioner and I was immediately available to provide assistance  At this point I agree with the current assessment done in the Emergency Department  I have conducted an independent evaluation of this patient a history and physical is as follows:    ED Course     68-year-old female presented to the ED for evaluation of pain on the lateral aspect of her left leg  She reports an erythematous rash that has since become painful and warm  Reports history of left lower leg weakness secondary to polio as a very young child  Has since had erythematous rash for a few days that started as a small "pink spot" and gradually got larger  Now with mild pain approximately 2/10 in intensity  No fevers  No nausea, fatigue, or other systemic symptoms  On exam patient is in no acute distress, GCS 15 nonfocal, sclera nonicteric junk to the normal, moist mucous membranes, regular rate and rhythm, clear to auscultation bilaterally, abdomen soft nondistended nontender, there is sized discrepancy of the lower extremities which patient states has been chronic for many years  There is an approximately 3 x 6 cm area of erythema over the right ankle with mild tenderness, no crepitus, no drainage, no fluctuance  Full range of motion of the joint actively and passively without significant pain  Normal distal sensation and cap refill  Assessment and plan: Left ankle erythema and warmth with mild tenderness  Venous duplex to rule out DVT, otherwise likely cellulitis  Remainder of vital signs and physical exam reassuring  Will treat with oral antibiotics, discharge for outpatient follow-up with return precautions      Critical Care Time  Procedures

## 2023-07-11 ENCOUNTER — TELEPHONE (OUTPATIENT)
Dept: OTHER | Facility: OTHER | Age: 78
End: 2023-07-11

## 2023-07-12 ENCOUNTER — RA CDI HCC (OUTPATIENT)
Dept: OTHER | Facility: HOSPITAL | Age: 78
End: 2023-07-12

## 2023-07-12 NOTE — PROGRESS NOTES
[facility] coding opportunities       Chart reviewed, no opportunity found: CHART REVIEWED, [address]     Patients Insurance     Medicare Insurance: [employer]

## 2023-08-14 ENCOUNTER — APPOINTMENT (OUTPATIENT)
Dept: RADIOLOGY | Facility: CLINIC | Age: 78
End: 2023-08-14
Payer: COMMERCIAL

## 2023-08-14 ENCOUNTER — OFFICE VISIT (OUTPATIENT)
Dept: FAMILY MEDICINE CLINIC | Facility: CLINIC | Age: 78
End: 2023-08-14
Payer: COMMERCIAL

## 2023-08-14 VITALS
HEIGHT: 63 IN | TEMPERATURE: 98.7 F | HEART RATE: 74 BPM | SYSTOLIC BLOOD PRESSURE: 122 MMHG | DIASTOLIC BLOOD PRESSURE: 74 MMHG | WEIGHT: 140 LBS | OXYGEN SATURATION: 99 % | BODY MASS INDEX: 24.8 KG/M2

## 2023-08-14 DIAGNOSIS — Z00.00 MEDICARE ANNUAL WELLNESS VISIT, SUBSEQUENT: ICD-10-CM

## 2023-08-14 DIAGNOSIS — M25.462 PAIN AND SWELLING OF LEFT KNEE: ICD-10-CM

## 2023-08-14 DIAGNOSIS — M25.562 PAIN AND SWELLING OF LEFT KNEE: ICD-10-CM

## 2023-08-14 DIAGNOSIS — M25.562 PAIN AND SWELLING OF LEFT KNEE: Primary | ICD-10-CM

## 2023-08-14 DIAGNOSIS — B35.6 TINEA CRURIS: ICD-10-CM

## 2023-08-14 DIAGNOSIS — R73.01 IMPAIRED FASTING GLUCOSE: ICD-10-CM

## 2023-08-14 DIAGNOSIS — M25.462 PAIN AND SWELLING OF LEFT KNEE: Primary | ICD-10-CM

## 2023-08-14 PROBLEM — Z77.120 EXPOSURE TO MOLD: Status: RESOLVED | Noted: 2019-10-09 | Resolved: 2023-08-14

## 2023-08-14 PROBLEM — R60.0 LOCALIZED EDEMA: Status: RESOLVED | Noted: 2022-06-08 | Resolved: 2023-08-14

## 2023-08-14 LAB — SL AMB POCT HEMOGLOBIN AIC: 5.5 (ref ?–6.5)

## 2023-08-14 PROCEDURE — 73564 X-RAY EXAM KNEE 4 OR MORE: CPT

## 2023-08-14 PROCEDURE — G0439 PPPS, SUBSEQ VISIT: HCPCS | Performed by: FAMILY MEDICINE

## 2023-08-14 PROCEDURE — 83036 HEMOGLOBIN GLYCOSYLATED A1C: CPT | Performed by: FAMILY MEDICINE

## 2023-08-14 PROCEDURE — 99214 OFFICE O/P EST MOD 30 MIN: CPT | Performed by: FAMILY MEDICINE

## 2023-08-14 RX ORDER — PRENATAL VIT 91/IRON/FOLIC/DHA 28-975-200
COMBINATION PACKAGE (EA) ORAL 2 TIMES DAILY
Qty: 42 G | Refills: 2 | Status: SHIPPED | OUTPATIENT
Start: 2023-08-14

## 2023-08-14 RX ORDER — IBUPROFEN 600 MG/1
600 TABLET ORAL DAILY PRN
Qty: 30 TABLET | Refills: 2 | Status: SHIPPED | OUTPATIENT
Start: 2023-08-14

## 2023-08-14 NOTE — PATIENT INSTRUCTIONS
Medicare Preventive Visit Patient Instructions  Thank you for completing your Welcome to Medicare Visit or Medicare Annual Wellness Visit today. Your next wellness visit will be due in one year (8/14/2024). The screening/preventive services that you may require over the next 5-10 years are detailed below. Some tests may not apply to you based off risk factors and/or age. Screening tests ordered at today's visit but not completed yet may show as past due. Also, please note that scanned in results may not display below. Preventive Screenings:  Service Recommendations Previous Testing/Comments   Colorectal Cancer Screening  * Colonoscopy    * Fecal Occult Blood Test (FOBT)/Fecal Immunochemical Test (FIT)  * Fecal DNA/Cologuard Test  * Flexible Sigmoidoscopy Age: 43-73 years old   Colonoscopy: every 10 years (may be performed more frequently if at higher risk)  OR  FOBT/FIT: every 1 year  OR  Cologuard: every 3 years  OR  Sigmoidoscopy: every 5 years  Screening may be recommended earlier than age 39 if at higher risk for colorectal cancer. Also, an individualized decision between you and your healthcare provider will decide whether screening between the ages of 77-80 would be appropriate. Colonoscopy: 07/22/2015  FOBT/FIT: Not on file  Cologuard: Not on file  Sigmoidoscopy: Not on file    Screening Current     Breast Cancer Screening Age: 36 years old  Frequency: every 1-2 years  Not required if history of left and right mastectomy Mammogram: 04/17/2018    Due for Mammogram   Cervical Cancer Screening Between the ages of 21-29, pap smear recommended once every 3 years. Between the ages of 32-69, can perform pap smear with HPV co-testing every 5 years.    Recommendations may differ for women with a history of total hysterectomy, cervical cancer, or abnormal pap smears in past. Pap Smear: Not on file    Screening Not Indicated   Hepatitis C Screening Once for adults born between 1945 and 1965  More frequently in patients at high risk for Hepatitis C Hep C Antibody: 10/10/2019    Screening Current   Diabetes Screening 1-2 times per year if you're at risk for diabetes or have pre-diabetes Fasting glucose: 106 mg/dL (12/7/2022)  A1C: 5.6 % of total Hgb (1/19/2023)  Screening Current  Due for Blood Glucose   Cholesterol Screening Once every 5 years if you don't have a lipid disorder. May order more often based on risk factors. Lipid panel: 12/07/2022    Screening Not Indicated  History Lipid Disorder  Due for Lipid Panel     Other Preventive Screenings Covered by Medicare:  1. Abdominal Aortic Aneurysm (AAA) Screening: covered once if your at risk. You're considered to be at risk if you have a family history of AAA. 2. Lung Cancer Screening: covers low dose CT scan once per year if you meet all of the following conditions: (1) Age 48-67; (2) No signs or symptoms of lung cancer; (3) Current smoker or have quit smoking within the last 15 years; (4) You have a tobacco smoking history of at least 20 pack years (packs per day multiplied by number of years you smoked); (5) You get a written order from a healthcare provider. 3. Glaucoma Screening: covered annually if you're considered high risk: (1) You have diabetes OR (2) Family history of glaucoma OR (3)  aged 48 and older OR (3)  American aged 72 and older  3. Osteoporosis Screening: covered every 2 years if you meet one of the following conditions: (1) You're estrogen deficient and at risk for osteoporosis based off medical history and other findings; (2) Have a vertebral abnormality; (3) On glucocorticoid therapy for more than 3 months; (4) Have primary hyperparathyroidism; (5) On osteoporosis medications and need to assess response to drug therapy. · Last bone density test (DXA Scan): 06/24/2021.  5. HIV Screening: covered annually if you're between the age of 15-65.  Also covered annually if you are younger than 13 and older than 72 with risk factors for HIV infection. For pregnant patients, it is covered up to 3 times per pregnancy. Immunizations:  Immunization Recommendations   Influenza Vaccine Annual influenza vaccination during flu season is recommended for all persons aged >= 6 months who do not have contraindications   Pneumococcal Vaccine   * Pneumococcal conjugate vaccine = PCV13 (Prevnar 13), PCV15 (Vaxneuvance), PCV20 (Prevnar 20)  * Pneumococcal polysaccharide vaccine = PPSV23 (Pneumovax) Adults 20-63 years old: 1-3 doses may be recommended based on certain risk factors  Adults 72 years old: 1-2 doses may be recommended based off what pneumonia vaccine you previously received   Hepatitis B Vaccine 3 dose series if at intermediate or high risk (ex: diabetes, end stage renal disease, liver disease)   Tetanus (Td) Vaccine - COST NOT COVERED BY MEDICARE PART B Following completion of primary series, a booster dose should be given every 10 years to maintain immunity against tetanus. Td may also be given as tetanus wound prophylaxis. Tdap Vaccine - COST NOT COVERED BY MEDICARE PART B Recommended at least once for all adults. For pregnant patients, recommended with each pregnancy. Shingles Vaccine (Shingrix) - COST NOT COVERED BY MEDICARE PART B  2 shot series recommended in those aged 48 and above     Health Maintenance Due:      Topic Date Due   • Breast Cancer Screening: Mammogram  04/17/2019   • DXA SCAN  06/24/2023   • Hepatitis C Screening  Completed   • Colorectal Cancer Screening  Discontinued     Immunizations Due:      Topic Date Due   • COVID-19 Vaccine (1) Never done   • Pneumococcal Vaccine: 65+ Years (2 - PCV) 10/28/2015   • Influenza Vaccine (1) 09/01/2023     Advance Directives   What are advance directives? Advance directives are legal documents that state your wishes and plans for medical care. These plans are made ahead of time in case you lose your ability to make decisions for yourself.  Advance directives can apply to any medical decision, such as the treatments you want, and if you want to donate organs. What are the types of advance directives? There are many types of advance directives, and each state has rules about how to use them. You may choose a combination of any of the following:  · Living will: This is a written record of the treatment you want. You can also choose which treatments you do not want, which to limit, and which to stop at a certain time. This includes surgery, medicine, IV fluid, and tube feedings. · Durable power of  for healthcare [place] [facility]): This is a written record that states who you want to make healthcare choices for you when you are unable to make them for yourself. This person, called a proxy, is usually a family member or a friend. You may choose more than 1 proxy. · Do not resuscitate (DNR) order:  A DNR order is used in case your heart stops beating or you stop breathing. It is a request not to have certain forms of treatment, such as CPR. A DNR order may be included in other types of advance directives. · Medical directive: This covers the care that you want if you are in a coma, near death, or unable to make decisions for yourself. You can list the treatments you want for each condition. Treatment may include pain medicine, surgery, blood transfusions, dialysis, IV or tube feedings, and a ventilator (breathing machine). · Values history: This document has questions about your views, beliefs, and how you feel and think about life. This information can help others choose the care that you would choose. Why are advance directives important? An advance directive helps you control your care. Although spoken wishes may be used, it is better to have your wishes written down. Spoken wishes can be misunderstood, or not followed. Treatments may be given even if you do not want them. An advance directive may make it easier for your family to make difficult choices about your care.    Fall Prevention    Fall prevention  includes ways to make your home and other areas safer. It also includes ways you can move more carefully to prevent a fall. Health conditions that cause changes in your blood pressure, vision, or muscle strength and coordination may increase your risk for falls. Medicines may also increase your risk for falls if they make you dizzy, weak, or sleepy. Fall prevention tips:   · Stand or sit up slowly. · Use assistive devices as directed. · Wear shoes that fit well and have soles that . · Wear a personal alarm. · Stay active. · Manage your medical conditions. Home Safety Tips:  · Add items to prevent falls in the bathroom. · Keep paths clear. · Install bright lights in your home. · Keep items you use often on shelves within reach. · Paint or place reflective tape on the edges of your stairs. © Copyright [employer] 2018 Information is for End User's use only and may not be sold, redistributed or otherwise used for commercial purposes.  All illustrations and images included in CareNotes® are the copyrighted property of A.D.A.M., Inc. or  [employer]

## 2023-08-14 NOTE — PROGRESS NOTES
Name: Ede Last      : 1945      MRN: [id number]  Encounter Provider: Amena Mukherjee MD  Encounter Date: 2023   Encounter department: [address] [place] [address]     1. Pain and swelling of left knee  -     XR knee 4+ vw left injury; Future; Expected date: 2023  -     ibuprofen (MOTRIN) 600 mg tablet; Take 1 tablet (600 mg total) by mouth daily as needed for mild pain  -     Ambulatory Referral to Physical Therapy; Future  -     Ambulatory Referral to Sports Medicine; Future  -     Ambulatory Referral to Sports Medicine    2. Impaired fasting glucose  -     Basic metabolic panel; Future  -     Hemoglobin A1C; Future  -     POCT hemoglobin A1c- 5.5, normal    3. Tinea cruris  -     terbinafine (LamISIL) 1 % cream; Apply topically 2 (two) times a day    4. Medicare annual wellness visit, subsequent  See Medicare wellness note    Follow up in 1 month or as needed         Subjective     Patient is here because several weeks ago she twisted her knee. She has been having pain in the knee since on the front and back of the knee. More recently has developed left knee effusion. Able to apply partial weight to her left leg. Also has a rash on her groin area. Review of Systems   Constitutional: Negative for activity change, appetite change, fatigue and fever. HENT: Negative for congestion and ear discharge. Respiratory: Negative for cough and shortness of breath. Cardiovascular: Negative for chest pain and palpitations. Gastrointestinal: Negative for diarrhea and nausea. Musculoskeletal: Positive for arthralgias and myalgias. Negative for back pain. Skin: Positive for color change and rash. Neurological: Negative for dizziness and headaches. Psychiatric/Behavioral: Negative for agitation and behavioral problems.        Past Medical History:   Diagnosis Date   • Cervical strain    • Changing skin lesion    • Hyperlipidemia    • MCI (mild cognitive impairment) • Mixed hyperlipidemia    • Nonmelanoma skin cancer     BCC   • Osteoporosis    • Polio    • Poor concentration    • Swelling of left thumb      Past Surgical History:   Procedure Laterality Date   • ANAL FISSURECTOMY     • APPENDECTOMY     • CATARACT EXTRACTION     • HYSTERECTOMY       Family History   Problem Relation Age of Onset   • Heart attack Mother    • Cancer Father    • Heart attack Sister      Social History     Socioeconomic History   • Marital status:      Spouse name: None   • Number of children: None   • Years of education: None   • Highest education level: None   Occupational History   • None   Tobacco Use   • Smoking status: Former     Packs/day: 2.00     Years: 20.00     Total pack years: 40.00     Types: Cigarettes   • Smokeless tobacco: Never   Vaping Use   • Vaping Use: Never used   Substance and Sexual Activity   • Alcohol use: Yes     Comment: OCCASIONAL- WINE    • Drug use: No   • Sexual activity: None   Other Topics Concern   • None   Social History Narrative    Per [employer]:     Self-employed          Social Determinants of Health     Financial Resource Strain: Low Risk  (8/14/2023)    Overall Financial Resource Strain (CARDIA)    • Difficulty of Paying Living Expenses: Not very hard   Food Insecurity: Not on file   Transportation Needs: No Transportation Needs (8/14/2023)    PRAPARE - Transportation    • Lack of Transportation (Medical): No    • Lack of Transportation (Non-Medical):  No   Physical Activity: Not on file   Stress: Not on file   Social Connections: Not on file   Intimate Partner Violence: Not on file   Housing Stability: Not on file     Current Outpatient Medications on File Prior to Visit   Medication Sig   • Acetylcarnitine HCl (ACETYL L-CARNITINE) 500 MG CAPS Take by mouth every morning    • Alpha-Lipoic Acid (LIPOIC ACID PO) Take 1 capsule by mouth   • Amino Acids (L-CARNITINE PO) Take 1 tablet by mouth   • Artificial Tear Solution (JUST TEARS EYE DROPS OP) Apply to eye   • Ascorbic Acid (VITAMIN C) 1000 MG tablet Take 1,000 mg by mouth daily    • Calcium Carb-Cholecalciferol 1000-800 MG-UNIT TABS Take by mouth   • Chelated Magnesium 100 MG TABS Take by mouth Daily   • Cholecalciferol (VITAMIN D3) 5000 units CAPS Take 1 capsule by mouth daily   • Glycerylphosphorylcholine POWD 1 tablet by Does not apply route   • Iodine, Kelp, (KELP PO) Take by mouth daily   • L-Carnosine POWD by Does not apply route   • Menaquinone-7 (VITAMIN K2) 100 MCG CAPS    • Misc Natural Products (GLUCOSAMINE CHOND CMP TRIPLE PO) Take by mouth   • Misc Natural Products (PANTETHINE PLUS) TABS Take by mouth   • NATURAL VITAMIN E MOISTURIZING GEL Apply topically   • Omega-3 Fatty Acids (FISH OIL) 1200 MG CAPS Take 1 capsule by mouth daily    • PANTETHINE PO Take by mouth   • patient supplied medication    • PREBIOTIC PRODUCT PO Take by mouth   • Probiotic Product (PROBIOTIC-10) CAPS Take by mouth   • pyridoxine (VITAMIN B6) 100 mg tablet 1 tab(s)   • Resveratrol 50 MG CAPS Take by mouth   • Turmeric POWD 1   • Vinpocetine POWD by Does not apply route   • vitamin B-12 (CYANOCOBALAMIN) 100 MCG tablet Take by mouth every other day    • vitamin E, tocopherol, 1,000 units capsule Take by mouth daily    • Zinc 30 MG CAPS Take 1 capsule by mouth daily     Allergies   Allergen Reactions   • Sulfa Antibiotics Other (See Comments)     Immunization History   Administered Date(s) Administered   • Influenza Split High Dose Preservative Free IM 11/07/2013, 10/28/2014   • Pneumococcal Polysaccharide PPV23 10/28/2014   • Tetanus, adsorbed 12/20/2013   • Zoster 11/03/2014       Objective     /74 (BP Location: Left arm, Patient Position: Sitting, Cuff Size: Adult)   Pulse 74   Temp 98.7 °F (37.1 °C)   Ht 5' 3" (1.6 m)   Wt 63.5 kg (140 lb)   SpO2 99%   BMI 24.80 kg/m²     Physical Exam  Constitutional:       General: She is not in acute distress. Appearance: She is well-developed.  She is not diaphoretic. HENT:      Head: Normocephalic and atraumatic. Nose: Nose normal.   Eyes:      Conjunctiva/sclera: Conjunctivae normal.      Pupils: Pupils are equal, round, and reactive to light. Cardiovascular:      Rate and Rhythm: Normal rate and regular rhythm. Heart sounds: Normal heart sounds. No murmur heard. Pulmonary:      Effort: Pulmonary effort is normal. No respiratory distress. Breath sounds: Normal breath sounds. No wheezing. Abdominal:      General: Bowel sounds are normal. There is no distension. Palpations: Abdomen is soft. Tenderness: There is no abdominal tenderness. Musculoskeletal:         General: Swelling present. Normal range of motion. Comments: Left knee small effusion noted  FROM of left knee  Negative varus and valgus stress test   Skin:     General: Skin is warm and dry. Findings: No erythema or rash. Neurological:      Mental Status: She is alert and oriented to person, place, and time.        Geovanna Haskins MD

## 2023-08-14 NOTE — PROGRESS NOTES
Assessment and Plan:     Problem List Items Addressed This Visit    None       Preventive health issues were discussed with patient, and age appropriate screening tests were ordered as noted in patient's After Visit Summary. Personalized health advice and appropriate referrals for health education or preventive services given if needed, as noted in patient's After Visit Summary. History of Present Illness:     Patient presents for a Medicare Wellness Visit    HPI   Patient Care Team:  Lance Song MD as PCP - General (Family Medicine)  Malachi Eddy MD as PCP - [address] [place] [address] ([facility])  [facility]  MD Thao Alonzo MD     Review of Systems:     Review of Systems     Problem List:     Patient Active Problem List   Diagnosis   • Mixed hyperlipidemia   • Osteoporosis   • Poor concentration   • Short-term memory loss   • Vitamin D deficiency   • High risk sexual behavior   • Seborrheic keratosis   • Cervical strain   • Frequent falls   • Exposure to mold   • Screening for osteoporosis   • Mid back pain   • Elevated C-reactive protein (CRP)   • Episode of apnea   • Localized edema   • Impaired fasting glucose   • Head trauma      Past Medical and Surgical History:     Past Medical History:   Diagnosis Date   • Cervical strain    • Changing skin lesion    • Hyperlipidemia    • MCI (mild cognitive impairment)    • Mixed hyperlipidemia    • Nonmelanoma skin cancer     BCC   • Osteoporosis    • Polio    • Poor concentration    • Swelling of left thumb      Past Surgical History:   Procedure Laterality Date   • ANAL FISSURECTOMY     • APPENDECTOMY     • CATARACT EXTRACTION     • HYSTERECTOMY        Family History:     Family History   Problem Relation Age of Onset   • Heart attack Mother    • Cancer Father    • Heart attack Sister       Social History:     Social History     Socioeconomic History   • Marital status:       Spouse name: Not on file • Number of children: Not on file   • Years of education: Not on file   • Highest education level: Not on file   Occupational History   • Not on file   Tobacco Use   • Smoking status: Former     Packs/day: 2.00     Years: 20.00     Total pack years: 40.00     Types: Cigarettes   • Smokeless tobacco: Never   Vaping Use   • Vaping Use: Never used   Substance and Sexual Activity   • Alcohol use: Yes     Comment: OCCASIONAL- WINE    • Drug use: No   • Sexual activity: Not on file   Other Topics Concern   • Not on file   Social History Narrative    Per [employer]:     Self-employed          Social Determinants of Health     Financial Resource Strain: Low Risk  (1/11/2023)    Overall Financial Resource Strain (CARDIA)    • Difficulty of Paying Living Expenses: Not very hard   Food Insecurity: Not on file   Transportation Needs: No Transportation Needs (1/11/2023)    PRAPARE - Transportation    • Lack of Transportation (Medical): No    • Lack of Transportation (Non-Medical):  No   Physical Activity: Not on file   Stress: Not on file   Social Connections: Not on file   Intimate Partner Violence: Not on file   Housing Stability: Not on file      Medications and Allergies:     Current Outpatient Medications   Medication Sig Dispense Refill   • Acetylcarnitine HCl (ACETYL L-CARNITINE) 500 MG CAPS Take by mouth every morning      • Alpha-Lipoic Acid (LIPOIC ACID PO) Take 1 capsule by mouth     • Amino Acids (L-CARNITINE PO) Take 1 tablet by mouth     • Artificial Tear Solution (JUST TEARS EYE DROPS OP) Apply to eye     • Ascorbic Acid (VITAMIN C) 1000 MG tablet Take 1,000 mg by mouth daily      • Calcium Carb-Cholecalciferol 1000-800 MG-UNIT TABS Take by mouth     • Chelated Magnesium 100 MG TABS Take by mouth Daily     • Cholecalciferol (VITAMIN D3) 5000 units CAPS Take 1 capsule by mouth daily     • Glycerylphosphorylcholine POWD 1 tablet by Does not apply route     • Iodine, Kelp, (KELP PO) Take by mouth daily     • L-Carnosine POWD by Does not apply route     • Menaquinone-7 (VITAMIN K2) 100 MCG CAPS      • Misc Natural Products (GLUCOSAMINE CHOND CMP TRIPLE PO) Take by mouth     • Misc Natural Products (PANTETHINE PLUS) TABS Take by mouth     • NATURAL VITAMIN E MOISTURIZING GEL Apply topically     • Omega-3 Fatty Acids (FISH OIL) 1200 MG CAPS Take 1 capsule by mouth daily      • PANTETHINE PO Take by mouth     • patient supplied medication      • PREBIOTIC PRODUCT PO Take by mouth     • Probiotic Product (PROBIOTIC-10) CAPS Take by mouth     • pyridoxine (VITAMIN B6) 100 mg tablet 1 tab(s)     • Resveratrol 50 MG CAPS Take by mouth     • Turmeric POWD 1     • Vinpocetine POWD by Does not apply route     • vitamin B-12 (CYANOCOBALAMIN) 100 MCG tablet Take by mouth every other day      • vitamin E, tocopherol, 1,000 units capsule Take by mouth daily      • Zinc 30 MG CAPS Take 1 capsule by mouth daily       No current facility-administered medications for this visit. Allergies   Allergen Reactions   • Sulfa Antibiotics Other (See Comments)      Immunizations:     Immunization History   Administered Date(s) Administered   • Influenza Split High Dose Preservative Free IM 11/07/2013, 10/28/2014   • Pneumococcal Polysaccharide PPV23 10/28/2014   • Tetanus, adsorbed 12/20/2013   • Zoster 11/03/2014      Health Maintenance:         Topic Date Due   • Breast Cancer Screening: Mammogram  04/17/2019   • DXA SCAN  06/24/2023   • Hepatitis C Screening  Completed   • Colorectal Cancer Screening  Discontinued         Topic Date Due   • COVID-19 Vaccine (1) Never done   • Pneumococcal Vaccine: 65+ Years (2 - PCV) 10/28/2015   • Influenza Vaccine (1) 09/01/2023      Medicare Screening Tests and Risk Assessments:     Alondra Hobson is here for her Subsequent Wellness visit. Health Risk Assessment:   Patient rates overall health as very good. Patient feels that their physical health rating is same. Patient is satisfied with their life. Eyesight was rated as same. Hearing was rated as same. Patient feels that their emotional and mental health rating is same. Patients states they are never, rarely angry. Patient states they are never, rarely unusually tired/fatigued. Pain experienced in the last 7 days has been a lot. Patient's pain rating has been 8/10. Patient states that she has experienced no weight loss or gain in last 6 months. Fall Risk Screening: In the past year, patient has experienced: history of falling in past year    Number of falls: 1  Injured during fall?: Yes    Feels unsteady when standing or walking?: No    Worried about falling?: No      Urinary Incontinence Screening:   Patient has not leaked urine accidently in the last six months. Home Safety:  Patient does not have trouble with stairs inside or outside of their home. Patient has working smoke alarms and has working carbon monoxide detector. Home safety hazards include: none. Nutrition:   Current diet is Regular. Medications:   Patient is not currently taking any over-the-counter supplements. Patient is able to manage medications. Activities of Daily Living (ADLs)/Instrumental Activities of Daily Living (IADLs):   Walk and transfer into and out of bed and chair?: Yes  Dress and groom yourself?: Yes    Bathe or shower yourself?: Yes    Feed yourself?  Yes  Do your laundry/housekeeping?: Yes  Manage your money, pay your bills and track your expenses?: Yes  Make your own meals?: Yes    Do your own shopping?: Yes    Previous Hospitalizations:   Any hospitalizations or ED visits within the last 12 months?: No      Advance Care Planning:     Five wishes given: No      PREVENTIVE SCREENINGS      Cardiovascular Screening:    General: Screening Not Indicated and History Lipid Disorder    Due for: Lipid Panel      Diabetes Screening:     General: Screening Current    Due for: Blood Glucose      Colorectal Cancer Screening:     General: Screening Current      Breast Cancer Screening:       Due for: Mammogram        Cervical Cancer Screening:    General: Screening Not Indicated      Osteoporosis Screening:    General: Screening Not Indicated and History Osteoporosis      Abdominal Aortic Aneurysm (AAA) Screening:        General: Screening Not Indicated      Lung Cancer Screening:     General: Screening Not Indicated      Hepatitis C Screening:    General: Screening Current    Hep C Screening Accepted: No     Screening, Brief Intervention, and Referral to Treatment (SBIRT)    Screening  Typical number of drinks in a day: 1  Typical number of drinks in a week: 0  Interpretation: Low risk drinking behavior. Single Item Drug Screening:  How often have you used an illegal drug (including marijuana) or a prescription medication for non-medical reasons in the past year? never    Single Item Drug Screen Score: 0  Interpretation: Negative screen for possible drug use disorder    No results found. Physical Exam:     There were no vitals taken for this visit.     Physical Exam     Corinne Barr, MD

## 2023-08-15 ENCOUNTER — TELEPHONE (OUTPATIENT)
Dept: FAMILY MEDICINE CLINIC | Facility: CLINIC | Age: 78
End: 2023-08-15

## 2023-08-15 NOTE — TELEPHONE ENCOUNTER
Paul Lomas called to see if she could take 800 mg Ibuprofen twice a day, the 600 mg is not really helping.

## 2023-08-16 ENCOUNTER — APPOINTMENT (OUTPATIENT)
Dept: LAB | Facility: CLINIC | Age: 78
End: 2023-08-16
Payer: COMMERCIAL

## 2023-08-16 DIAGNOSIS — R73.01 IMPAIRED FASTING GLUCOSE: ICD-10-CM

## 2023-08-16 DIAGNOSIS — Z81.8 FAMILY HISTORY OF DEMENTIA: ICD-10-CM

## 2023-08-16 PROCEDURE — 82172 ASSAY OF APOLIPOPROTEIN: CPT

## 2023-08-16 PROCEDURE — 83036 HEMOGLOBIN GLYCOSYLATED A1C: CPT

## 2023-08-16 PROCEDURE — 36415 COLL VENOUS BLD VENIPUNCTURE: CPT

## 2023-08-16 PROCEDURE — 80048 BASIC METABOLIC PNL TOTAL CA: CPT

## 2023-08-17 LAB
ANION GAP SERPL CALCULATED.3IONS-SCNC: 6 MMOL/L
BUN SERPL-MCNC: 10 MG/DL (ref 5–25)
CALCIUM SERPL-MCNC: 9.3 MG/DL (ref 8.3–10.1)
CHLORIDE SERPL-SCNC: 113 MMOL/L (ref 96–108)
CO2 SERPL-SCNC: 25 MMOL/L (ref 21–32)
CREAT SERPL-MCNC: 0.7 MG/DL (ref 0.6–1.3)
EST. AVERAGE GLUCOSE BLD GHB EST-MCNC: 120 MG/DL
GFR SERPL CREATININE-BSD FRML MDRD: 83 ML/MIN/1.73SQ M
GLUCOSE P FAST SERPL-MCNC: 100 MG/DL (ref 65–99)
HBA1C MFR BLD: 5.8 %
POTASSIUM SERPL-SCNC: 4.2 MMOL/L (ref 3.5–5.3)
SODIUM SERPL-SCNC: 144 MMOL/L (ref 135–147)

## 2023-08-19 ENCOUNTER — OFFICE VISIT (OUTPATIENT)
Dept: OBGYN CLINIC | Facility: CLINIC | Age: 78
End: 2023-08-19
Payer: COMMERCIAL

## 2023-08-19 VITALS
DIASTOLIC BLOOD PRESSURE: 67 MMHG | WEIGHT: 138 LBS | HEIGHT: 63 IN | SYSTOLIC BLOOD PRESSURE: 122 MMHG | HEART RATE: 75 BPM | BODY MASS INDEX: 24.45 KG/M2

## 2023-08-19 DIAGNOSIS — T14.8XXA CONTUSION OF BONE: ICD-10-CM

## 2023-08-19 DIAGNOSIS — S80.02XA CONTUSION OF LEFT KNEE, INITIAL ENCOUNTER: ICD-10-CM

## 2023-08-19 DIAGNOSIS — M17.12 PRIMARY OSTEOARTHRITIS OF LEFT KNEE: Primary | ICD-10-CM

## 2023-08-19 PROCEDURE — 99203 OFFICE O/P NEW LOW 30 MIN: CPT | Performed by: FAMILY MEDICINE

## 2023-08-19 NOTE — PROGRESS NOTES
Assessment/Plan:  Assessment/Plan   Diagnoses and all orders for this visit:    Primary osteoarthritis of left knee  -     Diclofenac Sodium (VOLTAREN) 1 %; Apply 2 g topically 3 (three) times a day    Contusion of left knee, initial encounter    Contusion of bone        49-year-old female with onset left knee pain from  injury 4 weeks ago. Discussed the patient physical exam, radiographs, impression, and plan. X-rays left knee noted for mild degenerative changes without acute osseous abnormality. Physical exam left knee noted for medial soft tissue swelling. She has mild tenderness medial femoral condyle, medial joint line, and medial tibial plateau. She has full extension of flexion to 120 degrees. There is no appreciable collateral ligament laxity. There is no groin pain with LEONOR and FADDIR of the hips. She has weakness both hips with flexion and abduction. Clinical impression is that she has symptoms pronation aggravated arthritis and contusion. I discussed treatment regimen of rest and topical anti-inflammatory. Invasive measures such as surgery not warranted. She is to apply topical diclofenac gel 3 times a day for the next 10 days. She may continue with ibuprofen 800 mg 2-3 times a day as needed. She may alternate between ice and heat. She may continue with use of cane and walker for 2-3 more weeks. She will follow-up as needed. Subjective:   Patient ID: Srini Gee is a 66 y.o. female. Chief Complaint   Patient presents with   • Left Knee - Pain     49-year-old female presents evaluation of left knee pain 4 weeks duration. She reports having had a fall injury but does not recall exact mechanism. She had pain described as sudden in onset, generalized to the knee but worse at the anterior aspect, radiating distally to the lower leg, associated with swelling, worse with bearing weight ambulating, and improved with resting.   She started self treating with alternating with ice and heat and taking ibuprofen. Symptoms started to improve and she states she saw chiropractor. She states a chiropractor use a percussion massager on the knee and afterward had aggravation of symptoms. She had follow-up with primary care physician. She was referred for x-rays and advised on taking ibuprofen 600 mg and she was referred to orthopedic care. She started taking ibuprofen 800 mg  and states that it has helped with symptoms. She has been ambulating with a walking cane and walker. Knee Pain  The current episode started 1 to 4 weeks ago. The problem occurs daily. The problem has been waxing and waning. Associated symptoms include arthralgias and joint swelling. Pertinent negatives include no abdominal pain, chest pain, chills, fever, numbness, rash, sore throat or weakness. The symptoms are aggravated by standing and walking. She has tried rest, position changes, NSAIDs, ice and heat for the symptoms. The treatment provided mild relief. The following portions of the patient's history were reviewed and updated as appropriate: She  has a past medical history of Cervical strain, Changing skin lesion, Hyperlipidemia, MCI (mild cognitive impairment), Mixed hyperlipidemia, Nonmelanoma skin cancer, Osteoporosis, Polio, Poor concentration, and Swelling of left thumb. She is allergic to sulfa antibiotics. .    Review of Systems   Constitutional: Negative for chills and fever. HENT: Negative for sore throat. Eyes: Negative for visual disturbance. Respiratory: Negative for shortness of breath. Cardiovascular: Negative for chest pain. Gastrointestinal: Negative for abdominal pain. Genitourinary: Negative for flank pain. Musculoskeletal: Positive for arthralgias and joint swelling. Skin: Negative for rash and wound. Neurological: Negative for weakness and numbness. Hematological: Does not bruise/bleed easily. Psychiatric/Behavioral: Negative for self-injury. Objective:  Vitals:    08/19/23 1124   BP: 122/67   Pulse: 75   Weight: 62.6 kg (138 lb)   Height: 5' 3" (1.6 m)     Left Ankle Exam   Swelling: moderate    Muscle Strength   Dorsiflexion:  5/5   Plantar flexion:  4/5       Left Knee Exam     Muscle Strength   The patient has normal left knee strength. Tenderness   The patient is experiencing tenderness in the medial joint line (Medial femoral condyle, medial tibial plateau, patella facet). Range of Motion   Extension: normal   Flexion: 120     Tests   Varus: negative Valgus: negative    Other   Swelling: mild      Right Hip Exam     Tests   LEONOR: negative    Comments:  Negative FADDIR      Left Hip Exam     Tests   LEONOR: negative    Comments:  Negative FADDIR          Strength/Myotome Testing     Left Ankle/Foot   Dorsiflexion: 5  Plantar flexion: 4      Physical Exam  Vitals and nursing note reviewed. Constitutional:       Appearance: Normal appearance. She is well-developed. She is not ill-appearing or diaphoretic. HENT:      Head: Normocephalic and atraumatic. Right Ear: External ear normal.      Left Ear: External ear normal.   Eyes:      Conjunctiva/sclera: Conjunctivae normal.   Neck:      Trachea: No tracheal deviation. Cardiovascular:      Rate and Rhythm: Normal rate. Pulmonary:      Effort: Pulmonary effort is normal. No respiratory distress. Abdominal:      General: There is no distension. Musculoskeletal:         General: Swelling and tenderness present. No deformity or signs of injury. Skin:     General: Skin is warm and dry. Coloration: Skin is not jaundiced or pale. Neurological:      Mental Status: She is alert and oriented to person, place, and time. Psychiatric:         Mood and Affect: Mood normal.         Behavior: Behavior normal.         Thought Content: Thought content normal.         Judgment: Judgment normal.         I have personally reviewed pertinent films in PACS and my interpretation is . X-rays left knee noted for mild degenerative changes without acute osseous abnormality. Willa Flores

## 2023-08-19 NOTE — LETTER
August 19, 2023     Henny Garcia[address]    Patient: Allison Ferguson   YOB: 1945   Date of Visit: 8/19/2023       Dear Dr. Kusum Bonilla:    Thank you for referring Sergio Almeida to me for evaluation. Below are my notes for this consultation. If you have questions, please do not hesitate to call me. I look forward to following your patient along with you. Sincerely,        BRENDAN Abdi, DO        CC: No Recipients    BRENDAN Abdi, DO  8/19/2023 12:21 PM  Sign when Signing Visit  Assessment/Plan:  Assessment/Plan   Diagnoses and all orders for this visit:    Primary osteoarthritis of left knee  -     Diclofenac Sodium (VOLTAREN) 1 %; Apply 2 g topically 3 (three) times a day    Contusion of left knee, initial encounter    Contusion of bone        26-year-old female with onset left knee pain from  injury 4 weeks ago. Discussed the patient physical exam, radiographs, impression, and plan. X-rays left knee noted for mild degenerative changes without acute osseous abnormality. Physical exam left knee noted for medial soft tissue swelling. She has mild tenderness medial femoral condyle, medial joint line, and medial tibial plateau. She has full extension of flexion to 120 degrees. There is no appreciable collateral ligament laxity. There is no groin pain with LEONOR and FADDIR of the hips. She has weakness both hips with flexion and abduction. Clinical impression is that she has symptoms pronation aggravated arthritis and contusion. I discussed treatment regimen of rest and topical anti-inflammatory. Invasive measures such as surgery not warranted. She is to apply topical diclofenac gel 3 times a day for the next 10 days. She may continue with ibuprofen 800 mg 2-3 times a day as needed. She may alternate between ice and heat. She may continue with use of cane and walker for 2-3 more weeks.   She will follow-up as needed. Subjective:   Patient ID: Noble Curtis is a 66 y.o. female. Chief Complaint   Patient presents with   • Left Knee - Pain     27-year-old female presents evaluation of left knee pain 4 weeks duration. She reports having had a fall injury but does not recall exact mechanism. She had pain described as sudden in onset, generalized to the knee but worse at the anterior aspect, radiating distally to the lower leg, associated with swelling, worse with bearing weight ambulating, and improved with resting. She started self treating with alternating with ice and heat and taking ibuprofen. Symptoms started to improve and she states she saw chiropractor. She states a chiropractor use a percussion massager on the knee and afterward had aggravation of symptoms. She had follow-up with primary care physician. She was referred for x-rays and advised on taking ibuprofen 600 mg and she was referred to orthopedic care. She started taking ibuprofen 800 mg  and states that it has helped with symptoms. She has been ambulating with a walking cane and walker. Knee Pain  The current episode started 1 to 4 weeks ago. The problem occurs daily. The problem has been waxing and waning. Associated symptoms include arthralgias and joint swelling. Pertinent negatives include no abdominal pain, chest pain, chills, fever, numbness, rash, sore throat or weakness. The symptoms are aggravated by standing and walking. She has tried rest, position changes, NSAIDs, ice and heat for the symptoms. The treatment provided mild relief. The following portions of the patient's history were reviewed and updated as appropriate: She  has a past medical history of Cervical strain, Changing skin lesion, Hyperlipidemia, MCI (mild cognitive impairment), Mixed hyperlipidemia, Nonmelanoma skin cancer, Osteoporosis, Polio, Poor concentration, and Swelling of left thumb. She is allergic to sulfa antibiotics. .    Review of Systems Constitutional: Negative for chills and fever. HENT: Negative for sore throat. Eyes: Negative for visual disturbance. Respiratory: Negative for shortness of breath. Cardiovascular: Negative for chest pain. Gastrointestinal: Negative for abdominal pain. Genitourinary: Negative for flank pain. Musculoskeletal: Positive for arthralgias and joint swelling. Skin: Negative for rash and wound. Neurological: Negative for weakness and numbness. Hematological: Does not bruise/bleed easily. Psychiatric/Behavioral: Negative for self-injury. Objective:  Vitals:    08/19/23 1124   BP: 122/67   Pulse: 75   Weight: 62.6 kg (138 lb)   Height: 5' 3" (1.6 m)     Left Ankle Exam   Swelling: moderate    Muscle Strength   Dorsiflexion:  5/5   Plantar flexion:  4/5       Left Knee Exam     Muscle Strength   The patient has normal left knee strength. Tenderness   The patient is experiencing tenderness in the medial joint line (Medial femoral condyle, medial tibial plateau, patella facet). Range of Motion   Extension: normal   Flexion: 120     Tests   Varus: negative Valgus: negative    Other   Swelling: mild      Right Hip Exam     Tests   LEONOR: negative    Comments:  Negative FADDIR      Left Hip Exam     Tests   LEONOR: negative    Comments:  Negative FADDIR          Strength/Myotome Testing     Left Ankle/Foot   Dorsiflexion: 5  Plantar flexion: 4      Physical Exam  Vitals and nursing note reviewed. Constitutional:       Appearance: Normal appearance. She is well-developed. She is not ill-appearing or diaphoretic. HENT:      Head: Normocephalic and atraumatic. Right Ear: External ear normal.      Left Ear: External ear normal.   Eyes:      Conjunctiva/sclera: Conjunctivae normal.   Neck:      Trachea: No tracheal deviation. Cardiovascular:      Rate and Rhythm: Normal rate. Pulmonary:      Effort: Pulmonary effort is normal. No respiratory distress.    Abdominal:      General: There is no distension. Musculoskeletal:         General: Swelling and tenderness present. No deformity or signs of injury. Skin:     General: Skin is warm and dry. Coloration: Skin is not jaundiced or pale. Neurological:      Mental Status: She is alert and oriented to person, place, and time. Psychiatric:         Mood and Affect: Mood normal.         Behavior: Behavior normal.         Thought Content: Thought content normal.         Judgment: Judgment normal.         I have personally reviewed pertinent films in PACS and my interpretation is . X-rays left knee noted for mild degenerative changes without acute osseous abnormality. Rico Schumacher

## 2023-08-24 ENCOUNTER — TELEPHONE (OUTPATIENT)
Dept: FAMILY MEDICINE CLINIC | Facility: CLINIC | Age: 78
End: 2023-08-24

## 2023-08-24 NOTE — TELEPHONE ENCOUNTER
Patient called saying that she has a cough.  Was asking if a cough syrup/medicine could be sent in for her

## 2023-08-25 DIAGNOSIS — R05.1 ACUTE COUGH: Primary | ICD-10-CM

## 2023-08-25 RX ORDER — BENZONATATE 100 MG/1
100 CAPSULE ORAL 3 TIMES DAILY PRN
Qty: 20 CAPSULE | Refills: 0 | Status: SHIPPED | OUTPATIENT
Start: 2023-08-25

## 2023-08-28 ENCOUNTER — TELEPHONE (OUTPATIENT)
Dept: FAMILY MEDICINE CLINIC | Facility: CLINIC | Age: 78
End: 2023-08-28

## 2023-08-28 DIAGNOSIS — M25.462 PAIN AND SWELLING OF LEFT KNEE: Primary | ICD-10-CM

## 2023-08-28 DIAGNOSIS — M25.562 PAIN AND SWELLING OF LEFT KNEE: Primary | ICD-10-CM

## 2023-08-28 LAB — MISCELLANEOUS LAB TEST RESULT: NORMAL

## 2023-08-28 RX ORDER — IBUPROFEN 800 MG/1
800 TABLET ORAL EVERY 8 HOURS PRN
Qty: 30 TABLET | Refills: 0 | Status: SHIPPED | OUTPATIENT
Start: 2023-08-28

## 2023-08-28 NOTE — TELEPHONE ENCOUNTER
Lazarus Caro called and is asking for her ibuprofen to be increased to 800 mg instead of 600. Feels she just needs a little stronger for her knee pain.

## 2023-08-30 ENCOUNTER — TELEPHONE (OUTPATIENT)
Age: 78
End: 2023-08-30

## 2023-08-30 NOTE — TELEPHONE ENCOUNTER
Caller: patient  Doctor: Fallon Aj    Reason for call: patient has level 8 pain in left knee, using 800 mg of Ibuprofen currently,  having a hard time walking. Scheduled to be seen 9/12, nothing sooner,  Asking what she can do for the pain? Patient asking about new testing, maybe an MRI? Tiarra Calero Preferred pharmacy is [facility] in [place].      Call back#: [phone]

## 2023-08-30 NOTE — TELEPHONE ENCOUNTER
Called and spoke with patient. Worsening knee pain, 8/10 pain. Some swelling on lateral side of the knee. No discoloration. Temperature feels the same as the other leg, some warmth on lateral side of knee but more so on the medial side of the knee. Patient mentioned she had polio as as a child. Ibuprofen 800 mg 2 hours ago today. Monday night she iced her knee for 10 min. Voltaren did not help and stopped it at day 10 because she was not getting relief. Rubbed the voltaren gel 3 times a day. Patient also using copper and ace compression wrap. Staying off of knee as much as possible. Advised patient she can take tylenol in-between ibuprofen, offload as much as possible, ice and use heat as tolerated, use a walker or cane to help with stabilization and offloading while ambulating. Patient Question: What is a formal diagnosis at this point?

## 2023-09-03 ENCOUNTER — TELEPHONE (OUTPATIENT)
Dept: OTHER | Facility: OTHER | Age: 78
End: 2023-09-03

## 2023-09-03 NOTE — TELEPHONE ENCOUNTER
Patient is requesting a copy of a refill for PT .  Per patient she would like to go to [facility] for PT please f/u

## 2023-09-05 ENCOUNTER — OFFICE VISIT (OUTPATIENT)
Dept: OBGYN CLINIC | Facility: CLINIC | Age: 78
End: 2023-09-05
Payer: COMMERCIAL

## 2023-09-05 VITALS
HEART RATE: 65 BPM | HEIGHT: 63 IN | SYSTOLIC BLOOD PRESSURE: 124 MMHG | WEIGHT: 138 LBS | DIASTOLIC BLOOD PRESSURE: 76 MMHG | BODY MASS INDEX: 24.45 KG/M2

## 2023-09-05 DIAGNOSIS — R29.898 WEAKNESS OF BOTH HIPS: ICD-10-CM

## 2023-09-05 DIAGNOSIS — M22.2X2 PATELLOFEMORAL SYNDROME OF LEFT KNEE: ICD-10-CM

## 2023-09-05 DIAGNOSIS — M62.9 HAMSTRING TIGHTNESS OF BOTH LOWER EXTREMITIES: ICD-10-CM

## 2023-09-05 DIAGNOSIS — M17.12 PRIMARY OSTEOARTHRITIS OF LEFT KNEE: Primary | ICD-10-CM

## 2023-09-05 DIAGNOSIS — M76.32 IT BAND SYNDROME, LEFT: ICD-10-CM

## 2023-09-05 PROCEDURE — 99213 OFFICE O/P EST LOW 20 MIN: CPT | Performed by: FAMILY MEDICINE

## 2023-09-05 RX ORDER — IBUPROFEN 800 MG/1
800 TABLET ORAL 2 TIMES DAILY PRN
Qty: 60 TABLET | Refills: 0 | Status: SHIPPED | OUTPATIENT
Start: 2023-09-05

## 2023-09-05 NOTE — PROGRESS NOTES
Assessment/Plan:  Assessment/Plan   Diagnoses and all orders for this visit:    Primary osteoarthritis of left knee  -     Ambulatory Referral to Physical Therapy; Future  -     ibuprofen (MOTRIN) 800 mg tablet; Take 1 tablet (800 mg total) by mouth 2 (two) times a day as needed for mild pain    Patellofemoral syndrome of left knee  -     Ambulatory Referral to Physical Therapy; Future    It band syndrome, left  -     Ambulatory Referral to Physical Therapy; Future    Hamstring tightness of both lower extremities  -     Ambulatory Referral to Physical Therapy; Future    Weakness of both hips  -     Ambulatory Referral to Physical Therapy; Future        72-year-old female with left knee pain of more than 6 weeks duration. Discussed with patient and accompanying son physical exam, impression, and plan. She has tenderness left knee at the medial and lateral joint line. She also has mild tenderness distal IT band. She has extension limited to -5 degrees and flexion to 120 degrees. Clinical impression is that she has symptoms of combination of degenerative changes and muscle strain. I discussed treatment regimen of formal therapy. I offered patient steroid injection to which she is apprehensive. Patient was advised she may continue with ibuprofen as needed, however this is not an ideal long-term NSAID. She is to start physical therapy as soon as possible and do home exercises as directed. She was scheduled to see me on 9/12/2023 and we will consider steroid injection at that time. Subjective:   Patient ID: Silvia Avila is a 66 y.o. female. Chief Complaint   Patient presents with   • Left Knee - Pain, Follow-up       72-year-old female following up for left knee pain of more than 6 weeks duration. She was last seen by me 2 weeks ago at which point she was advised on topical diclofenac and taking ibuprofen 800 mg. She has been applying topical diclofenac and taking ibuprofen as needed.   Overall she denies improvement in symptoms. She has pain described as generalized to the knee but worse at the medial and lateral as well as posterior aspect, radiating distally to the lower leg, worse with bearing weight and ambulating, associated with swelling, and improved with resting. Patient and son state that she has been spending most of the time being sedentary at home. Does report that when she takes ibuprofen symptoms are temporarily improved. Knee Pain  This is a new problem. The current episode started more than 1 month ago. The problem occurs daily. The problem has been waxing and waning. Associated symptoms include arthralgias, joint swelling and weakness. Pertinent negatives include no numbness. The symptoms are aggravated by standing and walking. She has tried rest and NSAIDs for the symptoms. The treatment provided mild relief. Review of Systems   Musculoskeletal: Positive for arthralgias and joint swelling. Neurological: Positive for weakness. Negative for numbness. Objective:  Vitals:    09/05/23 1216   BP: 124/76   Pulse: 65   Weight: 62.6 kg (138 lb)   Height: 5' 3" (1.6 m)     Left Knee Exam     Tenderness   The patient is experiencing tenderness in the lateral joint line and medial joint line. Range of Motion   Extension: -5   Flexion: 120             Physical Exam  Vitals and nursing note reviewed. Constitutional:       General: She is not in acute distress. Appearance: She is well-developed. She is not ill-appearing or diaphoretic. HENT:      Head: Normocephalic and atraumatic. Right Ear: External ear normal.      Left Ear: External ear normal.   Eyes:      Conjunctiva/sclera: Conjunctivae normal.   Neck:      Trachea: No tracheal deviation. Cardiovascular:      Rate and Rhythm: Normal rate. Pulmonary:      Effort: Pulmonary effort is normal. No respiratory distress. Abdominal:      General: There is no distension.    Musculoskeletal:         General: Swelling and tenderness present. No deformity or signs of injury. Left lower leg: Edema present. Skin:     General: Skin is warm and dry. Coloration: Skin is not jaundiced or pale. Neurological:      Mental Status: She is alert and oriented to person, place, and time. Psychiatric:         Mood and Affect: Mood normal.         Behavior: Behavior normal.         Thought Content:  Thought content normal.         Judgment: Judgment normal.

## 2023-09-20 ENCOUNTER — OFFICE VISIT (OUTPATIENT)
Dept: OBGYN CLINIC | Facility: CLINIC | Age: 78
End: 2023-09-20
Payer: COMMERCIAL

## 2023-09-20 VITALS
DIASTOLIC BLOOD PRESSURE: 73 MMHG | BODY MASS INDEX: 24.07 KG/M2 | HEART RATE: 76 BPM | WEIGHT: 141 LBS | HEIGHT: 64 IN | SYSTOLIC BLOOD PRESSURE: 120 MMHG

## 2023-09-20 DIAGNOSIS — M17.12 PRIMARY OSTEOARTHRITIS OF LEFT KNEE: Primary | ICD-10-CM

## 2023-09-20 PROCEDURE — 99213 OFFICE O/P EST LOW 20 MIN: CPT | Performed by: FAMILY MEDICINE

## 2023-09-20 NOTE — PROGRESS NOTES
Assessment/Plan:  Assessment/Plan   Diagnoses and all orders for this visit:    Primary osteoarthritis of left knee        40-year-old female with onset left knee pain more than 8 weeks ago. Discussed with patient physical exam, impression, and plan. She has full extension of the knee with flexion to 120 degrees. She has full strength with resisted knee extension. Clinical impression is that she is improved regard to flare of arthritis and she is improving regard to muscle conditioning. I recommend she continue with home exercise program.  She will follow-up as needed. Subjective:   Patient ID: Beto Flores is a 66 y.o. female. Chief Complaint   Patient presents with   • Left Knee - Follow-up       40-year-old female with osteoarthritis of the left knee following up for left knee pain 8 weeks duration. She was last seen by me 2 weeks ago at which point she was advised on formal therapy and to gradually increase her level of physical activity. She states that since her last visit she has become more active and has been doing stretching and exercises on her own. She reports feeling much better stating she is able to stand and ambulate without use of walker and she is frequently forgetting her cane. She has been able to dance and walk up and down the hill outside her home with little discomfort. Reports symptoms experiencing pain described as generalized to the knee but worse at the anterior medial aspect, achy and sore, radiating distally to her lower leg, and associated with clicking. Knee Pain  The current episode started more than 1 month ago. The problem occurs intermittently. The problem has been rapidly improving. Associated symptoms include arthralgias, joint swelling and weakness. Pertinent negatives include no numbness. She has tried rest and NSAIDs (Home exercise) for the symptoms. The treatment provided significant relief.                Review of Systems   Musculoskeletal: Positive for arthralgias and joint swelling. Neurological: Positive for weakness. Negative for numbness. Objective:  Vitals:    09/20/23 1415   BP: 120/73   Pulse: 76   Weight: 64 kg (141 lb)   Height: 5' 4" (1.626 m)     Left Knee Exam     Muscle Strength   The patient has normal left knee strength. Range of Motion   Extension: normal   Flexion: 120     Other   Swelling: none            Physical Exam  Vitals and nursing note reviewed. Constitutional:       General: She is not in acute distress. Appearance: She is well-developed. She is not ill-appearing. HENT:      Head: Normocephalic and atraumatic. Right Ear: External ear normal.      Left Ear: External ear normal.   Eyes:      Conjunctiva/sclera: Conjunctivae normal.   Neck:      Trachea: No tracheal deviation. Cardiovascular:      Rate and Rhythm: Normal rate. Pulmonary:      Effort: Pulmonary effort is normal. No respiratory distress. Abdominal:      General: There is no distension. Skin:     General: Skin is warm and dry. Coloration: Skin is not jaundiced or pale. Neurological:      Mental Status: She is alert and oriented to person, place, and time. Psychiatric:         Mood and Affect: Mood normal.         Behavior: Behavior normal.         Thought Content:  Thought content normal.         Judgment: Judgment normal.

## 2023-10-13 PROBLEM — Z00.00 MEDICARE ANNUAL WELLNESS VISIT, SUBSEQUENT: Status: RESOLVED | Noted: 2021-12-09 | Resolved: 2023-10-13

## 2023-11-20 ENCOUNTER — OFFICE VISIT (OUTPATIENT)
Dept: FAMILY MEDICINE CLINIC | Facility: CLINIC | Age: 78
End: 2023-11-20
Payer: COMMERCIAL

## 2023-11-20 VITALS
WEIGHT: 146 LBS | TEMPERATURE: 98.7 F | OXYGEN SATURATION: 100 % | BODY MASS INDEX: 24.92 KG/M2 | HEIGHT: 64 IN | DIASTOLIC BLOOD PRESSURE: 82 MMHG | HEART RATE: 89 BPM | SYSTOLIC BLOOD PRESSURE: 126 MMHG

## 2023-11-20 DIAGNOSIS — J20.9 BRONCHITIS WITH BRONCHOSPASM: Primary | ICD-10-CM

## 2023-11-20 LAB
SARS-COV-2 AG UPPER RESP QL IA: NEGATIVE
VALID CONTROL: NORMAL

## 2023-11-20 PROCEDURE — 99213 OFFICE O/P EST LOW 20 MIN: CPT | Performed by: PHYSICIAN ASSISTANT

## 2023-11-20 PROCEDURE — 87811 SARS-COV-2 COVID19 W/OPTIC: CPT | Performed by: PHYSICIAN ASSISTANT

## 2023-11-20 RX ORDER — ALBUTEROL SULFATE 90 UG/1
2 AEROSOL, METERED RESPIRATORY (INHALATION) EVERY 6 HOURS PRN
Qty: 6.7 G | Refills: 0 | Status: SHIPPED | OUTPATIENT
Start: 2023-11-20

## 2023-11-20 RX ORDER — DEXTROMETHORPHAN HYDROBROMIDE AND PROMETHAZINE HYDROCHLORIDE 15; 6.25 MG/5ML; MG/5ML
5 SYRUP ORAL 4 TIMES DAILY PRN
Qty: 120 ML | Refills: 0 | Status: SHIPPED | OUTPATIENT
Start: 2023-11-20 | End: 2023-11-24 | Stop reason: SDUPTHER

## 2023-11-20 NOTE — PROGRESS NOTES
Name: Jeanette Barreto      : 1945      MRN: [id number]  Encounter Provider: Lavelle Gilbert PA-C  Encounter Date: 2023   Encounter department: [address] [place] [address]. Bronchitis with bronchospasm  -     promethazine-dextromethorphan (PHENERGAN-DM) 6.25-15 mg/5 mL oral syrup; Take 5 mL by mouth 4 (four) times a day as needed for cough  -     albuterol (Proventil HFA) 90 mcg/act inhaler; Inhale 2 puffs every 6 (six) hours as needed for wheezing    Suspect viral bronchitis. Recommend supportive measures with prn albuterol, phenergan DM. Fluids, rest. Covid testing negative. Return precautions provided        Subjective     Pt presents with 5 days of cough, PND, phlegm. Some sneezing. No SOB. No fevers. Mild sore throat. Some ear pressure. Did not test for covid. Review of Systems   Constitutional:  Negative for chills, fatigue and fever. HENT:  Positive for ear pain (pressure), postnasal drip and sore throat. Negative for congestion, hearing loss, nosebleeds, rhinorrhea, sinus pressure, sinus pain and sneezing. Eyes:  Negative for pain, discharge, itching and visual disturbance. Respiratory:  Positive for cough. Negative for chest tightness, shortness of breath and wheezing. Cardiovascular:  Negative for chest pain, palpitations and leg swelling. Gastrointestinal:  Negative for abdominal pain, blood in stool, constipation, diarrhea, nausea and vomiting. Genitourinary:  Negative for frequency and urgency. Neurological:  Negative for dizziness, light-headedness and numbness.        Past Medical History:   Diagnosis Date   • Cervical strain    • Changing skin lesion    • Hyperlipidemia    • MCI (mild cognitive impairment)    • Mixed hyperlipidemia    • Nonmelanoma skin cancer     BCC   • Osteoporosis    • Polio    • Poor concentration    • Swelling of left thumb      Past Surgical History:   Procedure Laterality Date   • ANAL FISSURECTOMY     • APPENDECTOMY     • CATARACT EXTRACTION     • HYSTERECTOMY       Family History   Problem Relation Age of Onset   • Heart attack Mother    • Cancer Father    • Heart attack Sister      Social History     Socioeconomic History   • Marital status:      Spouse name: None   • Number of children: None   • Years of education: None   • Highest education level: None   Occupational History   • None   Tobacco Use   • Smoking status: Former     Packs/day: 2.00     Years: 20.00     Total pack years: 40.00     Types: Cigarettes   • Smokeless tobacco: Never   Vaping Use   • Vaping Use: Never used   Substance and Sexual Activity   • Alcohol use: Yes     Comment: OCCASIONAL- WINE    • Drug use: No   • Sexual activity: None   Other Topics Concern   • None   Social History Narrative    Per [employer]:     Self-employed          Social Determinants of Health     Financial Resource Strain: Low Risk  (8/14/2023)    Overall Financial Resource Strain (CARDIA)    • Difficulty of Paying Living Expenses: Not very hard   Food Insecurity: Not on file   Transportation Needs: No Transportation Needs (8/14/2023)    PRAPARE - Transportation    • Lack of Transportation (Medical): No    • Lack of Transportation (Non-Medical):  No   Physical Activity: Not on file   Stress: Not on file   Social Connections: Not on file   Intimate Partner Violence: Not on file   Housing Stability: Not on file     Current Outpatient Medications on File Prior to Visit   Medication Sig   • Acetylcarnitine HCl (ACETYL L-CARNITINE) 500 MG CAPS Take by mouth every morning    • Alpha-Lipoic Acid (LIPOIC ACID PO) Take 1 capsule by mouth   • Amino Acids (L-CARNITINE PO) Take 1 tablet by mouth   • Artificial Tear Solution (JUST TEARS EYE DROPS OP) Apply to eye   • Ascorbic Acid (VITAMIN C) 1000 MG tablet Take 1,000 mg by mouth daily    • benzonatate (TESSALON PERLES) 100 mg capsule Take 1 capsule (100 mg total) by mouth 3 (three) times a day as needed for cough   • Calcium Carb-Cholecalciferol 1000-800 MG-UNIT TABS Take by mouth   • Chelated Magnesium 100 MG TABS Take by mouth Daily   • Cholecalciferol (VITAMIN D3) 5000 units CAPS Take 1 capsule by mouth daily   • Diclofenac Sodium (VOLTAREN) 1 % Apply 2 g topically 3 (three) times a day   • Glycerylphosphorylcholine POWD 1 tablet by Does not apply route   • ibuprofen (MOTRIN) 800 mg tablet Take 1 tablet (800 mg total) by mouth every 8 (eight) hours as needed for mild pain (Patient not taking: Reported on 9/20/2023)   • ibuprofen (MOTRIN) 800 mg tablet Take 1 tablet (800 mg total) by mouth 2 (two) times a day as needed for mild pain   • Iodine, Kelp, (KELP PO) Take by mouth daily   • L-Carnosine POWD by Does not apply route   • Menaquinone-7 (VITAMIN K2) 100 MCG CAPS    • Misc Natural Products (GLUCOSAMINE CHOND CMP TRIPLE PO) Take by mouth   • Misc Natural Products (PANTETHINE PLUS) TABS Take by mouth   • NATURAL VITAMIN E MOISTURIZING GEL Apply topically   • Omega-3 Fatty Acids (FISH OIL) 1200 MG CAPS Take 1 capsule by mouth daily    • PANTETHINE PO Take by mouth   • patient supplied medication    • PREBIOTIC PRODUCT PO Take by mouth   • Probiotic Product (PROBIOTIC-10) CAPS Take by mouth   • pyridoxine (VITAMIN B6) 100 mg tablet 1 tab(s)   • Resveratrol 50 MG CAPS Take by mouth   • terbinafine (LamISIL) 1 % cream Apply topically 2 (two) times a day   • Turmeric POWD 1   • Vinpocetine POWD by Does not apply route   • vitamin B-12 (CYANOCOBALAMIN) 100 MCG tablet Take by mouth every other day    • vitamin E, tocopherol, 1,000 units capsule Take by mouth daily    • Zinc 30 MG CAPS Take 1 capsule by mouth daily     Allergies   Allergen Reactions   • Sulfa Antibiotics Other (See Comments)     Immunization History   Administered Date(s) Administered   • Influenza Split High Dose Preservative Free IM 11/07/2013, 10/28/2014   • Pneumococcal Polysaccharide PPV23 10/28/2014   • Tetanus, adsorbed 12/20/2013   • Zoster 11/03/2014 Objective     /82 (BP Location: Left arm, Patient Position: Sitting, Cuff Size: Standard)   Pulse 89   Temp 98.7 °F (37.1 °C)   Ht 5' 4" (1.626 m)   Wt 66.2 kg (146 lb)   SpO2 100%   BMI 25.06 kg/m²     Physical Exam  Vitals and nursing note reviewed. Constitutional:       General: She is not in acute distress. Appearance: Normal appearance. HENT:      Head: Normocephalic and atraumatic. Right Ear: Tympanic membrane normal.      Left Ear: Tympanic membrane normal.      Nose: Nose normal.      Mouth/Throat:      Mouth: Mucous membranes are moist.      Pharynx: Oropharynx is clear. No oropharyngeal exudate or posterior oropharyngeal erythema. Eyes:      Pupils: Pupils are equal, round, and reactive to light. Cardiovascular:      Rate and Rhythm: Normal rate and regular rhythm. Heart sounds: Normal heart sounds. Pulmonary:      Effort: Pulmonary effort is normal. No respiratory distress. Breath sounds: Wheezing (clears with cough) and rhonchi (clears with cough) present. Musculoskeletal:         General: Normal range of motion. Cervical back: Normal range of motion and neck supple. Lymphadenopathy:      Cervical: No cervical adenopathy. Skin:     General: Skin is warm and dry. Neurological:      Mental Status: She is alert and oriented to person, place, and time.    Psychiatric:         Mood and Affect: Mood and affect normal.       Shruthi Chavez PA-C

## 2023-11-24 DIAGNOSIS — J20.9 BRONCHITIS WITH BRONCHOSPASM: ICD-10-CM

## 2023-11-24 RX ORDER — DEXTROMETHORPHAN HYDROBROMIDE AND PROMETHAZINE HYDROCHLORIDE 15; 6.25 MG/5ML; MG/5ML
5 SYRUP ORAL 4 TIMES DAILY PRN
Qty: 120 ML | Refills: 0 | Status: SHIPPED | OUTPATIENT
Start: 2023-11-24

## 2023-11-28 ENCOUNTER — RA CDI HCC (OUTPATIENT)
Dept: OTHER | Facility: HOSPITAL | Age: 78
End: 2023-11-28

## 2023-11-28 NOTE — PROGRESS NOTES
[address] coding opportunities       Chart reviewed, no opportunity found: [address] Review     Patients Insurance     Medicare Insurance: [employer]

## 2023-12-04 ENCOUNTER — RA CDI HCC (OUTPATIENT)
Dept: OTHER | Facility: HOSPITAL | Age: 78
End: 2023-12-04

## 2023-12-05 NOTE — PROGRESS NOTES
[address] [facility] coding opportunities       Chart reviewed, no opportunity found: CHART REVIEWED, [address]     Patients Insurance     Medicare Insurance: [employer]

## 2024-01-09 ENCOUNTER — OFFICE VISIT (OUTPATIENT)
Dept: FAMILY MEDICINE CLINIC | Facility: CLINIC | Age: 79
End: 2024-01-09
Payer: COMMERCIAL

## 2024-01-09 VITALS
DIASTOLIC BLOOD PRESSURE: 80 MMHG | SYSTOLIC BLOOD PRESSURE: 122 MMHG | OXYGEN SATURATION: 96 % | HEART RATE: 69 BPM | TEMPERATURE: 97.9 F | HEIGHT: 63 IN | WEIGHT: 141 LBS | BODY MASS INDEX: 24.98 KG/M2

## 2024-01-09 DIAGNOSIS — E28.39 MENOPAUSE OVARIAN FAILURE: ICD-10-CM

## 2024-01-09 DIAGNOSIS — E46 PROTEIN-CALORIE MALNUTRITION, UNSPECIFIED SEVERITY (HCC): ICD-10-CM

## 2024-01-09 DIAGNOSIS — E55.9 VITAMIN D DEFICIENCY: ICD-10-CM

## 2024-01-09 DIAGNOSIS — R73.01 IMPAIRED FASTING GLUCOSE: Primary | ICD-10-CM

## 2024-01-09 DIAGNOSIS — Z13.1 SCREENING FOR DIABETES MELLITUS: ICD-10-CM

## 2024-01-09 DIAGNOSIS — E78.2 MIXED HYPERLIPIDEMIA: ICD-10-CM

## 2024-01-09 DIAGNOSIS — E83.42 HYPOMAGNESEMIA: ICD-10-CM

## 2024-01-09 DIAGNOSIS — Z12.31 ENCOUNTER FOR SCREENING MAMMOGRAM FOR MALIGNANT NEOPLASM OF BREAST: ICD-10-CM

## 2024-01-09 DIAGNOSIS — D53.9 NUTRITIONAL ANEMIA, UNSPECIFIED: ICD-10-CM

## 2024-01-09 DIAGNOSIS — R53.83 FATIGUE, UNSPECIFIED TYPE: ICD-10-CM

## 2024-01-09 DIAGNOSIS — R79.82 ELEVATED C-REACTIVE PROTEIN (CRP): ICD-10-CM

## 2024-01-09 PROBLEM — R29.6 FREQUENT FALLS: Status: RESOLVED | Noted: 2018-09-25 | Resolved: 2024-01-09

## 2024-01-09 PROBLEM — M25.462 PAIN AND SWELLING OF LEFT KNEE: Status: RESOLVED | Noted: 2023-08-14 | Resolved: 2024-01-09

## 2024-01-09 PROBLEM — S09.90XA HEAD TRAUMA: Status: RESOLVED | Noted: 2023-01-11 | Resolved: 2024-01-09

## 2024-01-09 PROBLEM — M25.562 PAIN AND SWELLING OF LEFT KNEE: Status: RESOLVED | Noted: 2023-08-14 | Resolved: 2024-01-09

## 2024-01-09 PROBLEM — R41.840 POOR CONCENTRATION: Status: RESOLVED | Noted: 2017-08-15 | Resolved: 2024-01-09

## 2024-01-09 PROBLEM — B35.6 TINEA CRURIS: Status: RESOLVED | Noted: 2023-08-14 | Resolved: 2024-01-09

## 2024-01-09 LAB — SL AMB POCT HEMOGLOBIN AIC: 5.7 (ref ?–6.5)

## 2024-01-09 PROCEDURE — 83036 HEMOGLOBIN GLYCOSYLATED A1C: CPT | Performed by: FAMILY MEDICINE

## 2024-01-09 PROCEDURE — 99213 OFFICE O/P EST LOW 20 MIN: CPT | Performed by: FAMILY MEDICINE

## 2024-01-09 NOTE — PROGRESS NOTES
Name: Shanda Quintanilla      : 1945      MRN: [id number]  Encounter Provider: Talat Ventura MD  Encounter Date: 2024   Encounter department: [facility]    Assessment & Plan     1. Impaired fasting glucose  -     POCT hemoglobin A1c-5.7   Recommend a low carb diet  -     Comprehensive metabolic panel; Future    2. Encounter for screening mammogram for malignant neoplasm of breast  -     Mammo screening bilateral w cad; Future; Expected date: 2024    3. Menopause ovarian failure  -     DXA bone density spine hip and pelvis; Future; Expected date: 2024    4. Mixed hyperlipidemia  -     Cardio IQ(R) Advanced Lipid Panel; Future  -     Lipid panel; Future    5. Vitamin D deficiency  -     Vitamin D 25 hydroxy; Future    6. Elevated C-reactive protein (CRP)  -     C-reactive protein; Future    7. Screening for diabetes mellitus    8. Fatigue, unspecified type  -     TSH, 3rd generation with Free T4 reflex; Future  -     Vitamin B12; Future  -     Homocysteine; Future    9. Hypomagnesemia  -     Magnesium; Future    10. Protein-calorie malnutrition, unspecified severity (HCC)  -     Vitamin B12; Future    11. Nutritional anemia, unspecified  -     Homocysteine; Future    F/U in 6 months       Subjective     Patient is here for pre-diabetes, she takes many multivitamins. She denies any symptoms related to this. She is requesting blood work to be done.        Review of Systems   Constitutional:  Negative for activity change, appetite change, fatigue and fever.   HENT:  Negative for congestion and ear discharge.    Respiratory:  Negative for cough and shortness of breath.    Cardiovascular:  Negative for chest pain and palpitations.   Gastrointestinal:  Negative for diarrhea and nausea.   Musculoskeletal:  Negative for arthralgias and back pain.   Skin:  Negative for color change and rash.   Neurological:  Negative for dizziness and headaches.   Psychiatric/Behavioral:  Negative for  agitation and behavioral problems.        Past Medical History:   Diagnosis Date    Cervical strain     Changing skin lesion     Hyperlipidemia     MCI (mild cognitive impairment)     Mixed hyperlipidemia     Nonmelanoma skin cancer     BCC    Osteoporosis     Polio     Poor concentration     Swelling of left thumb      Past Surgical History:   Procedure Laterality Date    ANAL FISSURECTOMY      APPENDECTOMY      CATARACT EXTRACTION      HYSTERECTOMY       Family History   Problem Relation Age of Onset    Heart attack Mother     Cancer Father     Heart attack Sister      Social History     Socioeconomic History    Marital status:      Spouse name: None    Number of children: None    Years of education: None    Highest education level: None   Occupational History    None   Tobacco Use    Smoking status: Former     Current packs/day: 2.00     Average packs/day: 2.0 packs/day for 20.0 years (40.0 ttl pk-yrs)     Types: Cigarettes    Smokeless tobacco: Never   Vaping Use    Vaping status: Never Used   Substance and Sexual Activity    Alcohol use: Yes     Comment: OCCASIONAL- WINE     Drug use: No    Sexual activity: None   Other Topics Concern    None   Social History Narrative    Per [employer]:     Self-employed          Social Determinants of Health     Financial Resource Strain: Low Risk  (8/14/2023)    Overall Financial Resource Strain (CARDIA)     Difficulty of Paying Living Expenses: Not very hard   Food Insecurity: Not on file   Transportation Needs: No Transportation Needs (8/14/2023)    PRAPARE - Transportation     Lack of Transportation (Medical): No     Lack of Transportation (Non-Medical): No   Physical Activity: Not on file   Stress: Not on file   Social Connections: Not on file   Intimate Partner Violence: Not on file   Housing Stability: Not on file     Current Outpatient Medications on File Prior to Visit   Medication Sig    Acetylcarnitine HCl (ACETYL L-CARNITINE) 500 MG CAPS Take by  mouth every morning     Alpha-Lipoic Acid (LIPOIC ACID PO) Take 1 capsule by mouth    Amino Acids (L-CARNITINE PO) Take 1 tablet by mouth    Artificial Tear Solution (JUST TEARS EYE DROPS OP) Apply to eye    Ascorbic Acid (VITAMIN C) 1000 MG tablet Take 1,000 mg by mouth daily     Calcium Carb-Cholecalciferol 1000-800 MG-UNIT TABS Take by mouth    Chelated Magnesium 100 MG TABS Take by mouth Daily    Cholecalciferol (VITAMIN D3) 5000 units CAPS Take 1 capsule by mouth daily    Glycerylphosphorylcholine POWD 1 tablet by Does not apply route    ibuprofen (MOTRIN) 800 mg tablet Take 1 tablet (800 mg total) by mouth 2 (two) times a day as needed for mild pain    Iodine, Kelp, (KELP PO) Take by mouth daily    L-Carnosine POWD by Does not apply route    Menaquinone-7 (VITAMIN K2) 100 MCG CAPS     Misc Natural Products (GLUCOSAMINE CHOND CMP TRIPLE PO) Take by mouth    Misc Natural Products (PANTETHINE PLUS) TABS Take by mouth    NATURAL VITAMIN E MOISTURIZING GEL Apply topically    Omega-3 Fatty Acids (FISH OIL) 1200 MG CAPS Take 1 capsule by mouth daily     PANTETHINE PO Take by mouth    patient supplied medication     PREBIOTIC PRODUCT PO Take by mouth    Probiotic Product (PROBIOTIC-10) CAPS Take by mouth    pyridoxine (VITAMIN B6) 100 mg tablet 1 tab(s)    Resveratrol 50 MG CAPS Take by mouth    terbinafine (LamISIL) 1 % cream Apply topically 2 (two) times a day    Turmeric POWD 1    Vinpocetine POWD by Does not apply route    vitamin B-12 (CYANOCOBALAMIN) 100 MCG tablet Take by mouth every other day     vitamin E, tocopherol, 1,000 units capsule Take by mouth daily     Zinc 30 MG CAPS Take 1 capsule by mouth daily    albuterol (Proventil HFA) 90 mcg/act inhaler Inhale 2 puffs every 6 (six) hours as needed for wheezing (Patient not taking: Reported on 1/9/2024)    benzonatate (TESSALON PERLES) 100 mg capsule Take 1 capsule (100 mg total) by mouth 3 (three) times a day as needed for cough (Patient not taking: Reported  "on 1/9/2024)    Diclofenac Sodium (VOLTAREN) 1 % Apply 2 g topically 3 (three) times a day (Patient not taking: Reported on 1/9/2024)    ibuprofen (MOTRIN) 800 mg tablet Take 1 tablet (800 mg total) by mouth every 8 (eight) hours as needed for mild pain (Patient not taking: Reported on 9/20/2023)    promethazine-dextromethorphan (PHENERGAN-DM) 6.25-15 mg/5 mL oral syrup Take 5 mL by mouth 4 (four) times a day as needed for cough (Patient not taking: Reported on 1/9/2024)     Allergies   Allergen Reactions    Sulfa Antibiotics Other (See Comments)     Immunization History   Administered Date(s) Administered    Influenza Split High Dose Preservative Free IM 11/07/2013, 10/28/2014    Pneumococcal Polysaccharide PPV23 10/28/2014    Tetanus, adsorbed 12/20/2013    Zoster 11/03/2014       Objective     /80 (BP Location: Right arm, Patient Position: Sitting)   Pulse 69   Temp 97.9 °F (36.6 °C) (Temporal)   Ht 5' 3\" (1.6 m)   Wt 64 kg (141 lb)   SpO2 96%   BMI 24.98 kg/m²     Physical Exam  Constitutional:       General: She is not in acute distress.     Appearance: She is well-developed. She is not diaphoretic.   HENT:      Head: Normocephalic and atraumatic.      Nose: Nose normal.   Eyes:      Conjunctiva/sclera: Conjunctivae normal.      Pupils: Pupils are equal, round, and reactive to light.   Cardiovascular:      Rate and Rhythm: Normal rate and regular rhythm.      Heart sounds: Normal heart sounds. No murmur heard.  Pulmonary:      Effort: Pulmonary effort is normal. No respiratory distress.      Breath sounds: Normal breath sounds. No wheezing.   Abdominal:      General: Bowel sounds are normal. There is no distension.      Palpations: Abdomen is soft.      Tenderness: There is no abdominal tenderness.   Skin:     General: Skin is warm and dry.      Findings: No erythema or rash.   Neurological:      Mental Status: She is alert and oriented to person, place, and time.      Coordination: Coordination " normal.       Talat Ventura MD

## 2024-01-10 ENCOUNTER — APPOINTMENT (OUTPATIENT)
Dept: LAB | Facility: CLINIC | Age: 79
End: 2024-01-10
Payer: COMMERCIAL

## 2024-01-10 DIAGNOSIS — R79.82 ELEVATED C-REACTIVE PROTEIN (CRP): ICD-10-CM

## 2024-01-10 DIAGNOSIS — E55.9 VITAMIN D DEFICIENCY: ICD-10-CM

## 2024-01-10 DIAGNOSIS — R73.01 IMPAIRED FASTING GLUCOSE: ICD-10-CM

## 2024-01-10 DIAGNOSIS — E78.2 MIXED HYPERLIPIDEMIA: ICD-10-CM

## 2024-01-10 DIAGNOSIS — E83.42 HYPOMAGNESEMIA: ICD-10-CM

## 2024-01-10 DIAGNOSIS — E46 PROTEIN-CALORIE MALNUTRITION, UNSPECIFIED SEVERITY (HCC): ICD-10-CM

## 2024-01-10 DIAGNOSIS — D53.9 NUTRITIONAL ANEMIA, UNSPECIFIED: ICD-10-CM

## 2024-01-10 DIAGNOSIS — R53.83 FATIGUE, UNSPECIFIED TYPE: ICD-10-CM

## 2024-01-10 LAB
ALBUMIN SERPL BCP-MCNC: 4.4 G/DL (ref 3.5–5)
ALP SERPL-CCNC: 76 U/L (ref 34–104)
ALT SERPL W P-5'-P-CCNC: 17 U/L (ref 7–52)
ANION GAP SERPL CALCULATED.3IONS-SCNC: 10 MMOL/L
AST SERPL W P-5'-P-CCNC: 24 U/L (ref 13–39)
BILIRUB SERPL-MCNC: 1.04 MG/DL (ref 0.2–1)
BUN SERPL-MCNC: 13 MG/DL (ref 5–25)
CALCIUM SERPL-MCNC: 10.2 MG/DL (ref 8.4–10.2)
CHLORIDE SERPL-SCNC: 105 MMOL/L (ref 96–108)
CHOLEST SERPL-MCNC: 281 MG/DL
CO2 SERPL-SCNC: 28 MMOL/L (ref 21–32)
CREAT SERPL-MCNC: 0.74 MG/DL (ref 0.6–1.3)
CRP SERPL QL: 1 MG/L
GFR SERPL CREATININE-BSD FRML MDRD: 77 ML/MIN/1.73SQ M
GLUCOSE P FAST SERPL-MCNC: 97 MG/DL (ref 65–99)
HCYS SERPL-SCNC: 11.2 UMOL/L (ref 5–20)
HDLC SERPL-MCNC: 86 MG/DL
LDLC SERPL CALC-MCNC: 171 MG/DL (ref 0–100)
MAGNESIUM SERPL-MCNC: 2.2 MG/DL (ref 1.9–2.7)
NONHDLC SERPL-MCNC: 195 MG/DL
POTASSIUM SERPL-SCNC: 4.7 MMOL/L (ref 3.5–5.3)
PROT SERPL-MCNC: 7 G/DL (ref 6.4–8.4)
SODIUM SERPL-SCNC: 143 MMOL/L (ref 135–147)
TRIGL SERPL-MCNC: 120 MG/DL
TSH SERPL DL<=0.05 MIU/L-ACNC: 1.79 UIU/ML (ref 0.45–4.5)
VIT B12 SERPL-MCNC: 725 PG/ML (ref 180–914)

## 2024-01-10 PROCEDURE — 80061 LIPID PANEL: CPT

## 2024-01-10 PROCEDURE — 36415 COLL VENOUS BLD VENIPUNCTURE: CPT

## 2024-01-10 PROCEDURE — 82172 ASSAY OF APOLIPOPROTEIN: CPT | Performed by: FAMILY MEDICINE

## 2024-01-10 PROCEDURE — 83735 ASSAY OF MAGNESIUM: CPT

## 2024-01-10 PROCEDURE — 83090 ASSAY OF HOMOCYSTEINE: CPT

## 2024-01-10 PROCEDURE — 83695 ASSAY OF LIPOPROTEIN(A): CPT | Performed by: FAMILY MEDICINE

## 2024-01-10 PROCEDURE — 86140 C-REACTIVE PROTEIN: CPT

## 2024-01-10 PROCEDURE — 84443 ASSAY THYROID STIM HORMONE: CPT

## 2024-01-10 PROCEDURE — 82607 VITAMIN B-12: CPT

## 2024-01-10 PROCEDURE — 80053 COMPREHEN METABOLIC PANEL: CPT

## 2024-01-10 PROCEDURE — 82306 VITAMIN D 25 HYDROXY: CPT

## 2024-01-10 PROCEDURE — 83704 LIPOPROTEIN BLD QUAN PART: CPT | Performed by: FAMILY MEDICINE

## 2024-01-11 LAB — 25(OH)D3 SERPL-MCNC: 101.8 NG/ML (ref 30–100)

## 2024-01-19 LAB — MISCELLANEOUS LAB TEST RESULT: NORMAL

## 2024-01-23 ENCOUNTER — OFFICE VISIT (OUTPATIENT)
Dept: VASCULAR SURGERY | Facility: CLINIC | Age: 79
End: 2024-01-23
Payer: COMMERCIAL

## 2024-01-23 VITALS
HEART RATE: 78 BPM | HEIGHT: 63 IN | BODY MASS INDEX: 24.8 KG/M2 | DIASTOLIC BLOOD PRESSURE: 78 MMHG | WEIGHT: 140 LBS | SYSTOLIC BLOOD PRESSURE: 124 MMHG

## 2024-01-23 DIAGNOSIS — E78.2 MIXED HYPERLIPIDEMIA: Primary | ICD-10-CM

## 2024-01-23 DIAGNOSIS — I87.2 VENOUS INSUFFICIENCY OF BOTH LOWER EXTREMITIES: ICD-10-CM

## 2024-01-23 DIAGNOSIS — M79.89 SWELLING OF LEFT LOWER EXTREMITY: ICD-10-CM

## 2024-01-23 PROCEDURE — 99202 OFFICE O/P NEW SF 15 MIN: CPT | Performed by: SURGERY

## 2024-01-23 NOTE — PROGRESS NOTES
Assessment/Plan:    Venous insufficiency of both lower extremities  Bilateral lower extremity varicosities and edema.  Chronic.  Due to family hx of this condition.  Skin quality is good.  Continue with good quality moisturizer and compression stockings.  No intervnetion recommended.    Arterial circulation is normal with excellent pulses and doppler sounds in both feet.    Swelling of left lower extremity  Due to venous insufficiency , chronic.  No intervention of further investigation recommended.  Use compression stockings.    Mixed hyperlipidemia  Managing with supplements / fish oil.  HDL levels are excellent.         Diagnoses and all orders for this visit:    Mixed hyperlipidemia    Swelling of left lower extremity  -     Ambulatory Referral to Vascular Surgery    Venous insufficiency of both lower extremities          Subjective:      Patient ID: Shanda Quintanilla is a 78 y.o. female.    HPI  Presents for evaluation of leg circulation and bilateral leg swelling and varicose veins.  She also has some non specific discomfort in her right lower rib cage area.  Many years ago she had a wound related to trauma/ injury in the left lower leg that was infected and was treated with oral antibiotics and it healed.  She is taking various nutritional supplements daily.    The following portions of the patient's history were reviewed and updated as appropriate: allergies, current medications, past family history, past medical history, past social history, past surgical history, and problem list.    Review of Systems   Constitutional: Negative.    HENT: Negative.     Eyes: Negative.    Respiratory: Negative.     Cardiovascular: Negative.    Gastrointestinal: Negative.    Endocrine: Negative.    Genitourinary: Negative.    Musculoskeletal: Negative.    Skin: Negative.    Allergic/Immunologic: Negative.    Neurological: Negative.    Hematological: Negative.    Psychiatric/Behavioral: Negative.       I have reviewed the ROS as  "entered and made changes as necessary.      Objective:      /78 (BP Location: Left arm, Patient Position: Sitting, Cuff Size: Standard)   Pulse 78   Ht 5' 3\" (1.6 m)   Wt 63.5 kg (140 lb)   BMI 24.80 kg/m²          Physical Exam  Vitals and nursing note reviewed.   Constitutional:       Appearance: Normal appearance.   Cardiovascular:      Rate and Rhythm: Normal rate and regular rhythm.      Pulses: Normal pulses.           Dorsalis pedis pulses are 2+ on the right side and 2+ on the left side.        Posterior tibial pulses are 2+ on the right side and 2+ on the left side.   Pulmonary:      Effort: Pulmonary effort is normal. No respiratory distress.      Comments: No tenderness in the right lower rib cage area, no mass palpated in that area pointed by patient.  Abdominal:      General: There is no distension.      Palpations: Abdomen is soft.      Tenderness: There is no abdominal tenderness.      Comments: Right lower quadrant incision for prior appendectomy   Musculoskeletal:      Right lower le+ Edema present.      Left lower le+ Edema present.   Skin:     Capillary Refill: Capillary refill takes less than 2 seconds.      Comments: Scattered varicose veins  / reticular veins in both legs.  There are some mild changes of chronic venous insufficiency in left lower leg with healed wound in the medial malleolar area.  Skin quality is good.   Neurological:      General: No focal deficit present.      Mental Status: She is alert and oriented to person, place, and time.   Psychiatric:         Mood and Affect: Mood normal.         Behavior: Behavior normal.           "

## 2024-01-23 NOTE — PATIENT INSTRUCTIONS
Varicose Veins   AMBULATORY CARE:   Varicose veins  are veins that become large, twisted, and swollen. They are common on the back of the calves, knees, and thighs. Varicose veins are caused by valves in your veins that do not work properly. This causes blood to collect and increase pressure in the veins of your legs. The increased pressure causes your veins to stretch, get larger, swell, and twist.       Common signs and symptoms:   Blue or purple bulging veins in your legs    Pain, swelling, or muscle cramps in your legs    Feeling of tiredness or heaviness in your legs    Seek care immediately if:   You have an injury that has broken your skin and caused your varicose veins to bleed.    Your leg is swollen and hard.    Your legs or feet are turning blue or black.    Your leg feels warm, tender, and painful. It may look swollen and red.    You have a wound on your leg that does not heal or is infected.    Call your doctor if:   You have pain in your leg that does not go away or gets worse.    You have large bruising on your legs.    You have a rash on your leg.    Your symptoms keep you from doing your daily activities.    You have questions or concerns about your condition or care.    Treatment of varicose veins  aims to decrease symptoms, improve appearance, and prevent further problems. Treatment will depend on which veins are affected and how severe your symptoms are. You may need a procedure to treat or remove your varicose veins. For example, your provider may inject a solution or use a laser to close the varicose veins. Surgery to remove long veins may also be done. Ask your provider for more information about procedures used to treat varicose veins.  Manage varicose veins:   Maintain a healthy weight.  Excess weight or obesity can make your varicose veins worse. Ask your provider what a healthy weight is for you. Ask your provider to help you create a weight loss plan, if needed.    Wear pressure stockings  as directed.  The stockings are tight and put pressure on your legs. This improves blood flow and helps prevent clots.         Elevate your legs.  Keep them above the level of your heart as often as you can. Prop your legs on pillows or blankets to keep them elevated comfortably. This will help blood flow back to your heart and decrease pain and swelling.         Get regular exercise.  Talk to your provider about the best exercise plan for you. Exercise can improve blood flow to your legs and feet.       Prevent your varicose veins from getting worse:   Do not sit or stand for long periods of time, if possible.  This can cause the blood to collect in your legs and make your symptoms worse. Bend or rotate your ankles several times every hour. Walk around for a few minutes every hour to get blood moving in your legs.    Do not smoke.  Nicotine and other chemicals in cigarettes and cigars can cause lung damage. Ask your healthcare provider for information if you currently smoke and need help to quit. E-cigarettes or smokeless tobacco still contain nicotine. Talk to your provider before you use these products.    Do not wear tight clothes or high-heeled shoes.  Avoid clothes that are tight around your waist and knees. Wear clothes that fit well to help blood flow in your legs. Your symptoms may get worse if you wear high-heeled shoes.    Do not cross your legs when you sit.  This decreases blood flow to your feet and can make your symptoms worse. Make sure your feet can reach the chair's footrest or ground when you sit.    Follow up with your doctor as directed:  Write down your questions so you remember to ask them during your visits.  © Copyright [employer] 2023 Information is for End User's use only and may not be sold, redistributed or otherwise used for commercial purposes.  The above information is an  only. It is not intended as medical advice for individual conditions or treatments. Talk to your  doctor, nurse or pharmacist before following any medical regimen to see if it is safe and effective for you.

## 2024-01-24 NOTE — ASSESSMENT & PLAN NOTE
Due to venous insufficiency , chronic.  No intervention of further investigation recommended.  Use compression stockings.

## 2024-01-24 NOTE — ASSESSMENT & PLAN NOTE
Bilateral lower extremity varicosities and edema.  Chronic.  Due to family hx of this condition.  Skin quality is good.  Continue with good quality moisturizer and compression stockings.  No intervnetion recommended.    Arterial circulation is normal with excellent pulses and doppler sounds in both feet.

## 2024-02-21 PROBLEM — Z13.820 SCREENING FOR OSTEOPOROSIS: Status: RESOLVED | Noted: 2019-10-09 | Resolved: 2024-02-21

## 2024-04-06 ENCOUNTER — HOSPITAL ENCOUNTER (INPATIENT)
Facility: HOSPITAL | Age: 79
LOS: 2 days | Discharge: HOME | DRG: 066 | End: 2024-04-09
Attending: EMERGENCY MEDICINE | Admitting: STUDENT IN AN ORGANIZED HEALTH CARE EDUCATION/TRAINING PROGRAM
Payer: COMMERCIAL

## 2024-04-06 ENCOUNTER — APPOINTMENT (EMERGENCY)
Dept: CT IMAGING | Facility: HOSPITAL | Age: 79
DRG: 066 | End: 2024-04-06
Payer: COMMERCIAL

## 2024-04-06 ENCOUNTER — APPOINTMENT (OUTPATIENT)
Dept: MRI IMAGING | Facility: HOSPITAL | Age: 79
DRG: 066 | End: 2024-04-06
Payer: COMMERCIAL

## 2024-04-06 ENCOUNTER — OFFICE VISIT (OUTPATIENT)
Dept: URGENT CARE | Facility: CLINIC | Age: 79
DRG: 066 | End: 2024-04-06
Payer: COMMERCIAL

## 2024-04-06 VITALS
OXYGEN SATURATION: 96 % | HEART RATE: 72 BPM | RESPIRATION RATE: 16 BRPM | SYSTOLIC BLOOD PRESSURE: 140 MMHG | DIASTOLIC BLOOD PRESSURE: 80 MMHG | TEMPERATURE: 97.2 F

## 2024-04-06 DIAGNOSIS — R29.90 STROKE-LIKE SYMPTOM: ICD-10-CM

## 2024-04-06 DIAGNOSIS — G46.7 DYSARTHRIA-CLUMSY HAND SYNDROME: ICD-10-CM

## 2024-04-06 DIAGNOSIS — R47.81 SLURRED SPEECH: Primary | ICD-10-CM

## 2024-04-06 DIAGNOSIS — G45.9 TIA (TRANSIENT ISCHEMIC ATTACK): ICD-10-CM

## 2024-04-06 DIAGNOSIS — I65.22 STENOSIS OF LEFT INTERNAL CAROTID ARTERY: ICD-10-CM

## 2024-04-06 DIAGNOSIS — R47.1 DYSARTHRIA: Primary | ICD-10-CM

## 2024-04-06 PROBLEM — Z72.51 HIGH RISK SEXUAL BEHAVIOR: Status: RESOLVED | Noted: 2018-05-17 | Resolved: 2024-04-06

## 2024-04-06 PROBLEM — R41.3 SHORT-TERM MEMORY LOSS: Status: RESOLVED | Noted: 2017-08-15 | Resolved: 2024-04-06

## 2024-04-06 PROBLEM — S16.1XXA CERVICAL STRAIN: Status: RESOLVED | Noted: 2018-09-20 | Resolved: 2024-04-06

## 2024-04-06 PROBLEM — I63.9 LEFT SIDED CEREBRAL HEMISPHERE CEREBROVASCULAR ACCIDENT (CVA) (HCC): Status: ACTIVE | Noted: 2024-04-06

## 2024-04-06 PROBLEM — M54.9 MID BACK PAIN: Status: RESOLVED | Noted: 2021-12-09 | Resolved: 2024-04-06

## 2024-04-06 PROBLEM — E55.9 VITAMIN D DEFICIENCY: Status: RESOLVED | Noted: 2018-03-08 | Resolved: 2024-04-06

## 2024-04-06 LAB
2HR DELTA HS TROPONIN: 1 NG/L
4HR DELTA HS TROPONIN: 1 NG/L
ANION GAP SERPL CALCULATED.3IONS-SCNC: 8 MMOL/L (ref 4–13)
APTT PPP: 26 SECONDS (ref 23–37)
ATRIAL RATE: 77 BPM
BUN SERPL-MCNC: 12 MG/DL (ref 5–25)
CALCIUM SERPL-MCNC: 9.3 MG/DL (ref 8.4–10.2)
CARDIAC TROPONIN I PNL SERPL HS: 3 NG/L
CARDIAC TROPONIN I PNL SERPL HS: 4 NG/L
CARDIAC TROPONIN I PNL SERPL HS: 4 NG/L
CHLORIDE SERPL-SCNC: 106 MMOL/L (ref 96–108)
CHOLEST SERPL-MCNC: 267 MG/DL
CO2 SERPL-SCNC: 25 MMOL/L (ref 21–32)
CREAT SERPL-MCNC: 0.68 MG/DL (ref 0.6–1.3)
ERYTHROCYTE [DISTWIDTH] IN BLOOD BY AUTOMATED COUNT: 13.8 % (ref 11.6–15.1)
EST. AVERAGE GLUCOSE BLD GHB EST-MCNC: 117 MG/DL
GFR SERPL CREATININE-BSD FRML MDRD: 83 ML/MIN/1.73SQ M
GLUCOSE SERPL-MCNC: 87 MG/DL (ref 65–140)
GLUCOSE SERPL-MCNC: 92 MG/DL (ref 65–140)
GLUCOSE SERPL-MCNC: 93 MG/DL (ref 65–140)
HBA1C MFR BLD: 5.7 %
HCT VFR BLD AUTO: 45.7 % (ref 34.8–46.1)
HDLC SERPL-MCNC: 71 MG/DL
HGB BLD-MCNC: 14.6 G/DL (ref 11.5–15.4)
INR PPP: 0.88 (ref 0.84–1.19)
LDLC SERPL CALC-MCNC: 168 MG/DL (ref 0–100)
MCH RBC QN AUTO: 29.5 PG (ref 26.8–34.3)
MCHC RBC AUTO-ENTMCNC: 31.9 G/DL (ref 31.4–37.4)
MCV RBC AUTO: 92 FL (ref 82–98)
P AXIS: 85 DEGREES
PLATELET # BLD AUTO: 352 THOUSANDS/UL (ref 149–390)
PMV BLD AUTO: 9 FL (ref 8.9–12.7)
POTASSIUM SERPL-SCNC: 4.6 MMOL/L (ref 3.5–5.3)
PR INTERVAL: 156 MS
PROTHROMBIN TIME: 12.5 SECONDS (ref 11.6–14.5)
QRS AXIS: 82 DEGREES
QRSD INTERVAL: 68 MS
QT INTERVAL: 394 MS
QTC INTERVAL: 445 MS
RBC # BLD AUTO: 4.95 MILLION/UL (ref 3.81–5.12)
SODIUM SERPL-SCNC: 139 MMOL/L (ref 135–147)
T WAVE AXIS: 43 DEGREES
TRIGL SERPL-MCNC: 138 MG/DL
VENTRICULAR RATE: 77 BPM
WBC # BLD AUTO: 8.77 THOUSAND/UL (ref 4.31–10.16)

## 2024-04-06 PROCEDURE — 85610 PROTHROMBIN TIME: CPT | Performed by: EMERGENCY MEDICINE

## 2024-04-06 PROCEDURE — 80061 LIPID PANEL: CPT | Performed by: STUDENT IN AN ORGANIZED HEALTH CARE EDUCATION/TRAINING PROGRAM

## 2024-04-06 PROCEDURE — 99213 OFFICE O/P EST LOW 20 MIN: CPT

## 2024-04-06 PROCEDURE — 85730 THROMBOPLASTIN TIME PARTIAL: CPT | Performed by: EMERGENCY MEDICINE

## 2024-04-06 PROCEDURE — 70496 CT ANGIOGRAPHY HEAD: CPT

## 2024-04-06 PROCEDURE — 36415 COLL VENOUS BLD VENIPUNCTURE: CPT | Performed by: EMERGENCY MEDICINE

## 2024-04-06 PROCEDURE — 84484 ASSAY OF TROPONIN QUANT: CPT | Performed by: EMERGENCY MEDICINE

## 2024-04-06 PROCEDURE — 80048 BASIC METABOLIC PNL TOTAL CA: CPT | Performed by: EMERGENCY MEDICINE

## 2024-04-06 PROCEDURE — 99245 OFF/OP CONSLTJ NEW/EST HI 55: CPT | Performed by: PSYCHIATRY & NEUROLOGY

## 2024-04-06 PROCEDURE — 82948 REAGENT STRIP/BLOOD GLUCOSE: CPT

## 2024-04-06 PROCEDURE — 93005 ELECTROCARDIOGRAM TRACING: CPT

## 2024-04-06 PROCEDURE — 99285 EMERGENCY DEPT VISIT HI MDM: CPT | Performed by: EMERGENCY MEDICINE

## 2024-04-06 PROCEDURE — 83036 HEMOGLOBIN GLYCOSYLATED A1C: CPT | Performed by: STUDENT IN AN ORGANIZED HEALTH CARE EDUCATION/TRAINING PROGRAM

## 2024-04-06 PROCEDURE — 70551 MRI BRAIN STEM W/O DYE: CPT

## 2024-04-06 PROCEDURE — 70498 CT ANGIOGRAPHY NECK: CPT

## 2024-04-06 PROCEDURE — 99223 1ST HOSP IP/OBS HIGH 75: CPT | Performed by: STUDENT IN AN ORGANIZED HEALTH CARE EDUCATION/TRAINING PROGRAM

## 2024-04-06 PROCEDURE — 85027 COMPLETE CBC AUTOMATED: CPT | Performed by: EMERGENCY MEDICINE

## 2024-04-06 RX ORDER — CLOPIDOGREL BISULFATE 75 MG/1
75 TABLET ORAL DAILY
Status: DISCONTINUED | OUTPATIENT
Start: 2024-04-07 | End: 2024-04-09 | Stop reason: HOSPADM

## 2024-04-06 RX ORDER — ATORVASTATIN CALCIUM 40 MG/1
40 TABLET, FILM COATED ORAL
Status: DISCONTINUED | OUTPATIENT
Start: 2024-04-06 | End: 2024-04-09 | Stop reason: HOSPADM

## 2024-04-06 RX ORDER — CLOPIDOGREL BISULFATE 75 MG/1
300 TABLET ORAL ONCE
Status: COMPLETED | OUTPATIENT
Start: 2024-04-06 | End: 2024-04-06

## 2024-04-06 RX ORDER — HEPARIN SODIUM 5000 [USP'U]/ML
5000 INJECTION, SOLUTION INTRAVENOUS; SUBCUTANEOUS EVERY 8 HOURS SCHEDULED
Status: DISCONTINUED | OUTPATIENT
Start: 2024-04-06 | End: 2024-04-09 | Stop reason: HOSPADM

## 2024-04-06 RX ORDER — ASPIRIN 325 MG
325 TABLET ORAL ONCE
Status: COMPLETED | OUTPATIENT
Start: 2024-04-06 | End: 2024-04-06

## 2024-04-06 RX ADMIN — IOHEXOL 85 ML: 350 INJECTION, SOLUTION INTRAVENOUS at 16:12

## 2024-04-06 RX ADMIN — ATORVASTATIN CALCIUM 40 MG: 40 TABLET, FILM COATED ORAL at 17:14

## 2024-04-06 RX ADMIN — CLOPIDOGREL 300 MG: 75 TABLET ORAL at 17:09

## 2024-04-06 RX ADMIN — ASPIRIN 325 MG ORAL TABLET 325 MG: 325 PILL ORAL at 17:09

## 2024-04-06 NOTE — ASSESSMENT & PLAN NOTE
Noted to have slurred speech   Initial neuroimaging showing significant atherosclerotic disease- refer to imaging  Neurology consulted, recommending stroke pathway  Loaded with aspirin/plavix  Start statin, Follow up MRI brain

## 2024-04-06 NOTE — ED PROVIDER NOTES
"History  Chief Complaint   Patient presents with    Slurred Speech     Patient arrives via EMS from urgent care with co of slurred speed that started approx 1445. Patient reports \"right arm feels different.\" Patient AOX4 and slurred speech has resolved. Denies headache or dizziness.      HPI patient is a 79-year-old female reports sudden onset at 2:45 PM of dysarthria.  Reports that her speech was slurred and she knew what she wanted today but was unable to state.  She reports that she went directly to urgent care was seen there and apparently her speech was normal but she was apparently unsteady so they called an ambulance and brought her here to the emergency department.  She denies any current symptoms other than reports her right hand feels weird at times.  She reports she had this symptom a few days ago intermittently and then seem to resolve.  She reports she feels it now somewhat after her speech has resolved.  Patient denies headache.  There is no dizziness.  Past medical history of hyperlipidemia cervical strain  Family history noncontributory  Social history, hysterectomy, appendectomy, cataract surgery    Prior to Admission Medications   Prescriptions Last Dose Informant Patient Reported? Taking?   Acetylcarnitine HCl (ACETYL L-CARNITINE) 500 MG CAPS  Self Yes No   Sig: Take by mouth every morning    Alpha-Lipoic Acid (LIPOIC ACID PO)  Self Yes No   Sig: Take 1 capsule by mouth   Amino Acids (L-CARNITINE PO)  Self Yes No   Sig: Take 1 tablet by mouth   Artificial Tear Solution (JUST TEARS EYE DROPS OP)  Self Yes No   Sig: Apply to eye   Ascorbic Acid (VITAMIN C) 1000 MG tablet  Self Yes No   Sig: Take 1,000 mg by mouth daily    Calcium Carb-Cholecalciferol 1000-800 MG-UNIT TABS  Self Yes No   Sig: Take by mouth   Chelated Magnesium 100 MG TABS  Self Yes No   Sig: Take by mouth Daily   Cholecalciferol (VITAMIN D3) 5000 units CAPS  Self Yes No   Sig: Take 1 capsule by mouth daily   Diclofenac Sodium " (VOLTAREN) 1 %  Self No No   Sig: Apply 2 g topically 3 (three) times a day   Patient not taking: Reported on 2024   Glycerylphosphorylcholine POWD  Self Yes No   Si tablet by Does not apply route   Iodine, Kelp, (KELP PO)  Self Yes No   Sig: Take by mouth daily   L-Carnosine POWD  Self Yes No   Sig: by Does not apply route   Menaquinone-7 (VITAMIN K2) 100 MCG CAPS  Self Yes No   Misc Natural Products (GLUCOSAMINE CHOND CMP TRIPLE PO)  Self Yes No   Sig: Take by mouth   Misc Natural Products (PANTETHINE PLUS) TABS  Self Yes No   Sig: Take by mouth   NATURAL VITAMIN E MOISTURIZING GEL  Self Yes No   Sig: Apply topically   Omega-3 Fatty Acids (FISH OIL) 1200 MG CAPS  Self Yes No   Sig: Take 1 capsule by mouth daily    PANTETHINE PO  Self Yes No   Sig: Take by mouth   PREBIOTIC PRODUCT PO  Self Yes No   Sig: Take by mouth   Probiotic Product (PROBIOTIC-10) CAPS  Self Yes No   Sig: Take by mouth   Resveratrol 50 MG CAPS  Self Yes No   Sig: Take by mouth   Turmeric POWD  Self Yes No   Si   Vinpocetine POWD  Self Yes No   Sig: by Does not apply route   Zinc 30 MG CAPS  Self Yes No   Sig: Take 1 capsule by mouth daily   albuterol (Proventil HFA) 90 mcg/act inhaler   No No   Sig: Inhale 2 puffs every 6 (six) hours as needed for wheezing   Patient not taking: Reported on 2024   benzonatate (TESSALON PERLES) 100 mg capsule  Self No No   Sig: Take 1 capsule (100 mg total) by mouth 3 (three) times a day as needed for cough   Patient not taking: Reported on 2024   ibuprofen (MOTRIN) 800 mg tablet  Self No No   Sig: Take 1 tablet (800 mg total) by mouth every 8 (eight) hours as needed for mild pain   Patient not taking: Reported on 2023   ibuprofen (MOTRIN) 800 mg tablet   No No   Sig: Take 1 tablet (800 mg total) by mouth 2 (two) times a day as needed for mild pain   patient supplied medication  Self Yes No   promethazine-dextromethorphan (PHENERGAN-DM) 6.25-15 mg/5 mL oral syrup   No No   Sig: Take 5 mL  by mouth 4 (four) times a day as needed for cough   Patient not taking: Reported on 2024   pyridoxine (VITAMIN B6) 100 mg tablet  Self Yes No   Si tab(s)   terbinafine (LamISIL) 1 % cream  Self No No   Sig: Apply topically 2 (two) times a day   vitamin B-12 (CYANOCOBALAMIN) 100 MCG tablet  Self Yes No   Sig: Take by mouth every other day    vitamin E, tocopherol, 1,000 units capsule  Self Yes No   Sig: Take by mouth daily       Facility-Administered Medications: None       Past Medical History:   Diagnosis Date    Cervical strain     Changing skin lesion     Hyperlipidemia     MCI (mild cognitive impairment)     Mixed hyperlipidemia     Nonmelanoma skin cancer     BCC    Osteoporosis     Polio     Poor concentration     Swelling of left thumb        Past Surgical History:   Procedure Laterality Date    ANAL FISSURECTOMY      APPENDECTOMY      CATARACT EXTRACTION      HYSTERECTOMY         Family History   Problem Relation Age of Onset    Heart attack Mother     Cancer Father     Heart attack Sister      I have reviewed and agree with the history as documented.    E-Cigarette/Vaping    E-Cigarette Use Never User      E-Cigarette/Vaping Substances     Social History     Tobacco Use    Smoking status: Former     Current packs/day: 2.00     Average packs/day: 2.0 packs/day for 20.0 years (40.0 ttl pk-yrs)     Types: Cigarettes    Smokeless tobacco: Never   Vaping Use    Vaping status: Never Used   Substance Use Topics    Alcohol use: Yes     Comment: OCCASIONAL- WINE     Drug use: No       Review of Systems   Constitutional:  Negative for fever.   HENT:  Negative for congestion.    Eyes:  Negative for pain and redness.   Respiratory:  Negative for cough and shortness of breath.    Cardiovascular:  Negative for chest pain.   Gastrointestinal:  Negative for abdominal pain and vomiting.   Neurological:  Positive for speech difficulty.       Physical Exam  Physical Exam  Vitals and nursing note reviewed.    Constitutional:       Appearance: She is well-developed.   HENT:      Head: Normocephalic.      Right Ear: External ear normal.      Left Ear: External ear normal.      Nose: Nose normal.      Mouth/Throat:      Mouth: Mucous membranes are moist.      Pharynx: Oropharynx is clear.   Eyes:      General: Lids are normal.      Extraocular Movements: Extraocular movements intact.      Pupils: Pupils are equal, round, and reactive to light.   Cardiovascular:      Rate and Rhythm: Normal rate and regular rhythm.      Pulses: Normal pulses.      Heart sounds: Normal heart sounds.   Pulmonary:      Effort: Pulmonary effort is normal. No respiratory distress.   Abdominal:      General: Abdomen is flat. Bowel sounds are normal.   Musculoskeletal:         General: No deformity. Normal range of motion.      Cervical back: Normal range of motion and neck supple.   Skin:     General: Skin is warm and dry.   Neurological:      General: No focal deficit present.      Mental Status: She is alert and oriented to person, place, and time.      Comments: Negative ataxia reports some difficulty with finger-to-nose with the right hand but able to accomplish it.  No ataxia with the left hand, no ataxia with her legs.  Left lower leg is smaller than right secondary to polio   Psychiatric:         Mood and Affect: Mood normal.         Vital Signs  ED Triage Vitals   Temperature Pulse Respirations Blood Pressure SpO2   04/06/24 1645 04/06/24 1554 04/06/24 1554 04/06/24 1554 04/06/24 1554   97.6 °F (36.4 °C) 83 16 (!) 171/92 98 %      Temp Source Heart Rate Source Patient Position - Orthostatic VS BP Location FiO2 (%)   04/06/24 1554 04/06/24 1554 04/06/24 1554 04/06/24 1554 --   Oral Monitor Sitting Right arm       Pain Score       --                  Vitals:    04/06/24 1554 04/06/24 1600 04/06/24 1630 04/06/24 1645   BP: (!) 171/92 (!) 171/92 141/82 160/83   Pulse: 83 76 81 80   Patient Position - Orthostatic VS: Sitting Sitting Sitting  Sitting         Visual Acuity      ED Medications  Medications   aspirin tablet 325 mg (325 mg Oral Given 4/6/24 1709)     Followed by   aspirin (ECOTRIN LOW STRENGTH) EC tablet 81 mg (has no administration in time range)   clopidogrel (PLAVIX) tablet 300 mg (300 mg Oral Given 4/6/24 1709)     Followed by   clopidogrel (PLAVIX) tablet 75 mg (has no administration in time range)   atorvastatin (LIPITOR) tablet 40 mg (40 mg Oral Given 4/6/24 1714)   heparin (porcine) subcutaneous injection 5,000 Units (has no administration in time range)   iohexol (OMNIPAQUE) 350 MG/ML injection (MULTI-DOSE) 100 mL (85 mL Intravenous Given 4/6/24 1612)       Diagnostic Studies  Results Reviewed       Procedure Component Value Units Date/Time    Hemoglobin A1c w/EAG Estimation [[id number]] Collected: 04/06/24 1601    Lab Status: In process Specimen: Blood from Arm, Left Updated: 04/06/24 1715    Lipid Panel with Direct LDL reflex [[id number]]     Lab Status: No result Specimen: Blood     HS Troponin 0hr (reflex protocol) [[id number]]  (Normal) Collected: 04/06/24 1601    Lab Status: Final result Specimen: Blood from Arm, Left Updated: 04/06/24 1630     hs TnI 0hr 3 ng/L     HS Troponin I 2hr [[id number]]     Lab Status: No result Specimen: Blood     HS Troponin I 4hr [[id number]]     Lab Status: No result Specimen: Blood     Basic metabolic panel [[id number]] Collected: 04/06/24 1601    Lab Status: Final result Specimen: Blood from Arm, Left Updated: 04/06/24 1622     Sodium 139 mmol/L      Potassium 4.6 mmol/L      Chloride 106 mmol/L      CO2 25 mmol/L      ANION GAP 8 mmol/L      BUN 12 mg/dL      Creatinine 0.68 mg/dL      Glucose 87 mg/dL      Calcium 9.3 mg/dL      eGFR 83 ml/min/1.73sq m     Narrative:      National Kidney Disease Foundation guidelines for Chronic Kidney Disease (CKD):     Stage 1 with normal or high GFR (GFR > 90 mL/min/1.73 square meters)    Stage 2 Mild CKD (GFR = 60-89 mL/min/1.73 square meters)    Stage 3A  Moderate CKD (GFR = 45-59 mL/min/1.73 square meters)    Stage 3B Moderate CKD (GFR = 30-44 mL/min/1.73 square meters)    Stage 4 Severe CKD (GFR = 15-29 mL/min/1.73 square meters)    Stage 5 End Stage CKD (GFR <15 mL/min/1.73 square meters)  Note: GFR calculation is accurate only with a steady state creatinine    Protime-INR [[id number]]  (Normal) Collected: 04/06/24 1601    Lab Status: Final result Specimen: Blood from Arm, Left Updated: 04/06/24 1621     Protime 12.5 seconds      INR 0.88    APTT [[id number]]  (Normal) Collected: 04/06/24 1601    Lab Status: Final result Specimen: Blood from Arm, Left Updated: 04/06/24 1621     PTT 26 seconds     CBC and Platelet [[id number]]  (Normal) Collected: 04/06/24 1601    Lab Status: Final result Specimen: Blood from Arm, Left Updated: 04/06/24 1607     WBC 8.77 Thousand/uL      RBC 4.95 Million/uL      Hemoglobin 14.6 g/dL      Hematocrit 45.7 %      MCV 92 fL      MCH 29.5 pg      MCHC 31.9 g/dL      RDW 13.8 %      Platelets 352 Thousands/uL      MPV 9.0 fL     Fingerstick Glucose (POCT) [[id number]]  (Normal) Collected: 04/06/24 1555    Lab Status: Final result Specimen: Blood Updated: 04/06/24 1556     POC Glucose 92 mg/dl     FLU/RSV/COVID - if FLU/RSV clinically relevant [[id number]]     Lab Status: No result Specimen: Nares from Nose                    CTA stroke alert (head/neck)   Final Result by Rush Huddleston MD (04/06 1636)      1.  Patent major vessels of the [place] of sy without high-grade stenosis.  No aneurysm.   2.  About 87-90% atherosclerotic stenosis at the origin of the left cervical ICA. Mild atherosclerotic disease in the right carotid bifurcation without stenosis.   3.  Patent vertebral arteries without high-grade stenosis.   4.  Beaded luminal irregularity of the cervical ICA (right greater than left) suggesting fibromuscular dysplasia (FMD).   5.  Patchy groundglass opacities in the partially imaged right upper lobe consistent with pneumonia.  Correlate with clinical assessment. Recommend 3 months follow-up low-dose chest CT to ensure resolution.   6.  3 mm right upper lobe pulmonary nodule. Based on current Fleischner Society 2017 Guidelines on incidental pulmonary nodule, no routine follow-up is needed if the patient is low risk. If the patient is high risk, optional follow-up chest CT at 12    months can be considered.                     Findings were directly discussed with Anton Wilson at 4:21 p.m.                           Workstation performed: [id number]         CT stroke alert brain   Final Result by Rush Huddleston MD (04/06 1634)      1.  No acute intracranial CT abnormality.   2.  Sinus disease as described.                     Findings were directly discussed with Anton Wilson at 4:21 p.m.         Workstation performed: [id number]         MRI Inpatient Order    (Results Pending)              Procedures  ECG 12 Lead Documentation Only    Date/Time: 4/6/2024 5:30 PM    Performed by: Matt Krishnan MD  Authorized by: Matt Krishnan MD    Indications / Diagnosis:  Speech disturbance  ECG reviewed by me, the ED Provider: yes    Patient location:  ED  Previous ECG:     Previous ECG:  Unavailable  Interpretation:     Interpretation: non-specific    Rate:     ECG rate:  77    ECG rate assessment: normal    Rhythm:     Rhythm: sinus rhythm    Comments:      Sinus rhythm with fusion complexes no acute ST-T wave changes some baseline artifact           ED Course       Diagnostic testing cardiac troponin was normal no sign of cardiac ischemia  Electrolytes within normal limits normal BUN/creatinine sign of renal dysfunction  White count was normal at 8.7 no sign of inflammation hemoglobin is normal at 14 no sign of anemia.  CT CTA showed a significant stenosis of the left cervical carotid artery, discussed with neurology.           Stroke Assessment       Row Name 04/06/24 1600             NIH Stroke Scale    Interval --      Level of Consciousness (1a.)  "0      LOC Questions (1b.) 0      LOC Commands (1c.) 0      Best Gaze (2.) 0      Visual (3.) 0      Facial Palsy (4.) 0      Motor Arm, Left (5a.) 0      Motor Arm, Right (5b.) 0      Motor Leg, Left (6a.) 0      Motor Leg, Right (6b.) 0      Limb Ataxia (7.) 0      Sensory (8.) 0      Best Language (9.) 0      Dysarthria (10.) 0      Extinction and Inattention (11.) (Formerly Neglect) 0      Total 0                      Patient was seen within the first 5 to 10 minutes of arrival by Dr. Wilson, our neurologist, pattern is now completely normal she reports some \"funkiness.\"  NIH score for me is essentially 0, discussed with neurology this point minimal minimal deficit of what patient describes as some lack of dexterity of her fingers on the right hand.  Discussed with neurology no indication for lysis currently.  Low NIH score, minimal deficit which is rapidly improving          SBIRT 20yo+      Flowsheet Row Most Recent Value   Initial Alcohol Screen: US AUDIT-C     1. How often do you have a drink containing alcohol? 1 Filed at: 04/06/2024 1557   2. How many drinks containing alcohol do you have on a typical day you are drinking?  0 Filed at: 04/06/2024 1557   3b. FEMALE Any Age, or MALE 65+: How often do you have 4 or more drinks on one occassion? 1 Filed at: 04/06/2024 1557   Audit-C Score 2 Filed at: 04/06/2024 1557   PITER: How many times in the past year have you...    Used an illegal drug or used a prescription medication for non-medical reasons? Never Filed at: 04/06/2024 1557                      Medical Decision Making  Medical decision making 79-year-old female reports sudden onset of severe dysarthria, reports that she was just babbling and immediately went to an urgent care center.  Symptoms started at 2:45pm.  Patient reports now pleat resolution of her speech disturbance.  Arrives with fluent speech.  Reports still states feels some weirdness in her right hand.  She was seen almost immediately by " neurology stroke alert was called.  CT was reviewed.  Patient at this point is not a lytic candidate due to markedly improving symptoms.  Discussed with neurology.  Discussed with the hospitalist service.    Amount and/or Complexity of Data Reviewed  Labs: ordered.  Radiology: ordered.    Risk  Prescription drug management.  Decision regarding hospitalization.             Disposition  Final diagnoses:   Dysarthria   TIA (transient ischemic attack)     Time reflects when diagnosis was documented in both MDM as applicable and the Disposition within this note       Time User Action Codes Description Comment    4/6/2024  3:55 PM Matt Krishnan Add [R47.1] Dysarthria     4/6/2024  4:08 PM Matt Krishnan Add [G45.9] TIA (transient ischemic attack)           ED Disposition       ED Disposition   Admit    Condition   Stable    Date/Time   Sat Apr 6, 2024 6015    Comment   Case was discussed with hospitalist service and the patient's admission status was agreed to be Admission Status: observation status to the service of Dr. Castro .               Follow-up Information    None         Patient's Medications   Discharge Prescriptions    No medications on file       No discharge procedures on file.    PDMP Review       None            ED Provider  Electronically Signed by             Matt Krishnan MD  04/06/24 8942

## 2024-04-06 NOTE — CONSULTS
Former[facility]  Neuro Consult- Name: Shanda Quintanilla 79 y.o. female   I MRN: [id number] Unit/Bed#: ED 27 I   Date of Admission: 4/6/2024  Date of Service: 4/6/2024 I Hospital Day: 0    Consult to Neurology  Consult performed by: MARS Dupont  Consult ordered by: Matt Krishnan MD      Reason for Consult / Principal Problem: Stroke alert  Hx and PE limited by: None   Review of previous medical records none completed.   Family, partner, Don, was present at the bedside for history and examination    Assessment/Plan   * Small subcortical Left MCA CVA,  Assessment & Plan  Neurology is asked to see this 79-year-old right-hand-dominant patient who is known to our outpatient program last seen 5-6 years ago for cervical strain and neck pain as well as mild cognitive impairment.  She reports in the intervening years she has been doing well without prescriptive medication using supplements.  She presented to our facility today after having reports of dysarthria while she was finishing at a [employer], acute onset, at approximately 3:15 PM.  She and her partner presented to acute urgent care facility, for a short period, a minute or 2, up dysarthria without aphasia or word finding problems and reportedly also right arm clumsiness.  Reportedly her speech had cleared up by then but because they felt she still had a mild degree of possible ataxia on the right upper extremity with maneuvers she was referred to our facility.  Stroke alert was called shortly after her presentation and we saw her both before and after her CAT scan.  She reports that the dysarthria has resolved and that she still feels that her right hand is clumsy and she also reports an intermittent tingling in the right hand.  She and her partner reports that vocal tenor was low pitched, and clearly unusual.  Patient also reports that earlier this week on 2 different occasions her R arm felt clumsy.  She reports that each time it lasted  30 minutes or so complete resolution  She reports to us that she monitors her blood pressure daily and she is able to document blood pressures in the 1 20-1 30 range pretty consistently each day. Her pressure here has been in the 1 40-1 60 region.  CT scan and CTA:  L ICA stenosis at the bifurcation, otherwise no acute pathology stenosis or aneurysms.  This patient just had recent labs (1-24) w NL CMP and CBC, w >> cholesterol.   MRI: We have reviewed her MRI without the benefit of radiology read yet.  We note that she has a small area of acute ischemia seen on the diffusion-weighted images in the left subcortical region of the posterior left MCA territory.  This would corroborate her clumsy hand that she feels she is still experiencing in the transient dysarthria that they both hurt.  Also noted was very little small vessel disease appreciated on her flares.  The remainder of the images were grossly without pathology.  Plan:  -  Stroke protocol as she is currently on  -Given the findings on her CTA we will also asked that she undergo carotid ultrasound testing and have a vascular consult as well.  -  Echocardiogram - pending  -  maintain Blood pressure if possible.  If she becomes hypotensive decrease head of the bed maintain perfusion pressure with the fluids.  -  Telemetry, continue no AFib seen so far,   -  Loaded with both 325 aspirin and 300 Plavix.  We will likely continue aspirin 81 post discharge with 21 days of Plavix, unless her MRI speech formation indicates otherwise.  -  Lipid panel - Statin medication started and to be maintained post discharge, however this patient is clearly averse to prescriptive medication.  -  A1C & other labs for the a.m. pending  -  Keep euvolemic, as dehydration can worsen exam and function  -  Treat high blood sugars if arises  -  Therapies to include PT/OT/ST  -  DVT prevention   -  Secondary stroke / TIA  risk factor modification  -  Frequent neurological check and notify  neurology with any change in neuro status  -  Continue to monitor for infectious and metabolic derangements and treat as arises         This patient will need to be seen in our outpatient neurologic office here in either the [place] area or any of our [facility] locations.  She can be seen as a hospital follow-up, and depending on her MRI findings this may be a TIA or a stroke follow-up.  Her meds are yet to be determined depending on her MRI findings as well as negotiation with the patient.      HPI: Shanda Quintanilla is a reportedly ambidextrous  79 y.o. female who neurology is asked to see when she presented to our facility earlier this afternoon with complaints of a resolved dysarthria but a persistent clumsy hand on the right side.  As noted above the patient was out with her partner when she had an acute onset of what they reported to be contextually appropriate speech but it was clearly poorly controlled dysarthric and also the tender of her voice had also changed.  They immediately went to an nearby urgent care center and although the speech had resolved she persisted with the slight ataxia that they referred her to our facility.  Upon presentation to our facility the stroke alert was called.  She was noted to have only a mildly elevated blood pressure of 160 or 6 systolic.  Her CAT scan and CTA were done near immediately and her CTA was notable for a left ICA high-grade stenosis at the bifurcation.  The remainder of the study and the CAT scan were unremarkable.  Her NIH for us consistently was 1 she lost a point for the mild ataxia of the right upper extremity.  Even though this patient is a polio survivor with a left leg weakness she still was able to demonstrate a good heel-to-shin as noted below on cerebellar maneuvers.      ROS: 12 system cued query: She denies any headache or visual distortions at this time.  She and her partner report that the pitch of her voice changed and it was almost  uncontrollably fast difficult to understand but again contextually appropriate.  She has an intermittent complaint of some numbness or tingling that she is noting in her right hand fingertips.  She reported a slight subjective lightheadedness when we were about to gait her and her gait exam was deferred.  The remainder of her query is negative.  She reports she has not been sick or ill otherwise.  She and her partner reports that they had their usual night last night symptoms, in terms of activity, they go out dancing every week and she had a glass or 2 of wine.  She woke up normal this morning.        Historical Information     Past Medical History:   Diagnosis Date    Cervical strain     Changing skin lesion     Hyperlipidemia     MCI (mild cognitive impairment)     Mixed hyperlipidemia     Nonmelanoma skin cancer     BCC    Osteoporosis     Polio, left lower extremity mild weakness residual     Poor concentration     Swelling of left thumb      Past Surgical History:   Procedure Laterality Date    ANAL FISSURECTOMY      APPENDECTOMY      CATARACT EXTRACTION      HYSTERECTOMY         Social History : Active, dances every week, No tob, occ wine, no recreationals,         Family History:   Family History   Problem Relation Age of Onset    Heart attack Mother     Cancer Father     Heart attack Sister        Allergies   Allergen Reactions    Sulfa Antibiotics Other (See Comments)     Meds: This patient does not use prescriptive medication.  She reports that she uses a several supplements daily with good effect.    Scheduled Meds:  Current Facility-Administered Medications   Medication Dose Route Frequency    aspirin  81 mg Oral Daily    atorvastatin  40 mg Oral Daily With Dinner    clopidogrel  75 mg Oral Daily    heparin (porcine)  5,000 Units Subcutaneous Q8H ALMAS     PRN Meds:.      Physical Exam:   Objective   Vitals:Blood pressure 157/80, pulse 82, temperature 97.6 °F (36.4 °C), temperature source Oral, resp. rate  "17, height 5' 3\" (1.6 m), weight 63.5 kg (140 lb), SpO2 98%.,Body mass index is 24.8 kg/m².      Patient was examined in critical care unit bed partner at bedside  General: alert, appears stated age and cooperative  Head: Normocephalic, without obvious abnormality, atraumatic  Oral exam: lips, mucosa, and tongue moist;   Neck: no carotid bruit,   Lungs: clear to auscultation ant. bilaterally  Heart: regular rate and rhythm, S1, S2 normal, no murmur appreciated,   Abdomen: soft, +BS    Extremities: atraumatic, no cyanosis or edema, L leg polio survivor and subjectively she reports weakness but is noted below she had full power.    Neurologic:   Mental status: Alert, oriented, there was no dysarthria there was no aphasia.  Her speech was spontaneous paragraph length without word finding difficulties or paraphasic errors.  She was able to follow 1 and 2 part and cross body commands.  CN Exam: ABELARDO, EOM's I, VF full, Gaze conjugate No sensory or motor lateralizations (No PP on face), Hearing I B, CNIX-XII I B  Motor: full power, age appropriate x 4 limbs  Sensory: intact  X 4 limbs, mod inc lt, temp, vib, & PP tested symetrically , subjective reports of a subtle inconsistent differences,  Cerebellar: subtle R arm pronation w/out drift from modified Romberg position, no ataxia w upper or lower extremity maneuvers, Fine motor finger taps, age appropriate, but feel slow subjectively on the right  DTR's: Uppers 1+, diminished patellas; Plantars: downgoing B   Gait: Not gated at this time.       Lab Results: I have personally reviewed pertinent reports.  , CBC:   Results from last 7 days   Lab Units 04/06/24  1601   WBC Thousand/uL 8.77   RBC Million/uL 4.95   HEMOGLOBIN g/dL 14.6   HEMATOCRIT % 45.7   MCV fL 92   PLATELETS Thousands/uL 352   , BMP/CMP:   Results from last 7 days   Lab Units 04/06/24  1601   SODIUM mmol/L 139   POTASSIUM mmol/L 4.6   CHLORIDE mmol/L 106   CO2 mmol/L 25   BUN mg/dL 12   CREATININE mg/dL 0.68 "   CALCIUM mg/dL 9.3   EGFR ml/min/1.73sq m 83   , Vitamin B12:   , HgBA1C:   , TSH:   , Coagulation:   Results from last 7 days   Lab Units 04/06/24  1601   INR  0.88   ,   Lipid Profile:    Latest Reference Range & Units 01/10/24 11:44   Cholesterol See Comment mg/dL 281 (H)   Triglycerides See Comment mg/dL 120   HDL >=50 mg/dL 86   Non-HDL Cholesterol mg/dl 195   LDL Calculated 0 - 100 mg/dL 171 (H)   (H): Data is abnormally high      Latest Reference Range & Units 01/10/24 11:44   VITAMIN D, 25-HYDROXY 30.0 - 100.0 ng/mL 101.8 (H)   Vitamin B-12 180 - 914 pg/mL 725   (H): Data is abnormally high    Imaging Studies: I have personally reviewed pertinent films in PACS and have also reviewed radiology's report.  We have discussed the films briefly with the patient at the bedside prior to her MRI.  CTA: 1.  Patent major vessels of the [place] of sy without high-grade stenosis.  No aneurysm.  2.  About 87-90% atherosclerotic stenosis at the origin of the left cervical ICA. Mild atherosclerotic disease in the right carotid bifurcation without stenosis.  3.  Patent vertebral arteries without high-grade stenosis.  4.  Beaded luminal irregularity of the cervical ICA (right greater than left) suggesting fibromuscular dysplasia (FMD).  5.  Patchy groundglass opacities in the partially imaged right upper lobe consistent with pneumonia. Correlate with clinical assessment. Recommend 3 months follow-up low-dose chest CT to ensure resolution.  6.  3 mm right upper lobe pulmonary nodule. Based on current Fleischner Society 2017 Guidelines on incidental pulmonary nodule, no routine follow-up is needed if the patient is low risk. If the patient is high risk, optional follow-up chest CT at 12   months can be considered.    MRI: As noted above she is noted to have a small region of acute ischemia in the left subcortical posterior frontal MCA territory.  She has very little small vessel disease appreciated on her flares.    Carotid  ultrasound to be done hopefully in the next day or 2.    EEG, Echo, Pathology, and Other Studies:  Her echocardiogram will likely be done tomorrow or Monday.    Counseling / Coordination of Care  Total time spent today approximately total 60 minutes. Greater than 50% of total time was spent with the patient and / or family counseling and / or coordination of care. A description of the counseling / coordination of care: All of the above was discussed and reviewed with the patient and particularly her  at the bedside.  They both had several questions I believe they were all answered within the limits of the workup at this time.      Dictation voice to text software has been used in the creation of this document. Please consider this in light of any contextual or grammatical errors.

## 2024-04-06 NOTE — ASSESSMENT & PLAN NOTE
Neurology is asked to see this 79-year-old right-hand-dominant patient who is known to our outpatient program last seen 5-6 years ago for cervical strain and neck pain as well as mild cognitive impairment.  She reports in the intervening years she has been doing well without prescriptive medication using supplements.  She presented to our facility today after having reports of dysarthria while she was finishing at a [employer], acute onset, at approximately 3:15 PM.  She and her partner presented to acute urgent care facility, for a short period, a minute or 2, up dysarthria without aphasia or word finding problems and reportedly also right arm clumsiness.  Reportedly her speech had cleared up by then but because they felt she still had a mild degree of possible ataxia on the right upper extremity with maneuvers she was referred to our facility.  Stroke alert was called shortly after her presentation and we saw her both before and after her CAT scan.  She reports that the dysarthria has resolved and that she still feels that her right hand is clumsy and she also reports an intermittent tingling in the right hand.  She and her partner reports that vocal tenor was low pitched, and clearly unusual.  Patient also reports that earlier this week on 2 different occasions her R arm felt clumsy.  She reports that each time it lasted 30 minutes or so complete resolution  She reports to us that she monitors her blood pressure daily and she is able to document blood pressures in the 1 20-1 30 range pretty consistently each day. Her pressure here has been in the 1 40-1 60 region.  CT scan and CTA:  L ICA stenosis at the bifurcation, otherwise no acute pathology stenosis or aneurysms.  This patient just had recent labs (1-24) w NL CMP and CBC, w >> cholesterol.   MRI: We have reviewed her MRI without the benefit of radiology read yet.  We note that she has a small area of acute ischemia seen on the diffusion-weighted images in  the left subcortical region of the posterior left MCA territory.  This would corroborate her clumsy hand that she feels she is still experiencing in the transient dysarthria that they both hurt.  Also noted was very little small vessel disease appreciated on her flares.  The remainder of the images were grossly without pathology.  Plan:  -  Stroke protocol as she is currently on  -Given the findings on her CTA we will also asked that she undergo carotid ultrasound testing and have a vascular consult as well.  -  Echocardiogram - pending  -  maintain Blood pressure if possible.  If she becomes hypotensive decrease head of the bed maintain perfusion pressure with the fluids.  -  Telemetry, continue no AFib seen so far,   -  Loaded with both 325 aspirin and 300 Plavix.  We will likely continue aspirin 81 post discharge with 21 days of Plavix, unless her MRI speech formation indicates otherwise.  -  Lipid panel - Statin medication started and to be maintained post discharge, however this patient is clearly averse to prescriptive medication.  -  A1C & other labs for the a.m. pending  -  Keep euvolemic, as dehydration can worsen exam and function  -  Treat high blood sugars if arises  -  Therapies to include PT/OT/ST  -  DVT prevention   -  Secondary stroke / TIA  risk factor modification  -  Frequent neurological check and notify neurology with any change in neuro status  -  Continue to monitor for infectious and metabolic derangements and treat as arises

## 2024-04-06 NOTE — H&P
[facility]  H&P  Name: Shanda Quintanilla 79 y.o. female I MRN: [id number]  Unit/Bed#: ED 27 I Date of Admission: 4/6/2024   Date of Service: 4/6/2024 I Hospital Day: 0      Assessment/Plan   * Dysarthria-clumsy hand syndrome  Assessment & Plan  Noted to have slurred speech   Initial neuroimaging showing significant atherosclerotic disease- refer to imaging  Neurology consulted, recommending stroke pathway  Loaded with aspirin/plavix  Start statin, Follow up MRI brain           VTE Pharmacologic Prophylaxis:   Moderate Risk (Score 3-4) - Pharmacological DVT Prophylaxis Ordered: heparin.  Code Status: Level 1 - Full Code   Discussion with family: Updated  () at bedside.    Anticipated Length of Stay: Patient will be admitted on an observation basis with an anticipated length of stay of less than 2 midnights secondary to stroke pathway.    Total Time Spent on Date of Encounter in care of patient: 30+ mins. This time was spent on one or more of the following: performing physical exam; counseling and coordination of care; obtaining or reviewing history; documenting in the medical record; reviewing/ordering tests, medications or procedures; communicating with other healthcare professionals and discussing with patient's family/caregivers.    Chief Complaint: slurred speech     History of Present Illness:  Shanda Quintanilla is a 79 y.o. female with a PMH of hld who presents with slurred speech. Initially went to urgent care but was referred to the ED after concern for acute CVA. Last known well was 245 PM. In the ED, she was a stroke alert but not a candidate for TNK. Neurology was consulted and recommended admission to [facility] for stroke pathway.     Review of Systems:  Review of Systems   Constitutional:  Negative for chills and fever.   HENT:  Negative for ear pain and sore throat.    Eyes:  Negative for pain and visual disturbance.   Respiratory:  Negative for cough and shortness of  breath.    Cardiovascular:  Negative for chest pain and palpitations.   Gastrointestinal:  Negative for abdominal pain and vomiting.   Genitourinary:  Negative for dysuria and hematuria.   Musculoskeletal:  Negative for arthralgias and back pain.   Skin:  Negative for color change and rash.   Neurological:  Positive for speech difficulty. Negative for seizures and syncope.   All other systems reviewed and are negative.      Past Medical and Surgical History:   Past Medical History:   Diagnosis Date    Cervical strain     Changing skin lesion     Hyperlipidemia     MCI (mild cognitive impairment)     Mixed hyperlipidemia     Nonmelanoma skin cancer     BCC    Osteoporosis     Polio     Poor concentration     Swelling of left thumb        Past Surgical History:   Procedure Laterality Date    ANAL FISSURECTOMY      APPENDECTOMY      CATARACT EXTRACTION      HYSTERECTOMY         Meds/Allergies:  Prior to Admission medications    Medication Sig Start Date End Date Taking? Authorizing Provider   Acetylcarnitine HCl (ACETYL L-CARNITINE) 500 MG CAPS Take by mouth every morning     Historical Provider, MD   albuterol (Proventil HFA) 90 mcg/act inhaler Inhale 2 puffs every 6 (six) hours as needed for wheezing  Patient not taking: Reported on 1/9/2024 11/20/23   Agustin Krause PA-C   Alpha-Lipoic Acid (LIPOIC ACID PO) Take 1 capsule by mouth    Historical Provider, MD   Amino Acids (L-CARNITINE PO) Take 1 tablet by mouth    Historical Provider, MD   Artificial Tear Solution (JUST TEARS EYE DROPS OP) Apply to eye    Historical Provider, MD   Ascorbic Acid (VITAMIN C) 1000 MG tablet Take 1,000 mg by mouth daily     Historical Provider, MD   benzonatate (TESSALON PERLES) 100 mg capsule Take 1 capsule (100 mg total) by mouth 3 (three) times a day as needed for cough  Patient not taking: Reported on 1/9/2024 8/25/23   Jenni Bansal MD   Calcium Carb-Cholecalciferol 1000-800 MG-UNIT TABS Take by mouth    Historical Provider, MD    Chelated Magnesium 100 MG TABS Take by mouth Daily    Historical Provider, MD   Cholecalciferol (VITAMIN D3) 5000 units CAPS Take 1 capsule by mouth daily    Historical Provider, MD   Diclofenac Sodium (VOLTAREN) 1 % Apply 2 g topically 3 (three) times a day  Patient not taking: Reported on 1/9/2024 8/19/23   Mauricio Ramírez DO   Glycerylphosphorylcholine POWD 1 tablet by Does not apply route    Historical Provider, MD   ibuprofen (MOTRIN) 800 mg tablet Take 1 tablet (800 mg total) by mouth every 8 (eight) hours as needed for mild pain  Patient not taking: Reported on 9/20/2023 8/28/23   Agustin Krause PA-C   ibuprofen (MOTRIN) 800 mg tablet Take 1 tablet (800 mg total) by mouth 2 (two) times a day as needed for mild pain 9/5/23   Mauricio Ramírez DO   Iodine, Kelp, (KELP PO) Take by mouth daily    Historical Provider, MD   L-Carnosine POWD by Does not apply route    Historical Provider, MD   Menaquinone-7 (VITAMIN K2) 100 MCG CAPS     Historical Provider, MD   Misc Natural Products (GLUCOSAMINE CHOND CMP TRIPLE PO) Take by mouth    Historical Provider, MD   Misc Natural Products (PANTETHINE PLUS) TABS Take by mouth    Historical Provider, MD   NATURAL VITAMIN E MOISTURIZING GEL Apply topically    Historical Provider, MD   Omega-3 Fatty Acids (FISH OIL) 1200 MG CAPS Take 1 capsule by mouth daily     Historical Provider, MD   PANTETHINE PO Take by mouth    Historical Provider, MD   patient supplied medication     Historical Provider, MD   PREBIOTIC PRODUCT PO Take by mouth    Historical Provider, MD   Probiotic Product (PROBIOTIC-10) CAPS Take by mouth    Historical Provider, MD   promethazine-dextromethorphan (PHENERGAN-DM) 6.25-15 mg/5 mL oral syrup Take 5 mL by mouth 4 (four) times a day as needed for cough  Patient not taking: Reported on 1/9/2024 11/24/23   Talat Ventura MD   pyridoxine (VITAMIN B6) 100 mg tablet 1 tab(s)    Historical Provider, MD   Resveratrol 50 MG CAPS Take by  "mouth    Historical Provider, MD   terbinafine (LamISIL) 1 % cream Apply topically 2 (two) times a day 8/14/23   MD Cas Ruiz 1    Historical Provider, MD   Vinpocetine POWD by Does not apply route    Historical Provider, MD   vitamin B-12 (CYANOCOBALAMIN) 100 MCG tablet Take by mouth every other day     Historical Provider, MD   vitamin E, tocopherol, 1,000 units capsule Take by mouth daily     Historical Provider, MD   Zinc 30 MG CAPS Take 1 capsule by mouth daily    Historical Provider, MD     I have reviewed home medications using recent Epic encounter.    Allergies:   Allergies   Allergen Reactions    Sulfa Antibiotics Other (See Comments)       Social History:  Marital Status:    Occupation: na  Patient Pre-hospital Living Situation: Home  Patient Pre-hospital Level of Mobility: walks  Patient Pre-hospital Diet Restrictions: na  Substance Use History:   Social History     Substance and Sexual Activity   Alcohol Use Yes    Comment: OCCASIONAL- WINE      Social History     Tobacco Use   Smoking Status Former    Current packs/day: 2.00    Average packs/day: 2.0 packs/day for 20.0 years (40.0 ttl pk-yrs)    Types: Cigarettes   Smokeless Tobacco Never     Social History     Substance and Sexual Activity   Drug Use No       Family History:  Family History   Problem Relation Age of Onset    Heart attack Mother     Cancer Father     Heart attack Sister        Physical Exam:     Vitals:   Blood Pressure: 160/83 (04/06/24 1645)  Pulse: 80 (04/06/24 1645)  Temp Source: Oral (04/06/24 1554)  Respirations: 17 (04/06/24 1645)  Height: 5' 3\" (160 cm) (04/06/24 1554)  Weight - Scale: 63.5 kg (140 lb) (04/06/24 1554)  SpO2: 97 % (04/06/24 1645)    Physical Exam  Constitutional:       General: She is not in acute distress.     Appearance: Normal appearance. She is not toxic-appearing.   Cardiovascular:      Rate and Rhythm: Normal rate and regular rhythm.      Heart sounds: Normal heart sounds. No " murmur heard.  Pulmonary:      Effort: Pulmonary effort is normal. No respiratory distress.      Breath sounds: Normal breath sounds. No wheezing.   Abdominal:      General: Abdomen is flat. There is no distension.      Palpations: Abdomen is soft.      Tenderness: There is no abdominal tenderness.   Neurological:      General: No focal deficit present.      Mental Status: She is alert and oriented to person, place, and time. Mental status is at baseline.      Motor: No weakness.          Additional Data:     Lab Results:  Results from last 7 days   Lab Units 04/06/24  1601   WBC Thousand/uL 8.77   HEMOGLOBIN g/dL 14.6   HEMATOCRIT % 45.7   PLATELETS Thousands/uL 352     Results from last 7 days   Lab Units 04/06/24  1601   SODIUM mmol/L 139   POTASSIUM mmol/L 4.6   CHLORIDE mmol/L 106   CO2 mmol/L 25   BUN mg/dL 12   CREATININE mg/dL 0.68   ANION GAP mmol/L 8   CALCIUM mg/dL 9.3   GLUCOSE RANDOM mg/dL 87     Results from last 7 days   Lab Units 04/06/24  1601   INR  0.88     Results from last 7 days   Lab Units 04/06/24  1555 04/06/24  1511   POC GLUCOSE mg/dl 92 93               Lines/Drains:  Invasive Devices       Peripheral Intravenous Line  Duration             Peripheral IV 04/06/24 Distal;Left;Ventral (anterior) Forearm <1 day    Peripheral IV 04/06/24 Left;Ventral (anterior) Forearm <1 day                        Imaging: Reviewed radiology reports from this admission including: CT head  CTA stroke alert (head/neck)   Final Result by Rush Huddleston MD (04/06 1636)      1.  Patent major vessels of the [place] of sy without high-grade stenosis.  No aneurysm.   2.  About 87-90% atherosclerotic stenosis at the origin of the left cervical ICA. Mild atherosclerotic disease in the right carotid bifurcation without stenosis.   3.  Patent vertebral arteries without high-grade stenosis.   4.  Beaded luminal irregularity of the cervical ICA (right greater than left) suggesting fibromuscular dysplasia (FMD).   5.   Patchy groundglass opacities in the partially imaged right upper lobe consistent with pneumonia. Correlate with clinical assessment. Recommend 3 months follow-up low-dose chest CT to ensure resolution.   6.  3 mm right upper lobe pulmonary nodule. Based on current Fleischner Society 2017 Guidelines on incidental pulmonary nodule, no routine follow-up is needed if the patient is low risk. If the patient is high risk, optional follow-up chest CT at 12    months can be considered.                     Findings were directly discussed with Anton Wilson at 4:21 p.m.                           Workstation performed: [id number]         CT stroke alert brain   Final Result by Rush Huddleston MD (04/06 1634)      1.  No acute intracranial CT abnormality.   2.  Sinus disease as described.                     Findings were directly discussed with Anton Wilson at 4:21 p.m.         Workstation performed: [id number]         MRI Inpatient Order    (Results Pending)       EKG and Other Studies Reviewed on Admission:   EKG:  pending scan into epic chart.    ** Please Note: This note has been constructed using a voice recognition system. **

## 2024-04-06 NOTE — PROGRESS NOTES
[facility]    NAME: Shanda Quintanilla is a 79 y.o. female  : 1945    MRN: [id number]  DATE: 2024  TIME: 3:27 PM    Assessment and Plan   Slurred speech [R47.81]  1. Slurred speech  Fingerstick Glucose (POCT)    Transfer to other facility        Go to ER for stroke rule out.   + romberg, + tandem gait. Speech at this time normal.  Sugar 93.   NIH stroke scale 1. Incorrect month.     Patient Instructions     Go to ER.  Last known well at 2:45 pm.  Last meal at 2 am.   Sugar 93.     Chief Complaint     Chief Complaint   Patient presents with    speech difficulty     S.O. states speech was slurred and voice was off approx 10 min before arrival. Pt states she was sitting in car, voice was off, words jumbled.  and smile equal. Currently states head feels cloudy. Denies headache, dizziness at this time. Mild headache yesterday. Last meal 2 am. Took nyquil for sleep and had 2 glasses of wine.          History of Present Illness       Presents with change in symptoms today. Last known well at 2:45 pm. Last meal 2 am. She was passanger in a car when signficant other notes speech to be slurred, slow and off. He was near to this location and drove here. Otherwise normal exam. History polio as a child, HLD.          Review of Systems   Review of Systems   Constitutional:  Negative for chills and fever.   HENT:  Negative for congestion and sore throat.    Respiratory:  Negative for cough and shortness of breath.    Cardiovascular:  Negative for chest pain.   Gastrointestinal:  Negative for abdominal pain.   Musculoskeletal:  Negative for myalgias.   Neurological:  Positive for dizziness. Negative for syncope and weakness.   Psychiatric/Behavioral:  Negative for confusion.          Current Medications       Current Outpatient Medications:     Acetylcarnitine HCl (ACETYL L-CARNITINE) 500 MG CAPS, Take by mouth every morning , Disp: , Rfl:     albuterol (Proventil HFA) 90 mcg/act inhaler, Inhale 2 puffs  every 6 (six) hours as needed for wheezing (Patient not taking: Reported on 1/9/2024), Disp: 6.7 g, Rfl: 0    Alpha-Lipoic Acid (LIPOIC ACID PO), Take 1 capsule by mouth, Disp: , Rfl:     Amino Acids (L-CARNITINE PO), Take 1 tablet by mouth, Disp: , Rfl:     Artificial Tear Solution (JUST TEARS EYE DROPS OP), Apply to eye, Disp: , Rfl:     Ascorbic Acid (VITAMIN C) 1000 MG tablet, Take 1,000 mg by mouth daily , Disp: , Rfl:     benzonatate (TESSALON PERLES) 100 mg capsule, Take 1 capsule (100 mg total) by mouth 3 (three) times a day as needed for cough (Patient not taking: Reported on 1/9/2024), Disp: 20 capsule, Rfl: 0    Calcium Carb-Cholecalciferol 1000-800 MG-UNIT TABS, Take by mouth, Disp: , Rfl:     Chelated Magnesium 100 MG TABS, Take by mouth Daily, Disp: , Rfl:     Cholecalciferol (VITAMIN D3) 5000 units CAPS, Take 1 capsule by mouth daily, Disp: , Rfl:     Diclofenac Sodium (VOLTAREN) 1 %, Apply 2 g topically 3 (three) times a day (Patient not taking: Reported on 1/9/2024), Disp: 150 g, Rfl: 1    Glycerylphosphorylcholine POWD, 1 tablet by Does not apply route, Disp: , Rfl:     ibuprofen (MOTRIN) 800 mg tablet, Take 1 tablet (800 mg total) by mouth every 8 (eight) hours as needed for mild pain (Patient not taking: Reported on 9/20/2023), Disp: 30 tablet, Rfl: 0    ibuprofen (MOTRIN) 800 mg tablet, Take 1 tablet (800 mg total) by mouth 2 (two) times a day as needed for mild pain, Disp: 60 tablet, Rfl: 0    Iodine, Kelp, (KELP PO), Take by mouth daily, Disp: , Rfl:     L-Carnosine POWD, by Does not apply route, Disp: , Rfl:     Menaquinone-7 (VITAMIN K2) 100 MCG CAPS, , Disp: , Rfl:     Misc Natural Products (GLUCOSAMINE CHOND CMP TRIPLE PO), Take by mouth, Disp: , Rfl:     Misc Natural Products (PANTETHINE PLUS) TABS, Take by mouth, Disp: , Rfl:     NATURAL VITAMIN E MOISTURIZING GEL, Apply topically, Disp: , Rfl:     Omega-3 Fatty Acids (FISH OIL) 1200 MG CAPS, Take 1 capsule by mouth daily , Disp: , Rfl:      PANTETHINE PO, Take by mouth, Disp: , Rfl:     patient supplied medication, , Disp: , Rfl:     PREBIOTIC PRODUCT PO, Take by mouth, Disp: , Rfl:     Probiotic Product (PROBIOTIC-10) CAPS, Take by mouth, Disp: , Rfl:     promethazine-dextromethorphan (PHENERGAN-DM) 6.25-15 mg/5 mL oral syrup, Take 5 mL by mouth 4 (four) times a day as needed for cough (Patient not taking: Reported on 1/9/2024), Disp: 120 mL, Rfl: 0    pyridoxine (VITAMIN B6) 100 mg tablet, 1 tab(s), Disp: , Rfl:     Resveratrol 50 MG CAPS, Take by mouth, Disp: , Rfl:     terbinafine (LamISIL) 1 % cream, Apply topically 2 (two) times a day, Disp: 42 g, Rfl: 2    Turmeric POWD, 1, Disp: , Rfl:     Vinpocetine POWD, by Does not apply route, Disp: , Rfl:     vitamin B-12 (CYANOCOBALAMIN) 100 MCG tablet, Take by mouth every other day , Disp: , Rfl:     vitamin E, tocopherol, 1,000 units capsule, Take by mouth daily , Disp: , Rfl:     Zinc 30 MG CAPS, Take 1 capsule by mouth daily, Disp: , Rfl:     Current Allergies     Allergies as of 04/06/2024 - Reviewed 01/09/2024   Allergen Reaction Noted    Sulfa antibiotics Other (See Comments) 06/08/2015            The following portions of the patient's history were reviewed and updated as appropriate: allergies, current medications, past family history, past medical history, past social history, past surgical history and problem list.     Past Medical History:   Diagnosis Date    Cervical strain     Changing skin lesion     Hyperlipidemia     MCI (mild cognitive impairment)     Mixed hyperlipidemia     Nonmelanoma skin cancer     BCC    Osteoporosis     Polio     Poor concentration     Swelling of left thumb        Past Surgical History:   Procedure Laterality Date    ANAL FISSURECTOMY      APPENDECTOMY      CATARACT EXTRACTION      HYSTERECTOMY         Family History   Problem Relation Age of Onset    Heart attack Mother     Cancer Father     Heart attack Sister          Medications have been  verified.        Objective   /80   Pulse 72   Temp (!) 97.2 °F (36.2 °C)   Resp 16   SpO2 96%        Physical Exam     Physical Exam  Vitals reviewed.   Constitutional:       General: She is not in acute distress.     Appearance: Normal appearance.   HENT:      Right Ear: Tympanic membrane, ear canal and external ear normal.      Left Ear: Tympanic membrane, ear canal and external ear normal.      Nose: Nose normal.      Mouth/Throat:      Mouth: Mucous membranes are moist.      Pharynx: No posterior oropharyngeal erythema.   Eyes:      General: No visual field deficit.     Conjunctiva/sclera: Conjunctivae normal.   Cardiovascular:      Rate and Rhythm: Normal rate and regular rhythm.      Pulses: Normal pulses.      Heart sounds: Normal heart sounds. No murmur heard.  Pulmonary:      Effort: Pulmonary effort is normal. No respiratory distress.      Breath sounds: Normal breath sounds.   Skin:     General: Skin is warm and dry.      Capillary Refill: Capillary refill takes less than 2 seconds.   Neurological:      General: No focal deficit present.      Mental Status: She is alert.      Cranial Nerves: Cranial nerves 2-12 are intact. No dysarthria or facial asymmetry.      Sensory: Sensation is intact.      Motor: No pronator drift.      Coordination: Romberg sign positive. Coordination abnormal.      Gait: Tandem walk abnormal.      Comments: One first orientation stated month was February, then corrected to April.    Psychiatric:         Mood and Affect: Mood normal.         Behavior: Behavior normal.

## 2024-04-07 PROBLEM — I65.22 INTRACRANIAL CAROTID STENOSIS, LEFT: Status: ACTIVE | Noted: 2024-04-07

## 2024-04-07 LAB
ALBUMIN SERPL BCP-MCNC: 3.7 G/DL (ref 3.5–5)
ALP SERPL-CCNC: 58 U/L (ref 34–104)
ALT SERPL W P-5'-P-CCNC: 17 U/L (ref 7–52)
ANION GAP SERPL CALCULATED.3IONS-SCNC: 6 MMOL/L (ref 4–13)
AST SERPL W P-5'-P-CCNC: 23 U/L (ref 13–39)
BILIRUB SERPL-MCNC: 0.53 MG/DL (ref 0.2–1)
BUN SERPL-MCNC: 16 MG/DL (ref 5–25)
CALCIUM SERPL-MCNC: 9 MG/DL (ref 8.4–10.2)
CHLORIDE SERPL-SCNC: 109 MMOL/L (ref 96–108)
CO2 SERPL-SCNC: 26 MMOL/L (ref 21–32)
CREAT SERPL-MCNC: 0.69 MG/DL (ref 0.6–1.3)
ERYTHROCYTE [DISTWIDTH] IN BLOOD BY AUTOMATED COUNT: 13.8 % (ref 11.6–15.1)
GFR SERPL CREATININE-BSD FRML MDRD: 83 ML/MIN/1.73SQ M
GLUCOSE P FAST SERPL-MCNC: 102 MG/DL (ref 65–99)
GLUCOSE SERPL-MCNC: 102 MG/DL (ref 65–140)
HCT VFR BLD AUTO: 38.9 % (ref 34.8–46.1)
HGB BLD-MCNC: 12.6 G/DL (ref 11.5–15.4)
MAGNESIUM SERPL-MCNC: 2 MG/DL (ref 1.9–2.7)
MCH RBC QN AUTO: 29.5 PG (ref 26.8–34.3)
MCHC RBC AUTO-ENTMCNC: 32.4 G/DL (ref 31.4–37.4)
MCV RBC AUTO: 91 FL (ref 82–98)
PLATELET # BLD AUTO: 298 THOUSANDS/UL (ref 149–390)
PMV BLD AUTO: 8.9 FL (ref 8.9–12.7)
POTASSIUM SERPL-SCNC: 3.8 MMOL/L (ref 3.5–5.3)
PROT SERPL-MCNC: 6.2 G/DL (ref 6.4–8.4)
RBC # BLD AUTO: 4.27 MILLION/UL (ref 3.81–5.12)
SODIUM SERPL-SCNC: 141 MMOL/L (ref 135–147)
WBC # BLD AUTO: 5 THOUSAND/UL (ref 4.31–10.16)

## 2024-04-07 PROCEDURE — 97166 OT EVAL MOD COMPLEX 45 MIN: CPT

## 2024-04-07 PROCEDURE — 83735 ASSAY OF MAGNESIUM: CPT | Performed by: STUDENT IN AN ORGANIZED HEALTH CARE EDUCATION/TRAINING PROGRAM

## 2024-04-07 PROCEDURE — 85027 COMPLETE CBC AUTOMATED: CPT | Performed by: STUDENT IN AN ORGANIZED HEALTH CARE EDUCATION/TRAINING PROGRAM

## 2024-04-07 PROCEDURE — 97162 PT EVAL MOD COMPLEX 30 MIN: CPT

## 2024-04-07 PROCEDURE — 99233 SBSQ HOSP IP/OBS HIGH 50: CPT | Performed by: STUDENT IN AN ORGANIZED HEALTH CARE EDUCATION/TRAINING PROGRAM

## 2024-04-07 PROCEDURE — 80053 COMPREHEN METABOLIC PANEL: CPT | Performed by: STUDENT IN AN ORGANIZED HEALTH CARE EDUCATION/TRAINING PROGRAM

## 2024-04-07 RX ORDER — LANOLIN ALCOHOL/MO/W.PET/CERES
6 CREAM (GRAM) TOPICAL
Status: DISCONTINUED | OUTPATIENT
Start: 2024-04-07 | End: 2024-04-09 | Stop reason: HOSPADM

## 2024-04-07 RX ADMIN — HEPARIN SODIUM 5000 UNITS: 5000 INJECTION INTRAVENOUS; SUBCUTANEOUS at 23:03

## 2024-04-07 RX ADMIN — ATORVASTATIN CALCIUM 40 MG: 40 TABLET, FILM COATED ORAL at 18:08

## 2024-04-07 RX ADMIN — CLOPIDOGREL 75 MG: 75 TABLET ORAL at 08:44

## 2024-04-07 RX ADMIN — ASPIRIN 81 MG: 81 TABLET, COATED ORAL at 08:44

## 2024-04-07 NOTE — SPEECH THERAPY NOTE
Consult received and chart reviewed. Per chart review, and discussion with RN, pt passed RN dysphagia assessment and is tolerating regular diet with thin liquids with no s/s of aspiration. No further dysarthria or language deficits noted or reported. Therefore, formal speech/swallow evaluation not indicated at this time. If new concerns arise, please reconsult.     Viki Hester M.S., CCC-SLP  Speech Language Pathologist   Available via [employer]  [place] #[id number]  [place] #[id number]

## 2024-04-07 NOTE — PLAN OF CARE
Problem: PHYSICAL THERAPY ADULT  Goal: Performs mobility at highest level of function for planned discharge setting.  See evaluation for individualized goals.  Description: Treatment/Interventions: Functional transfer training, LE strengthening/ROM, Elevations, Therapeutic exercise, Endurance training, Cognitive reorientation, Patient/family training, Equipment eval/education, Bed mobility, Gait training, Spoke to nursing, OT          See flowsheet documentation for full assessment, interventions and recommendations.  Outcome: Progressing  Note: Prognosis: Good  Problem List: Decreased strength, Decreased endurance, Decreased safety awareness, Decreased mobility, Decreased coordination  Assessment: Pt is 79 y.o. female seen for PT evaluation s/p admit to [facility] on 4/6/2024 w/ Left sided cerebral hemisphere cerebrovascular accident (CVA) (HCC). PT consulted to assess pt's functional mobility and d/c needs. Order placed for PT eval and tx, w/ up as tolerated order. Comorbidities affecting pt's physical performance at time of assessment include: hyperlipidemia, MCI, osteoporosis, polio. PTA, pt was independent w/ all functional mobility w/ out AD, ambulates community distances and elevations, ambulates unrestricted distances and all terrain, ambulates household distances, lives in multi-level home, has   DENIS, lives w/ significant other in multi level house, and retired. Personal factors affecting pt at time of IE include: lives in multi story house, inability to navigate community distances, unable to perform dynamic tasks in community, impulsivity, inability to perform current job functions, unable to perform caregiver support/tasks, unable to perform physical activity, inability to perform IADLs, inability to perform ADLs, and inability to live alone. Please find objective findings from PT assessment regarding body systems outlined above with impairments and limitations including weakness, impaired  balance, decreased endurance, impaired coordination, decreased activity tolerance, and decreased functional mobility tolerance. The following objective measures performed on IE also reveal limitations: Barthel Index: 75/100, Modified [place]: 2 (slight disability), and AM-PAC 6-Clicks: 24/24. Pt's clinical presentation is currently evolving seen in pt's presentation of of continued need of medical management and monitoring, decreased strength and balance, leading to an increased risk of falls, decreased endurance and activity tolerance limiting mobility. Pt to benefit from continued PT tx to address deficits as defined above and maximize level of functional independent mobility and consistency. From PT/mobility standpoint, recommendation at time of d/c would be Level 3 minimum resource intensity home with home health rehabilitation vs outpatient physical therapy pending progress in order to facilitate return to PLOF.  Barriers to Discharge: None     Rehab Resource Intensity Level, PT: III (Minimum Resource Intensity) (HHPT vs OPPT)    See flowsheet documentation for full assessment.

## 2024-04-07 NOTE — PHYSICAL THERAPY NOTE
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]       04/07/24 0818   PT Last Visit   PT Visit Date 04/07/24   Note Type   Note type Evaluation   Pain Assessment   Pain Assessment Tool 0-10   Pain Score No Pain   Restrictions/Precautions   Weight Bearing Precautions Per Order No   Other Precautions Telemetry;Fall Risk;Chair Alarm   Home Living   Type of Home House   Home Layout Two level;1/2 bath on main level;Bed/bath upstairs;Stairs to enter with rails  (FOS to 2nd floor)   Bathroom Shower/Tub Walk-in shower   Bathroom Toilet Standard   Bathroom Equipment Shower chair;Grab bars in shower   Bathroom Accessibility Accessible   Home Equipment Walker;Cane;Wheelchair-manual   Additional Comments no AD used at baseline   Prior Function   Level of [place] Independent with ADLs;Independent with functional mobility   Lives With Significant other   Receives Help From   (Significant other Vincent)   IADLs Independent with driving;Independent with meal prep;Independent with medication management   Falls in the last 6 months 1 to 4   Vocational Retired   Cognition   Overall Cognitive Status WFL   Arousal/Participation Alert   Attention Within functional limits   Orientation Level Oriented X4   Memory Other (Comment)  (patient failed cognitive test administered by OT.)   Following Commands Follows all commands and directions without difficulty   RLE Assessment   RLE Assessment X  (3+/5)   Strength RLE   RLE Overall Strength 3+/5   LLE Assessment   LLE Assessment WFL   Light Touch   RLE Light Touch Grossly intact   LLE Light Touch Grossly intact   Proprioception   RLE Proprioception Impaired  (Decreased ability to do finger to nose on the R side especially with eyes closed.)   LLE Proprioception Grossly Intact   Bed Mobility   Supine to Sit 5  Supervision   Additional items Assist x  1;Bedrails   Sit to Supine 5  Supervision   Additional items Assist x 1   Transfers   Sit to Stand 5  Supervision   Additional items Assist x 1   Stand to Sit 5  Supervision   Additional items Assist x 1   Ambulation/Elevation   Gait pattern Improper Weight shift;Decreased foot clearance;Inconsistent sky;Short stride;Decreased hip extension;Decreased heel strike;Decreased toe off;Step through pattern   Gait Assistance   (CG x 1)   Additional items Assist x 1;Verbal cues   Assistive Device None   Distance 20 ft   Stair Management Assistance   (None)   Balance   Static Sitting Good   Dynamic Sitting Fair +   Static Standing Fair   Dynamic Standing Fair   Ambulatory Fair -   Endurance Deficit   Endurance Deficit No   Activity Tolerance   Activity Tolerance Patient tolerated treatment well   Medical Staff Made Aware Co-evaluation performed with OT Sanjuana secondary to complex medical condition of patient and regression of functional status from baseline. PT/OT goals were addressed separately.   Nurse Made Aware RN Brigid made aware   Assessment   Prognosis Good   Problem List Decreased strength;Decreased endurance;Decreased safety awareness;Decreased mobility;Decreased coordination   Assessment Pt is 79 y.o. female seen for PT evaluation s/p admit to [facility] on 4/6/2024 w/ Left sided cerebral hemisphere cerebrovascular accident (CVA) (HCC). PT consulted to assess pt's functional mobility and d/c needs. Order placed for PT eval and tx, w/ up as tolerated order. Comorbidities affecting pt's physical performance at time of assessment include: hyperlipidemia, MCI, osteoporosis, polio. PTA, pt was independent w/ all functional mobility w/ out AD, ambulates community distances and elevations, ambulates unrestricted distances and all terrain, ambulates household distances, lives in multi-level home, has   DENIS, lives w/ significant other in multi level house, and retired. Personal factors affecting pt at time of IE  "include: lives in multi story house, inability to navigate community distances, unable to perform dynamic tasks in community, impulsivity, inability to perform current job functions, unable to perform caregiver support/tasks, unable to perform physical activity, inability to perform IADLs, inability to perform ADLs, and inability to live alone. Please find objective findings from PT assessment regarding body systems outlined above with impairments and limitations including weakness, impaired balance, decreased endurance, impaired coordination, decreased activity tolerance, and decreased functional mobility tolerance. The following objective measures performed on IE also reveal limitations: Barthel Index: 75/100, Modified Chuck: 2 (slight disability), and AM-PAC 6-Clicks: 24/24. Pt's clinical presentation is currently evolving seen in pt's presentation of of continued need of medical management and monitoring, decreased strength and balance, leading to an increased risk of falls, decreased endurance and activity tolerance limiting mobility. Pt to benefit from continued PT tx to address deficits as defined above and maximize level of functional independent mobility and consistency. From PT/mobility standpoint, recommendation at time of d/c would be Level 3 minimum resource intensity home with home health rehabilitation vs outpatient physical therapy pending progress in order to facilitate return to PLOF.   Barriers to Discharge None   Goals   Patient Goals \" I want to go home\".   STG Expiration Date 04/17/24   Short Term Goal #1 In 7-10 days: Increase bilateral LE strength 1 grade to facilitate independent mobility, Perform all bed mobility tasks with distant S to decrease caregiver burden, Perform all transfers with distant S to improve independence, Ambulate > 1000 ft. with least restrictive assistive device with distant S w/o LOB and w/ normalized gait pattern 100% of the time, Navigate 12 stairs with distant S " with unilateral handrail to facilitate return to previous living environment, and Increase all balance 1 grade to decrease risk for falls   Plan   Treatment/Interventions Functional transfer training;LE strengthening/ROM;Elevations;Therapeutic exercise;Endurance training;Cognitive reorientation;Patient/family training;Equipment eval/education;Bed mobility;Gait training;Spoke to nursing;OT   PT Frequency 3-5x/wk   Discharge Recommendation   Rehab Resource Intensity Level, PT III (Minimum Resource Intensity)  (HHPT vs OPPT)   AM-PAC Basic Mobility Inpatient   Turning in Flat Bed Without Bedrails 4   Lying on Back to Sitting on Edge of Flat Bed Without Bedrails 4   Moving Bed to Chair 4   Standing Up From Chair Using Arms 4   Walk in Room 4   Climb 3-5 Stairs With Railing 4   Basic Mobility Inpatient Raw Score 24   Basic Mobility Standardized Score 57.68   [facility] Highest Level Of Mobility   -HLM Goal 8: Walk 250 feet or more   JH-HLM Achieved 7: Walk 25 feet or more   Modified Chuck Scale   Modified Chuck Scale 2   Barthel Index   Feeding 10   Bathing 5   Grooming Score 5   Dressing Score 10   Bladder Score 10   Bowels Score 10   Toilet Use Score 10   Transfers (Bed/Chair) Score 10   Mobility (Level Surface) Score 0   Stairs Score 5   Barthel Index Score 75     Problem List  Patient Active Problem List   Diagnosis    Mixed hyperlipidemia    Osteoporosis    Seborrheic keratosis    Elevated C-reactive protein (CRP)    Episode of apnea    Impaired fasting glucose    Swelling of left lower extremity    Venous insufficiency of both lower extremities    Small subcortical Left MCA CVA,       Past Medical History  Past Medical History:   Diagnosis Date    Cervical strain     Changing skin lesion     Hyperlipidemia     MCI (mild cognitive impairment)     Mixed hyperlipidemia     Nonmelanoma skin cancer     BCC    Osteoporosis     Polio     Poor concentration     Swelling of left thumb        Past Surgical  History  Past Surgical History:   Procedure Laterality Date    ANAL FISSURECTOMY      APPENDECTOMY      CATARACT EXTRACTION      HYSTERECTOMY               Nick Nieves, PT

## 2024-04-07 NOTE — PLAN OF CARE
Problem: PAIN - ADULT  Goal: Verbalizes/displays adequate comfort level or baseline comfort level  Description: Interventions:  - Encourage patient to monitor pain and request assistance  - Assess pain using appropriate pain scale  - Administer analgesics based on type and severity of pain and evaluate response  - Implement non-pharmacological measures as appropriate and evaluate response  - Consider cultural and social influences on pain and pain management  - Notify physician/advanced practitioner if interventions unsuccessful or patient reports new pain  4/7/2024 0736 by Grace Smith RN  Outcome: Progressing  4/6/2024 1829 by Grace Smith RN  Outcome: Progressing

## 2024-04-07 NOTE — PLAN OF CARE
Problem: OCCUPATIONAL THERAPY ADULT  Goal: Performs self-care activities at highest level of function for planned discharge setting.  See evaluation for individualized goals.  Description: Treatment Interventions: ADL retraining, Functional transfer training, UE strengthening/ROM, Endurance training, Cognitive reorientation, Patient/family training, Equipment evaluation/education, Neuromuscular reeducation, Compensatory technique education, Activityengagement          See flowsheet documentation for full assessment, interventions and recommendations.   Note: Limitation: Decreased ADL status, Decreased UE strength, Decreased cognition, Decreased Safe judgement during ADL, Decreased endurance, Decreased self-care trans, Decreased high-level ADLs  Prognosis: Good  Assessment: Patient is a 79 y.o. female seen for OT evaluation s/p admit to [facility]  on 4/6/2024 w/Left sided cerebral hemisphere cerebrovascular accident (CVA) (HCC). Commorbidities affecting patient's functional performance at time of assessment include: HLD, osteoporosis, swelling of LLE, and hx of polio. Orders placed for OT evaluation and treatment and up and OOB as tolerated . Performed at least two patient identifiers during session including name and wristband.  Prior to admission, Patient reporting being independent with ADLs/IADLs, ambulatory with no AD, and lives with Significant other Vincent. Personal factors affecting patient at time of initial evaluation include: steps to enter, limited insight into deficits, difficulty performing ADLs, and difficulty performing IADLs. Upon evaluation, patient requires supervision assist for UB ADLs, minimal  assist for LB ADLs, transfers and functional ambulation in room and bathroom with supervision and contact guard assist, with the use of  no AD .  Patient is oriented x 4 and presents with limited ability to recall information after a brief period of time (short term memory) / working  memory .  Occupational performance is affected by the following deficits: impulsive behavior, decreased muscle strength, impaired gross motor coordination, dynamic sit/ stand balance deficit with poor standing tolerance time for self care and functional mobility, decreased activity tolerance, impaired memory, and delayed righting and equilibrium reactions. Patient to benefit from continued Occupational Therapy treatment while in the hospital to address deficits as defined above and maximize level of functional independence with ADLs and functional mobility. Occupational Performance areas to address include: grooming , bathing/ shower, dressing, toilet hygiene, transfer to all surfaces, functional ambulation, medication routine/ management, IADLS: Household maintenance, IADLs: safety procedures, and IADLs: meal prep/ clean up. From OT standpoint, recommendation at time of d/c would be Level III (minimim resource intensity).     Rehab Resource Intensity Level, OT: III (Minimum Resource Intensity)        Sanjuana ROSS, OTR/L

## 2024-04-07 NOTE — CASE MANAGEMENT
Case Management Assessment & Discharge Planning Note    Patient name Shanda Quintanilla  Location /-01 MRN [id number]  : 1945 Date 2024       Current Admission Date: 2024  Current Admission Diagnosis:Small subcortical Left MCA CVA,   Patient Active Problem List    Diagnosis Date Noted    Small subcortical Left MCA CVA, 2024    Swelling of left lower extremity 2024    Venous insufficiency of both lower extremities 2024    Impaired fasting glucose 2023    Episode of apnea 2022    Elevated C-reactive protein (CRP) 2021    Seborrheic keratosis 2018    Mixed hyperlipidemia 2017    Osteoporosis 2013      LOS (days): 0  Geometric Mean LOS (GMLOS) (days):   Days to GMLOS:     OBJECTIVE:    Risk of Unplanned Readmission Score: 6.05         Current admission status: Inpatient       Preferred Pharmacy:   [id number] - [place] [address]  [facility] [address]  Phone: [phone] Fax: [phone]    Primary Care Provider: Talat Ventura MD    Primary Insurance: [employer] [facility]  Secondary Insurance:     ASSESSMENT:  Active Health Care Proxies    There are no active Health Care Proxies on file.       Advance Directives  Does patient have a Health Care POA?: Yes  Does patient have Advance Directives?: Yes  Advance Directives: Living will, Power of  for health care  Primary Contact: Arabella Mata Notice Signed:  (not on workqueue)    Readmission Root Cause  30 Day Readmission: No    Patient Information  Admitted from:: Home  Mental Status: Alert  During Assessment patient was accompanied by: Not accompanied during assessment  Assessment information provided by:: Patient  Primary Caregiver: Self  Support Systems: Spouse/significant other  County of Residence: [place]  What city do you live in?: [place]  Home entry access options. Select all that apply.:  Stairs, Garage  Number of steps to enter home.: 2  Do the steps have railings?: Yes  Type of Current Residence: 2 story home  Upon entering residence, is there a bedroom on the main floor (no further steps)?: No  A bedroom is located on the following floor levels of residence (select all that apply):: 2nd Floor  Upon entering residence, is there a bathroom on the main floor (no further steps)?: Yes  Number of steps to 2nd floor from main floor: One Flight  Living Arrangements: Lives w/ Spouse/significant other (lives with Uli)  Is patient a ?: No    Activities of Daily Living Prior to Admission  Functional Status: Independent  Completes ADLs independently?: Yes  Ambulates independently?: Yes  Does patient use assisted devices?: No  Does patient currently own DME?: No  Does patient have a history of Outpatient Therapy (PT/OT)?: Yes  Does the patient have a history of Short-Term Rehab?: No  Does patient have a history of HHC?: No  Does patient currently have HHC?: No         Patient Information Continued  Income Source: Self-employed  Does patient have prescription coverage?: Yes  Does patient receive dialysis treatments?: No  Does patient have a history of substance abuse?: No  Does patient have a history of Mental Health Diagnosis?: No    PHQ 2/9 Screening   Reviewed PHQ 2/9 Depression Screening Score?: No    Means of Transportation  Means of Transport to Appts:: Drives Self      Social Determinants of Health (SDOH)      Flowsheet Row Most Recent Value   Housing Stability    In the last 12 months, was there a time when you were not able to pay the mortgage or rent on time? N   In the last 12 months, how many places have you lived? 1   In the last 12 months, was there a time when you did not have a steady place to sleep or slept in a shelter (including now)? N   Transportation Needs    In the past 12 months, has lack of transportation kept you from medical appointments or from getting medications? no    In the past 12 months, has lack of transportation kept you from meetings, work, or from getting things needed for daily living? No   Food Insecurity    Within the past 12 months, you worried that your food would run out before you got the money to buy more. Never true   Within the past 12 months, the food you bought just didn't last and you didn't have money to get more. Never true   Utilities    In the past 12 months has the electric, gas, oil, or water company threatened to shut off services in your home? No            DISCHARGE DETAILS:    Discharge planning discussed with:: patient  Freedom of Choice: Yes  Comments - Freedom of Choice: CM discussed d/c needs including VNA services, but pt declines this offer.  IPTA  Self employed Has adequate family support  CM contacted family/caregiver?: Yes  Were Treatment Team discharge recommendations reviewed with patient/caregiver?: Yes  Did patient/caregiver verbalize understanding of patient care needs?: Yes  Were patient/caregiver advised of the risks associated with not following Treatment Team discharge recommendations?: Yes    Contacts  Patient Contacts: Vincent  Relationship to Patient:: Family  Contact Method: In Person  Reason/Outcome: Continuity of Care    Requested Home Health Care         Is the patient interested in HHC at discharge?: No    DME Referral Provided  Referral made for DME?: No    Other Referral/Resources/Interventions Provided:  Referral Comments: No anticipated d/c needs-declining VNA services  IPTA  Self employed  Drives  Has adequate family support    Would you like to participate in our [facility] service program?  : No - Declined    Treatment Team Recommendation: Home  Discharge Destination Plan:: Home  Transport at Discharge : Family

## 2024-04-07 NOTE — PLAN OF CARE
Problem: INFECTION - ADULT  Goal: Absence or prevention of progression during hospitalization  Description: INTERVENTIONS:  - Assess and monitor for signs and symptoms of infection  - Monitor lab/diagnostic results  - Monitor all insertion sites, i.e. indwelling lines, tubes, and drains  - Monitor endotracheal if appropriate and nasal secretions for changes in amount and color  - [place] appropriate cooling/warming therapies per order  - Administer medications as ordered  - Instruct and encourage patient and family to use good hand hygiene technique  - Identify and instruct in appropriate isolation precautions for identified infection/condition  Outcome: Progressing  Goal: Absence of fever/infection during neutropenic period  Description: INTERVENTIONS:  - Monitor WBC    Outcome: Progressing

## 2024-04-07 NOTE — UTILIZATION REVIEW
"Initial Clinical Review    OBSERVATION  4/6  @   1706  CHANGED TO IP ADMISSION  4/7  @   0958  ACUTE  CVA    Admission: Date/Time/Statement:   Admission Orders (From admission, onward)       Ordered        04/06/24 1707  Place in Observation  Once                          04/07/24 0958  INPATIENT ADMISSION  Once        Transfer Service: Hospitalist   Question Answer Comment   Level of Care Med Surg    Estimated length of stay More than 2 Midnights    Certification I certify that inpatient services are medically necessary for this patient for a duration of greater than two midnights. See H&P and MD Progress Notes for additional information about the patient's course of treatment.        04/07/24 0957       ED Arrival Information       Expected   4/6/2024     Arrival   4/6/2024 15:50    Acuity   Emergent              Means of arrival   Ambulance    Escorted by   [facility]    Service   Hospitalist    Admission type   Emergency              Arrival complaint   slurred speech             Chief Complaint   Patient presents with    Slurred Speech     Patient arrives via EMS from urgent care with co of slurred speed that started approx 1445. Patient reports \"right arm feels different.\" Patient AOX4 and slurred speech has resolved. Denies headache or dizziness.        Initial Presentation: 79 y.o. female presents to ED  via  EMS  from [facility]  with slurred speech, concern for acute  CVA  at  Urgent  Care. LKW  was  2:45  PM the day of admission, not a candidate for  TNK.  Admit  Observation with  Dysarthria, clumsy hand syndrome and plan is  neuro checks, neuro consult, MRI brain and aspirin/plavix.    Neuro consult  MRI  C/W  acute  CVA.  Wait  carotid  U/S, vascular  consult.  Monitor  BP.  Needs  PT/OT.  Monitor  frequent neuro checks.          ED Triage Vitals   Temperature Pulse Respirations Blood Pressure SpO2   04/06/24 1645 04/06/24 1554 04/06/24 1554 04/06/24 1554 04/06/24 1554   97.6 °F (36.4 " °C) 83 16 (!) 171/92 98 %      Temp Source Heart Rate Source Patient Position - Orthostatic VS BP Location FiO2 (%)   04/06/24 1554 04/06/24 1554 04/06/24 1554 04/06/24 1554 --   Oral Monitor Sitting Right arm       Pain Score       04/06/24 1823       No Pain          Wt Readings from Last 1 Encounters:   04/06/24 63.5 kg (140 lb)     Additional Vital Signs:     97.7 °F (36.5 °C) 69 16 114/53 73 98 % -- --    04/07/24 0300 97.6 °F (36.4 °C) 86 16 114/53 73 97 % None (Room air) Lying   04/07/24 0234 -- -- -- -- -- 97 % -- --   04/07/24 0100 98.1 °F (36.7 °C) 73 16 127/62 84 96 % None (Room air) Lying   04/06/24 2300 98.2 °F (36.8 °C) 83 16 132/61 85 97 % None (Room air) Lying   04/06/24 21:12:01 98.4 °F (36.9 °C) 79 -- -- -- 97 % -- --   04/06/24 2100 98.1 °F (36.7 °C) 85 18 134/62 86 96 % None (Room air) Lying   04/06/24 1900 98.4 °F (36.9 °C) 97 16 134/62 86 97 % None (Room air) Lying   04/06/24 1833 -- -- -- -- -- -- None (Room air) --   04/06/24 1823 -- -- 18 -- -- -- None (Room air) --   04/06/24 18:20:36 98.3 °F (36.8 °C) 71 -- 138/69 92 97 % -- --   04/06/24 1700 -- 82 17 157/80 -- 98 % None (Room air) Sitting   04/06/24 1645 97.6 °F (36.4 °C) 80 17 160/83 103 97 % None (Room air) Sitting   04/06/24 1630 -- 81 18 141/82 100 99 % None (Room air) Sitting   04/06/24 1600 -- 76 19 171/92 Abnormal  123 98 % None (Room air) Sitting   04/06/24 1554 -- 83 16 171/92 Abnormal  -- 98 % -- Sitting     Pertinent Labs/Diagnostic Test Results:   MRI brain wo contrast   Final Result by Rush Huddleston MD (04/06 1814)      1.  Small acute infarct involving the left precentral gyrus. Questionable punctate acute cortical infarct in the left parietal lobe. No significant edema or hemorrhage.   2.  Mild nonspecific white matter change suggesting microangiopathy.   3.  3 mm left anterior pituitary gland lesion could be a tiny cyst versus microadenoma, stable from MRI 5/9/2017. Correlate with hormonal levels.   4.  Sinus disease.       Workstation performed: [id number]         CTA stroke alert (head/neck)   Final Result by Rush Huddleston MD (04/06 1636)      1.  Patent major vessels of the [place] of sy without high-grade stenosis.  No aneurysm.   2.  About 87-90% atherosclerotic stenosis at the origin of the left cervical ICA. Mild atherosclerotic disease in the right carotid bifurcation without stenosis.   3.  Patent vertebral arteries without high-grade stenosis.   4.  Beaded luminal irregularity of the cervical ICA (right greater than left) suggesting fibromuscular dysplasia (FMD).   5.  Patchy groundglass opacities in the partially imaged right upper lobe consistent with pneumonia. Correlate with clinical assessment. Recommend 3 months follow-up low-dose chest CT to ensure resolution.   6.  3 mm right upper lobe pulmonary nodule. Based on current Fleischner Society 2017 Guidelines on incidental pulmonary nodule, no routine follow-up is needed if the patient is low risk. If the patient is high risk, optional follow-up chest CT at 12    months can be considered.                     Findings were directly discussed with Anton Wilson at 4:21 p.m.                           Workstation performed: [id number]         CT stroke alert brain   Final Result by Rush Huddleston MD (04/06 1634)      1.  No acute intracranial CT abnormality.   2.  Sinus disease as described.                     Findings were directly discussed with Atnon Wilson at 4:21 p.m.         Workstation performed: [id number]         VAS carotid complete study    (Results Pending)         Results from last 7 days   Lab Units 04/07/24  0526 04/06/24  1601   WBC Thousand/uL 5.00 8.77   HEMOGLOBIN g/dL 12.6 14.6   HEMATOCRIT % 38.9 45.7   PLATELETS Thousands/uL 298 352         Results from last 7 days   Lab Units 04/07/24  0526 04/06/24  1601   SODIUM mmol/L 141 139   POTASSIUM mmol/L 3.8 4.6   CHLORIDE mmol/L 109* 106   CO2 mmol/L 26 25   ANION GAP mmol/L 6 8   BUN mg/dL 16 12    CREATININE mg/dL 0.69 0.68   EGFR ml/min/1.73sq m 83 83   CALCIUM mg/dL 9.0 9.3   MAGNESIUM mg/dL 2.0  --      Results from last 7 days   Lab Units 04/07/24  0526   AST U/L 23   ALT U/L 17   ALK PHOS U/L 58   TOTAL PROTEIN g/dL 6.2*   ALBUMIN g/dL 3.7   TOTAL BILIRUBIN mg/dL 0.53     Results from last 7 days   Lab Units 04/06/24  1555 04/06/24  1511   POC GLUCOSE mg/dl 92 93     Results from last 7 days   Lab Units 04/07/24  0526 04/06/24  1601   GLUCOSE RANDOM mg/dL 102 87         Results from last 7 days   Lab Units 04/06/24  1601   HEMOGLOBIN A1C % 5.7*   EAG mg/dl 117       Results from last 7 days   Lab Units 04/06/24  2144 04/06/24  1842 04/06/24  1601   HS TNI 0HR ng/L  --   --  3   HS TNI 2HR ng/L  --  4  --    HSTNI D2 ng/L  --  1  --    HS TNI 4HR ng/L 4  --   --    HSTNI D4 ng/L 1  --   --          Results from last 7 days   Lab Units 04/06/24  1601   PROTIME seconds 12.5   INR  0.88   PTT seconds 26             ED Treatment:   Medication Administration from 04/06/2024 1519 to 04/06/2024 1818         Date/Time Order Dose Route Action Comments     04/06/2024 1612 EDT iohexol (OMNIPAQUE) 350 MG/ML injection (MULTI-DOSE) 100 mL 85 mL Intravenous Given --     04/06/2024 1709 EDT aspirin tablet 325 mg 325 mg Oral Given --     04/06/2024 1709 EDT clopidogrel (PLAVIX) tablet 300 mg 300 mg Oral Given --     04/06/2024 1714 EDT atorvastatin (LIPITOR) tablet 40 mg 40 mg Oral Given --            Admitting Diagnosis: TIA (transient ischemic attack) [G45.9]  Dysarthria [R47.1]  Dysarthria-clumsy hand syndrome [G46.7]  Age/Sex: 79 y.o. female  Admission Orders:  Scheduled Medications:  aspirin, 81 mg, Oral, Daily  atorvastatin, 40 mg, Oral, Daily With Dinner  clopidogrel, 75 mg, Oral, Daily  heparin (porcine), 5,000 Units, Subcutaneous, Q8H ALMAS      Continuous IV Infusions:     PRN Meds:     24 hr tele  Stroke teaching  Dysphagia  eval  Neuro checks    IP CONSULT TO NEUROLOGY  IP CONSULT TO CASE MANAGEMENT  IP  CONSULT TO NUTRITION SERVICES  IP CONSULT TO VASCULAR SURGERY    Network Utilization Review Department  ATTENTION: Please call with any questions or concerns to [phone] and carefully listen to the prompts so that you are directed to the right person. All voicemails are confidential.   For Discharge needs, contact Care Management DC Support Team at [phone] opt. [phone]  Send all requests for admission clinical reviews, approved or denied determinations and any other requests to dedicated fax number below belonging to the campus where the patient is receiving treatment. List of dedicated fax numbers for the Facilities:  FACILITY NAME UR FAX NUMBER   ADMISSION DENIALS (Administrative/Medical Necessity) [phone]   DISCHARGE SUPPORT TEAM (NETWORK) [phone]   PARENT CHILD HEALTH (Maternity/NICU/Pediatrics) [phone]   [facility] [phone]   [facility] [phone]   [facility] [phone]   [facility] [phone]   [facility] [phone]   [facility] [phone]   [facility] [phone]   [facility] [phone]   [facility] [phone]   [facility] [phone]   [facility] [phone]   [facility] [phone]

## 2024-04-07 NOTE — ASSESSMENT & PLAN NOTE
87 to 90% left ICA stenosis noted on CT head and neck report.  Follow-up with vascular ultrasound Dopplers.  Continue aspirin, Plavix, statin.  Follow-up with vascular surgery consult.

## 2024-04-07 NOTE — PROGRESS NOTES
formerly [facility]  Progress Note  Name: Shanda Quintanilla I  MRN: [id number]  Unit/Bed#: -01 I Date of Admission: 4/6/2024   Date of Service: 4/7/2024 I Hospital Day: 0    Assessment/Plan   * Small subcortical Left MCA CVA,  Assessment & Plan  79-year-old female patient presented with dysarthria, right upper extremity clumsiness and was presented a stroke alert.  Symptoms had resolved by the time she arrived to the emergency room therefore was not considered candidate for TNK.    CTA head and neck report noted with 87 to 90% stenosis left ICA.  Brain MRI report noted with Small acute infarct involving the left precentral gyrus. Questionable punctate acute cortical infarct in the left parietal lobe. No significant edema or hemorrhage.     Continue with stroke pathway.  Currently afebrile, hemodynamically stable.  Dysarthria has resolved, right upper extremity improving.  Received loading dose of aspirin and Plavix.  Continue aspirin, Plavix, atorvastatin 40 milligram nightly.  Follow-up with 2D echo, carotid Doppler.  Follow-up with vascular surgery consultation.  Neurology recommendation appreciated.    Intracranial carotid stenosis, left  Assessment & Plan  87 to 90% left ICA stenosis noted on CT head and neck report.  Follow-up with vascular ultrasound Dopplers.  Continue aspirin, Plavix, statin.  Follow-up with vascular surgery consult.    Mixed hyperlipidemia  Assessment & Plan  Continue atorvastatin 40 mg nightly.               VTE Pharmacologic Prophylaxis:   Moderate Risk (Score 3-4) - Pharmacological DVT Prophylaxis Ordered: heparin.    Mobility:   Basic Mobility Inpatient Raw Score: 24  JH-HLM Goal: 8: Walk 250 feet or more  JH-HLM Achieved: 7: Walk 25 feet or more      Patient Centered Rounds: I performed bedside rounds with nursing staff today.   Discussions with Specialists or Other Care Team Provider: Neurology    Education and Discussions with Family / Patient: Updated contact  person (significant other) at bedside.    Total Time Spent on Date of Encounter in care of patient: 35 mins. This time was spent on one or more of the following: performing physical exam; counseling and coordination of care; obtaining or reviewing history; documenting in the medical record; reviewing/ordering tests, medications or procedures; communicating with other healthcare professionals and discussing with patient's family/caregivers.    Current Length of Stay: 0 day(s)  Current Patient Status: Inpatient   Certification Statement: The patient will continue to require additional inpatient hospital stay due to currently on stroke pathway, pending 2D echo, vascular Doppler ultrasound, vascular surgery consultation.  Discharge Plan: Anticipate discharge in 24-48 hrs to discharge location to be determined pending rehab evaluations.    Code Status: Level 1 - Full Code    Subjective:   Patient appears comfortable not in distress.  No further signs of dysarthria noted, right upper extremity tenderness significantly improved.  Denies chest pain, dyspnea, fever, chills, nausea, vomiting, any other new complaints.  No other events reported.    Objective:     Vitals:   Temp (24hrs), Av.1 °F (36.7 °C), Min:97.6 °F (36.4 °C), Max:98.5 °F (36.9 °C)    Temp:  [97.6 °F (36.4 °C)-98.5 °F (36.9 °C)] 98.5 °F (36.9 °C)  HR:  [61-97] 71  Resp:  [16-19] 16  BP: (108-171)/(53-92) 115/61  SpO2:  [95 %-99 %] 95 %  Body mass index is 24.8 kg/m².     Input and Output Summary (last 24 hours):   No intake or output data in the 24 hours ending 24 8985    Physical Exam:   Physical Exam  Constitutional:       General: She is not in acute distress.     Appearance: Normal appearance. She is not ill-appearing, toxic-appearing or diaphoretic.   HENT:      Head: Normocephalic and atraumatic.   Eyes:      Pupils: Pupils are equal, round, and reactive to light.   Cardiovascular:      Rate and Rhythm: Normal rate.      Pulses: Normal pulses.    Pulmonary:      Effort: Pulmonary effort is normal. No respiratory distress.      Breath sounds: Normal breath sounds. No wheezing.   Abdominal:      General: Bowel sounds are normal. There is no distension.      Palpations: Abdomen is soft.      Tenderness: There is no abdominal tenderness.   Musculoskeletal:      Cervical back: No rigidity.      Right lower leg: No edema.      Left lower leg: No edema.   Neurological:      Mental Status: She is alert and oriented to person, place, and time. Mental status is at baseline.      Comments: Currently no signs of dysarthria, facial asymmetry noted on exam.  Motor strength 5 out of 5 equal and symmetric in all 4 extremities.  Right upper extremity noted with very slight dysmetria.  No other gross focal motor deficit noted on exam.          Additional Data:     Labs:  Results from last 7 days   Lab Units 04/07/24  0526   WBC Thousand/uL 5.00   HEMOGLOBIN g/dL 12.6   HEMATOCRIT % 38.9   PLATELETS Thousands/uL 298     Results from last 7 days   Lab Units 04/07/24  0526   SODIUM mmol/L 141   POTASSIUM mmol/L 3.8   CHLORIDE mmol/L 109*   CO2 mmol/L 26   BUN mg/dL 16   CREATININE mg/dL 0.69   ANION GAP mmol/L 6   CALCIUM mg/dL 9.0   ALBUMIN g/dL 3.7   TOTAL BILIRUBIN mg/dL 0.53   ALK PHOS U/L 58   ALT U/L 17   AST U/L 23   GLUCOSE RANDOM mg/dL 102     Results from last 7 days   Lab Units 04/06/24  1601   INR  0.88     Results from last 7 days   Lab Units 04/06/24  1555 04/06/24  1511   POC GLUCOSE mg/dl 92 93     Results from last 7 days   Lab Units 04/06/24  1601   HEMOGLOBIN A1C % 5.7*           Lines/Drains:  Invasive Devices       Peripheral Intravenous Line  Duration             Peripheral IV 04/06/24 Distal;Left;Ventral (anterior) Forearm <1 day    Peripheral IV 04/06/24 Left;Ventral (anterior) Forearm <1 day                      Telemetry:  Telemetry Orders (From admission, onward)               24 Hour Telemetry Monitoring  Continuous x 24 Hours (Telem)         Question:  Reason for 24 Hour Telemetry  Answer:  TIA/Suspected CVA/ Confirmed CVA                     Telemetry Reviewed: Normal Sinus Rhythm  Indication for Continued Telemetry Use: Acute CVA             Imaging: Reviewed radiology reports from this admission including: CT head and MRI brain    Recent Cultures (last 7 days):         Last 24 Hours Medication List:   Current Facility-Administered Medications   Medication Dose Route Frequency Provider Last Rate    aspirin  81 mg Oral Daily Lora Dye, CRNP      atorvastatin  40 mg Oral Daily With Dinner Lora Dye, CRNP      clopidogrel  75 mg Oral Daily Lora Dye, CRNP      heparin (porcine)  5,000 Units Subcutaneous Q8H [facility] Doyle Castro MD          Today, Patient Was Seen By: Evelio Villalobos MD    **Please Note: This note may have been constructed using a voice recognition system.**

## 2024-04-07 NOTE — OCCUPATIONAL THERAPY NOTE
Occupational Therapy Evaluation      Shanda Quintanilla    4/7/2024    Patient Active Problem List   Diagnosis    Mixed hyperlipidemia    Osteoporosis    Seborrheic keratosis    Elevated C-reactive protein (CRP)    Episode of apnea    Impaired fasting glucose    Swelling of left lower extremity    Venous insufficiency of both lower extremities    Small subcortical Left MCA CVA,       Past Medical History:   Diagnosis Date    Cervical strain     Changing skin lesion     Hyperlipidemia     MCI (mild cognitive impairment)     Mixed hyperlipidemia     Nonmelanoma skin cancer     BCC    Osteoporosis     Polio     Poor concentration     Swelling of left thumb        Past Surgical History:   Procedure Laterality Date    ANAL FISSURECTOMY      APPENDECTOMY      CATARACT EXTRACTION      HYSTERECTOMY          04/07/24 0817   OT Last Visit   OT Visit Date 04/07/24   Note Type   Note type Evaluation   Additional Comments Pt agreeable to OT eval. Upon arrival pt supine in bed with HOB elevated.   Pain Assessment   Pain Assessment Tool 0-10   Pain Score 2   Pain Location/Orientation Location: Head   Pain Onset/Description Frequency: Intermittent;Descriptor: Discomfort  (c coughing)   Hospital Pain Intervention(s) Ambulation/increased activity;Repositioned;Medication (See MAR)   Restrictions/Precautions   Weight Bearing Precautions Per Order No   Other Precautions Telemetry;Fall Risk;Contact/isolation;Airborne/isolation   Home Living   Type of Home House   Home Layout Two level;1/2 bath on main level;Bed/bath upstairs;Stairs to enter with rails  (FOS to 2nd floor)   Bathroom Shower/Tub Walk-in shower   Bathroom Toilet Standard   Bathroom Equipment Shower chair;Grab bars in shower   Bathroom Accessibility Accessible   Home Equipment Wheelchair-manual;Walker;Cane  (no AD used at baseline)   Prior Function   Level of [place] Independent with ADLs;Independent with functional mobility;Independent with IADLS   Lives With Significant  "other  (recently moved in with SO ~6 months ago)   Receives Help From   (Significant other Vincent)   IADLs Independent with driving;Independent with meal prep;Independent with medication management   Falls in the last 6 months 1 to 4   Vocational Retired   Lifestyle   Autonomy Patient reporting being independent with ADLs/IADLs, ambulatory with no AD, and lives with Significant other Vincent   Reciprocal Relationships Significant other Vincent   Service to Others Retired   Intrinsic Gratification Dancing   General   Additional Pertinent History MRI of brain on 4/6/24 indicates \"Small acute infarct involving the left precentral gyrus\"   Family/Caregiver Present Yes  (Significant other Vincent)   ADL   Eating Assistance 6  Modified independent   Grooming Assistance 6  Modified Independent   UB Bathing Assistance 5  Supervision/Setup   LB Bathing Assistance 4  Minimal Assistance   UB Dressing Assistance 5  Supervision/Setup   LB Dressing Assistance 4  Minimal Assistance   Toileting Assistance  5  Supervision/Setup   Additional Comments ADL levels based on functional performance during OT eval.   Bed Mobility   Supine to Sit 5  Supervision   Additional items Assist x 1;HOB elevated;Bedrails   Sit to Supine   (DNT: pt seated at EOB at end of eval)   Transfers   Sit to Stand 5  Supervision   Additional items Assist x 1;Bedrails;Verbal cues   Stand to Sit 4  Minimal assistance   Additional items Assist x 1;Bedrails;Verbal cues   Functional Mobility   Functional Mobility   (CGA)   Additional Comments Pt ambulated in room with no overt SOB. Pt impulsive and requires cues safety.   Additional items   (pt ambulated with no AD)   Balance   Static Sitting Good   Dynamic Sitting Fair +   Static Standing Fair   Dynamic Standing Fair   Activity Tolerance   Activity Tolerance Patient tolerated treatment well   Medical Staff Made Aware Pt seen as a co-eval with PT due to the patient's co-morbidities and clinically unstable " presentation indicated by chart review.   RUE Assessment   RUE Assessment WFL  (full AROM, 4-/5 MMT)   LUE Assessment   LUE Assessment WFL  (full AROM, 4/5 MMT)   Hand Function   Gross Motor Coordination Impaired  (RUE noted c finger-to-nose test)   Fine Motor Coordination Functional   Sensation   Light Touch No apparent deficits   Cognition   Overall Cognitive Status   (Questionable)   Arousal/Participation Alert;Responsive;Cooperative   Attention Attends with cues to redirect   Orientation Level Oriented X4   Memory Decreased short term memory   Following Commands Follows all commands and directions without difficulty   Comments Mini-Cog assessment performed today.  Version 1 was given. Patient able to recall 1/3 words.  Patient able to draw a normal clock with the correct time.  Total score of test was 3/5 indicating negative screen for dementia. Pt noted to be repetitive and requires cues for task focus. Questionable safety awareness/insight. May benefit from formal cognitive assessment   Assessment   Limitation Decreased ADL status;Decreased UE strength;Decreased cognition;Decreased Safe judgement during ADL;Decreased endurance;Decreased self-care trans;Decreased high-level ADLs   Prognosis Good   Assessment Patient is a 79 y.o. female seen for OT evaluation s/p admit to [facility]  on 4/6/2024 w/Left sided cerebral hemisphere cerebrovascular accident (CVA) (HCC). Commorbidities affecting patient's functional performance at time of assessment include: HLD, osteoporosis, swelling of LLE, and hx of polio. Orders placed for OT evaluation and treatment and up and OOB as tolerated . Performed at least two patient identifiers during session including name and wristband.  Prior to admission, Patient reporting being independent with ADLs/IADLs, ambulatory with no AD, and lives with Significant other Vincent. Personal factors affecting patient at time of initial evaluation include: steps to enter, limited  insight into deficits, difficulty performing ADLs, and difficulty performing IADLs. Upon evaluation, patient requires supervision assist for UB ADLs, minimal  assist for LB ADLs, transfers and functional ambulation in room and bathroom with supervision and contact guard assist, with the use of  no AD .  Patient is oriented x 4 and presents with limited ability to recall information after a brief period of time (short term memory) / working memory .  Occupational performance is affected by the following deficits: impulsive behavior, decreased muscle strength, impaired gross motor coordination, dynamic sit/ stand balance deficit with poor standing tolerance time for self care and functional mobility, decreased activity tolerance, impaired memory, and delayed righting and equilibrium reactions. Patient to benefit from continued Occupational Therapy treatment while in the hospital to address deficits as defined above and maximize level of functional independence with ADLs and functional mobility. Occupational Performance areas to address include: grooming , bathing/ shower, dressing, toilet hygiene, transfer to all surfaces, functional ambulation, medication routine/ management, IADLS: Household maintenance, IADLs: safety procedures, and IADLs: meal prep/ clean up. From OT standpoint, recommendation at time of d/c would be Level III (minimim resource intensity).   Goals   Patient Goals to go home   Plan   Treatment Interventions ADL retraining;Functional transfer training;UE strengthening/ROM;Endurance training;Cognitive reorientation;Patient/family training;Equipment evaluation/education;Neuromuscular reeducation;Compensatory technique education;Activityengagement   Goal Expiration Date 04/17/24   OT Treatment Day 0   OT Frequency 1-2x/wk   Discharge Recommendation   Rehab Resource Intensity Level, OT III (Minimum Resource Intensity)   AM-PAC Daily Activity Inpatient   Lower Body Dressing 3   Bathing 3   Toileting 3    Upper Body Dressing 3   Grooming 4   Eating 4   Daily Activity Raw Score 20   Daily Activity Standardized Score (Calc for Raw Score >=11) 42.03   AM-PAC Applied Cognition Inpatient   Following a Speech/Presentation 4   Understanding Ordinary Conversation 4   Taking Medications 3   Remembering Where Things Are Placed or Put Away 3   Remembering List of 4-5 Errands 3   Taking Care of Complicated Tasks 3   Applied Cognition Raw Score 20   Applied Cognition Standardized Score 41.76   Mini-Cog Assessment   Word Version Version 1: Banana, Sunrise, Chair   Clock Draw (CDT) 2   Word Recall 1   Mini-Cog Score 3   Mini-Cog Results Negative screen for dementia   Modified Kerrick Scale   Modified Kerrick Scale 2   End of Consult   Patient Position at End of Consult Seated edge of bed;All needs within reach   Nurse Communication Nurse aware of consult     GOALS:    *ADL transfers with (I) for inc'd independence with ADLs/purposeful tasks    *UB ADL with (I) for inc'd independence with self cares    *LB ADL with (I) using AE prn for inc'd independence with self cares    *Toileting with (I) for clothing management and hygiene for return to PLOF with personal care    *Increase stand tolerance x 5  m for inc'd tolerance with standing purposeful tasks    *Participate in 10m UE therex to increase overall stamina/activity tolerance for purposeful tasks    *Bed mobility- (I) for inc'd independence to manage own comfort and initiate EOB & OOB purposeful tasks    *Patient will verbalize 3 safety awareness/ principles to prevent falls in the home setting.     *Patient will increase OOB/sitting tolerance to 2-4 hours per day to increase participation in self-care and leisure tasks with no s/s of exertion.     *Patient will engage in ongoing cognitive assessment to assist with safe discharge planning/recommendations.     Sanjuana Roth, OTCHAR, OTR/L

## 2024-04-07 NOTE — ASSESSMENT & PLAN NOTE
79-year-old female patient presented with dysarthria, right upper extremity clumsiness and was presented a stroke alert.  Symptoms had resolved by the time she arrived to the emergency room therefore was not considered candidate for TNK.    CTA head and neck report noted with 87 to 90% stenosis left ICA.  Brain MRI report noted with Small acute infarct involving the left precentral gyrus. Questionable punctate acute cortical infarct in the left parietal lobe. No significant edema or hemorrhage.     Continue with stroke pathway.  Currently afebrile, hemodynamically stable.  Dysarthria has resolved, right upper extremity improving.  Received loading dose of aspirin and Plavix.  Continue aspirin, Plavix, atorvastatin 40 milligram nightly.  Follow-up with 2D echo, carotid Doppler.  Follow-up with vascular surgery consultation.  Neurology recommendation appreciated.

## 2024-04-07 NOTE — QUICK NOTE
Quick note    Patient seen at bedside briefly, reports no worsening of symptoms, and on exam has improvement of her right upper extremity incoordination with minimal dysmetria, with no speech dysfunction and the rest of her neurological exam is unremarkable.  Reviewed test results with the patient and her spouse including MRI of the brain, echocardiogram is pending, vascular surgery evaluation is pending, as well as a carotid ultrasound which is pending.  Patient maintained on dual antiplatelet therapy, will recommend adequate blood pressure and blood sugar control, continue statin therapy, and most likely will need vascular surgery intervention in the near future for symptomatic left ICA stenosis greater than 70% to be confirmed on carotid ultrasound.  She will also need outpatient neurology follow-up in 4 to 6 weeks.

## 2024-04-08 ENCOUNTER — APPOINTMENT (INPATIENT)
Dept: VASCULAR ULTRASOUND | Facility: HOSPITAL | Age: 79
DRG: 066 | End: 2024-04-08
Payer: COMMERCIAL

## 2024-04-08 ENCOUNTER — APPOINTMENT (INPATIENT)
Dept: NON INVASIVE DIAGNOSTICS | Facility: HOSPITAL | Age: 79
DRG: 066 | End: 2024-04-08
Payer: COMMERCIAL

## 2024-04-08 LAB
AORTIC ROOT: 2.9 CM
APICAL FOUR CHAMBER EJECTION FRACTION: 62 %
BSA FOR ECHO PROCEDURE: 1.66 M2
E WAVE DECELERATION TIME: 256 MS
E/A RATIO: 0.75
FRACTIONAL SHORTENING: 34 (ref 28–44)
INTERVENTRICULAR SEPTUM IN DIASTOLE (PARASTERNAL SHORT AXIS VIEW): 0.8 CM
INTERVENTRICULAR SEPTUM: 0.8 CM (ref 0.6–1.1)
LAAS-AP2: 15.3 CM2
LAAS-AP4: 14.9 CM2
LEFT ATRIUM AREA SYSTOLE SINGLE PLANE A4C: 15.1 CM2
LEFT ATRIUM SIZE: 2.9 CM
LEFT ATRIUM VOLUME (MOD BIPLANE): 37 ML
LEFT ATRIUM VOLUME INDEX (MOD BIPLANE): 22.3 ML/M2
LEFT INTERNAL DIMENSION IN SYSTOLE: 2.7 CM (ref 2.1–4)
LEFT VENTRICULAR INTERNAL DIMENSION IN DIASTOLE: 4.1 CM (ref 3.5–6)
LEFT VENTRICULAR POSTERIOR WALL IN END DIASTOLE: 0.8 CM
LEFT VENTRICULAR STROKE VOLUME: 47 ML
LVSV (TEICH): 47 ML
MV E'TISSUE VEL-SEP: 7 CM/S
MV PEAK A VEL: 0.53 M/S
MV PEAK E VEL: 40 CM/S
MV STENOSIS PRESSURE HALF TIME: 74 MS
MV VALVE AREA P 1/2 METHOD: 2.97
RIGHT ATRIUM AREA SYSTOLE A4C: 12.4 CM2
RIGHT VENTRICLE ID DIMENSION: 2.7 CM
SL CV LEFT ATRIUM LENGTH A2C: 4.5 CM
SL CV LV EF: 60
SL CV PED ECHO LEFT VENTRICLE DIASTOLIC VOLUME (MOD BIPLANE) 2D: 74 ML
SL CV PED ECHO LEFT VENTRICLE SYSTOLIC VOLUME (MOD BIPLANE) 2D: 27 ML
TR MAX PG: 19 MMHG
TR PEAK VELOCITY: 2.2 M/S
TRICUSPID ANNULAR PLANE SYSTOLIC EXCURSION: 1.9 CM
TRICUSPID VALVE PEAK REGURGITATION VELOCITY: 2.18 M/S

## 2024-04-08 PROCEDURE — 99223 1ST HOSP IP/OBS HIGH 75: CPT | Performed by: SURGERY

## 2024-04-08 PROCEDURE — 99232 SBSQ HOSP IP/OBS MODERATE 35: CPT | Performed by: STUDENT IN AN ORGANIZED HEALTH CARE EDUCATION/TRAINING PROGRAM

## 2024-04-08 PROCEDURE — 93306 TTE W/DOPPLER COMPLETE: CPT | Performed by: INTERNAL MEDICINE

## 2024-04-08 PROCEDURE — 93306 TTE W/DOPPLER COMPLETE: CPT

## 2024-04-08 PROCEDURE — 93880 EXTRACRANIAL BILAT STUDY: CPT

## 2024-04-08 PROCEDURE — 93880 EXTRACRANIAL BILAT STUDY: CPT | Performed by: SURGERY

## 2024-04-08 RX ADMIN — HEPARIN SODIUM 5000 UNITS: 5000 INJECTION INTRAVENOUS; SUBCUTANEOUS at 08:44

## 2024-04-08 RX ADMIN — ASPIRIN 81 MG: 81 TABLET, COATED ORAL at 08:43

## 2024-04-08 RX ADMIN — CLOPIDOGREL 75 MG: 75 TABLET ORAL at 08:43

## 2024-04-08 RX ADMIN — HEPARIN SODIUM 5000 UNITS: 5000 INJECTION INTRAVENOUS; SUBCUTANEOUS at 16:37

## 2024-04-08 RX ADMIN — HEPARIN SODIUM 5000 UNITS: 5000 INJECTION INTRAVENOUS; SUBCUTANEOUS at 23:29

## 2024-04-08 RX ADMIN — ATORVASTATIN CALCIUM 40 MG: 40 TABLET, FILM COATED ORAL at 16:37

## 2024-04-08 NOTE — ASSESSMENT & PLAN NOTE
87 to 90% left ICA stenosis noted on CT head and neck report.  Follow-up with vascular ultrasound Dopplers.  Continue aspirin, Plavix, statin.  Follow-up with carotid Doppler.  Follow-up with vascular surgery consult.

## 2024-04-08 NOTE — PROGRESS NOTES
[facility]  Progress Note  Name: Shanda Quintanilla I  MRN: [id number]  Unit/Bed#: -01 I Date of Admission: 4/6/2024   Date of Service: 4/8/2024 I Hospital Day: 1    Assessment/Plan   * Small subcortical Left MCA CVA,  Assessment & Plan  79-year-old female patient presented with dysarthria, right upper extremity clumsiness and was presented a stroke alert.  Symptoms had resolved by the time she arrived to the emergency room therefore was not considered candidate for TNK.    CTA head and neck report noted with 87 to 90% stenosis left ICA.  Brain MRI report noted with Small acute infarct involving the left precentral gyrus. Questionable punctate acute cortical infarct in the left parietal lobe. No significant edema or hemorrhage.     Continue with stroke pathway.  Currently afebrile, hemodynamically stable.  Dysarthria has resolved, right upper extremity improving.  Received loading dose of aspirin and Plavix.  Continue aspirin, Plavix, atorvastatin 40 milligram nightly.  Echo report noted with normal EF 60% with grade 1 diastolic function.  Carotid Doppler pending.  Follow-up with vascular surgery consultation.  Neurology recommendation appreciated.    Intracranial carotid stenosis, left  Assessment & Plan  87 to 90% left ICA stenosis noted on CT head and neck report.  Follow-up with vascular ultrasound Dopplers.  Continue aspirin, Plavix, statin.  Follow-up with carotid Doppler.  Follow-up with vascular surgery consult.    Mixed hyperlipidemia  Assessment & Plan  Continue atorvastatin 40 mg nightly.               VTE Pharmacologic Prophylaxis:   Moderate Risk (Score 3-4) - Pharmacological DVT Prophylaxis Ordered: heparin.    Mobility:   Basic Mobility Inpatient Raw Score: 24  JH-HLM Goal: 8: Walk 250 feet or more  JH-HLM Achieved: 2: Bed activities/Dependent transfer      Patient Centered Rounds: I performed bedside rounds with nursing staff today.   Discussions with Specialists or Other  Care Team Provider: Neurology    Total Time Spent on Date of Encounter in care of patient: 35 mins. This time was spent on one or more of the following: performing physical exam; counseling and coordination of care; obtaining or reviewing history; documenting in the medical record; reviewing/ordering tests, medications or procedures; communicating with other healthcare professionals and discussing with patient's family/caregivers.    Current Length of Stay: 1 day(s)  Current Patient Status: Inpatient   Certification Statement: The patient will continue to require additional inpatient hospital stay due to pending echo, carotid Dopplers, vascular consultation.  Discharge Plan: Anticipate discharge in 24-48 hrs to home.    Code Status: Level 1 - Full Code    Subjective:   Seen during a.m. rounds.  Denies any new complaints.  Awaiting echo and carotid Dopplers.  No other events reported.    Objective:     Vitals:   Temp (24hrs), Av.2 °F (36.8 °C), Min:97.3 °F (36.3 °C), Max:98.5 °F (36.9 °C)    Temp:  [97.3 °F (36.3 °C)-98.5 °F (36.9 °C)] 98.2 °F (36.8 °C)  HR:  [65-76] 76  Resp:  [16-20] 18  BP: (110-136)/(51-78) 135/62  SpO2:  [95 %-99 %] 99 %  Body mass index is 24.8 kg/m².     Input and Output Summary (last 24 hours):   No intake or output data in the 24 hours ending 24 1337    Physical Exam:   Physical Exam  Constitutional:       General: She is not in acute distress.     Appearance: Normal appearance. She is not ill-appearing, toxic-appearing or diaphoretic.   HENT:      Head: Normocephalic and atraumatic.   Eyes:      Pupils: Pupils are equal, round, and reactive to light.   Cardiovascular:      Rate and Rhythm: Normal rate.      Pulses: Normal pulses.   Pulmonary:      Effort: Pulmonary effort is normal. No respiratory distress.      Breath sounds: Normal breath sounds. No wheezing.   Abdominal:      General: Bowel sounds are normal. There is no distension.      Palpations: Abdomen is soft.       Tenderness: There is no abdominal tenderness.   Musculoskeletal:      Cervical back: No rigidity.      Right lower leg: No edema.      Left lower leg: No edema.   Neurological:      Mental Status: She is alert and oriented to person, place, and time. Mental status is at baseline.      Comments: Currently no signs of dysarthria, facial asymmetry noted on exam.  Motor strength 5 out of 5 equal and symmetric in all 4 extremities.  Right upper extremity noted with very slight dysmetria.  No other gross focal motor deficit noted on exam.          Additional Data:     Labs:  Results from last 7 days   Lab Units 04/07/24  0526   WBC Thousand/uL 5.00   HEMOGLOBIN g/dL 12.6   HEMATOCRIT % 38.9   PLATELETS Thousands/uL 298     Results from last 7 days   Lab Units 04/07/24  0526   SODIUM mmol/L 141   POTASSIUM mmol/L 3.8   CHLORIDE mmol/L 109*   CO2 mmol/L 26   BUN mg/dL 16   CREATININE mg/dL 0.69   ANION GAP mmol/L 6   CALCIUM mg/dL 9.0   ALBUMIN g/dL 3.7   TOTAL BILIRUBIN mg/dL 0.53   ALK PHOS U/L 58   ALT U/L 17   AST U/L 23   GLUCOSE RANDOM mg/dL 102     Results from last 7 days   Lab Units 04/06/24  1601   INR  0.88     Results from last 7 days   Lab Units 04/06/24  1555 04/06/24  1511   POC GLUCOSE mg/dl 92 93     Results from last 7 days   Lab Units 04/06/24  1601   HEMOGLOBIN A1C % 5.7*           Lines/Drains:  Invasive Devices       Peripheral Intravenous Line  Duration             Peripheral IV 04/06/24 Left;Ventral (anterior) Forearm 1 day                          Recent Cultures (last 7 days):         Last 24 Hours Medication List:   Current Facility-Administered Medications   Medication Dose Route Frequency Provider Last Rate    aspirin  81 mg Oral Daily Lora Dye, CRNP      atorvastatin  40 mg Oral Daily With Dinner Lora Dye, CRNP      clopidogrel  75 mg Oral Daily Lora Dye, CRNP      heparin (porcine)  5,000 Units Subcutaneous Q8H [facility] Doyle Castro MD      melatonin  6 mg Oral HS Kristy  MARS Elizabeth          Today, Patient Was Seen By: Evelio Villalobos MD    **Please Note: This note may have been constructed using a voice recognition system.**

## 2024-04-08 NOTE — ASSESSMENT & PLAN NOTE
79-year-old female patient presented with dysarthria, right upper extremity clumsiness and was presented a stroke alert.  Symptoms had resolved by the time she arrived to the emergency room therefore was not considered candidate for TNK.    CTA head and neck report noted with 87 to 90% stenosis left ICA.  Brain MRI report noted with Small acute infarct involving the left precentral gyrus. Questionable punctate acute cortical infarct in the left parietal lobe. No significant edema or hemorrhage.     Continue with stroke pathway.  Currently afebrile, hemodynamically stable.  Dysarthria has resolved, right upper extremity improving.  Received loading dose of aspirin and Plavix.  Continue aspirin, Plavix, atorvastatin 40 milligram nightly.  Echo report noted with normal EF 60% with grade 1 diastolic function.  Carotid Doppler pending.  Follow-up with vascular surgery consultation.  Neurology recommendation appreciated.

## 2024-04-08 NOTE — CONSULTS
"Consult Note - Vascular Surgery   [facility]: [phone]    Assessment:  79y female with PMH of HLD, polio, former smoker, currently admitted for RUE weakness and slurred speech, found to have an acute left precentral gyrus CVA on MRI with evidence of high grade L ICA stenosis on CTA head and neck. Vascular surgery was consulted for evaluation.     Imaging-   MRI brain 4/6: Small acute infarct involving the left precentral gyrus. Questionable punctate acute cortical infarct in the left parietal lobe.     CTA head and neck 4/6 suggests about 87-90% atherosclerotic stenosis at the origin of the left cervical ICA. Mild atherosclerotic disease in the right carotid bifurcation without stenosis. Patent vertebral arteries without high-grade stenosis. Beaded luminal irregularity of the cervical ICA (right greater than left) suggesting fibromuscular dysplasia (FMD).    CV duplex pending   Echo pending     Labs-  A1c 5.7      Plan:  -Acute L precentral gyrus CVA likely in the setting of symptomatic L ICA stenosis.   -CTA demonstrates 87-90% L ICA stenosis at its origin.   -Neuro following. Input appreciated. \"TIA/CVA secondary to left ICA stenosis thromboembolic in etiology\".  -CV duplex pending.   -If etiology of CVA is deemed to be symptomatic L ICA stenosis, patient would benefit from left carotid intervention (CEA vs TCAR) within 2 weeks. Per neuro, given that the size of her stroke is small, surgical intervention could be done anytime and does not need to be delayed.   -Continue DAPT and statin therapy.   -Monitor neuro exam.   -BP control per neuro recs.   -Echo pending.   -Vascular will continue to follow. Will await CV duplex results to assess ICA velocities in comparison to CTA findings.   -D/w Neuro.   -Will d/w with Dr. Daniel.     ______________________________________________________________________    Consulting Service: SLIM    Chief Complaint: CVA symptoms     HPI: Shanda Quintanilla is a 79 " y.o. female who presented to the ED on 4/6 with acute onset of slurred speech and right arm weakness. She states 4 days prior she had an episode of right weakness and stated the arm felt weird but symptoms spontaneously resolved. She denies any prior hx of CVA or TIA. She states she did have a weird feeling in her head when she would cough. She denied any issues with her vision or her legs at that time. She denies any prior vascular surgical history. She was not on aspirin or a statin medication prior. She states she takes a lot of supplements. She is a former smoker and quit in her 30s.     Review of Systems:  General: negative  Cardiovascular: no chest pain or dyspnea on exertion  Respiratory: positive for - cough  Gastrointestinal: no abdominal pain, change in bowel habits, or black or bloody stools  Genitourinary ROS: no dysuria, trouble voiding, or hematuria  Musculoskeletal ROS: negative  Neurological ROS: weakness in right hand  Hematological and Lymphatic ROS: negative  Dermatological ROS: negative  Psychological ROS: negative  Ophthalmic ROS: negative  ENT ROS: negative    Past Medical History:  Past Medical History:   Diagnosis Date    Cervical strain     Changing skin lesion     Hyperlipidemia     MCI (mild cognitive impairment)     Mixed hyperlipidemia     Nonmelanoma skin cancer     BCC    Osteoporosis     Polio     Poor concentration     Swelling of left thumb        Past Surgical History:  Past Surgical History:   Procedure Laterality Date    ANAL FISSURECTOMY      APPENDECTOMY      CATARACT EXTRACTION      HYSTERECTOMY         Social History:  Social History     Substance and Sexual Activity   Alcohol Use Yes    Comment: OCCASIONAL- WINE      Social History     Substance and Sexual Activity   Drug Use No     Social History     Tobacco Use   Smoking Status Former    Current packs/day: 2.00    Average packs/day: 2.0 packs/day for 20.0 years (40.0 ttl pk-yrs)    Types: Cigarettes   Smokeless Tobacco  "Never       Family History:  Family History   Problem Relation Age of Onset    Heart attack Mother     Cancer Father     Heart attack Sister        Allergies:  Allergies   Allergen Reactions    Sulfa Antibiotics Other (See Comments)       Medications:  Current Facility-Administered Medications   Medication Dose Route Frequency    aspirin (ECOTRIN LOW STRENGTH) EC tablet 81 mg  81 mg Oral Daily    atorvastatin (LIPITOR) tablet 40 mg  40 mg Oral Daily With Dinner    clopidogrel (PLAVIX) tablet 75 mg  75 mg Oral Daily    heparin (porcine) subcutaneous injection 5,000 Units  5,000 Units Subcutaneous Q8H ALMAS    melatonin tablet 6 mg  6 mg Oral HS       Vitals:  Vitals:    04/08/24 0758   BP: 110/51   Pulse: 56   Resp:    Temp: (!) 97.3 °F (36.3 °C)   SpO2: 99%       I/Os:  No intake/output data recorded.  No intake/output data recorded.    Lab Results and Cultures:   CBC with diff:   Lab Results   Component Value Date    WBC 5.00 04/07/2024    HGB 12.6 04/07/2024    HCT 38.9 04/07/2024    MCV 91 04/07/2024     04/07/2024    RBC 4.27 04/07/2024    MCH 29.5 04/07/2024    MCHC 32.4 04/07/2024    RDW 13.8 04/07/2024    MPV 8.9 04/07/2024    NRBC 0 12/28/2020   ,   BMP/CMP:  Lab Results   Component Value Date    K 3.8 04/07/2024    K 5.1 01/19/2023    K 4.1 05/23/2018     (H) 04/07/2024     01/19/2023     05/23/2018    CO2 26 04/07/2024    CO2 30 01/19/2023    CO2 28 05/23/2018    BUN 16 04/07/2024    BUN 11 01/19/2023    BUN 8 05/23/2018    CREATININE 0.69 04/07/2024    CREATININE 0.61 05/23/2018    CALCIUM 9.0 04/07/2024    CALCIUM 10.4 01/19/2023    CALCIUM 9.6 05/23/2018    AST 23 04/07/2024    AST 24 12/22/2021    AST 37 05/23/2018    ALT 17 04/07/2024    ALT 16 12/22/2021    ALT 23 05/23/2018    ALKPHOS 58 04/07/2024    ALKPHOS 77 12/22/2021    ALKPHOS 74 05/23/2018    ALKPHOS 91 03/08/2018    EGFR 83 04/07/2024    EGFR 86 03/08/2018   ,   Lipid Panel: No results found for: \"CHOL\",   Coags: " "  Lab Results   Component Value Date    PTT 26 04/06/2024    INR 0.88 04/06/2024   ,     Blood Culture: No results found for: \"BLOODCX\",   Urinalysis:   Lab Results   Component Value Date    COLORU Yellow 03/31/2021    CLARITYU Cloudy 03/31/2021    SPECGRAV 1.016 03/31/2021    PHUR 6.5 03/31/2021    LEUKOCYTESUR Small (A) 03/31/2021    NITRITE Negative 03/31/2021    GLUCOSEU Negative 03/31/2021    KETONESU Negative 03/31/2021    BILIRUBINUR Negative 03/31/2021    BLOODU Negative 03/31/2021   ,   Urine Culture:   Lab Results   Component Value Date    URINECX No Growth <1000 cfu/mL 03/31/2021   ,   Wound Culure: No results found for: \"WOUNDCULT\"    Imaging:  MRI brain wo contrast   Final Result by Rush Huddleston MD (04/06 9724)      1.  Small acute infarct involving the left precentral gyrus. Questionable punctate acute cortical infarct in the left parietal lobe. No significant edema or hemorrhage.   2.  Mild nonspecific white matter change suggesting microangiopathy.   3.  3 mm left anterior pituitary gland lesion could be a tiny cyst versus microadenoma, stable from MRI 5/9/2017. Correlate with hormonal levels.   4.  Sinus disease.      Workstation performed: [id number]         CTA stroke alert (head/neck)   Final Result by Rush Huddleston MD (04/06 2941)      1.  Patent major vessels of the [place] of sy without high-grade stenosis.  No aneurysm.   2.  About 87-90% atherosclerotic stenosis at the origin of the left cervical ICA. Mild atherosclerotic disease in the right carotid bifurcation without stenosis.   3.  Patent vertebral arteries without high-grade stenosis.   4.  Beaded luminal irregularity of the cervical ICA (right greater than left) suggesting fibromuscular dysplasia (FMD).   5.  Patchy groundglass opacities in the partially imaged right upper lobe consistent with pneumonia. Correlate with clinical assessment. Recommend 3 months follow-up low-dose chest CT to ensure resolution.   6.  3 mm right upper lobe " pulmonary nodule. Based on current Fleischner Society 2017 Guidelines on incidental pulmonary nodule, no routine follow-up is needed if the patient is low risk. If the patient is high risk, optional follow-up chest CT at 12    months can be considered.                     Findings were directly discussed with Anton Wilson at 4:21 p.m.                           Workstation performed: [id number]         CT stroke alert brain   Final Result by Rush Huddleston MD (04/06 1634)      1.  No acute intracranial CT abnormality.   2.  Sinus disease as described.                     Findings were directly discussed with Anton Wilson at 4:21 p.m.         Workstation performed: [id number]         VAS carotid complete study    (Results Pending)         Physical Exam:    General appearance: alert and oriented, in no acute distress  Head: Normocephalic, without obvious abnormality, atraumatic  Eyes: conjunctivae/corneas clear. PERRL, EOM's intact. Fundi benign.  Neck: no carotid bruit and supple, symmetrical, trachea midline  Lungs: clear to auscultation bilaterally  Heart: regular rate and rhythm, S1, S2 normal, no murmur, click, rub or gallop  Abdomen: soft, non-tender; bowel sounds normal; no masses,  no organomegaly  Extremities: extremities normal, warm and well-perfused; no cyanosis, clubbing, or edema  Skin: Skin color, texture, turgor normal. No rashes or lesions  Neurologic: Grossly normal. Tongue midline, speech clear, 5/5 UE and LE strength         Pulse exam:  Radial: Right: 2+ Left:: 2+  DP: Right: 2+ Left: 2+      Amanda Erazo PA-C  4/8/2024

## 2024-04-08 NOTE — CASE MANAGEMENT
Case Management Progress Note    Patient name Shanda Quintanilla  Location /-01 MRN [id number]  : 1945 Date 2024       LOS (days): 1  Geometric Mean LOS (GMLOS) (days):   Days to GMLOS:        OBJECTIVE:        Current admission status: Inpatient  Preferred Pharmacy:   [facility]/pharmacy #[id number] - [place] [address]  [place] [id number]  Phone: [phone] Fax: [phone]    Primary Care Provider: Talat Ventura MD    Primary Insurance: [facility]  Secondary Insurance:     PROGRESS NOTE:    Discussed patient's case in interdisciplinary rounds this morning with [facility] provider. Vascular consulting, patient is awaiting carotid doppler. Possible transfer to [facility] vs OP management. CM will continue to follow for needs.

## 2024-04-08 NOTE — PLAN OF CARE
Problem: INFECTION - ADULT  Goal: Absence or prevention of progression during hospitalization  Description: INTERVENTIONS:  - Assess and monitor for signs and symptoms of infection  - Monitor lab/diagnostic results  - Monitor all insertion sites, i.e. indwelling lines, tubes, and drains  - Monitor endotracheal if appropriate and nasal secretions for changes in amount and color  - [place] appropriate cooling/warming therapies per order  - Administer medications as ordered  - Instruct and encourage patient and family to use good hand hygiene technique  - Identify and instruct in appropriate isolation precautions for identified infection/condition  Outcome: Progressing  Goal: Absence of fever/infection during neutropenic period  Description: INTERVENTIONS:  - Monitor WBC    Outcome: Progressing

## 2024-04-08 NOTE — UTILIZATION REVIEW
Continued Stay Review    Date: 4/8                          Current Patient Class: INpatient   Current Level of Care: MEd/surg    HPI:79 y.o. female initially admitted on 4/7     Assessment/Plan: Date: 4/8 Day 3: Has surpassed a 2nd midnight with active treatments and services.  Initially admitted for subcortical left MCA CVA.  Continue asa, plavix, atorvastatin. Carotid doppler pending. Currently no signs of dysarthria, facial asymmetry noted on exam. Motor strength 5 out of 5 equal and symmetric in all 4 extremities. Right upper extremity noted with very slight dysmetria. Tolerating regular diet. GCS 15.  Per Vascular surgery consult: If etiology of CVA is deemed to be symptomatic L ICA stenosis,  would benefit from left carotid intervention (CEA vs TCAR) within 2 weeks. Per neuro, given that the size of her stroke is small, surgical intervention could be done anytime and does not need to be delayed.   Vital Signs: Vital Signs (last 2 days)    Date/Time Temp Pulse Resp BP MAP (mmHg) SpO2 O2 Device Patient Position - Orthostatic VS   04/08/24 10:49:58 98.2 °F (36.8 °C) 76 -- 135/62 86 99 % -- --   04/08/24 1003 -- 68 -- 110/51 -- -- -- --   04/08/24 0700 97.3 °F (36.3 °C) Abnormal  -- 18 110/51 71 -- None (Room air) Lying   04/08/24 0300 -- -- 18 136/78 97 -- -- Sitting     Pertinent Labs/Diagnostic Results:   4/8 Carotid VAS US: CONCLUSION:     Impression  RIGHT:  There is <50% stenosis noted in the internal carotid artery. Plaque is  heterogenous and irregular.  Vertebral artery flow is antegrade. There is no significant subclavian artery  disease.     LEFT:  There is 50-69% stenosis noted in the internal carotid artery. Plaque is  heterogenous and irregular.  Vertebral artery flow is antegrade. There is no significant subclavian artery  disease.     4/8 ECHO: Interpretation Summary      Left Ventricle: Left ventricular cavity size is normal. Wall thickness is normal. The left ventricular ejection fraction is  60%. Systolic function is normal. Wall motion is normal. Diastolic function is mildly abnormal, consistent with grade I (abnormal) relaxation.    Mitral Valve: There is annular calcification.      Results from last 7 days   Lab Units 04/07/24  0526 04/06/24  1601   WBC Thousand/uL 5.00 8.77   HEMOGLOBIN g/dL 12.6 14.6   HEMATOCRIT % 38.9 45.7   PLATELETS Thousands/uL 298 352         Results from last 7 days   Lab Units 04/07/24  0526 04/06/24  1601   SODIUM mmol/L 141 139   POTASSIUM mmol/L 3.8 4.6   CHLORIDE mmol/L 109* 106   CO2 mmol/L 26 25   ANION GAP mmol/L 6 8   BUN mg/dL 16 12   CREATININE mg/dL 0.69 0.68   EGFR ml/min/1.73sq m 83 83   CALCIUM mg/dL 9.0 9.3   MAGNESIUM mg/dL 2.0  --      Results from last 7 days   Lab Units 04/07/24  0526   AST U/L 23   ALT U/L 17   ALK PHOS U/L 58   TOTAL PROTEIN g/dL 6.2*   ALBUMIN g/dL 3.7   TOTAL BILIRUBIN mg/dL 0.53     Results from last 7 days   Lab Units 04/06/24  1555 04/06/24  1511   POC GLUCOSE mg/dl 92 93     Results from last 7 days   Lab Units 04/07/24  0526 04/06/24  1601   GLUCOSE RANDOM mg/dL 102 87         Results from last 7 days   Lab Units 04/06/24  1601   HEMOGLOBIN A1C % 5.7*   EAG mg/dl 117     Results from last 7 days   Lab Units 04/06/24  2144 04/06/24  1842 04/06/24  1601   HS TNI 0HR ng/L  --   --  3   HS TNI 2HR ng/L  --  4  --    HSTNI D2 ng/L  --  1  --    HS TNI 4HR ng/L 4  --   --    HSTNI D4 ng/L 1  --   --          Results from last 7 days   Lab Units 04/06/24  1601   PROTIME seconds 12.5   INR  0.88   PTT seconds 26         Medications:   Scheduled Medications:  aspirin, 81 mg, Oral, Daily  atorvastatin, 40 mg, Oral, Daily With Dinner  clopidogrel, 75 mg, Oral, Daily  heparin (porcine), 5,000 Units, Subcutaneous, Q8H ALMAS  melatonin, 6 mg, Oral, HS      Continuous IV Infusions:     PRN Meds:       Discharge Plan: TBd    Network Utilization Review Department  ATTENTION: Please call with any questions or concerns to [phone] and carefully  listen to the prompts so that you are directed to the right person. All voicemails are confidential.   For Discharge needs, contact Care Management DC Support Team at [phone] opt. 2  Send all requests for admission clinical reviews, approved or denied determinations and any other requests to dedicated fax number below belonging to the campus where the patient is receiving treatment. List of dedicated fax numbers for the Facilities:  FACILITY NAME UR FAX NUMBER   ADMISSION DENIALS (Administrative/Medical Necessity) [phone]   DISCHARGE SUPPORT TEAM (NETWORK) [phone]   PARENT CHILD HEALTH (Maternity/NICU/Pediatrics) [phone]   [facility] [phone]   [facility] [phone]   [facility] [phone]   [facility] [phone]   [facility] [phone]   [facility] [phone]   [facility] [phone]   [facility] [phone]   [facility] [phone]   [facility] [phone]   [facility] [phone]   [facility] [phone]

## 2024-04-09 ENCOUNTER — TELEPHONE (OUTPATIENT)
Dept: VASCULAR SURGERY | Facility: CLINIC | Age: 79
End: 2024-04-09

## 2024-04-09 ENCOUNTER — TELEPHONE (OUTPATIENT)
Dept: CARDIOLOGY CLINIC | Facility: CLINIC | Age: 79
End: 2024-04-09

## 2024-04-09 ENCOUNTER — PREP FOR PROCEDURE (OUTPATIENT)
Dept: VASCULAR SURGERY | Facility: CLINIC | Age: 79
End: 2024-04-09

## 2024-04-09 VITALS
SYSTOLIC BLOOD PRESSURE: 121 MMHG | HEIGHT: 63 IN | HEART RATE: 64 BPM | RESPIRATION RATE: 20 BRPM | OXYGEN SATURATION: 98 % | TEMPERATURE: 98.4 F | BODY MASS INDEX: 24.8 KG/M2 | WEIGHT: 140 LBS | DIASTOLIC BLOOD PRESSURE: 56 MMHG

## 2024-04-09 DIAGNOSIS — I65.22 LEFT CAROTID ARTERY STENOSIS: Primary | ICD-10-CM

## 2024-04-09 PROCEDURE — 99232 SBSQ HOSP IP/OBS MODERATE 35: CPT

## 2024-04-09 PROCEDURE — 99239 HOSP IP/OBS DSCHRG MGMT >30: CPT | Performed by: STUDENT IN AN ORGANIZED HEALTH CARE EDUCATION/TRAINING PROGRAM

## 2024-04-09 RX ORDER — ATORVASTATIN CALCIUM 40 MG/1
40 TABLET, FILM COATED ORAL
Qty: 30 TABLET | Refills: 0 | Status: SHIPPED | OUTPATIENT
Start: 2024-04-09

## 2024-04-09 RX ORDER — CLOPIDOGREL BISULFATE 75 MG/1
75 TABLET ORAL DAILY
Qty: 21 TABLET | Refills: 0 | Status: SHIPPED | OUTPATIENT
Start: 2024-04-10

## 2024-04-09 RX ADMIN — CLOPIDOGREL 75 MG: 75 TABLET ORAL at 09:25

## 2024-04-09 RX ADMIN — ASPIRIN 81 MG: 81 TABLET, COATED ORAL at 09:25

## 2024-04-09 RX ADMIN — ATORVASTATIN CALCIUM 40 MG: 40 TABLET, FILM COATED ORAL at 18:00

## 2024-04-09 NOTE — PROGRESS NOTES
"[facility]  Progress Note  Name: Shanda Quintanilla I  MRN: [id number]  Unit/Bed#: MS 231Markel I Date of Admission: 4/6/2024   Date of Service: 4/9/2024 I Hospital Day: 2    Assessment/Plan   * Small subcortical Left MCA CVA,  Assessment & Plan  79y female with PMH of HLD, polio, former smoker, currently admitted for RUE weakness and slurred speech, found to have an acute left precentral gyrus CVA on MRI with evidence of high grade L ICA stenosis on CTA head and neck. Vascular surgery was consulted for evaluation.      Imaging-   MRI brain 4/6: Small acute infarct involving the left precentral gyrus. Questionable punctate acute cortical infarct in the left parietal lobe.      CTA head and neck 4/6 suggests about 87-90% atherosclerotic stenosis at the origin of the left cervical ICA. Mild atherosclerotic disease in the right carotid bifurcation without stenosis. Patent vertebral arteries without high-grade stenosis. Beaded luminal irregularity of the cervical ICA (right greater than left) suggesting fibromuscular dysplasia (FMD).     CV duplex 4/8/24: R ICA stenosis <50% with 50-69% L ICA stenosis (jose 212/41, ratio 2.19)  Echo 4/8/24: The left ventricular ejection fraction is 60%. Systolic function is normal. Wall motion is normal. Diastolic function is mildly abnormal, consistent with grade I (abnormal) relaxation.      Labs-  A1c 5.7       Plan:  -Acute L precentral gyrus CVA likely in the setting of symptomatic L ICA stenosis.   -CTA demonstrates 87-90% L ICA stenosis at its origin.   -Neuro following. Input appreciated. \"TIA/CVA secondary to left ICA stenosis thromboembolic in etiology\".  -CV duplex demonstrates 50-69% left ICA stenosis.   -Patient would benefit from left carotid endarterectomy within 2 weeks. Per neuro, given that the size of her stroke is small, surgical intervention could be done anytime and does not need to be delayed. Patient tentatively scheduled 4/16 at [facility] with " "Dr. Daniel. Patient can be discharged and readmitted for procedure.   -[facility] will arrange outpatient cardiology visit for clearance.   -Continue DAPT and statin therapy.   -Monitor neuro exam.   -BP control per neuro recs.   -D/w SLIM.   -Will d/w with Dr. Rosales           Subjective: Patient denies any acute complaints today. She denies any overnight events. Denies change in vision, slurred speech, facial droop or worsening lateralizing symptoms     Vitals:  /57   Pulse 64   Temp 98 °F (36.7 °C)   Resp 18   Ht 5' 3\" (1.6 m)   Wt 63.5 kg (140 lb)   SpO2 98%   BMI 24.80 kg/m²     I/Os:  No intake/output data recorded.  No intake/output data recorded.        Lab Results and Cultures:   CBC with diff:   Lab Results   Component Value Date    WBC 5.00 04/07/2024    HGB 12.6 04/07/2024    HCT 38.9 04/07/2024    MCV 91 04/07/2024     04/07/2024    RBC 4.27 04/07/2024    MCH 29.5 04/07/2024    MCHC 32.4 04/07/2024    RDW 13.8 04/07/2024    MPV 8.9 04/07/2024    NRBC 0 12/28/2020   ,   BMP/CMP:  Lab Results   Component Value Date    SODIUM 141 04/07/2024    SODIUM 140 01/19/2023    SODIUM 142 05/23/2018    K 3.8 04/07/2024    K 5.1 01/19/2023    K 4.1 05/23/2018     (H) 04/07/2024     01/19/2023     05/23/2018    CO2 26 04/07/2024    CO2 30 01/19/2023    CO2 28 05/23/2018    BUN 16 04/07/2024    BUN 11 01/19/2023    BUN 8 05/23/2018    CREATININE 0.69 04/07/2024    CREATININE 0.61 05/23/2018    CALCIUM 9.0 04/07/2024    CALCIUM 10.4 01/19/2023    CALCIUM 9.6 05/23/2018    AST 23 04/07/2024    AST 24 12/22/2021    AST 37 05/23/2018    ALT 17 04/07/2024    ALT 16 12/22/2021    ALT 23 05/23/2018    ALKPHOS 58 04/07/2024    ALKPHOS 77 12/22/2021    ALKPHOS 74 05/23/2018    ALKPHOS 91 03/08/2018    EGFR 83 04/07/2024    EGFR 86 03/08/2018   ,   Lipid Panel: No results found for: \"CHOL\",   Coags:   Lab Results   Component Value Date    PTT 26 04/06/2024    INR 0.88 04/06/2024   ,     Blood " "Culture: No results found for: \"BLOODCX\",   Urinalysis:   Lab Results   Component Value Date    COLORU Yellow 03/31/2021    CLARITYU Cloudy 03/31/2021    SPECGRAV 1.016 03/31/2021    PHUR 6.5 03/31/2021    LEUKOCYTESUR Small (A) 03/31/2021    NITRITE Negative 03/31/2021    GLUCOSEU Negative 03/31/2021    KETONESU Negative 03/31/2021    BILIRUBINUR Negative 03/31/2021    BLOODU Negative 03/31/2021   ,   Urine Culture:   Lab Results   Component Value Date    URINECX No Growth <1000 cfu/mL 03/31/2021   ,   Wound Culure: No results found for: \"WOUNDCULT\"    Medications:  Current Facility-Administered Medications   Medication Dose Route Frequency    aspirin (ECOTRIN LOW STRENGTH) EC tablet 81 mg  81 mg Oral Daily    atorvastatin (LIPITOR) tablet 40 mg  40 mg Oral Daily With Dinner    clopidogrel (PLAVIX) tablet 75 mg  75 mg Oral Daily    heparin (porcine) subcutaneous injection 5,000 Units  5,000 Units Subcutaneous Q8H ALMAS    melatonin tablet 6 mg  6 mg Oral HS       Imaging:  A above    Physical Exam:    General: AAOx3. Laying in bed in NAD  CV: RRR  Respiratory: Normal effort on RA.  Abdominal: Soft. NT. ND  Extremities:BLE warm, perfused without cyanosis or edema  Neurologic: grossly normal.         Amanda Erazo PA-C  4/9/2024        "

## 2024-04-09 NOTE — ASSESSMENT & PLAN NOTE
"79y female with PMH of HLD, polio, former smoker, currently admitted for RUE weakness and slurred speech, found to have an acute left precentral gyrus CVA on MRI with evidence of high grade L ICA stenosis on CTA head and neck. Vascular surgery was consulted for evaluation.      Imaging-   MRI brain 4/6: Small acute infarct involving the left precentral gyrus. Questionable punctate acute cortical infarct in the left parietal lobe.      CTA head and neck 4/6 suggests about 87-90% atherosclerotic stenosis at the origin of the left cervical ICA. Mild atherosclerotic disease in the right carotid bifurcation without stenosis. Patent vertebral arteries without high-grade stenosis. Beaded luminal irregularity of the cervical ICA (right greater than left) suggesting fibromuscular dysplasia (FMD).     CV duplex 4/8/24: R ICA stenosis <50% with 50-69% L ICA stenosis (jose 212/41, ratio 2.19)  Echo 4/8/24: The left ventricular ejection fraction is 60%. Systolic function is normal. Wall motion is normal. Diastolic function is mildly abnormal, consistent with grade I (abnormal) relaxation.      Labs-  A1c 5.7       Plan:  -Acute L precentral gyrus CVA likely in the setting of symptomatic L ICA stenosis.   -CTA demonstrates 87-90% L ICA stenosis at its origin.   -Neuro following. Input appreciated. \"TIA/CVA secondary to left ICA stenosis thromboembolic in etiology\".  -CV duplex demonstrates 50-69% left ICA stenosis.   -Patient would benefit from left carotid intervention (CEA vs TCAR) within 2 weeks. Per neuro, given that the size of her stroke is small, surgical intervention could be done anytime and does not need to be delayed. Patient tentatively scheduled 4/16 at [facility] with Dr. Daniel. Patient can be discharged and readmitted for procedure.   -Continue DAPT and statin therapy.   -Monitor neuro exam.   -BP control per neuro recs.   -D/w SLIM.   -Will d/w with Dr. Rosales  "

## 2024-04-09 NOTE — TELEPHONE ENCOUNTER
Patient called in stating that she would like a phone call in regards to her procedure and the approval or denial when it comes back from insurance. I let her know that I would send a message to [employer] and someone will reach out once a decision has been made. Thank you

## 2024-04-09 NOTE — NURSING NOTE
Pt remains awake alert oriented and denies pain. Pt encouraged to follow up with her pcp and the  neurology group.

## 2024-04-09 NOTE — DISCHARGE SUMMARY
[facility]  Discharge- Shanda Quintanilla 1945, 79 y.o. female MRN: [id number]  Unit/Bed#: MS Mishra-01 Encounter: [id number]  Primary Care Provider: Talat Ventura MD   Date and time admitted to hospital: 4/6/2024  3:50 PM    * Small subcortical Left MCA CVA,  Assessment & Plan  79-year-old female patient presented with dysarthria, right upper extremity clumsiness and was presented a stroke alert.  Symptoms had resolved by the time she arrived to the emergency room therefore was not considered candidate for TNK.    CTA head and neck report noted with 87 to 90% stenosis left ICA.  Brain MRI report noted with Small acute infarct involving the left precentral gyrus. Questionable punctate acute cortical infarct in the left parietal lobe. No significant edema or hemorrhage.   Carotid Dopplers noted with right less than 50% stenosis, left 50 to 69% stenosis.    Currently asymptomatic and stable at baseline.  Currently afebrile, hemodynamically stable.  Received loading dose of aspirin and Plavix.  Continue aspirin, Plavix, atorvastatin 40 milligram nightly.  Echo report noted with normal EF 60% with grade 1 diastolic function.  Patient does have outpatient vascular endarterectomy scheduled on 04/16/2024  Message sent to cardiology for outpatient appointment for clearance.  Currently she is hemodynamically stable for discharge with close outpatient follow-up.  Patient and significant other at the bedside currently agreeable with all plan.    Intracranial carotid stenosis, left  Assessment & Plan  87 to 90% left ICA stenosis noted on CT head and neck report.  Carotid Dopplers report noted as above.  Continue aspirin, Plavix, statin.  Follow-up with carotid Doppler.  Outpatient endarterectomy scheduled on 04/16/24    Mixed hyperlipidemia  Assessment & Plan  Continue atorvastatin 40 mg nightly.      Discharging Physician / Practitioner: Evelio Villalobos MD  PCP: Talat Ventura MD  Admission Date:    Admission Orders (From admission, onward)       Ordered        04/07/24 0957  INPATIENT ADMISSION  Once            04/06/24 1707  Place in Observation  Once                          Discharge Date: 04/09/24    Medical Problems       Resolved Problems  Date Reviewed: 4/9/2024   None         Consultations During Hospital Stay:  Vascular surgery, neurology      Reason for Admission: Dysarthria, right upper extremity clumsiness.    Hospital Course:     Shanda Quintanilla is a 79 y.o. female patient past medical history of hyperlipidemia who originally presented to the hospital on 4/6/2024 due to sudden onset of speech dysfunction as well as right upper extremity clumsiness.  Patient reported as her hand did not feel right and her speech was noted to be slurred by her significant other.  Patient was taken to urgent care facility initially and then referred to emergency room for further evaluation.  Patient reported her speech symptoms had resolved after coming to emergency room and she continues to experience funny sensation in the right hand but could not move both upper extremity well by the time she arrived to the ER.  NIH score was 1.  CT head report noted without any acute abnormalities, CTA head and neck report showed evidence of left ICA severe stenosis at the bifurcation.  Due to resolving symptoms she was felt not to be candidate for TNK.  Brain MRI report noted with small acute infarct involving the left precentral gyrus, questionable punctate acute cortical infarct in the left parietal lobe without significant edema or hemorrhage.  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  Patient was seen by urology and treated with aspirin, statin, Plavix.  Speech symptoms completely resolved and right upper extremity significantly improved throughout her course.  Carotid Dopplers report noted as above.  Patient was seen by vascular surgery and currently  "scheduled for endarterectomy on outpatient basis on 04/16/2024.  Also sent a message to cardiology to follow-up with patient for preop optimization.  Patient and her significant other when send both are in agreement with the plan.  Currently she is hemodynamically stable for discharge.  Refer to earlier notes for further clarification..      Please see above list of diagnoses and related plan for additional information.     Condition at Discharge: good     Discharge Day Visit / Exam:     Subjective: Seen during a.m. rounds.  Patient appears comfortable not in distress.  Speech symptoms are completely resolved.  Reports right hand has significantly improved.  Denies any other new complaints.  No other events reported.  She is agreeable to follow-up with cardiology and vascular surgery on outpatient basis.    Vitals: Blood Pressure: 121/56 (04/09/24 1400)  Pulse: 64 (04/09/24 1400)  Temperature: 98.4 °F (36.9 °C) (04/09/24 1100)  Temp Source: Oral (04/09/24 1100)  Respirations: 20 (04/09/24 1100)  Height: 5' 3\" (160 cm) (04/08/24 1003)  Weight - Scale: 63.5 kg (140 lb) (04/08/24 1003)  SpO2: 98 % (04/09/24 1100)  Exam:   Physical Exam  Constitutional:       General: She is not in acute distress.     Appearance: Normal appearance. She is not ill-appearing, toxic-appearing or diaphoretic.   HENT:      Head: Normocephalic and atraumatic.   Eyes:      Pupils: Pupils are equal, round, and reactive to light.   Cardiovascular:      Rate and Rhythm: Normal rate.      Pulses: Normal pulses.   Pulmonary:      Effort: Pulmonary effort is normal. No respiratory distress.      Breath sounds: Normal breath sounds. No wheezing.   Abdominal:      General: Bowel sounds are normal. There is no distension.      Palpations: Abdomen is soft.      Tenderness: There is no abdominal tenderness.   Musculoskeletal:      Cervical back: No rigidity.      Right lower leg: No edema.      Left lower leg: No edema.   Neurological:      Mental " Status: She is alert and oriented to person, place, and time. Mental status is at baseline.      Comments: Currently no signs of dysarthria, facial asymmetry noted on exam.  Motor strength 5 out of 5 equal and symmetric in all 4 extremities.  Right upper extremity noted with very slight dysmetria.  No other gross focal motor deficit noted on exam.       Discussion with Family: Discussed with significant other when sent at bedside.    Discharge instructions/Information to patient and family:   See after visit summary for information provided to patient and family.      Provisions for Follow-Up Care:  See after visit summary for information related to follow-up care and any pertinent home health orders.      Disposition:     Home        Planned Readmission:      Discharge Statement:  I spent 35 minutes discharging the patient. This time was spent on the day of discharge. I had direct contact with the patient on the day of discharge. Greater than 50% of the total time was spent examining patient, answering all patient questions, arranging and discussing plan of care with patient as well as directly providing post-discharge instructions.  Additional time then spent on discharge activities.    Discharge Medications:  See after visit summary for reconciled discharge medications provided to patient and family.      ** Please Note: This note has been constructed using a voice recognition system **

## 2024-04-09 NOTE — PLAN OF CARE
Problem: PAIN - ADULT  Goal: Verbalizes/displays adequate comfort level or baseline comfort level  Description: Interventions:  - Encourage patient to monitor pain and request assistance  - Assess pain using appropriate pain scale  - Administer analgesics based on type and severity of pain and evaluate response  - Implement non-pharmacological measures as appropriate and evaluate response  - Consider cultural and social influences on pain and pain management  - Notify physician/advanced practitioner if interventions unsuccessful or patient reports new pain  Outcome: Progressing     Problem: Neurological Deficit  Goal: Neurological status is stable or improving  Description: Interventions:  - Monitor and assess patient's level of consciousness, motor function, sensory function, and level of assistance needed for ADLs.   - Monitor and report changes from baseline. Collaborate with interdisciplinary team to initiate plan and implement interventions as ordered.   - Provide and maintain a safe environment.  - Consider seizure precautions.  - Consider fall precautions.  - Consider aspiration precautions.  - Consider bleeding precautions.  Outcome: Progressing     Problem: Activity Intolerance/Impaired Mobility  Goal: Mobility/activity is maintained at optimum level for patient  Description: Interventions:  - Assess and monitor patient  barriers to mobility and need for assistive/adaptive devices.  - Assess patient's emotional response to limitations.  - Collaborate with interdisciplinary team and initiate plans and interventions as ordered.  - Encourage independent activity per ability.  - Maintain proper body alignment.  - Perform active/passive rom as tolerated/ordered.  - Plan activities to conserve energy.  - Turn patient as appropriate  Outcome: Progressing

## 2024-04-09 NOTE — H&P (VIEW-ONLY)
"[facility]  Progress Note  Name: Shanda Quintanilla I  MRN: [id number]  Unit/Bed#: MS 231Markel I Date of Admission: 4/6/2024   Date of Service: 4/9/2024 I Hospital Day: 2    Assessment/Plan   * Small subcortical Left MCA CVA,  Assessment & Plan  79y female with PMH of HLD, polio, former smoker, currently admitted for RUE weakness and slurred speech, found to have an acute left precentral gyrus CVA on MRI with evidence of high grade L ICA stenosis on CTA head and neck. Vascular surgery was consulted for evaluation.      Imaging-   MRI brain 4/6: Small acute infarct involving the left precentral gyrus. Questionable punctate acute cortical infarct in the left parietal lobe.      CTA head and neck 4/6 suggests about 87-90% atherosclerotic stenosis at the origin of the left cervical ICA. Mild atherosclerotic disease in the right carotid bifurcation without stenosis. Patent vertebral arteries without high-grade stenosis. Beaded luminal irregularity of the cervical ICA (right greater than left) suggesting fibromuscular dysplasia (FMD).     CV duplex 4/8/24: R ICA stenosis <50% with 50-69% L ICA stenosis (jose 212/41, ratio 2.19)  Echo 4/8/24: The left ventricular ejection fraction is 60%. Systolic function is normal. Wall motion is normal. Diastolic function is mildly abnormal, consistent with grade I (abnormal) relaxation.      Labs-  A1c 5.7       Plan:  -Acute L precentral gyrus CVA likely in the setting of symptomatic L ICA stenosis.   -CTA demonstrates 87-90% L ICA stenosis at its origin.   -Neuro following. Input appreciated. \"TIA/CVA secondary to left ICA stenosis thromboembolic in etiology\".  -CV duplex demonstrates 50-69% left ICA stenosis.   -Patient would benefit from left carotid endarterectomy within 2 weeks. Per neuro, given that the size of her stroke is small, surgical intervention could be done anytime and does not need to be delayed. Patient tentatively scheduled 4/16 at [facility] with " "Dr. Daniel. Patient can be discharged and readmitted for procedure.   -[facility] will arrange outpatient cardiology visit for clearance.   -Continue DAPT and statin therapy.   -Monitor neuro exam.   -BP control per neuro recs.   -D/w SLIM.   -Will d/w with Dr. Rosales           Subjective: Patient denies any acute complaints today. She denies any overnight events. Denies change in vision, slurred speech, facial droop or worsening lateralizing symptoms     Vitals:  /57   Pulse 64   Temp 98 °F (36.7 °C)   Resp 18   Ht 5' 3\" (1.6 m)   Wt 63.5 kg (140 lb)   SpO2 98%   BMI 24.80 kg/m²     I/Os:  No intake/output data recorded.  No intake/output data recorded.        Lab Results and Cultures:   CBC with diff:   Lab Results   Component Value Date    WBC 5.00 04/07/2024    HGB 12.6 04/07/2024    HCT 38.9 04/07/2024    MCV 91 04/07/2024     04/07/2024    RBC 4.27 04/07/2024    MCH 29.5 04/07/2024    MCHC 32.4 04/07/2024    RDW 13.8 04/07/2024    MPV 8.9 04/07/2024    NRBC 0 12/28/2020   ,   BMP/CMP:  Lab Results   Component Value Date    SODIUM 141 04/07/2024    SODIUM 140 01/19/2023    SODIUM 142 05/23/2018    K 3.8 04/07/2024    K 5.1 01/19/2023    K 4.1 05/23/2018     (H) 04/07/2024     01/19/2023     05/23/2018    CO2 26 04/07/2024    CO2 30 01/19/2023    CO2 28 05/23/2018    BUN 16 04/07/2024    BUN 11 01/19/2023    BUN 8 05/23/2018    CREATININE 0.69 04/07/2024    CREATININE 0.61 05/23/2018    CALCIUM 9.0 04/07/2024    CALCIUM 10.4 01/19/2023    CALCIUM 9.6 05/23/2018    AST 23 04/07/2024    AST 24 12/22/2021    AST 37 05/23/2018    ALT 17 04/07/2024    ALT 16 12/22/2021    ALT 23 05/23/2018    ALKPHOS 58 04/07/2024    ALKPHOS 77 12/22/2021    ALKPHOS 74 05/23/2018    ALKPHOS 91 03/08/2018    EGFR 83 04/07/2024    EGFR 86 03/08/2018   ,   Lipid Panel: No results found for: \"CHOL\",   Coags:   Lab Results   Component Value Date    PTT 26 04/06/2024    INR 0.88 04/06/2024   ,     Blood " "Culture: No results found for: \"BLOODCX\",   Urinalysis:   Lab Results   Component Value Date    COLORU Yellow 03/31/2021    CLARITYU Cloudy 03/31/2021    SPECGRAV 1.016 03/31/2021    PHUR 6.5 03/31/2021    LEUKOCYTESUR Small (A) 03/31/2021    NITRITE Negative 03/31/2021    GLUCOSEU Negative 03/31/2021    KETONESU Negative 03/31/2021    BILIRUBINUR Negative 03/31/2021    BLOODU Negative 03/31/2021   ,   Urine Culture:   Lab Results   Component Value Date    URINECX No Growth <1000 cfu/mL 03/31/2021   ,   Wound Culure: No results found for: \"WOUNDCULT\"    Medications:  Current Facility-Administered Medications   Medication Dose Route Frequency    aspirin (ECOTRIN LOW STRENGTH) EC tablet 81 mg  81 mg Oral Daily    atorvastatin (LIPITOR) tablet 40 mg  40 mg Oral Daily With Dinner    clopidogrel (PLAVIX) tablet 75 mg  75 mg Oral Daily    heparin (porcine) subcutaneous injection 5,000 Units  5,000 Units Subcutaneous Q8H ALMAS    melatonin tablet 6 mg  6 mg Oral HS       Imaging:  A above    Physical Exam:    General: AAOx3. Laying in bed in NAD  CV: RRR  Respiratory: Normal effort on RA.  Abdominal: Soft. NT. ND  Extremities:BLE warm, perfused without cyanosis or edema  Neurologic: grossly normal.         Amanda Erazo PA-C  4/9/2024        "

## 2024-04-09 NOTE — CASE MANAGEMENT
Case Management Progress Note    Patient name Shanda Quintanilla  Location /-01 MRN [id number]  : 1945 Date 2024       LOS (days): 2  Geometric Mean LOS (GMLOS) (days): 1.9  Days to GMLOS:-0.2        OBJECTIVE:        Current admission status: Inpatient  Preferred Pharmacy:   [id number] - [address]  [facility] [place] [address]  Phone: [phone] Fax: [phone]    Primary Care Provider: Talat Ventura MD    Primary Insurance: [employer] [facility]  Secondary Insurance:     PROGRESS NOTE:    Per rounding with SLIM, patient is anticipated to be discharged home later today. No CM needs anticipated at this time. CM department will continue to follow patient through discharge.

## 2024-04-09 NOTE — TELEPHONE ENCOUNTER
Great, thank you so much!    From: Amanda Erazo <[email or web address]>   Sent: Monday, April 8, 2024 4:08 PM  To: Madeleine Hills <[email or web address]>; Vascular Surgery Schedulers <[email or web address]>  Subject: Re: Shanda Quintanilla [birth year]    Yes she will be discharged and then readmitted.     Thanks!     Get [place] for iOS    From: Madeleine Hills <[email or web address]>  Sent: Monday, April 8, 2024 3:46:26 PM  To: Amanda Erazo <[email or web address]>; Vascular Surgery Schedulers <[email or web address]>  Subject: RE: Shanda Quintanilla [birth year]      Will she be discharged and brought back for the CEA?      From: Amanda Erazo <[email or web address]>   Sent: Monday, April 8, 2024 3:39 PM  To: Vascular Surgery Schedulers <[email or web address]>  Subject: Shanda Quintanilla [birth year]     Kim      Shanda Milton will need to be scheduled for a left CEA. Dr Daniel said he has time on 4/16 in [place]. Are we able to place her on the schedule for then?      Thanks,      Natividad

## 2024-04-09 NOTE — DISCHARGE INSTR - AVS FIRST PAGE
Recommend close follow-up with primary care provider within within 1 week of discharge.  Recommend outpatient follow-up with cardiology for preop optimization for endarterectomy.  Recommend outpatient follow-up with primary care provider for refill of prescriptions.

## 2024-04-09 NOTE — TELEPHONE ENCOUNTER
----- Message from Livia Casey sent at 4/9/2024  2:06 PM EDT -----  Regarding: FW: Needs outpatient appointment for clearance for endarterectomy scheduled.  Can you forward to providers to see if anyone can see this patient?        ----- Message -----  From: Evelio ISLAS MD  Sent: 4/9/2024  12:06 PM EDT  To: [facility]  Subject: Needs outpatient appointment for clearance f#      Patient was hospitalized due to acute CVA found to have significant left ICA stenosis.  Patient is scheduled for endarterectomy on 04/16/2024.  Vascular surgery will need clearance for surgery and will do the appreciate if patient can get an outpatient appointment with any available provider for clearance.  Thank you so much.

## 2024-04-10 ENCOUNTER — TELEPHONE (OUTPATIENT)
Dept: FAMILY MEDICINE CLINIC | Facility: CLINIC | Age: 79
End: 2024-04-10

## 2024-04-10 ENCOUNTER — TELEPHONE (OUTPATIENT)
Dept: CARDIOLOGY CLINIC | Facility: CLINIC | Age: 79
End: 2024-04-10

## 2024-04-10 ENCOUNTER — TRANSITIONAL CARE MANAGEMENT (OUTPATIENT)
Dept: FAMILY MEDICINE CLINIC | Facility: CLINIC | Age: 79
End: 2024-04-10

## 2024-04-10 ENCOUNTER — ANESTHESIA EVENT (OUTPATIENT)
Dept: PERIOP | Facility: HOSPITAL | Age: 79
DRG: 039 | End: 2024-04-10
Payer: COMMERCIAL

## 2024-04-10 LAB
ATRIAL RATE: 77 BPM
P AXIS: 85 DEGREES
PR INTERVAL: 156 MS
QRS AXIS: 82 DEGREES
QRSD INTERVAL: 68 MS
QT INTERVAL: 394 MS
QTC INTERVAL: 445 MS
T WAVE AXIS: 43 DEGREES
VENTRICULAR RATE: 77 BPM

## 2024-04-10 PROCEDURE — 93010 ELECTROCARDIOGRAM REPORT: CPT | Performed by: INTERNAL MEDICINE

## 2024-04-10 NOTE — TELEPHONE ENCOUNTER
Called patient and . Explained plan for L CEA next week due to symptomatic disease. They had questions answered to their satisfaction. -RD

## 2024-04-10 NOTE — TELEPHONE ENCOUNTER
Called cardiology [phone] spoke w/Liliya. Was unable to schedule an office visit over phone. She must go thru the office to get confirmation on an availability and get back to me. Advised her she can also call patient with information as well. Gave Liliya my direct line to CB on.

## 2024-04-10 NOTE — UTILIZATION REVIEW
NOTIFICATION OF ADMISSION DISCHARGE   This is a Notification of Discharge from [facility]. Please be advised that this patient has been discharge from our facility. Below you will find the admission and discharge date and time including the patient’s disposition.   UTILIZATION REVIEW CONTACT:  Lenore Gutierrez  Utilization   Network Utilization Review Department  Phone: [phone] x carefully listen to the prompts. All voicemails are confidential.  Email: [email or web address]     ADMISSION INFORMATION  PRESENTATION DATE: 4/6/2024  3:50 PM  OBERVATION ADMISSION DATE: 4/6/24  INPATIENT ADMISSION DATE: 4/7/24  9:57 AM   DISCHARGE DATE: 4/9/2024  6:49 PM   DISPOSITION:Home/Self Care    Network Utilization Review Department  ATTENTION: Please call with any questions or concerns to [phone] and carefully listen to the prompts so that you are directed to the right person. All voicemails are confidential.   For Discharge needs, contact Care Management DC Support Team at [phone] opt. 2  Send all requests for admission clinical reviews, approved or denied determinations and any other requests to dedicated fax number below belonging to the campus where the patient is receiving treatment. List of dedicated fax numbers for the Facilities:  FACILITY NAME UR FAX NUMBER   ADMISSION DENIALS (Administrative/Medical Necessity) [phone]   DISCHARGE SUPPORT TEAM ([facility]) [phone]   PARENT CHILD HEALTH (Maternity/NICU/Pediatrics) [phone]   [facility] [phone]   General acute hospital [phone]   [facility] [phone]   [facility] [phone]   Critical access hospital [phone]   [facility] [phone]   [facility] [phone]   [facility]  [phone]   [facility] [phone]   [facility] [phone]   [facility] [phone]   [facility] [phone]

## 2024-04-10 NOTE — TELEPHONE ENCOUNTER
Please see if any of the physician providers can see this patient on Friday.    Not sure whether she can be seen in quick time if the surgery has to be next Tuesday.    It would have been easier if cardiology had evaluated the patient while she was in the hospital.    If cardiology evaluation has to be done, the surgery may need to be postponed by a week if clinically appropriate.

## 2024-04-10 NOTE — TELEPHONE ENCOUNTER
Received a call from  Madeleine Hills in Vascular Surgery has a Pt that needs CC after being discharged from the hospital 4/9/24. Has Vascular Surgery date set for 4/16/24 Carotid endarterectomy left side.  There are no availabilities with Pre-op slots, please assist.

## 2024-04-10 NOTE — TELEPHONE ENCOUNTER
Patient called left a message, had a stroke on 4/6 and was in the hospital. SHe is scheduled for surgery on 4/16. She woke today with a headache and is asking if she is ok to take Tylenol or Ibuprofen. Please advise

## 2024-04-10 NOTE — TELEPHONE ENCOUNTER
[place]-C3 minutes ago (9:46 AM)       Called patient and . Explained plan for L CEA next week due to symptomatic disease. They had questions answered to their satisfaction. -RD         Note        You  Nina Corado; [facility] Surgery Coordinator; Wilfredo Kennedy; [facility] Clinical; Vascular [id number] minutes ago (9:28 AM)       Will send to triage to advise on results of Carotid doppler performed 4/8/24 and about moving forward with surgery.     Gillian Corado; [facility] Procedure Prior Authorizations; [facility] Surgery Coordinator; Wilfredo Kennedy; [facility] [place] Clinical1 hour ago (8:10 AM)     CD  Good morning,    I spoke with patient and her  04/10/24 at 8:05 am regarding her authorization status. I informed her that it was approved - Authorization # [id number] - VALID: 04/16/2024 - 04/17/2024 @ [facility].  Patient would also like a call back regarding the results of her ultrasound and if it is okay to move forward with her surgery.    Thank you,  Gillian

## 2024-04-11 ENCOUNTER — TELEPHONE (OUTPATIENT)
Dept: NEUROLOGY | Facility: CLINIC | Age: 79
End: 2024-04-11

## 2024-04-11 NOTE — TELEPHONE ENCOUNTER
Needs tcm - no 40 min slots - can I shorten an appt to put her in? IT would only be for 20 mins though.

## 2024-04-11 NOTE — TELEPHONE ENCOUNTER
4/9 4:14    Pt left a VM re: tests ordered by MARS Groves.    Transcribed VM:   Hi, my name is Shanda Quintanilla. My YOB: 1945. I am being discharged from [facility]. I had a stroke on Saturday and they did some tests. They did an Echo, a doppler of the neck and an EKG. The person that called the order in was Lora Groves, she's a nurse practitioner and she's the one that ordered these tests. And I was told she would be the one to get the results. I'm supposed to have surgery on Tuesday, April 16th at 9:30 in the morning. And in order for this to proceed, I need to have approval that those tests came out ok, and my heart is in good shape. So again, my name is Shanda Quintanilla [phone]. That's my phone number. My YOB: 1945. And I need to speak to Lora Groves, or anyone else that would have received the results for the tests that I took. One of them was yesterday, the other might have been the day before. But she is the one, they are saying, who would have gotten the results of the tests. She's a nurse practitioner in neurology. So please have a get back to me as soon as possible. Thank you very much and you have a nice day.    Called pt to inform that her request would be forwarded to the provider.    Please advise. Thank you.

## 2024-04-12 ENCOUNTER — OFFICE VISIT (OUTPATIENT)
Dept: CARDIOLOGY CLINIC | Facility: CLINIC | Age: 79
End: 2024-04-12
Payer: COMMERCIAL

## 2024-04-12 ENCOUNTER — TELEPHONE (OUTPATIENT)
Dept: NEUROLOGY | Facility: CLINIC | Age: 79
End: 2024-04-12

## 2024-04-12 VITALS
BODY MASS INDEX: 24.8 KG/M2 | DIASTOLIC BLOOD PRESSURE: 75 MMHG | OXYGEN SATURATION: 95 % | HEIGHT: 63 IN | WEIGHT: 140 LBS | SYSTOLIC BLOOD PRESSURE: 130 MMHG | HEART RATE: 74 BPM | RESPIRATION RATE: 16 BRPM

## 2024-04-12 DIAGNOSIS — I65.22 INTRACRANIAL CAROTID STENOSIS, LEFT: Primary | ICD-10-CM

## 2024-04-12 PROCEDURE — 99204 OFFICE O/P NEW MOD 45 MIN: CPT | Performed by: INTERNAL MEDICINE

## 2024-04-12 NOTE — TELEPHONE ENCOUNTER
Hello,     Can you please advise which Speciality/Team and if patient can be seen by an Resident, AP and or Attending  only?    Thank you for your time,     Janae        HFU SL Small subcortical Left MCA CVA     DC- HOME- 4/9/2024    She will also need outpatient neurology follow-up in 4 to 6 weeks.

## 2024-04-12 NOTE — TELEPHONE ENCOUNTER
4/10 at 12:54 pm:    This is Shanda Quintanilla. YOB: 1945. I was admitted on Saturday, April 6 for a stroke. They let me out yesterday late, maybe 5 or 6 o'clock from the hospital with and they have to visit. On the after visit summary, it says if I have a headache, stuff like that to call. So I'm calling because I got a headache. My number is [phone]. If you can't get me on this number, you can call my significant other at [phone]. I'm here with him now. So please get back to me. I don't know if I can take an ibuprofen or something, but if somebody could get back to me. I don't think it's normal. I don't usually get headaches.  _______________________________________________________________    Pt's headache was addressed by her PCP on 4/10/24. Pt has not been seen since 4/1/19. Per Dr. Wilson's note from 4/7/24 pt will need outpatient neurology follow up in 4 to 6 weeks.     Called pt. She stated that she feels much better and her headache went away. Routed to the clerical team to schedule the HFU in 4-6 weeks.

## 2024-04-12 NOTE — PROGRESS NOTES
OP Consultation - Cardiology    Shanda Quintanilla 79 y.o. female MRN: [id number]    Physician Requesting Consult: Hospital Referral    Reason for Consult / Chief Complaint: Preop risk assessment    HPI:     79 year old woman with a history of CVA and carotid artery disease who presents for preoperative cardiovascular risk assessment    Denies chest pain, chest pressure, shortness of breath, dizziness, lightheadedness, presyncope or syncope.  Denies orthopnea, PND or lower limb edema.      Patient was recently admitted with a CVA. She was seen by neurology and vascular surgery. Plan for carotid endarterectomy  -Acute L precentral gyrus CVA likely in the setting of symptomatic L ICA stenosis.   -CTA demonstrates 87-90% L ICA stenosis at its origin.     She dances with her significant other for 3 hours on weekends. Has two flight of stairs which she walks up and down with no symptoms    Dneies personal history of CAD, CHF, DM,     Echo:      Left Ventricle: Left ventricular cavity size is normal. Wall thickness is normal. The left ventricular ejection fraction is 60%. Systolic function is normal. Wall motion is normal. Diastolic function is mildly abnormal, consistent with grade I (abnormal) relaxation.    Mitral Valve: There is annular calcification.      FAMILY HISTORY:  Family History   Problem Relation Age of Onset    Heart attack Mother     Cancer Father     Heart attack Sister         MEDS & ALLERGIES:  Allergies   Allergen Reactions    Sulfa Antibiotics Other (See Comments)         Current Outpatient Medications:     Acetylcarnitine HCl (ACETYL L-CARNITINE) 500 MG CAPS, Take by mouth every morning , Disp: , Rfl:     Alpha-Lipoic Acid (LIPOIC ACID PO), Take 1 capsule by mouth, Disp: , Rfl:     Amino Acids (L-CARNITINE PO), Take 1 tablet by mouth, Disp: , Rfl:     Artificial Tear Solution (JUST TEARS EYE DROPS OP), Apply to eye, Disp: , Rfl:     Ascorbic Acid (VITAMIN C) 1000 MG tablet, Take 1,000 mg by mouth daily ,  Disp: , Rfl:     aspirin (ECOTRIN LOW STRENGTH) 81 mg EC tablet, Take 1 tablet (81 mg total) by mouth daily, Disp: 30 tablet, Rfl: 0    atorvastatin (LIPITOR) 40 mg tablet, Take 1 tablet (40 mg total) by mouth daily with dinner, Disp: 30 tablet, Rfl: 0    Calcium Carb-Cholecalciferol 1000-800 MG-UNIT TABS, Take by mouth, Disp: , Rfl:     Chelated Magnesium 100 MG TABS, Take by mouth Daily, Disp: , Rfl:     Cholecalciferol (VITAMIN D3) 5000 units CAPS, Take 1 capsule by mouth daily, Disp: , Rfl:     clopidogrel (PLAVIX) 75 mg tablet, Take 1 tablet (75 mg total) by mouth daily, Disp: 21 tablet, Rfl: 0    Glycerylphosphorylcholine POWD, 1 tablet by Does not apply route, Disp: , Rfl:     ibuprofen (MOTRIN) 800 mg tablet, Take 1 tablet (800 mg total) by mouth 2 (two) times a day as needed for mild pain, Disp: 60 tablet, Rfl: 0    Iodine, Kelp, (KELP PO), Take by mouth daily, Disp: , Rfl:     L-Carnosine POWD, by Does not apply route, Disp: , Rfl:     Menaquinone-7 (VITAMIN K2) 100 MCG CAPS, , Disp: , Rfl:     Misc Natural Products (GLUCOSAMINE CHOND CMP TRIPLE PO), Take by mouth, Disp: , Rfl:     Misc Natural Products (PANTETHINE PLUS) TABS, Take by mouth, Disp: , Rfl:     NATURAL VITAMIN E MOISTURIZING GEL, Apply topically, Disp: , Rfl:     Omega-3 Fatty Acids (FISH OIL) 1200 MG CAPS, Take 1 capsule by mouth daily , Disp: , Rfl:     PANTETHINE PO, Take by mouth, Disp: , Rfl:     patient supplied medication, , Disp: , Rfl:     PREBIOTIC PRODUCT PO, Take by mouth, Disp: , Rfl:     Probiotic Product (PROBIOTIC-10) CAPS, Take by mouth, Disp: , Rfl:     pyridoxine (VITAMIN B6) 100 mg tablet, 1 tab(s), Disp: , Rfl:     Turmeric POWD, 1, Disp: , Rfl:     Vinpocetine POWD, by Does not apply route, Disp: , Rfl:     vitamin B-12 (CYANOCOBALAMIN) 100 MCG tablet, Take by mouth every other day , Disp: , Rfl:     vitamin E, tocopherol, 1,000 units capsule, Take by mouth daily , Disp: , Rfl:     Zinc 30 MG CAPS, Take 1 capsule by  "mouth daily, Disp: , Rfl:     Past Medical History:   Diagnosis Date    Cervical strain     Changing skin lesion     Hyperlipidemia     MCI (mild cognitive impairment)     Mixed hyperlipidemia     Nonmelanoma skin cancer     BCC    Osteoporosis     Polio     Poor concentration     Swelling of left thumb          Review of Systems:  Review of Systems   Constitutional: Negative.  Negative for activity change, appetite change, diaphoresis, fatigue and fever.   Respiratory:  Negative for chest tightness and shortness of breath.    Cardiovascular:  Negative for chest pain, palpitations and leg swelling.   Gastrointestinal:  Negative for nausea and vomiting.   Musculoskeletal:  Negative for arthralgias and back pain.   Skin:  Negative for color change and pallor.   Neurological:  Negative for dizziness, syncope, weakness and light-headedness.   Hematological:  Negative for adenopathy.   Psychiatric/Behavioral:  Negative for agitation.    All other systems reviewed and are negative.      PHYSICAL EXAM:  Vitals:   Vitals:    04/12/24 1002   BP: 130/75   BP Location: Left arm   Patient Position: Sitting   Cuff Size: Standard   Pulse: 74   Resp: 16   SpO2: 95%   Weight: 63.5 kg (140 lb)   Height: 5' 3\" (1.6 m)        Physical Exam:  Physical Exam  Vitals and nursing note reviewed.   Constitutional:       General: She is not in acute distress.     Appearance: Normal appearance. She is not ill-appearing or diaphoretic.   HENT:      Head: Normocephalic and atraumatic.   Eyes:      Extraocular Movements: Extraocular movements intact.   Cardiovascular:      Rate and Rhythm: Normal rate and regular rhythm.      Heart sounds: No murmur heard.     No friction rub. No gallop.   Pulmonary:      Effort: Pulmonary effort is normal. No respiratory distress.      Breath sounds: Normal breath sounds.   Abdominal:      General: There is no distension.      Palpations: Abdomen is soft.   Musculoskeletal:         General: No swelling. Normal " "range of motion.      Cervical back: Normal range of motion.   Skin:     General: Skin is warm and dry.      Capillary Refill: Capillary refill takes less than 2 seconds.      Coloration: Skin is not pale.   Neurological:      General: No focal deficit present.      Mental Status: She is alert and oriented to person, place, and time.      Cranial Nerves: No cranial nerve deficit.   Psychiatric:         Mood and Affect: Mood normal.         Behavior: Behavior normal.         LABORATORY RESULTS:    Lab Results   Component Value Date    WBC 5.00 04/07/2024    HGB 12.6 04/07/2024    HCT 38.9 04/07/2024    MCV 91 04/07/2024     04/07/2024     Lab Results   Component Value Date    CALCIUM 9.0 04/07/2024    K 3.8 04/07/2024    CO2 26 04/07/2024     (H) 04/07/2024    BUN 16 04/07/2024    CREATININE 0.69 04/07/2024     Lab Results   Component Value Date    HGBA1C 5.7 (H) 04/06/2024       Lipid Profile:   No results found for: \"CHOL\"  Lab Results   Component Value Date    HDL 71 04/06/2024    HDL 86 01/10/2024     12/07/2022     Lab Results   Component Value Date    LDLCALC 168 (H) 04/06/2024    LDLCALC 171 (H) 01/10/2024    LDLCALC 178 (H) 12/07/2022     Lab Results   Component Value Date    TRIG 138 04/06/2024    TRIG 120 01/10/2024    TRIG 81 12/07/2022       The ASCVD Risk score (Lizet DK, et al., 2019) failed to calculate for the following reasons:    The patient has a prior MI or stroke diagnosis    Imaging: I have personally reviewed pertinent reports.    No results found.    EKG reviewed personally: NSR    Assessment and Plan:    Shanda was seen today for new patient visit.    Diagnoses and all orders for this visit:    Intracranial carotid stenosis, left         Preop cardiovascular risk assessment  Carotid artery disease  Dyslipidemia    Patient presents for preoperative cardiovascular risk assessment.   Patient  is scheduled for carotid endarterectomy.  Activity level: Patient is  active. She " is easily able to complete greater than 4 METS of activity.  There is no sign or symptoms of acute coronary syndrome, acute heart failure, valvular heart disease or uncontrolled arrhythmias.  Patient is at moderate risk of perioperative cardiovascular complications based on his age, comorbidities, activity level and nature of upcoming surgery.    Cont with statin for dyslipidemia. Discussed lifestyle modifications including diet and exercise    She will inform us with onset of cardiac symptoms    Recommend patient presents to the emergency room or call our office if he has any new/persistent or progressive symptoms.    Patient had questions which were answered. Specific questions related to upcoming surgery were deferred to vascular surgeon who she will contact    Return to clinic in 6 months or JUSTIN Macias MD  4/12/2024,10:11 AM

## 2024-04-15 ENCOUNTER — TELEPHONE (OUTPATIENT)
Age: 79
End: 2024-04-15

## 2024-04-15 NOTE — TELEPHONE ENCOUNTER
Contacted patient to inform her of recommendation from Amanda Erazo PA-C.  She gave a verbal understanding that she is to take her Plavix the morning of her surgery.

## 2024-04-15 NOTE — TELEPHONE ENCOUNTER
Pt is scheduled for an ENDARTERECTOMY ARTERY CAROTID (Left: Neck) tomorrow, 4/16/2024. Pt called stating she received a call from Smita Amaya, who instructed pt to call vascular asking if she should take her prescribed Plavix 75mg the morning of the procedure.  Advised pt I would send a message to our triage provider to review and advise. Pt stated she would like a callback with an answer and also call Smita Amaya back as well at [phone].

## 2024-04-15 NOTE — PRE-PROCEDURE INSTRUCTIONS
Pre-Surgery Instructions:   Medication Instructions    Alpha-Lipoic Acid (LIPOIC ACID PO) Stop taking    Amino Acids (L-CARNITINE PO) Stop taking    Artificial Tear Solution (JUST TEARS EYE DROPS OP) Take day of surgery.    Ascorbic Acid (VITAMIN C) 1000 MG tablet Stop taking    aspirin (ECOTRIN LOW STRENGTH) 81 mg EC tablet Instructions provided by MD    atorvastatin (LIPITOR) 40 mg tablet Take night before surgery    Calcium Carb-Cholecalciferol 1000-800 MG-UNIT TABS Stop taking    Chelated Magnesium 100 MG TABS Stop taking    Cholecalciferol (VITAMIN D3) 5000 units CAPS Stop taking    clopidogrel (PLAVIX) 75 mg tablet Instructions provided by MD    Glycerylphosphorylcholine POWD Stop taking    ibuprofen (MOTRIN) 800 mg tablet Stop taking    Iodine, Kelp, (KELP PO) Stop taking    L-Carnosine POWD Stop taking    Menaquinone-7 (VITAMIN K2) 100 MCG CAPS Stop taking    Misc Natural Products (GLUCOSAMINE CHOND CMP TRIPLE PO) Stop taking    Misc Natural Products (PANTETHINE PLUS) TABS Stop taking    NATURAL VITAMIN E MOISTURIZING GEL Stop taking    Omega-3 Fatty Acids (FISH OIL) 1200 MG CAPS Stop taking    PANTETHINE PO Stop taking    PREBIOTIC PRODUCT PO Stop taking    Probiotic Product (PROBIOTIC-10) CAPS Stop taking      pyridoxine (VITAMIN B6) 100 mg tablet Stop taking    Turmeric POWD Stop taking    Vinpocetine POWD Stop taking    vitamin B-12 (CYANOCOBALAMIN) 100 MCG tablet Stop taking      vitamin E, tocopherol, 1,000 units capsule Stop taking    Zinc 30 MG CAPS Stop taking   Medication instructions for day surgery reviewed. Please use only a sip of water to take your instructed medications. Avoid all over the counter vitamins, supplements and NSAIDS for one week prior to surgery per anesthesia guidelines. Tylenol is ok to take as needed.     You will receive a call one business day prior to surgery with an arrival time and hospital directions. If your surgery is scheduled on a Monday, the hospital will be  calling you on the Friday prior to your surgery. If you have not heard from anyone by 8pm, please call the hospital supervisor through the hospital  at [phone]. ([place] [phone] or [place] [phone]).    Do not eat or drink anything after midnight the night before your surgery, including candy, mints, lifesavers, or chewing gum. Do not drink alcohol 24hrs before your surgery. Try not to smoke at least 24hrs before your surgery.       Follow the pre surgery showering instructions as listed in the “My Surgical Experience Booklet” or otherwise provided by your surgeon's office. Do not use a blade to shave the surgical area 1 week before surgery. It is okay to use a clean electric clippers up to 24 hours before surgery. Do not apply any lotions, creams, including makeup, cologne, deodorant, or perfumes after showering on the day of your surgery. Do not use dry shampoo, hair spray, hair gel, or any type of hair products.     No contact lenses, eye make-up, or artificial eyelashes. Remove nail polish, including gel polish, and any artificial, gel, or acrylic nails if possible. Remove all jewelry including rings and body piercing jewelry.     Wear causal clothing that is easy to take on and off. Consider your type of surgery.    Keep any valuables, jewelry, piercings at home. Please bring any specially ordered equipment (sling, braces) if indicated.    Arrange for a responsible person to drive you to and from the hospital on the day of your surgery. Please confirm the visitor policy for the day of your procedure when you receive your phone call with an arrival time.     Call the surgeon's office with any new illnesses, exposures, or additional questions prior to surgery.    Please reference your “My Surgical Experience Booklet” for additional information to prepare for your upcoming surgery.

## 2024-04-16 ENCOUNTER — APPOINTMENT (OUTPATIENT)
Dept: NON INVASIVE DIAGNOSTICS | Facility: HOSPITAL | Age: 79
DRG: 039 | End: 2024-04-16
Payer: COMMERCIAL

## 2024-04-16 ENCOUNTER — HOSPITAL ENCOUNTER (OUTPATIENT)
Dept: NON INVASIVE DIAGNOSTICS | Facility: HOSPITAL | Age: 79
Setting detail: SURGERY ADMIT
DRG: 039 | End: 2024-04-16
Payer: COMMERCIAL

## 2024-04-16 ENCOUNTER — HOSPITAL ENCOUNTER (INPATIENT)
Facility: HOSPITAL | Age: 79
LOS: 3 days | Discharge: HOME | DRG: 039 | End: 2024-04-19
Attending: SURGERY | Admitting: SURGERY
Payer: COMMERCIAL

## 2024-04-16 ENCOUNTER — ANESTHESIA (OUTPATIENT)
Dept: PERIOP | Facility: HOSPITAL | Age: 79
DRG: 039 | End: 2024-04-16
Payer: COMMERCIAL

## 2024-04-16 DIAGNOSIS — I63.239 CAROTID STENOSIS, SYMPTOMATIC, WITH INFARCTION (HCC): Primary | ICD-10-CM

## 2024-04-16 DIAGNOSIS — I65.22 STENOSIS OF LEFT INTERNAL CAROTID ARTERY: ICD-10-CM

## 2024-04-16 LAB
ABO GROUP BLD: NORMAL
ABO GROUP BLD: NORMAL
BLD GP AB SCN SERPL QL: NEGATIVE
GLUCOSE SERPL-MCNC: 125 MG/DL (ref 65–140)
GLUCOSE SERPL-MCNC: 97 MG/DL (ref 65–140)
KCT BLD-ACNC: 205 SEC (ref 89–137)
KCT BLD-ACNC: 283 SEC (ref 89–137)
RH BLD: POSITIVE
RH BLD: POSITIVE
SPECIMEN EXPIRATION DATE: NORMAL
SPECIMEN SOURCE: ABNORMAL
SPECIMEN SOURCE: ABNORMAL

## 2024-04-16 PROCEDURE — 85347 COAGULATION TIME ACTIVATED: CPT

## 2024-04-16 PROCEDURE — C1781 MESH (IMPLANTABLE): HCPCS | Performed by: SURGERY

## 2024-04-16 PROCEDURE — 82948 REAGENT STRIP/BLOOD GLUCOSE: CPT

## 2024-04-16 PROCEDURE — 03CJ0ZZ EXTIRPATION OF MATTER FROM LEFT COMMON CAROTID ARTERY, OPEN APPROACH: ICD-10-PCS | Performed by: SURGERY

## 2024-04-16 PROCEDURE — 86900 BLOOD TYPING SEROLOGIC ABO: CPT | Performed by: STUDENT IN AN ORGANIZED HEALTH CARE EDUCATION/TRAINING PROGRAM

## 2024-04-16 PROCEDURE — 93882 EXTRACRANIAL UNI/LTD STUDY: CPT

## 2024-04-16 PROCEDURE — 86901 BLOOD TYPING SEROLOGIC RH(D): CPT | Performed by: STUDENT IN AN ORGANIZED HEALTH CARE EDUCATION/TRAINING PROGRAM

## 2024-04-16 PROCEDURE — 86900 BLOOD TYPING SEROLOGIC ABO: CPT | Performed by: SURGERY

## 2024-04-16 PROCEDURE — 86850 RBC ANTIBODY SCREEN: CPT | Performed by: STUDENT IN AN ORGANIZED HEALTH CARE EDUCATION/TRAINING PROGRAM

## 2024-04-16 PROCEDURE — 03UJ0KZ SUPPLEMENT LEFT COMMON CAROTID ARTERY WITH NONAUTOLOGOUS TISSUE SUBSTITUTE, OPEN APPROACH: ICD-10-PCS | Performed by: SURGERY

## 2024-04-16 PROCEDURE — 86901 BLOOD TYPING SEROLOGIC RH(D): CPT | Performed by: SURGERY

## 2024-04-16 PROCEDURE — 35301 RECHANNELING OF ARTERY: CPT | Performed by: SURGERY

## 2024-04-16 DEVICE — XENOSURE BIOLOGIC PATCH, 0.8CM X 8CM, EIFU
Type: IMPLANTABLE DEVICE | Site: CAROTID | Status: FUNCTIONAL
Brand: XENOSURE BIOLOGIC PATCH

## 2024-04-16 RX ORDER — CHLORHEXIDINE GLUCONATE ORAL RINSE 1.2 MG/ML
SOLUTION DENTAL
Status: DISPENSED
Start: 2024-04-16 | End: 2024-04-16

## 2024-04-16 RX ORDER — PHENYLEPHRINE HCL IN 0.9% NACL 1 MG/10 ML
SYRINGE (ML) INTRAVENOUS AS NEEDED
Status: DISCONTINUED | OUTPATIENT
Start: 2024-04-16 | End: 2024-04-16

## 2024-04-16 RX ORDER — ACETAMINOPHEN 325 MG/1
975 TABLET ORAL EVERY 6 HOURS
Status: DISCONTINUED | OUTPATIENT
Start: 2024-04-16 | End: 2024-04-19 | Stop reason: HOSPADM

## 2024-04-16 RX ORDER — PROTAMINE SULFATE 10 MG/ML
INJECTION, SOLUTION INTRAVENOUS AS NEEDED
Status: DISCONTINUED | OUTPATIENT
Start: 2024-04-16 | End: 2024-04-16

## 2024-04-16 RX ORDER — EPHEDRINE SULFATE 50 MG/ML
INJECTION INTRAVENOUS AS NEEDED
Status: DISCONTINUED | OUTPATIENT
Start: 2024-04-16 | End: 2024-04-16

## 2024-04-16 RX ORDER — CHLORHEXIDINE GLUCONATE ORAL RINSE 1.2 MG/ML
15 SOLUTION DENTAL ONCE
Status: COMPLETED | OUTPATIENT
Start: 2024-04-16 | End: 2024-04-16

## 2024-04-16 RX ORDER — FENTANYL CITRATE/PF 50 MCG/ML
25 SYRINGE (ML) INJECTION
Status: DISCONTINUED | OUTPATIENT
Start: 2024-04-16 | End: 2024-04-16 | Stop reason: HOSPADM

## 2024-04-16 RX ORDER — OXYCODONE HYDROCHLORIDE 5 MG/1
2.5 TABLET ORAL EVERY 4 HOURS PRN
Status: DISCONTINUED | OUTPATIENT
Start: 2024-04-16 | End: 2024-04-19 | Stop reason: HOSPADM

## 2024-04-16 RX ORDER — CEFAZOLIN SODIUM 1 G/3ML
INJECTION, POWDER, FOR SOLUTION INTRAMUSCULAR; INTRAVENOUS AS NEEDED
Status: DISCONTINUED | OUTPATIENT
Start: 2024-04-16 | End: 2024-04-16

## 2024-04-16 RX ORDER — ONDANSETRON 2 MG/ML
4 INJECTION INTRAMUSCULAR; INTRAVENOUS ONCE AS NEEDED
Status: DISCONTINUED | OUTPATIENT
Start: 2024-04-16 | End: 2024-04-16 | Stop reason: HOSPADM

## 2024-04-16 RX ORDER — ASPIRIN 81 MG/1
81 TABLET, CHEWABLE ORAL DAILY
Status: DISCONTINUED | OUTPATIENT
Start: 2024-04-16 | End: 2024-04-16

## 2024-04-16 RX ORDER — SODIUM CHLORIDE, SODIUM LACTATE, POTASSIUM CHLORIDE, CALCIUM CHLORIDE 600; 310; 30; 20 MG/100ML; MG/100ML; MG/100ML; MG/100ML
50 INJECTION, SOLUTION INTRAVENOUS CONTINUOUS
Status: DISCONTINUED | OUTPATIENT
Start: 2024-04-16 | End: 2024-04-18

## 2024-04-16 RX ORDER — LABETALOL HYDROCHLORIDE 5 MG/ML
5 INJECTION, SOLUTION INTRAVENOUS
Status: DISCONTINUED | OUTPATIENT
Start: 2024-04-16 | End: 2024-04-17

## 2024-04-16 RX ORDER — LABETALOL HYDROCHLORIDE 5 MG/ML
INJECTION, SOLUTION INTRAVENOUS AS NEEDED
Status: DISCONTINUED | OUTPATIENT
Start: 2024-04-16 | End: 2024-04-16

## 2024-04-16 RX ORDER — ONDANSETRON 2 MG/ML
4 INJECTION INTRAMUSCULAR; INTRAVENOUS EVERY 4 HOURS PRN
Status: DISCONTINUED | OUTPATIENT
Start: 2024-04-16 | End: 2024-04-19 | Stop reason: HOSPADM

## 2024-04-16 RX ORDER — LIDOCAINE HYDROCHLORIDE 20 MG/ML
INJECTION, SOLUTION EPIDURAL; INFILTRATION; INTRACAUDAL; PERINEURAL AS NEEDED
Status: DISCONTINUED | OUTPATIENT
Start: 2024-04-16 | End: 2024-04-16

## 2024-04-16 RX ORDER — HYDROMORPHONE HCL/PF 1 MG/ML
0.2 SYRINGE (ML) INJECTION
Status: DISCONTINUED | OUTPATIENT
Start: 2024-04-16 | End: 2024-04-19 | Stop reason: HOSPADM

## 2024-04-16 RX ORDER — HEPARIN SODIUM 5000 [USP'U]/ML
5000 INJECTION, SOLUTION INTRAVENOUS; SUBCUTANEOUS EVERY 8 HOURS SCHEDULED
Status: DISCONTINUED | OUTPATIENT
Start: 2024-04-16 | End: 2024-04-19 | Stop reason: HOSPADM

## 2024-04-16 RX ORDER — OXYCODONE HYDROCHLORIDE 5 MG/1
5 TABLET ORAL EVERY 4 HOURS PRN
Status: DISCONTINUED | OUTPATIENT
Start: 2024-04-16 | End: 2024-04-19 | Stop reason: HOSPADM

## 2024-04-16 RX ORDER — LIDOCAINE HYDROCHLORIDE 10 MG/ML
INJECTION, SOLUTION EPIDURAL; INFILTRATION; INTRACAUDAL; PERINEURAL AS NEEDED
Status: DISCONTINUED | OUTPATIENT
Start: 2024-04-16 | End: 2024-04-16 | Stop reason: HOSPADM

## 2024-04-16 RX ORDER — HEPARIN SODIUM 1000 [USP'U]/ML
INJECTION, SOLUTION INTRAVENOUS; SUBCUTANEOUS AS NEEDED
Status: DISCONTINUED | OUTPATIENT
Start: 2024-04-16 | End: 2024-04-16

## 2024-04-16 RX ORDER — DEXAMETHASONE SODIUM PHOSPHATE 10 MG/ML
INJECTION, SOLUTION INTRAMUSCULAR; INTRAVENOUS AS NEEDED
Status: DISCONTINUED | OUTPATIENT
Start: 2024-04-16 | End: 2024-04-16

## 2024-04-16 RX ORDER — LIDOCAINE HYDROCHLORIDE 10 MG/ML
0.5 INJECTION, SOLUTION EPIDURAL; INFILTRATION; INTRACAUDAL; PERINEURAL ONCE AS NEEDED
Status: COMPLETED | OUTPATIENT
Start: 2024-04-16 | End: 2024-04-16

## 2024-04-16 RX ORDER — ASPIRIN 81 MG/1
81 TABLET, CHEWABLE ORAL DAILY
Status: DISCONTINUED | OUTPATIENT
Start: 2024-04-16 | End: 2024-04-19 | Stop reason: HOSPADM

## 2024-04-16 RX ORDER — FENTANYL CITRATE 50 UG/ML
INJECTION, SOLUTION INTRAMUSCULAR; INTRAVENOUS AS NEEDED
Status: DISCONTINUED | OUTPATIENT
Start: 2024-04-16 | End: 2024-04-16

## 2024-04-16 RX ORDER — PROPOFOL 10 MG/ML
INJECTION, EMULSION INTRAVENOUS AS NEEDED
Status: DISCONTINUED | OUTPATIENT
Start: 2024-04-16 | End: 2024-04-16

## 2024-04-16 RX ORDER — CLOPIDOGREL BISULFATE 75 MG/1
75 TABLET ORAL DAILY
Status: DISCONTINUED | OUTPATIENT
Start: 2024-04-17 | End: 2024-04-19 | Stop reason: HOSPADM

## 2024-04-16 RX ORDER — ATORVASTATIN CALCIUM 40 MG/1
40 TABLET, FILM COATED ORAL
Status: DISCONTINUED | OUTPATIENT
Start: 2024-04-16 | End: 2024-04-19 | Stop reason: HOSPADM

## 2024-04-16 RX ORDER — ONDANSETRON 2 MG/ML
INJECTION INTRAMUSCULAR; INTRAVENOUS AS NEEDED
Status: DISCONTINUED | OUTPATIENT
Start: 2024-04-16 | End: 2024-04-16

## 2024-04-16 RX ORDER — HYDRALAZINE HYDROCHLORIDE 20 MG/ML
15 INJECTION INTRAMUSCULAR; INTRAVENOUS
Status: DISCONTINUED | OUTPATIENT
Start: 2024-04-16 | End: 2024-04-16

## 2024-04-16 RX ORDER — MAGNESIUM HYDROXIDE 1200 MG/15ML
LIQUID ORAL AS NEEDED
Status: DISCONTINUED | OUTPATIENT
Start: 2024-04-16 | End: 2024-04-16 | Stop reason: HOSPADM

## 2024-04-16 RX ORDER — ROCURONIUM BROMIDE 10 MG/ML
INJECTION, SOLUTION INTRAVENOUS AS NEEDED
Status: DISCONTINUED | OUTPATIENT
Start: 2024-04-16 | End: 2024-04-16

## 2024-04-16 RX ADMIN — HEPARIN SODIUM 5000 UNITS: 5000 INJECTION INTRAVENOUS; SUBCUTANEOUS at 21:45

## 2024-04-16 RX ADMIN — Medication 100 MCG: at 12:36

## 2024-04-16 RX ADMIN — PHENYLEPHRINE HYDROCHLORIDE 25 MCG/MIN: 10 INJECTION INTRAVENOUS at 18:15

## 2024-04-16 RX ADMIN — SUGAMMADEX 160 MG: 100 INJECTION, SOLUTION INTRAVENOUS at 14:58

## 2024-04-16 RX ADMIN — FENTANYL CITRATE 50 MCG: 50 INJECTION INTRAMUSCULAR; INTRAVENOUS at 13:03

## 2024-04-16 RX ADMIN — PROPOFOL 30 MG: 10 INJECTION, EMULSION INTRAVENOUS at 14:22

## 2024-04-16 RX ADMIN — PROPOFOL 30 MG: 10 INJECTION, EMULSION INTRAVENOUS at 13:03

## 2024-04-16 RX ADMIN — ASPIRIN 81 MG CHEWABLE TABLET 81 MG: 81 TABLET CHEWABLE at 17:09

## 2024-04-16 RX ADMIN — HEPARIN SODIUM 7000 UNITS: 1000 INJECTION INTRAVENOUS; SUBCUTANEOUS at 13:08

## 2024-04-16 RX ADMIN — PHENYLEPHRINE HYDROCHLORIDE 30 MCG/MIN: 10 INJECTION INTRAVENOUS at 12:51

## 2024-04-16 RX ADMIN — SODIUM CHLORIDE, SODIUM LACTATE, POTASSIUM CHLORIDE, AND CALCIUM CHLORIDE 50 ML/HR: .6; .31; .03; .02 INJECTION, SOLUTION INTRAVENOUS at 12:35

## 2024-04-16 RX ADMIN — ROCURONIUM BROMIDE 10 MG: 50 INJECTION, SOLUTION INTRAVENOUS at 14:12

## 2024-04-16 RX ADMIN — ACETAMINOPHEN 975 MG: 325 TABLET, FILM COATED ORAL at 17:09

## 2024-04-16 RX ADMIN — ACETAMINOPHEN 975 MG: 325 TABLET, FILM COATED ORAL at 22:44

## 2024-04-16 RX ADMIN — CEFAZOLIN 1000 MG: 1 INJECTION, POWDER, FOR SOLUTION INTRAMUSCULAR; INTRAVENOUS at 12:50

## 2024-04-16 RX ADMIN — LABETALOL HYDROCHLORIDE 5 MG: 5 INJECTION, SOLUTION INTRAVENOUS at 15:04

## 2024-04-16 RX ADMIN — ONDANSETRON 4 MG: 2 INJECTION INTRAMUSCULAR; INTRAVENOUS at 12:42

## 2024-04-16 RX ADMIN — SODIUM CHLORIDE 500 ML: 0.9 INJECTION, SOLUTION INTRAVENOUS at 17:07

## 2024-04-16 RX ADMIN — PROTAMINE SULFATE 50 MG: 10 INJECTION, SOLUTION INTRAVENOUS at 14:25

## 2024-04-16 RX ADMIN — ATORVASTATIN CALCIUM 40 MG: 40 TABLET, FILM COATED ORAL at 17:09

## 2024-04-16 RX ADMIN — DEXAMETHASONE SODIUM PHOSPHATE 10 MG: 10 INJECTION, SOLUTION INTRAMUSCULAR; INTRAVENOUS at 12:42

## 2024-04-16 RX ADMIN — PROPOFOL 120 MG: 10 INJECTION, EMULSION INTRAVENOUS at 12:36

## 2024-04-16 RX ADMIN — LIDOCAINE HYDROCHLORIDE 80 MG: 20 INJECTION, SOLUTION EPIDURAL; INFILTRATION; INTRACAUDAL; PERINEURAL at 12:36

## 2024-04-16 RX ADMIN — HEPARIN SODIUM 3000 UNITS: 1000 INJECTION INTRAVENOUS; SUBCUTANEOUS at 13:39

## 2024-04-16 RX ADMIN — FENTANYL CITRATE 50 MCG: 50 INJECTION INTRAMUSCULAR; INTRAVENOUS at 12:30

## 2024-04-16 RX ADMIN — CHLORHEXIDINE GLUCONATE 15 ML: 1.2 SOLUTION ORAL at 12:34

## 2024-04-16 RX ADMIN — EPHEDRINE SULFATE 10 MG: 50 INJECTION, SOLUTION INTRAVENOUS at 14:19

## 2024-04-16 RX ADMIN — ROCURONIUM BROMIDE 50 MG: 50 INJECTION, SOLUTION INTRAVENOUS at 12:36

## 2024-04-16 RX ADMIN — Medication 200 MCG: at 13:12

## 2024-04-16 RX ADMIN — LIDOCAINE HYDROCHLORIDE 0.5 ML: 10 INJECTION, SOLUTION EPIDURAL; INFILTRATION; INTRACAUDAL; PERINEURAL at 12:35

## 2024-04-16 RX ADMIN — LABETALOL HYDROCHLORIDE 5 MG: 5 INJECTION, SOLUTION INTRAVENOUS at 14:42

## 2024-04-16 NOTE — OP NOTE
DATE OF PROCEDURE: 12/19/23    PERFORMING SERVICE: Vascular and Endovascular Surgery    ATTENDING SURGEON: Nikolai Daniel MD    PREOPERATIVE DIAGNOSIS: Symptomatic left carotid artery stenosis     POSTOPERATIVE DIAGNOSIS: Same    PROCEDURE NAME:   Left carotid endarterectomy with bovine pericardial patch angioplasty    ANESTHESIA: general    EBL: 75cc    UOP:  0cc    IVF:  900cc     SPECIMENS:  None     COMPLICATIONS: None    DRAINS: None    INDICATION:  79 year old female with recent right hand numbness and paresthesia with speech disturbance who was identified to have moderate right internal carotid artery stenosis.  She underwent imaging and neurology evaluation suggesting she has symptomatic carotid artery disease.  She was initiated on aspirin and plavix.  She underwent informed consent for the above mentioned procedure in the presence of her  with all questions answered.       INTRAOPERATIVE FINDINGS: Completion carotid artery duplex with successful resolution of stenosis and satisfactory waveforms with no evidence of residual debris.      ASSISTING SURGEON: Nikolai Daniel MD    DESCRIPTION OF PROCEDURE:   The patient was identified in the holding room.  The patient was then brought to the operating room and placed supine position. A prep verification and time out were performed identifying patient's name, mrn, side and site of the procedure. Preoperative antibiotics were administered within one hour of incision. General anesthesia was induced. The left neck was prepped and draped in the usual sterile fashion. An incision was created along the anterior border of the sternomastoid muscle. Dissection was carried down to and through the platysma with the cautery. The sternomastoid was mobilized and retracted the internal jugular vein as identified and retracted laterally. The facial vein was identified and ligated with ligatures of 3-0 silk then divided. The carotid sheath was  opened and the internal, external and common carotid arteries as well as the superior thyroid artery were looped with silastic loops. Heparin was administered to reach ACT's of 250. The arteries were controlled with traction on the vessel loops.     An arteriotomy was created with an 11 blade and Ochoa scissors extending beyond the visible disease in the common and internal carotid arteries. An 8 Italian shunt was inserted first in the ICA and confirmed back flow. Flow within the shunt was confirmed with doppler. The carotid plaque was then elevated and transected in the common carotid with fine scissors. An eversion endarterectomy of the external carotid was performed. The plaque was then elevated and rotated free of the distal internal carotid leaving an acceptable endpoint.  2 tacking sutures were placed at the distal endpoint with 7-0 Prolene suture.    A bovine pericardial patch was then prepared and sewn in place using a running suture of 6-0 prolene from either side of the arteriotomy. Once the suture line was complete, the arteries were each flushed. The suture line was secured. Control was released to the common and external carotid initially. After several cardiac cycles to flush any debris or air into the external, the internal control was also released.     A portable ultrasound was brought into the room and a high frequency footplate probe prepped into a sterile sleeve. This was used to take images from the entire operated segment. No visible defects or doppler velocity elevations indicative of residual stenosis were appreciated in the common or internal carotid artery.  We interpreted this as a satisfactory completion duplex study and a satisfactory endarterectomy procedure.     Protamine sulfate was administered. The wound was irrigated with sterile saline.  The platysma was closed in a watertight fashion using a running suture of 3-0 vicryl.  Marcaine was injected along the incision.  The skin was  re-approximated with a running intracuticular suture of 4-0 monocryl. Dermabond was placed in the skin. The patient was neurologically when they were awakened from anesthesia and had no new neurologic deficits, the patient moved all four extremities and followed commands. The patient was then transferred to recovery room in good condition.      Nikolai Daniel MD  12/19/23  3:54 PM    [facility] - Carotid Endarterectomy     Urgency:  Urgent    Anesthesia: General Type: Conventional    Side: left    Patch Type: Bovine Pericardial     If Prosthetic, Patch : Other bovine    Shunt: Yes- Routine    Skin Prep: Chlorhexidine    Drain: no  Heparin: yes    Protamine: yes      Dextran: no     Re-explore artery after closure: no    Total Procedure time: 115 min    Monitoring:   EEG: no   Stump Pressure: no   Other: no, shunt    Completion Study:   Doppler: yes   Duplex: yes   Arteriogram: no    Concomitant Procedure:   Proximal Endovascular: no   Distal Endovascular: no   CABG: no   Other Arterial Op: no

## 2024-04-16 NOTE — ANESTHESIA POSTPROCEDURE EVALUATION
Post-Op Assessment Note    CV Status:  Stable  Pain Score: 0    Pain management: adequate       Mental Status:  Awake and alert   Hydration Status:  Stable   PONV Controlled:  None   Airway Patency:  Patent and adequate     Post Op Vitals Reviewed: Yes    No anethesia notable event occurred.    Staff: CRNA, Anesthesiologist               /92 (04/16/24 1518)    Temp 97.7 °F (36.5 °C) (04/16/24 1518)    Pulse 87 (04/16/24 1518)   Resp   18   SpO2   100   Pt following commands and moving all extremeties

## 2024-04-16 NOTE — DISCHARGE INSTR - AVS FIRST PAGE
DISCHARGE INSTRUCTIONS  CAROTID ENDARTERECTOMY    ACTIVITY:  Limit your physical activity to walking for the first week and then increase your activity as tolerated.  Walking up steps and normal activities may be resumed as you feel ready.  Avoid strenuous activity such as vigorous exercise.  Avoid heavy lifting (do not lift more than 15 pounds) for the first four weeks after surgery.  You should not drive a car for at least one week following discharge from the hospital and you are off all narcotic pain medication.  You may ride in a car.  If you have had a stroke or mini-stroke, please consult with your neurologist prior to driving.    DO NOT START OR RESUME ANY PREVIOUSLY PLANNED THERAPY (PHYSICAL, CARDIAC, REHAB, ETC…) UNTIL YOU DISCUSS WITH YOUR SURGEON AND THEY FEEL IT IS SAFE TO ENGAGE IN THERAPY.    DIET:  Resume your normal diet.  Good nutrition is important for healing of your incision.  You can expect to have a sore throat and trouble swallowing after surgery which should improve quickly.  If you feel like you are choking, please call your doctor.    RECURRENT SYMPTOMS: If you develop any new numbness, weakness, vision changes, drooping of the face, or difficulty with speech after discharge, CALL 911 or go to the nearest Emergency department immediately.    INCISION SITE:  You may have surgical glue at your incision site.  There are stitches present under the skin which will absorb on their own.  The glue is used to cover the incision, assist in closure, and prevent contamination. This adhesive will darken and peel away on its own within one to two weeks. Do not pick at it.  If you have a bandage, please remove this on the second day after surgery.    You should shower daily.  Wash incision daily with soap and water, but do not rub or scrub the incision; rinse thoroughly and pat dry.  Do not bathe in a tub or swim for the first 4 weeks following surgery or if you have any open wounds.  It is normal to  have some bruising, swelling or discoloration around the incision.  IF increasing redness, pain, bleeding or a bulge develops, call our office immediately.    If you notice any active bleeding at the site, apply pressure to the site and call our office ([phone]) or [phone].    FOLLOW UP STUDIES: Doppler ultrasound studies are very important to your post-operative care. Your surgeon will arrange them at your first postoperative visit. The first study will be 3 months after surgery.    FOLLOW UP APPOINTMENTS:  Making and keeping follow up appointments and ultrasound tests are important to your recovery.  If you have difficulty making it to or keeping your follow up appointments, call the office.    If you have increased pain, fever >101.5, increased drainage, redness or a bad smell at your surgery site, new coldness/numbness of your arm or leg, please call us immediately and GO directly to the ER.    Appt w/ MARS Baker: 5/2/2024 at 10am, [place] office      PLEASE CALL THE OFFICE IF YOU HAVE ANY QUESTIONS  [phone]  [phone] [address], [address], [place] [address]  [address] [facility], [address], [place] [address]  [address] [place] [address]  [address] [facility], [address], [place] [address]  [address] [facility], [place] [address]  [address] [facility], 2nd Floor, [address], [place] [address]  [address] [facility], [address], [place] [address]  [address] [facility], 1st Floor, [address], [place] [address]  [address], [place] [address]  [address] [address]

## 2024-04-16 NOTE — ANESTHESIA PROCEDURE NOTES
Arterial Line Insertion    Performed by: Nakita Becerra CRNA  Authorized by: Soraida Argueta MD  Preparation: Patient was prepped and draped in the usual sterile fashion.  Indications: hemodynamic monitoring  Orientation:  Right  Location: radial artery  Procedure Details:  Needle gauge: 20  Seldinger technique: Seldinger technique used  Number of attempts: 1    Post-procedure:  Post-procedure: dressing applied  Waveform: good waveform and waveform confirmed  Post-procedure CNS: normal  Patient tolerance: Patient tolerated the procedure well with no immediate complications  Comments: Dr. Argueta placed fanta atraumatic

## 2024-04-16 NOTE — INTERVAL H&P NOTE
H&P reviewed. After examining the patient I find no changes in the patients condition since the H&P had been written.    Vitals:    04/16/24 1107   BP: 133/68   Pulse: 63   Resp: 20   Temp: 98.6 °F (37 °C)   SpO2: 100%     Discussed with patient and  at length in the pre operative setting.

## 2024-04-16 NOTE — DISCHARGE INSTR - OTHER ORDERS
Neck incision:   Incision daily with soap and water.  Pat dry thoroughly.  Allow skin glue to slough

## 2024-04-16 NOTE — ANESTHESIA PREPROCEDURE EVALUATION
Procedure:  ENDARTERECTOMY ARTERY CAROTID (Left: Neck)    Relevant Problems   CARDIO   (+) Mixed hyperlipidemia      NEURO/PSYCH   (+) Small subcortical Left MCA CVA,      -Acute L precentral gyrus CVA likely in the setting of symptomatic L ICA stenosis.   -CTA demonstrates 87-90% L ICA stenosis at its origin.     Physical Exam    Airway    Mallampati score: II  TM Distance: >3 FB  Neck ROM: full     Dental   No notable dental hx     Cardiovascular      Pulmonary      Other Findings  post-pubertal.      Anesthesia Plan  ASA Score- 3     Anesthesia Type- general with ASA Monitors.         Additional Monitors: arterial line.    Airway Plan: ETT.           Plan Factors-Exercise tolerance (METS): >4 METS.    Chart reviewed.    Patient summary reviewed.                  Induction- intravenous.    Postoperative Plan- Plan for postoperative opioid use. Planned trial extubation    Informed Consent- Anesthetic plan and risks discussed with patient.  I personally reviewed this patient with the CRNA. Discussed and agreed on the Anesthesia Plan with the CRNA..

## 2024-04-17 ENCOUNTER — DOCUMENTATION (OUTPATIENT)
Dept: VASCULAR SURGERY | Facility: CLINIC | Age: 79
End: 2024-04-17

## 2024-04-17 LAB
ANION GAP SERPL CALCULATED.3IONS-SCNC: 9 MMOL/L (ref 4–13)
APTT PPP: 23 SECONDS (ref 23–37)
BUN SERPL-MCNC: 13 MG/DL (ref 5–25)
CALCIUM SERPL-MCNC: 8.7 MG/DL (ref 8.4–10.2)
CHLORIDE SERPL-SCNC: 110 MMOL/L (ref 96–108)
CO2 SERPL-SCNC: 20 MMOL/L (ref 21–32)
CREAT SERPL-MCNC: 0.61 MG/DL (ref 0.6–1.3)
ERYTHROCYTE [DISTWIDTH] IN BLOOD BY AUTOMATED COUNT: 13.7 % (ref 11.6–15.1)
GFR SERPL CREATININE-BSD FRML MDRD: 86 ML/MIN/1.73SQ M
GLUCOSE SERPL-MCNC: 186 MG/DL (ref 65–140)
HCT VFR BLD AUTO: 35 % (ref 34.8–46.1)
HGB BLD-MCNC: 11.4 G/DL (ref 11.5–15.4)
INR PPP: 1.06 (ref 0.84–1.19)
MCH RBC QN AUTO: 30.2 PG (ref 26.8–34.3)
MCHC RBC AUTO-ENTMCNC: 32.6 G/DL (ref 31.4–37.4)
MCV RBC AUTO: 93 FL (ref 82–98)
PLATELET # BLD AUTO: 350 THOUSANDS/UL (ref 149–390)
PMV BLD AUTO: 9.7 FL (ref 8.9–12.7)
POTASSIUM SERPL-SCNC: 4.1 MMOL/L (ref 3.5–5.3)
PROTHROMBIN TIME: 13.7 SECONDS (ref 11.6–14.5)
RBC # BLD AUTO: 3.78 MILLION/UL (ref 3.81–5.12)
SODIUM SERPL-SCNC: 139 MMOL/L (ref 135–147)
WBC # BLD AUTO: 12.82 THOUSAND/UL (ref 4.31–10.16)

## 2024-04-17 PROCEDURE — 85730 THROMBOPLASTIN TIME PARTIAL: CPT

## 2024-04-17 PROCEDURE — 99024 POSTOP FOLLOW-UP VISIT: CPT | Performed by: SURGERY

## 2024-04-17 PROCEDURE — 85027 COMPLETE CBC AUTOMATED: CPT

## 2024-04-17 PROCEDURE — 85610 PROTHROMBIN TIME: CPT

## 2024-04-17 PROCEDURE — 80048 BASIC METABOLIC PNL TOTAL CA: CPT

## 2024-04-17 RX ADMIN — HEPARIN SODIUM 5000 UNITS: 5000 INJECTION INTRAVENOUS; SUBCUTANEOUS at 22:19

## 2024-04-17 RX ADMIN — ATORVASTATIN CALCIUM 40 MG: 40 TABLET, FILM COATED ORAL at 17:29

## 2024-04-17 RX ADMIN — HEPARIN SODIUM 5000 UNITS: 5000 INJECTION INTRAVENOUS; SUBCUTANEOUS at 05:15

## 2024-04-17 RX ADMIN — ASPIRIN 81 MG CHEWABLE TABLET 81 MG: 81 TABLET CHEWABLE at 09:12

## 2024-04-17 RX ADMIN — ACETAMINOPHEN 975 MG: 325 TABLET, FILM COATED ORAL at 17:29

## 2024-04-17 RX ADMIN — ACETAMINOPHEN 975 MG: 325 TABLET, FILM COATED ORAL at 05:15

## 2024-04-17 RX ADMIN — CLOPIDOGREL BISULFATE 75 MG: 75 TABLET ORAL at 09:12

## 2024-04-17 RX ADMIN — HEPARIN SODIUM 5000 UNITS: 5000 INJECTION INTRAVENOUS; SUBCUTANEOUS at 13:43

## 2024-04-17 RX ADMIN — ACETAMINOPHEN 975 MG: 325 TABLET, FILM COATED ORAL at 11:56

## 2024-04-17 NOTE — UTILIZATION REVIEW
"Initial Clinical Review    Elective Inpatient surgical procedure  Age/Sex: 79 y.o. female  Surgery Date: 4/16/24  Procedure: Left carotid endarterectomy with bovine pericardial patch angioplasty   Anesthesia: general  Operative Findings: Completion carotid artery duplex with successful resolution of stenosis and satisfactory waveforms with no evidence of residual debris.       POD#1 Progress Note:  status post L CEA yesterday, pt needed continued close BP monitoring and fred as needed for support. No acute events overnight. Pt awake, alert. Pt complains of mild fingertip numbness on left hand while BP cuff inflating. Complains of pain at incision site.  Left neck incision c/d/I, mild ecchymosis, soft, no hematoma. Tolerating diet, continue IVF. Has not been OOB. Voiding. Continue to monitor BP, continue [place], continue ASA, plavix and statin. Will require further inpatient care due to Fred requirement.    Admission Orders: Date/Time/Statement:   Admission Orders (From admission, onward)       Ordered        04/16/24 1519  Inpatient Admission  Once                          Orders Placed This Encounter   Procedures    Inpatient Admission     Standing Status:   Standing     Number of Occurrences:   1     Order Specific Question:   Level of Care     Answer:   Level 1 Stepdown [13]     Order Specific Question:   Estimated length of stay     Answer:   More than 2 Midnights     Order Specific Question:   Certification     Answer:   I certify that inpatient services are medically necessary for this patient for a duration of greater than two midnights. See H&P and MD Progress Notes for additional information about the patient's course of treatment.     Vital Signs: BP (!) 103/49   Pulse 58   Temp 97.8 °F (36.6 °C)   Resp 18   Ht 5' 3\" (1.6 m)   Wt 63.5 kg (139 lb 15.9 oz)   SpO2 98%   BMI 24.80 kg/m²     Pertinent Labs/Diagnostic Test Results:   VAS intra-op ultrasound CEA    (Results Pending)         Results from last " 7 days   Lab Units 04/17/24  0512   WBC Thousand/uL 12.82*   HEMOGLOBIN g/dL 11.4*   HEMATOCRIT % 35.0   PLATELETS Thousands/uL 350         Results from last 7 days   Lab Units 04/17/24  0512   SODIUM mmol/L 139   POTASSIUM mmol/L 4.1   CHLORIDE mmol/L 110*   CO2 mmol/L 20*   ANION GAP mmol/L 9   BUN mg/dL 13   CREATININE mg/dL 0.61   EGFR ml/min/1.73sq m 86   CALCIUM mg/dL 8.7         Results from last 7 days   Lab Units 04/16/24  1521 04/16/24  1225   POC GLUCOSE mg/dl 125 97     Results from last 7 days   Lab Units 04/17/24  0512   GLUCOSE RANDOM mg/dL 186*     Results from last 7 days   Lab Units 04/17/24  0512   PROTIME seconds 13.7   INR  1.06   PTT seconds 23       Diet: as tolerated  Mobility: OOB and ambulation  DVT Prophylaxis: heparin, scd, ambulatory    Medications/Pain Control:   Scheduled Medications:  acetaminophen, 975 mg, Oral, Q6H  aspirin, 81 mg, Oral, Daily  atorvastatin, 40 mg, Oral, Daily With Dinner  clopidogrel, 75 mg, Oral, Daily  heparin (porcine), 5,000 Units, Subcutaneous, Q8H ALMAS      Continuous IV Infusions:  lactated ringers, 50 mL/hr, Intravenous, Continuous  niCARdipine, 1-15 mg/hr, Intravenous, Continuous PRN  phenylephine,  mcg/min, Intravenous, Continuous PRN      PRN Meds:  HYDROmorphone, 0.2 mg, Intravenous, Q3H PRN  labetalol, 5 mg, Intravenous, Q15 Min PRN   And  niCARdipine, 1-15 mg/hr, Intravenous, Continuous PRN  ondansetron, 4 mg, Intravenous, Q4H PRN  oxyCODONE, 2.5 mg, Oral, Q4H PRN  oxyCODONE, 5 mg, Oral, Q4H PRN  sodium chloride, 500 mL, Intravenous, Once PRN   Followed by  phenylephine,  mcg/min, Intravenous, Continuous PRN        Network Utilization Review Department  ATTENTION: Please call with any questions or concerns to [phone] and carefully listen to the prompts so that you are directed to the right person. All voicemails are confidential.   For Discharge needs, contact Care Management DC Support Team at [phone] opt. [phone]  Send all requests  for admission clinical reviews, approved or denied determinations and any other requests to dedicated fax number below belonging to the campus where the patient is receiving treatment. List of dedicated fax numbers for the Facilities:  FACILITY NAME UR FAX NUMBER   ADMISSION DENIALS (Administrative/Medical Necessity) [phone]   DISCHARGE SUPPORT TEAM (NETWORK) [phone]   PARENT CHILD HEALTH (Maternity/NICU/Pediatrics) [phone]   [facility] [phone]   [facility] [phone]   [facility] [phone]   [facility] [phone]   [facility] [phone]   [facility] [phone]   [facility] [phone]   [facility] [phone]   [facility] [phone]   [facility] [phone]   [facility] [phone]   [facility] [phone]

## 2024-04-17 NOTE — PROGRESS NOTES
Progress Note - Vascular Surgery   Shanda Quintanilla 79 y.o. female [id number]  Unit/Bed#:[facility] 527-01 Encounter: [id number]      Assessment:    79 y.o. with PMH HLD, Polio, Left hemisphere CVA presenting with symptomatic left carotid stenosis for an elective CEA.     4/16: Left CEA    Plan:  - Doing well post-op  - Required Fred for BP goals overnight  - BP Goals 100-160mmHg  - Cont [place]  - Diet: As tolerated  - Abx: SCIPs  - VTEppx: SQH  - Cont Aspirin, Plavix, Statin  - Will require further inpatient care due to Fred requirement   - OOB as tolerated     Subjective:    Pt s/e. No acute events overnight. Pt awake, alert.    Pt complains of mild fingertip numbness on left hand while BP cuff inflating. Complains of pain at incision site. Tolerating diet. Not OOB. Voiding.      No new complaints. Denies n/v, sob, chest pain, f/c. No sensory change, numbness, or weakness of lower extremities    I/Os:  I/O last 3 completed shifts:  In: 1936.5 [P.O.:960; I.V.:976.5]  Out: 1493 [Urine:1418; Blood:75]  I/O this shift:  In: -   Out: 300 [Urine:300]    Review of Systems   All other systems reviewed and are negative.      Vitals:    04/17/24 0703   BP: (!) 103/49   Pulse:    Resp: 18   Temp: 97.8 °F (36.6 °C)   SpO2:         Physical Exam  Vitals and nursing note reviewed.   Constitutional:       General: She is not in acute distress.     Appearance: She is well-developed. She is not ill-appearing, toxic-appearing or diaphoretic.   HENT:      Head: Normocephalic and atraumatic.   Eyes:      Conjunctiva/sclera: Conjunctivae normal.   Neck:      Comments: Left neck incision c/d/I, mild ecchymosis, soft, no hematoma   Cardiovascular:      Rate and Rhythm: Normal rate and regular rhythm.      Heart sounds: No murmur heard.  Pulmonary:      Effort: Pulmonary effort is normal. No respiratory distress.      Breath sounds: Normal breath sounds.   Abdominal:      Palpations: Abdomen is soft.      Tenderness: There is no abdominal  "tenderness. There is no guarding.   Musculoskeletal:         General: No swelling.      Cervical back: Neck supple.      Right lower leg: No edema.      Left lower leg: No edema.   Skin:     General: Skin is warm and dry.      Capillary Refill: Capillary refill takes less than 2 seconds.   Neurological:      General: No focal deficit present.      Mental Status: She is alert and oriented to person, place, and time. Mental status is at baseline.      Comments: B/L UE and LE motor and sensory intact    Psychiatric:         Mood and Affect: Mood normal.         Imaging:I have personally reviewed pertinent reports.      Lab Results and Cultures:   Lab Results   Component Value Date    WBC 12.82 (H) 04/17/2024    HGB 11.4 (L) 04/17/2024    HCT 35.0 04/17/2024    MCV 93 04/17/2024     04/17/2024     Lab Results   Component Value Date    CALCIUM 8.7 04/17/2024    K 4.1 04/17/2024    CO2 20 (L) 04/17/2024     (H) 04/17/2024    BUN 13 04/17/2024    CREATININE 0.61 04/17/2024     Lab Results   Component Value Date    INR 1.06 04/17/2024    INR 0.88 04/06/2024    PROTIME 13.7 04/17/2024    PROTIME 12.5 04/06/2024        Blood Culture: No results found for: \"BLOODCX\",   Urinalysis:   Lab Results   Component Value Date    COLORU Yellow 03/31/2021    CLARITYU Cloudy 03/31/2021    SPECGRAV 1.016 03/31/2021    PHUR 6.5 03/31/2021    LEUKOCYTESUR Small (A) 03/31/2021    NITRITE Negative 03/31/2021    GLUCOSEU Negative 03/31/2021    KETONESU Negative 03/31/2021    BILIRUBINUR Negative 03/31/2021    BLOODU Negative 03/31/2021   ,   Urine Culture:   Lab Results   Component Value Date    URINECX No Growth <1000 cfu/mL 03/31/2021   ,   Wound Culure: No results found for: \"WOUNDCULT\"    Medications:  Current Facility-Administered Medications   Medication Dose Route Frequency    acetaminophen (TYLENOL) tablet 975 mg  975 mg Oral Q6H    aspirin chewable tablet 81 mg  81 mg Oral Daily    atorvastatin (LIPITOR) tablet 40 mg  40 " mg Oral Daily With Dinner    clopidogrel (PLAVIX) tablet 75 mg  75 mg Oral Daily    heparin (porcine) subcutaneous injection 5,000 Units  5,000 Units Subcutaneous Q8H ALMAS    HYDROmorphone (DILAUDID) injection 0.2 mg  0.2 mg Intravenous Q3H PRN    labetalol (NORMODYNE) injection 5 mg  5 mg Intravenous Q15 Min PRN    And    niCARdipine (CARDENE) 25 mg (STANDARD CONCENTRATION) in sodium chloride 0.9% 250 mL  1-15 mg/hr Intravenous Continuous PRN    lactated ringers infusion  50 mL/hr Intravenous Continuous    ondansetron (ZOFRAN) injection 4 mg  4 mg Intravenous Q4H PRN    oxyCODONE (ROXICODONE) IR tablet 2.5 mg  2.5 mg Oral Q4H PRN    oxyCODONE (ROXICODONE) IR tablet 5 mg  5 mg Oral Q4H PRN    sodium chloride 0.9 % bolus 500 mL  500 mL Intravenous Once PRN    Followed by    phenylephrine (OXANA-SYNEPHRINE) 50 mg (STANDARD CONCENTRATION) in sodium chloride 0.9% 250 mL   mcg/min Intravenous Continuous PRN       Patient Active Problem List   Diagnosis    Mixed hyperlipidemia    Osteoporosis    Seborrheic keratosis    Elevated C-reactive protein (CRP)    Episode of apnea    Impaired fasting glucose    Swelling of left lower extremity    Venous insufficiency of both lower extremities    Small subcortical Left MCA CVA,    Intracranial carotid stenosis, left    Carotid stenosis, symptomatic, with infarction (HCC)       Past Surgical History:   Procedure Laterality Date    ANAL FISSURECTOMY      APPENDECTOMY      CATARACT EXTRACTION      COLONOSCOPY      HYSTERECTOMY      HYSTERECTOMY W/ SALPINGO-OOPHERECTOMY         Family History   Problem Relation Age of Onset    Heart attack Mother     Cancer Father     Heart attack Sister        Social History     Socioeconomic History    Marital status: /Civil Union     Spouse name: Not on file    Number of children: Not on file    Years of education: Not on file    Highest education level: Not on file   Occupational History    Not on file   Tobacco Use    Smoking status:  Former     Current packs/day: 2.00     Average packs/day: 2.0 packs/day for 20.0 years (40.0 ttl pk-yrs)     Types: Cigarettes    Smokeless tobacco: Never   Vaping Use    Vaping status: Never Used   Substance and Sexual Activity    Alcohol use: Yes     Comment: OCCASIONAL- WINE     Drug use: Not Currently    Sexual activity: Not on file   Other Topics Concern    Not on file   Social History Narrative    Per [employer]:     Self-employed          Social Determinants of Health     Financial Resource Strain: Low Risk  (8/14/2023)    Overall Financial Resource Strain (CARDIA)     Difficulty of Paying Living Expenses: Not very hard   Food Insecurity: No Food Insecurity (4/7/2024)    Hunger Vital Sign     Worried About Running Out of Food in the Last Year: Never true     Ran Out of Food in the Last Year: Never true   Transportation Needs: No Transportation Needs (4/7/2024)    PRAPARE - Transportation     Lack of Transportation (Medical): No     Lack of Transportation (Non-Medical): No   Physical Activity: Not on file   Stress: Not on file   Social Connections: Not on file   Intimate Partner Violence: Not on file   Housing Stability: Low Risk  (4/7/2024)    Housing Stability Vital Sign     Unable to Pay for Housing in the Last Year: No     Number of Places Lived in the Last Year: 1     Unstable Housing in the Last Year: No       Allergies   Allergen Reactions    Sulfa Antibiotics Other (See Comments)

## 2024-04-17 NOTE — PROGRESS NOTES
Vascular Nurse Navigator Post Op Education    Met with patient at bedside to introduce myself as Vascular Nurse Navigator and explained my role.  Patient is appropriate and accepting to education. Patient was educated with Review of written materials provided, Teachback, Explanation, Demonstration, and Question & Answer on expectations of post op care and recovery on Left CEA. Patient is a former smoker, quit 45 years ago, as such Smoking effects on the lungs, tobacco triggers, and Smoking cessation was reviewed. Education provided to patient on infection prevention, activity limitations, when to call the office, importance of follow up, and incisional care.  Discharge instruction handout provided to patient to review.

## 2024-04-17 NOTE — QUICK NOTE
"Post Op Check Note - Vascular Surgery APC  Shanda Quintanilla 79 y.o. female MRN: [id number]  Unit/Bed#: [facility] 527-01 Encounter: [id number]    ASSESSMENT:  Shanda Quintanilla is a 79 y.o. female who is status post L CEA.  Continue close BP monitoring and diane as needed for support.    Subjective: Patient reports she feels well.  Has needed diane for BP support.    Physical Exam:  GEN: NAD  CV: RRR  Lung: Normal effort  Ab: Soft, NT/ND  Neuro: A+Ox3  Incisions: +ecchymosis L neck    Blood pressure 122/59, pulse 58, temperature 98.1 °F (36.7 °C), resp. rate 20, height 5' 3\" (1.6 m), weight 63.5 kg (139 lb 15.9 oz), SpO2 98%.,Body mass index is 24.8 kg/m².      Intake/Output Summary (Last 24 hours) at 4/17/2024 0430  Last data filed at 4/17/2024 0030  Gross per 24 hour   Intake 1456.45 ml   Output 1093 ml   Net 363.45 ml       Invasive Devices       Peripheral Intravenous Line  Duration             Peripheral IV 04/16/24 Right Antecubital <1 day              Arterial Line  Duration             Arterial Line 04/16/24 Right Radial <1 day                    VTE Pharmacologic Prophylaxis: Heparin            "

## 2024-04-18 LAB
ANION GAP SERPL CALCULATED.3IONS-SCNC: 6 MMOL/L (ref 4–13)
BUN SERPL-MCNC: 12 MG/DL (ref 5–25)
CALCIUM SERPL-MCNC: 8.5 MG/DL (ref 8.4–10.2)
CHLORIDE SERPL-SCNC: 110 MMOL/L (ref 96–108)
CO2 SERPL-SCNC: 23 MMOL/L (ref 21–32)
CREAT SERPL-MCNC: 0.61 MG/DL (ref 0.6–1.3)
ERYTHROCYTE [DISTWIDTH] IN BLOOD BY AUTOMATED COUNT: 14.3 % (ref 11.6–15.1)
GFR SERPL CREATININE-BSD FRML MDRD: 86 ML/MIN/1.73SQ M
GLUCOSE SERPL-MCNC: 108 MG/DL (ref 65–140)
HCT VFR BLD AUTO: 33.3 % (ref 34.8–46.1)
HGB BLD-MCNC: 10.6 G/DL (ref 11.5–15.4)
MCH RBC QN AUTO: 30.2 PG (ref 26.8–34.3)
MCHC RBC AUTO-ENTMCNC: 31.8 G/DL (ref 31.4–37.4)
MCV RBC AUTO: 95 FL (ref 82–98)
PLATELET # BLD AUTO: 283 THOUSANDS/UL (ref 149–390)
PMV BLD AUTO: 9.8 FL (ref 8.9–12.7)
POTASSIUM SERPL-SCNC: 4 MMOL/L (ref 3.5–5.3)
RBC # BLD AUTO: 3.51 MILLION/UL (ref 3.81–5.12)
SODIUM SERPL-SCNC: 139 MMOL/L (ref 135–147)
WBC # BLD AUTO: 7.29 THOUSAND/UL (ref 4.31–10.16)

## 2024-04-18 PROCEDURE — 80048 BASIC METABOLIC PNL TOTAL CA: CPT | Performed by: SURGERY

## 2024-04-18 PROCEDURE — 99024 POSTOP FOLLOW-UP VISIT: CPT | Performed by: SURGERY

## 2024-04-18 PROCEDURE — 85027 COMPLETE CBC AUTOMATED: CPT | Performed by: SURGERY

## 2024-04-18 RX ORDER — BISACODYL 10 MG
10 SUPPOSITORY, RECTAL RECTAL DAILY PRN
Status: DISCONTINUED | OUTPATIENT
Start: 2024-04-18 | End: 2024-04-19 | Stop reason: HOSPADM

## 2024-04-18 RX ORDER — POLYETHYLENE GLYCOL 3350 17 G/17G
17 POWDER, FOR SOLUTION ORAL DAILY PRN
Status: DISCONTINUED | OUTPATIENT
Start: 2024-04-18 | End: 2024-04-19 | Stop reason: HOSPADM

## 2024-04-18 RX ORDER — POLYETHYLENE GLYCOL 3350 17 G/17G
17 POWDER, FOR SOLUTION ORAL ONCE
Status: DISCONTINUED | OUTPATIENT
Start: 2024-04-18 | End: 2024-04-19 | Stop reason: HOSPADM

## 2024-04-18 RX ORDER — DOCUSATE SODIUM 100 MG/1
100 CAPSULE, LIQUID FILLED ORAL 2 TIMES DAILY
Status: DISCONTINUED | OUTPATIENT
Start: 2024-04-18 | End: 2024-04-19 | Stop reason: HOSPADM

## 2024-04-18 RX ADMIN — DOCUSATE SODIUM 100 MG: 100 CAPSULE, LIQUID FILLED ORAL at 14:12

## 2024-04-18 RX ADMIN — ACETAMINOPHEN 975 MG: 325 TABLET, FILM COATED ORAL at 22:10

## 2024-04-18 RX ADMIN — CLOPIDOGREL BISULFATE 75 MG: 75 TABLET ORAL at 08:50

## 2024-04-18 RX ADMIN — HEPARIN SODIUM 5000 UNITS: 5000 INJECTION INTRAVENOUS; SUBCUTANEOUS at 22:10

## 2024-04-18 RX ADMIN — ATORVASTATIN CALCIUM 40 MG: 40 TABLET, FILM COATED ORAL at 17:47

## 2024-04-18 RX ADMIN — ACETAMINOPHEN 975 MG: 325 TABLET, FILM COATED ORAL at 17:47

## 2024-04-18 RX ADMIN — ASPIRIN 81 MG CHEWABLE TABLET 81 MG: 81 TABLET CHEWABLE at 08:50

## 2024-04-18 RX ADMIN — HEPARIN SODIUM 5000 UNITS: 5000 INJECTION INTRAVENOUS; SUBCUTANEOUS at 06:31

## 2024-04-18 RX ADMIN — ACETAMINOPHEN 975 MG: 325 TABLET, FILM COATED ORAL at 04:22

## 2024-04-18 RX ADMIN — ACETAMINOPHEN 975 MG: 325 TABLET, FILM COATED ORAL at 11:51

## 2024-04-18 RX ADMIN — DOCUSATE SODIUM 100 MG: 100 CAPSULE, LIQUID FILLED ORAL at 17:47

## 2024-04-18 RX ADMIN — HEPARIN SODIUM 5000 UNITS: 5000 INJECTION INTRAVENOUS; SUBCUTANEOUS at 14:12

## 2024-04-18 NOTE — PROGRESS NOTES
"[facility]  Progress Note  Name: Shanda Quintanilla I  MRN: [id number]  Unit/Bed#: [facility] 527-01 I Date of Admission: 4/16/2024   Date of Service: 4/18/2024 I Hospital Day: 2    Assessment/Plan   * Carotid stenosis, symptomatic, with infarction (HCC)  Assessment & Plan  79 y.o. with PMH HLD, Polio, Left hemisphere CVA presenting with symptomatic left carotid stenosis for an elective CEA.      4/16: Left CEA     Plan:  - Doing well post-op  - Requiring Fred for BP goals; BP Goals 100-160mmHg; wean as tolerated  - Cont [place]  - Diet: As tolerated  - VTEppx: SQH  - Cont Aspirin, Plavix, Statin  - Will require further inpatient care due to Fred requirement   - OOB as tolerated              Subjective:  Pt doing well, no events overnight    Vitals:  BP 97/50   Pulse 63   Temp 98.1 °F (36.7 °C) (Oral)   Resp 16   Ht 5' 3\" (1.6 m)   Wt 63.5 kg (139 lb 15.9 oz)   SpO2 97%   BMI 24.80 kg/m²     I/Os:  I/O last 3 completed shifts:  In: 2334 [P.O.:2100; I.V.:234]  Out: 1150 [Urine:1150]  I/O this shift:  In: 26.7 [I.V.:26.7]  Out: -     Lab Results and Cultures:   Lab Results   Component Value Date    WBC 7.29 04/18/2024    HGB 10.6 (L) 04/18/2024    HCT 33.3 (L) 04/18/2024    MCV 95 04/18/2024     04/18/2024     Lab Results   Component Value Date    CALCIUM 8.5 04/18/2024    K 4.0 04/18/2024    CO2 23 04/18/2024     (H) 04/18/2024    BUN 12 04/18/2024    CREATININE 0.61 04/18/2024     Lab Results   Component Value Date    INR 1.06 04/17/2024    INR 0.88 04/06/2024    PROTIME 13.7 04/17/2024    PROTIME 12.5 04/06/2024        Blood Culture: No results found for: \"BLOODCX\",   Urinalysis:   Lab Results   Component Value Date    COLORU Yellow 03/31/2021    CLARITYU Cloudy 03/31/2021    SPECGRAV 1.016 03/31/2021    PHUR 6.5 03/31/2021    LEUKOCYTESUR Small (A) 03/31/2021    NITRITE Negative 03/31/2021    GLUCOSEU Negative 03/31/2021    KETONESU Negative 03/31/2021    BILIRUBINUR " "Negative 03/31/2021    BLOODU Negative 03/31/2021   ,   Urine Culture:   Lab Results   Component Value Date    URINECX No Growth <1000 cfu/mL 03/31/2021   ,   Wound Culure: No results found for: \"WOUNDCULT\"    Medications:  Current Facility-Administered Medications   Medication Dose Route Frequency    acetaminophen (TYLENOL) tablet 975 mg  975 mg Oral Q6H    aspirin chewable tablet 81 mg  81 mg Oral Daily    atorvastatin (LIPITOR) tablet 40 mg  40 mg Oral Daily With Dinner    clopidogrel (PLAVIX) tablet 75 mg  75 mg Oral Daily    heparin (porcine) subcutaneous injection 5,000 Units  5,000 Units Subcutaneous Q8H ALMAS    HYDROmorphone (DILAUDID) injection 0.2 mg  0.2 mg Intravenous Q3H PRN    lactated ringers infusion  50 mL/hr Intravenous Continuous    ondansetron (ZOFRAN) injection 4 mg  4 mg Intravenous Q4H PRN    oxyCODONE (ROXICODONE) IR tablet 2.5 mg  2.5 mg Oral Q4H PRN    oxyCODONE (ROXICODONE) IR tablet 5 mg  5 mg Oral Q4H PRN    phenylephrine (OXANA-SYNEPHRINE) 50 mg (STANDARD CONCENTRATION) in sodium chloride 0.9% 250 mL   mcg/min Intravenous Continuous PRN       Physical Exam:    General appearance: alert and oriented, in no acute distress  Neurologic: Grossly normal  Lungs: clear to auscultation bilaterally  Heart: S1, S2 normal  Abdomen: soft, non-tender; bowel sounds normal; no masses,  no organomegaly  Extremities: extremities normal, warm and well-perfused; no cyanosis, clubbing, or edema    Wound/Incision:  Left neck w/ ecchymosis, CDI, no hematoma         Peyton Sanchez PA-C  4/18/2024      "

## 2024-04-18 NOTE — ASSESSMENT & PLAN NOTE
79 y.o. with PMH HLD, Polio, Left hemisphere CVA presenting with symptomatic left carotid stenosis for an elective CEA.      4/16: Left CEA     Plan:  - Doing well post-op  - Requiring Fred for BP goals; BP Goals 100-160mmHg; wean as tolerated  - Cont Carmen  - Diet: As tolerated  - VTEppx: SQH  - Cont Aspirin, Plavix, Statin  - Will require further inpatient care due to Fred requirement   - OOB as tolerated

## 2024-04-19 VITALS
HEIGHT: 63 IN | HEART RATE: 67 BPM | WEIGHT: 139.99 LBS | TEMPERATURE: 98 F | DIASTOLIC BLOOD PRESSURE: 72 MMHG | OXYGEN SATURATION: 99 % | SYSTOLIC BLOOD PRESSURE: 136 MMHG | RESPIRATION RATE: 17 BRPM | BODY MASS INDEX: 24.8 KG/M2

## 2024-04-19 LAB
ANION GAP SERPL CALCULATED.3IONS-SCNC: 10 MMOL/L (ref 4–13)
BUN SERPL-MCNC: 13 MG/DL (ref 5–25)
CALCIUM SERPL-MCNC: 8.9 MG/DL (ref 8.4–10.2)
CHLORIDE SERPL-SCNC: 107 MMOL/L (ref 96–108)
CO2 SERPL-SCNC: 21 MMOL/L (ref 21–32)
CREAT SERPL-MCNC: 0.68 MG/DL (ref 0.6–1.3)
ERYTHROCYTE [DISTWIDTH] IN BLOOD BY AUTOMATED COUNT: 14 % (ref 11.6–15.1)
GFR SERPL CREATININE-BSD FRML MDRD: 83 ML/MIN/1.73SQ M
GLUCOSE SERPL-MCNC: 100 MG/DL (ref 65–140)
HCT VFR BLD AUTO: 37.6 % (ref 34.8–46.1)
HGB BLD-MCNC: 11.7 G/DL (ref 11.5–15.4)
MCH RBC QN AUTO: 29.7 PG (ref 26.8–34.3)
MCHC RBC AUTO-ENTMCNC: 31.1 G/DL (ref 31.4–37.4)
MCV RBC AUTO: 95 FL (ref 82–98)
PLATELET # BLD AUTO: 280 THOUSANDS/UL (ref 149–390)
PMV BLD AUTO: 9.1 FL (ref 8.9–12.7)
POTASSIUM SERPL-SCNC: 3.9 MMOL/L (ref 3.5–5.3)
RBC # BLD AUTO: 3.94 MILLION/UL (ref 3.81–5.12)
SODIUM SERPL-SCNC: 138 MMOL/L (ref 135–147)
WBC # BLD AUTO: 6.73 THOUSAND/UL (ref 4.31–10.16)

## 2024-04-19 PROCEDURE — 85027 COMPLETE CBC AUTOMATED: CPT | Performed by: SURGERY

## 2024-04-19 PROCEDURE — NC001 PR NO CHARGE: Performed by: SURGERY

## 2024-04-19 PROCEDURE — 80048 BASIC METABOLIC PNL TOTAL CA: CPT | Performed by: SURGERY

## 2024-04-19 PROCEDURE — 99024 POSTOP FOLLOW-UP VISIT: CPT | Performed by: SURGERY

## 2024-04-19 RX ORDER — CLOPIDOGREL BISULFATE 75 MG/1
75 TABLET ORAL DAILY
Qty: 30 TABLET | Refills: 3 | Status: SHIPPED | OUTPATIENT
Start: 2024-04-19 | End: 2024-05-02 | Stop reason: SDUPTHER

## 2024-04-19 RX ORDER — ATORVASTATIN CALCIUM 40 MG/1
40 TABLET, FILM COATED ORAL
Qty: 30 TABLET | Refills: 3 | Status: SHIPPED | OUTPATIENT
Start: 2024-04-19 | End: 2024-05-02

## 2024-04-19 RX ORDER — ACETAMINOPHEN 325 MG/1
650 TABLET ORAL EVERY 6 HOURS PRN
COMMUNITY
Start: 2024-04-19

## 2024-04-19 RX ADMIN — ACETAMINOPHEN 975 MG: 325 TABLET, FILM COATED ORAL at 04:37

## 2024-04-19 RX ADMIN — CLOPIDOGREL BISULFATE 75 MG: 75 TABLET ORAL at 08:24

## 2024-04-19 RX ADMIN — ACETAMINOPHEN 975 MG: 325 TABLET, FILM COATED ORAL at 12:39

## 2024-04-19 RX ADMIN — ASPIRIN 81 MG CHEWABLE TABLET 81 MG: 81 TABLET CHEWABLE at 08:24

## 2024-04-19 RX ADMIN — DOCUSATE SODIUM 100 MG: 100 CAPSULE, LIQUID FILLED ORAL at 08:24

## 2024-04-19 RX ADMIN — HEPARIN SODIUM 5000 UNITS: 5000 INJECTION INTRAVENOUS; SUBCUTANEOUS at 04:37

## 2024-04-19 NOTE — DISCHARGE SUMMARY
[facility]  Discharge- Shanda Quintanilla 1945, 79 y.o. female MRN: [id number]  Unit/Bed#: [facility] 527-01 Encounter: [id number]  Primary Care Provider: Talat Ventura MD   Date and time admitted to hospital: 4/16/2024 10:13 AM    * Carotid stenosis, symptomatic, with infarction (HCC)  Assessment & Plan  79 y.o. with PMH HLD, Polio, Left hemisphere CVA presenting with symptomatic left carotid stenosis for an elective CEA.      4/16: Left CEA     Plan:  - Cont Aspirin, Plavix, Statin  - Outpatient follow-up in the Vascular center          Secondary Diagnosis:  Post-op Hypotension requiring Fred gtt- resolved    Past Medical History:   Diagnosis Date    Cervical strain     Changing skin lesion     Hyperlipidemia     MCI (mild cognitive impairment)     Mixed hyperlipidemia     Nonmelanoma skin cancer     BCC    Osteoporosis     Polio     Poor concentration     Stroke (HCC)     Swelling of left thumb      Past Surgical History:   Procedure Laterality Date    ANAL FISSURECTOMY      APPENDECTOMY      CATARACT EXTRACTION      COLONOSCOPY      HYSTERECTOMY      HYSTERECTOMY W/ SALPINGO-OOPHERECTOMY      CO TEAEC W/PATCH GRF CAROTID VERTB SUBCLAV NECK INC Left 4/16/2024    Procedure: ENDARTERECTOMY ARTERY CAROTID;  Surgeon: Nikolai Daniel MD;  Location: [facility];  Service: Vascular        Admitting Diagnosis: Left carotid artery stenosis [I65.22]    Attending: Dr. Daniel    Procedures Performed: Left CEA 4/16    Discharge Medications:  See after visit summary for reconciled discharge medications provided to patient and family.      Hospital Course: 79-year-old female admitted electively to [facility] on 4/16/2024 for treatment of symptomatic left carotid stenosis.  She underwent left carotid endarterectomy on the day of admission as scheduled.  She tolerated surgery well.  Following surgery she was transferred the postanesthesia care unit where she  recovered briefly and subsequently to stepdown unit for monitoring overnight.  Her postoperative course was notable for postop hypotension requiring neodrip for support.  This was weaned off on 4/18/2024.  Her blood pressures then remained stable.  On 4/19/2024 she was hemodynamically stable and neurovascular exam was intact she was able to tolerate a diet, ambulate and void without difficulty.  She was deemed appropriate for discharge home in the care of her family.  Outpatient follow-up in the vascular center was arranged prior to her discharge.  She will continue aspirin, Plavix and statin therapy.    Condition at Discharge: stable     Provisions for Follow-Up Care:  See after visit summary for information related to follow-up care and any pertinent home health orders.      Disposition: Home    Discharge instructions/Information to patient and family:   See after visit summary for information provided to patient and family.    Planned Readmission: No    Discharge Statement   I spent 30 minutes discharging the patient. This time was spent on the day of discharge. I had direct contact with the patient on the day of discharge. Additional documentation is required if more than 30 minutes were spent on discharge.

## 2024-04-19 NOTE — ASSESSMENT & PLAN NOTE
79 y.o. with PMH HLD, Polio, Left hemisphere CVA presenting with symptomatic left carotid stenosis for an elective CEA.      4/16: Left CEA     Plan:  - Doing well post-op  - Post-op hypotension resolved; off Fred  - Diet: As tolerated  - VTEppx: SQH  - Cont Aspirin, Plavix, Statin  - Discharge home today

## 2024-04-19 NOTE — ASSESSMENT & PLAN NOTE
79 y.o. with PMH HLD, Polio, Left hemisphere CVA presenting with symptomatic left carotid stenosis for an elective CEA.      4/16: Left CEA     Plan:  - Cont Aspirin, Plavix, Statin  - Outpatient follow-up in the Vascular center

## 2024-04-19 NOTE — PROGRESS NOTES
"[facility]  Progress Note  Name: Shanda Quintanilla I  MRN: [id number]  Unit/Bed#: PPHP 527-01 I Date of Admission: 4/16/2024   Date of Service: 4/19/2024 I Hospital Day: 3    Assessment/Plan   * Carotid stenosis, symptomatic, with infarction (HCC)  Assessment & Plan  79 y.o. with PMH HLD, Polio, Left hemisphere CVA presenting with symptomatic left carotid stenosis for an elective CEA.      4/16: Left CEA     Plan:  - Doing well post-op  - Post-op hypotension resolved; off Fred  - Diet: As tolerated  - VTEppx: SQH  - Cont Aspirin, Plavix, Statin  - Discharge home today             Subjective:  Pt doing well, no events overnight     Vitals:  /56   Pulse 65   Temp (!) 97.3 °F (36.3 °C) (Oral)   Resp 16   Ht 5' 3\" (1.6 m)   Wt 63.5 kg (139 lb 15.9 oz)   SpO2 97%   BMI 24.80 kg/m²     I/Os:  I/O last 3 completed shifts:  In: 1965.7 [P.O.:1740; I.V.:225.7]  Out: 300 [Urine:300]  I/O this shift:  In: 1080 [P.O.:1080]  Out: -     Lab Results and Cultures:   Lab Results   Component Value Date    WBC 6.73 04/19/2024    HGB 11.7 04/19/2024    HCT 37.6 04/19/2024    MCV 95 04/19/2024     04/19/2024     Lab Results   Component Value Date    CALCIUM 8.9 04/19/2024    K 3.9 04/19/2024    CO2 21 04/19/2024     04/19/2024    BUN 13 04/19/2024    CREATININE 0.68 04/19/2024     Lab Results   Component Value Date    INR 1.06 04/17/2024    INR 0.88 04/06/2024    PROTIME 13.7 04/17/2024    PROTIME 12.5 04/06/2024        Blood Culture: No results found for: \"BLOODCX\",   Urinalysis:   Lab Results   Component Value Date    COLORU Yellow 03/31/2021    CLARITYU Cloudy 03/31/2021    SPECGRAV 1.016 03/31/2021    PHUR 6.5 03/31/2021    LEUKOCYTESUR Small (A) 03/31/2021    NITRITE Negative 03/31/2021    GLUCOSEU Negative 03/31/2021    KETONESU Negative 03/31/2021    BILIRUBINUR Negative 03/31/2021    BLOODU Negative 03/31/2021   ,   Urine Culture:   Lab Results   Component Value Date    " "URINECX No Growth <1000 cfu/mL 03/31/2021   ,   Wound Culure: No results found for: \"WOUNDCULT\"    Medications:  Current Facility-Administered Medications   Medication Dose Route Frequency    acetaminophen (TYLENOL) tablet 975 mg  975 mg Oral Q6H    aspirin chewable tablet 81 mg  81 mg Oral Daily    atorvastatin (LIPITOR) tablet 40 mg  40 mg Oral Daily With Dinner    bisacodyl (DULCOLAX) rectal suppository 10 mg  10 mg Rectal Daily PRN    clopidogrel (PLAVIX) tablet 75 mg  75 mg Oral Daily    docusate sodium (COLACE) capsule 100 mg  100 mg Oral BID    heparin (porcine) 2,000 Units, papaverine 60 mg in multi-electrolyte (PLASMALYTE-A/ISOLYTE-S PH 7.4) 500 mL irrigation   Irrigation Once    heparin (porcine) subcutaneous injection 5,000 Units  5,000 Units Subcutaneous Q8H ALMAS    HYDROmorphone (DILAUDID) injection 0.2 mg  0.2 mg Intravenous Q3H PRN    ondansetron (ZOFRAN) injection 4 mg  4 mg Intravenous Q4H PRN    oxyCODONE (ROXICODONE) IR tablet 2.5 mg  2.5 mg Oral Q4H PRN    oxyCODONE (ROXICODONE) IR tablet 5 mg  5 mg Oral Q4H PRN    phenylephrine (OXANA-SYNEPHRINE) 50 mg (STANDARD CONCENTRATION) in sodium chloride 0.9% 250 mL   mcg/min Intravenous Continuous PRN    polyethylene glycol (MIRALAX) packet 17 g  17 g Oral Once    polyethylene glycol (MIRALAX) packet 17 g  17 g Oral Daily PRN     Physical Exam:    General appearance: alert and oriented, in no acute distress  Neurologic: Mental status: Alert, oriented, thought content appropriate  Cranial nerves: normal  Sensory: normal  Motor: grossly normal  Lungs: clear to auscultation bilaterally  Heart: regular rate and rhythm, S1, S2 normal, no murmur, click, rub or gallop  Abdomen: soft, non-tender; bowel sounds normal; no masses,  no organomegaly  Extremities: extremities normal, warm and well-perfused; no cyanosis, clubbing, or edema    Wound/Incision:    Left neck incision clean, dry, and intact, +ecchymosis      Peyton Sanchez PA-C  4/19/2024      "

## 2024-04-22 ENCOUNTER — TRANSITIONAL CARE MANAGEMENT (OUTPATIENT)
Dept: FAMILY MEDICINE CLINIC | Facility: CLINIC | Age: 79
End: 2024-04-22

## 2024-04-22 ENCOUNTER — TELEPHONE (OUTPATIENT)
Age: 79
End: 2024-04-22

## 2024-04-22 NOTE — TELEPHONE ENCOUNTER
Chart reviewed.   I don't see issue with her continuing all supplements including Acetyl L Carnintine.   Can further discuss at post opt appt next week.    I reviewed sent pictures. Neck incision looks as expected. Well approximated, no dehiscence or drainage, mild edema resolving ecchymosis.  Feeling a lump under the incision can be expected as long as not enlarging or becoming painful.  Continue to monitor.

## 2024-04-22 NOTE — UTILIZATION REVIEW
NOTIFICATION OF ADMISSION DISCHARGE   This is a Notification of Discharge from [facility]. Please be advised that this patient has been discharge from our facility. Below you will find the admission and discharge date and time including the patient’s disposition.   UTILIZATION REVIEW CONTACT:  Latisha Dang  Utilization   Network Utilization Review Department  Phone: [phone] x carefully listen to the prompts. All voicemails are confidential.  Email: [email or web address]     ADMISSION INFORMATION  PRESENTATION DATE: 4/16/2024 10:13 AM  OBERVATION ADMISSION DATE:   INPATIENT ADMISSION DATE: 4/16/24  3:19 PM   DISCHARGE DATE: 4/19/2024  4:24 PM   DISPOSITION:Home/Self Care    Network Utilization Review Department  ATTENTION: Please call with any questions or concerns to [phone] and carefully listen to the prompts so that you are directed to the right person. All voicemails are confidential.   For Discharge needs, contact Care Management DC Support Team at [phone] opt. [phone]  Send all requests for admission clinical reviews, approved or denied determinations and any other requests to dedicated fax number below belonging to the campus where the patient is receiving treatment. List of dedicated fax numbers for the Facilities:  FACILITY NAME UR FAX NUMBER   ADMISSION DENIALS (Administrative/Medical Necessity) [phone]   DISCHARGE SUPPORT TEAM ([facility]) [phone]   PARENT CHILD HEALTH (Maternity/NICU/Pediatrics) [phone]   [facility] [phone]   [facility] [phone]   [facility] [phone]   [facility] [phone]   [facility] [phone]   [facility] [phone]   [facility] [phone]   [facility] [phone]    [facility] [phone]   [facility] [phone]   [facility] [phone]   [facility], [place]) [phone]

## 2024-04-22 NOTE — TELEPHONE ENCOUNTER
Patient calling with questions regarding her medications and supplements s/p L CEA with Dr. Daniel 4/16/2024. She noticed her AVS instructs her to stop taking Acetyl L-Carnitine. She wants to know why she has to stop taking it, and if she is still able to take her other supplements as usual. She also wants to know if she can take colace to help with BM.     Patient also states she has some swelling in L neck surrounding incision, but states she is not too concerned about the swelling, just wanted to make us aware.     Patient states if she is unable to be reached on her home phone, okay to speak with significant other Vincarti on mobile phone.

## 2024-04-22 NOTE — TELEPHONE ENCOUNTER
Called pt to gather more info. Pt stated she has been taking Acetyl L-Carnintine  for 14 years now to help brain function since her parents had alzheimer's and is not sure why her AVS stated she needs to stop taking it. Please review and advise if pt can / cannot take this supplement.    Pt stated she is having some swelling on her left neck incision site. Denies any redness, drainage, fever, chills, trouble swallowing, or visual changes. She does state she feels the area has a hard lump under the skin. States she is only having mild pain, rating it 2/10. Advised pt to send a photo through [employer] which she agreed. Will send to triage and the office to review and advise.

## 2024-04-23 ENCOUNTER — APPOINTMENT (EMERGENCY)
Dept: RADIOLOGY | Facility: HOSPITAL | Age: 79
End: 2024-04-23
Payer: COMMERCIAL

## 2024-04-23 ENCOUNTER — TELEPHONE (OUTPATIENT)
Dept: VASCULAR SURGERY | Facility: CLINIC | Age: 79
End: 2024-04-23

## 2024-04-23 ENCOUNTER — APPOINTMENT (EMERGENCY)
Dept: CT IMAGING | Facility: HOSPITAL | Age: 79
End: 2024-04-23
Payer: COMMERCIAL

## 2024-04-23 ENCOUNTER — HOSPITAL ENCOUNTER (OUTPATIENT)
Facility: HOSPITAL | Age: 79
Setting detail: OBSERVATION
Discharge: HOME | End: 2024-04-24
Attending: EMERGENCY MEDICINE | Admitting: FAMILY MEDICINE
Payer: COMMERCIAL

## 2024-04-23 ENCOUNTER — TELEPHONE (OUTPATIENT)
Age: 79
End: 2024-04-23

## 2024-04-23 ENCOUNTER — APPOINTMENT (OUTPATIENT)
Dept: MRI IMAGING | Facility: HOSPITAL | Age: 79
End: 2024-04-23
Payer: COMMERCIAL

## 2024-04-23 DIAGNOSIS — R29.90 STROKE-LIKE SYMPTOMS: ICD-10-CM

## 2024-04-23 DIAGNOSIS — R20.0 RIGHT ARM NUMBNESS: Primary | ICD-10-CM

## 2024-04-23 DIAGNOSIS — R29.898 RIGHT ARM WEAKNESS: ICD-10-CM

## 2024-04-23 LAB
2HR DELTA HS TROPONIN: 0 NG/L
4HR DELTA HS TROPONIN: 1 NG/L
ANION GAP SERPL CALCULATED.3IONS-SCNC: 7 MMOL/L (ref 4–13)
APTT PPP: 19 SECONDS (ref 23–37)
BUN SERPL-MCNC: 14 MG/DL (ref 5–25)
CALCIUM SERPL-MCNC: 8.6 MG/DL (ref 8.4–10.2)
CARDIAC TROPONIN I PNL SERPL HS: 3 NG/L
CARDIAC TROPONIN I PNL SERPL HS: 3 NG/L
CARDIAC TROPONIN I PNL SERPL HS: 4 NG/L
CHLORIDE SERPL-SCNC: 106 MMOL/L (ref 96–108)
CO2 SERPL-SCNC: 24 MMOL/L (ref 21–32)
CREAT SERPL-MCNC: 0.6 MG/DL (ref 0.6–1.3)
ERYTHROCYTE [DISTWIDTH] IN BLOOD BY AUTOMATED COUNT: 13.6 % (ref 11.6–15.1)
GFR SERPL CREATININE-BSD FRML MDRD: 86 ML/MIN/1.73SQ M
GLUCOSE SERPL-MCNC: 75 MG/DL (ref 65–140)
GLUCOSE SERPL-MCNC: 78 MG/DL (ref 65–140)
HCT VFR BLD AUTO: 38.2 % (ref 34.8–46.1)
HGB BLD-MCNC: 12.4 G/DL (ref 11.5–15.4)
INR PPP: 0.88 (ref 0.84–1.19)
MCH RBC QN AUTO: 30 PG (ref 26.8–34.3)
MCHC RBC AUTO-ENTMCNC: 32.5 G/DL (ref 31.4–37.4)
MCV RBC AUTO: 93 FL (ref 82–98)
PLATELET # BLD AUTO: 305 THOUSANDS/UL (ref 149–390)
PMV BLD AUTO: 9 FL (ref 8.9–12.7)
POTASSIUM SERPL-SCNC: 4.4 MMOL/L (ref 3.5–5.3)
PROTHROMBIN TIME: 12.5 SECONDS (ref 11.6–14.5)
RBC # BLD AUTO: 4.13 MILLION/UL (ref 3.81–5.12)
SODIUM SERPL-SCNC: 137 MMOL/L (ref 135–147)
TSH SERPL DL<=0.05 MIU/L-ACNC: 0.84 UIU/ML (ref 0.45–4.5)
WBC # BLD AUTO: 7.38 THOUSAND/UL (ref 4.31–10.16)

## 2024-04-23 PROCEDURE — 85027 COMPLETE CBC AUTOMATED: CPT | Performed by: EMERGENCY MEDICINE

## 2024-04-23 PROCEDURE — 84484 ASSAY OF TROPONIN QUANT: CPT | Performed by: EMERGENCY MEDICINE

## 2024-04-23 PROCEDURE — 80048 BASIC METABOLIC PNL TOTAL CA: CPT | Performed by: EMERGENCY MEDICINE

## 2024-04-23 PROCEDURE — 36415 COLL VENOUS BLD VENIPUNCTURE: CPT | Performed by: EMERGENCY MEDICINE

## 2024-04-23 PROCEDURE — 84443 ASSAY THYROID STIM HORMONE: CPT | Performed by: PHYSICIAN ASSISTANT

## 2024-04-23 PROCEDURE — 99223 1ST HOSP IP/OBS HIGH 75: CPT | Performed by: FAMILY MEDICINE

## 2024-04-23 PROCEDURE — 99285 EMERGENCY DEPT VISIT HI MDM: CPT | Performed by: EMERGENCY MEDICINE

## 2024-04-23 PROCEDURE — 84484 ASSAY OF TROPONIN QUANT: CPT | Performed by: PHYSICIAN ASSISTANT

## 2024-04-23 PROCEDURE — 70551 MRI BRAIN STEM W/O DYE: CPT

## 2024-04-23 PROCEDURE — 82948 REAGENT STRIP/BLOOD GLUCOSE: CPT

## 2024-04-23 PROCEDURE — 85730 THROMBOPLASTIN TIME PARTIAL: CPT | Performed by: EMERGENCY MEDICINE

## 2024-04-23 PROCEDURE — 70496 CT ANGIOGRAPHY HEAD: CPT

## 2024-04-23 PROCEDURE — 70498 CT ANGIOGRAPHY NECK: CPT

## 2024-04-23 PROCEDURE — 99285 EMERGENCY DEPT VISIT HI MDM: CPT

## 2024-04-23 PROCEDURE — 99291 CRITICAL CARE FIRST HOUR: CPT | Performed by: NURSE PRACTITIONER

## 2024-04-23 PROCEDURE — 71045 X-RAY EXAM CHEST 1 VIEW: CPT

## 2024-04-23 PROCEDURE — 93005 ELECTROCARDIOGRAM TRACING: CPT

## 2024-04-23 PROCEDURE — 85610 PROTHROMBIN TIME: CPT | Performed by: EMERGENCY MEDICINE

## 2024-04-23 RX ORDER — CLOPIDOGREL BISULFATE 75 MG/1
75 TABLET ORAL DAILY
Status: DISCONTINUED | OUTPATIENT
Start: 2024-04-24 | End: 2024-04-24 | Stop reason: HOSPADM

## 2024-04-23 RX ORDER — HEPARIN SODIUM 5000 [USP'U]/ML
5000 INJECTION, SOLUTION INTRAVENOUS; SUBCUTANEOUS EVERY 8 HOURS SCHEDULED
Status: DISCONTINUED | OUTPATIENT
Start: 2024-04-23 | End: 2024-04-24 | Stop reason: HOSPADM

## 2024-04-23 RX ORDER — POLYETHYLENE GLYCOL 3350 17 G/17G
17 POWDER, FOR SOLUTION ORAL DAILY PRN
Status: DISCONTINUED | OUTPATIENT
Start: 2024-04-23 | End: 2024-04-24 | Stop reason: HOSPADM

## 2024-04-23 RX ORDER — MAGNESIUM HYDROXIDE/ALUMINUM HYDROXICE/SIMETHICONE 120; 1200; 1200 MG/30ML; MG/30ML; MG/30ML
30 SUSPENSION ORAL EVERY 6 HOURS PRN
Status: DISCONTINUED | OUTPATIENT
Start: 2024-04-23 | End: 2024-04-24 | Stop reason: HOSPADM

## 2024-04-23 RX ORDER — ACETAMINOPHEN 325 MG/1
650 TABLET ORAL EVERY 6 HOURS PRN
Status: DISCONTINUED | OUTPATIENT
Start: 2024-04-23 | End: 2024-04-24 | Stop reason: HOSPADM

## 2024-04-23 RX ORDER — ONDANSETRON 2 MG/ML
4 INJECTION INTRAMUSCULAR; INTRAVENOUS EVERY 6 HOURS PRN
Status: DISCONTINUED | OUTPATIENT
Start: 2024-04-23 | End: 2024-04-24 | Stop reason: HOSPADM

## 2024-04-23 RX ORDER — AMOXICILLIN 250 MG
2 CAPSULE ORAL 2 TIMES DAILY
Status: DISCONTINUED | OUTPATIENT
Start: 2024-04-23 | End: 2024-04-24 | Stop reason: HOSPADM

## 2024-04-23 RX ORDER — ATORVASTATIN CALCIUM 40 MG/1
40 TABLET, FILM COATED ORAL
Status: DISCONTINUED | OUTPATIENT
Start: 2024-04-23 | End: 2024-04-24 | Stop reason: HOSPADM

## 2024-04-23 RX ADMIN — SENNOSIDES, DOCUSATE SODIUM 2 TABLET: 8.6; 5 TABLET ORAL at 20:31

## 2024-04-23 RX ADMIN — ATORVASTATIN CALCIUM 40 MG: 40 TABLET, FILM COATED ORAL at 17:24

## 2024-04-23 RX ADMIN — IOHEXOL 85 ML: 350 INJECTION, SOLUTION INTRAVENOUS at 14:48

## 2024-04-23 RX ADMIN — HEPARIN SODIUM 5000 UNITS: 5000 INJECTION INTRAVENOUS; SUBCUTANEOUS at 21:35

## 2024-04-23 NOTE — H&P
[facility]  History and Physical  Name: Shanda Quintanilla I MRN: [id number]  Unit/Bed#: MO CT SCANI Date of Admission: 4/23/2024   Date of Service: 4/23/2024  I Hospital Day: 0    Carotid stenosis, symptomatic, with infarction (HCC)  Assessment & Plan  Recent CVA 4/6/24 secondary to left carotid stenosis, now status post endarterectomy 4/26/23, on DAPT and statin therapy  No evidence of dissection or compromise to CEA  Continue aspirin, statin, plavix     * Stroke-like symptoms  Assessment & Plan  Presented with stroke-like symptoms of RUE paresthesias and heaviness and L pinky tingling with recent prior stroke (4/6/24) as well as recent L CEA on 4/16/24 on DAPT (aspirin/plavix) and statin. Stroke alert was initiated in ER.     CTH: no acute infarct  CTA head/neck: no acute occlusion; noted left cervical carotid endarterectomy with resolved stenosis   NIHSS at presentation 1 for LUE sensory deficit, no tPA/TNK given due to recent stroke <3 months ago     Stroke pathway initiated,  Neuro consulted  PT, OT, SLP consulted  Neurochecks per protocol  Telemetry monitor   Continue aspirin, plavix, statin  A1c 5.7%,  -- assessed <30 days prior; no need to repeat  Stat CTH for any acute neuro changes  Heparin for DVT prophylaxis  MRI, echo ordered per neuro    Mixed hyperlipidemia  Assessment & Plan  Cholesterol panel and A1c assessed 4/6/24 during prior stroke   Given <30 days since assessment, no indication for repeat labs        VTE Pharmacologic Prophylaxis: VTE Score: 4 Moderate Risk (Score 3-4) - Pharmacological DVT Prophylaxis Ordered: heparin.  Code Status: Level 1 - Full Code    Discussion with family: Updated  () at bedside.    Anticipated Length of Stay: Patient will be admitted on an observation basis with an anticipated length of stay of less than 2 midnights secondary to CVA rule out.    Total Time Spent on Date of Encounter in care of patient: 55 mins. This time  was spent on one or more of the following: performing physical exam; counseling and coordination of care; obtaining or reviewing history; documenting in the medical record; reviewing/ordering tests, medications or procedures; communicating with other healthcare professionals and discussing with patient's family/caregivers.    Chief Complaint: Numbness (Pt. Presents to ER with c/o numbness to right arm and left fifth digit.   Pt. States symptoms started at 1345 while she was riding in a car. )      History of Present Illness:  Shanda Quintanilla is a 79 y.o. female with a PMH of recent punctate acute infarct in the L parietal region with severe carotid stenosis, dyslipidemia, cognitive impairment (mild), and polio who presents with stroke-like symptoms of acute onset this afternoon. Symptoms are improving at time of evaluation, only notes some sensory deficit in the left pinky now. Had RUE heaviness earlier. No speech problems.     Review of Systems:  Review of Systems   Constitutional:  Negative for activity change, chills, fatigue and fever.   HENT:  Negative for congestion, tinnitus, trouble swallowing and voice change.    Eyes:  Negative for photophobia and visual disturbance.   Respiratory:  Negative for shortness of breath.    Cardiovascular:  Negative for chest pain and palpitations.   Genitourinary:  Negative for difficulty urinating.   Musculoskeletal:  Negative for neck pain.   Skin:  Negative for color change and rash.   Neurological:  Positive for weakness and numbness. Negative for dizziness, tremors, facial asymmetry, speech difficulty and headaches.   Hematological:  Does not bruise/bleed easily.   Psychiatric/Behavioral:  Negative for confusion.    All other systems reviewed and are negative.      Past Medical and Surgical History:   Past Medical History:   Diagnosis Date    Cervical strain     Changing skin lesion     Hyperlipidemia     MCI (mild cognitive impairment)     Mixed hyperlipidemia      Nonmelanoma skin cancer     BCC    Osteoporosis     Polio     Poor concentration     Stroke (HCC)     Swelling of left thumb        Past Surgical History:   Procedure Laterality Date    ANAL FISSURECTOMY      APPENDECTOMY      CATARACT EXTRACTION      COLONOSCOPY      HYSTERECTOMY      HYSTERECTOMY W/ SALPINGO-OOPHERECTOMY      CO TEAEC W/PATCH GRF CAROTID VERTB SUBCLAV NECK INC Left 4/16/2024    Procedure: ENDARTERECTOMY ARTERY CAROTID;  Surgeon: Nikolai Daniel MD;  Location: [facility];  Service: Vascular       Meds/Allergies:  Prior to Admission medications    Medication Sig Start Date End Date Taking? Authorizing Provider   acetaminophen (TYLENOL) 325 mg tablet Take 2 tablets (650 mg total) by mouth every 6 (six) hours as needed for mild pain 4/19/24   Peyton Sanchez PA-C   Alpha-Lipoic Acid (LIPOIC ACID PO) Take 1 capsule by mouth    Historical Provider, MD   Amino Acids (L-CARNITINE PO) Take 1 tablet by mouth    Historical Provider, MD   Artificial Tear Solution (JUST TEARS EYE DROPS OP) Apply to eye    Historical Provider, MD   Ascorbic Acid (VITAMIN C) 1000 MG tablet Take 1,000 mg by mouth daily     Historical Provider, MD   aspirin (ECOTRIN LOW STRENGTH) 81 mg EC tablet Take 1 tablet (81 mg total) by mouth daily 4/10/24   Evelio ISLAS MD   atorvastatin (LIPITOR) 40 mg tablet Take 1 tablet (40 mg total) by mouth daily with dinner 4/19/24   Ewelina Beverly PA-C   Calcium Carb-Cholecalciferol 1000-800 MG-UNIT TABS Take by mouth    Historical Provider, MD   Chelated Magnesium 100 MG TABS Take by mouth Daily    Historical Provider, MD   Cholecalciferol (VITAMIN D3) 5000 units CAPS Take 1 capsule by mouth daily    Historical Provider, MD   clopidogrel (PLAVIX) 75 mg tablet Take 1 tablet (75 mg total) by mouth daily 4/19/24   Ewelina Beverly PA-C   Glycerylphosphorylcholine POWD 1 tablet by Does not apply route    Historical Provider, MD   ibuprofen (MOTRIN) 800 mg tablet Take 1  tablet (800 mg total) by mouth 2 (two) times a day as needed for mild pain 9/5/23   Mauricio Ramírez DO   Iodine, Kelp, (KELP PO) Take by mouth daily    Historical Provider, MD   L-Carnosine POWD by Does not apply route    Historical Provider, MD   Menaquinone-7 (VITAMIN K2) 100 MCG CAPS     Historical Provider, MD   Misc Natural Products (GLUCOSAMINE CHOND CMP TRIPLE PO) Take by mouth    Historical Provider, MD   Misc Natural Products (PANTETHINE PLUS) TABS Take by mouth    Historical Provider, MD   NATURAL VITAMIN E MOISTURIZING GEL Apply topically    Historical Provider, MD   Omega-3 Fatty Acids (FISH OIL) 1200 MG CAPS Take 1 capsule by mouth daily     Historical Provider, MD   PANTETHINE PO Take by mouth    Historical Provider, MD   patient supplied medication     Historical Provider, MD   PREBIOTIC PRODUCT PO Take by mouth    Historical Provider, MD   Probiotic Product (PROBIOTIC-10) CAPS Take by mouth    Historical Provider, MD   pyridoxine (VITAMIN B6) 100 mg tablet 1 tab(s)    Historical Provider, MD Meltonmeric POWD 1    Historical Provider, MD   Vinpocetine POWD by Does not apply route    Historical Provider, MD   vitamin B-12 (CYANOCOBALAMIN) 100 MCG tablet Take by mouth every other day     Historical Provider, MD   vitamin E, tocopherol, 1,000 units capsule Take by mouth daily     Historical Provider, MD   Zinc 30 MG CAPS Take 1 capsule by mouth daily    Historical Provider, MD     I have reviewed home medications with patient personally.    Allergies:   Allergies   Allergen Reactions    Sulfa Antibiotics Other (See Comments)       Social History:  Marital Status: /Civil Union   Occupation:   Patient Pre-hospital Living Situation: Home  Patient Pre-hospital Level of Mobility: walks  Patient Pre-hospital Diet Restrictions:   Substance Use History:   Social History     Substance and Sexual Activity   Alcohol Use Yes    Comment: OCCASIONAL- WINE      Social History     Tobacco Use  "  Smoking Status Former    Current packs/day: 2.00    Average packs/day: 2.0 packs/day for 20.0 years (40.0 ttl pk-yrs)    Types: Cigarettes   Smokeless Tobacco Never     Social History     Substance and Sexual Activity   Drug Use Not Currently       Family History:  Family History   Problem Relation Age of Onset    Heart attack Mother     Cancer Father     Heart attack Sister        Physical Exam:     Vitals:   Blood Pressure: 121/58 (04/23/24 1615)  Pulse: 87 (04/23/24 1615)  Temperature: 97.8 °F (36.6 °C) (04/23/24 1430)  Temp Source: Oral (04/23/24 1430)  Respirations: 19 (04/23/24 1605)  Height: 5' 3\" (160 cm) (04/23/24 1519)  Weight - Scale: 63.9 kg (140 lb 14 oz) (04/23/24 1519)  SpO2: 98 % (04/23/24 1615)    Physical Exam  Vitals and nursing note reviewed.   Constitutional:       General: She is awake. She is not in acute distress.     Appearance: Normal appearance. She is well-developed and normal weight. She is not ill-appearing or toxic-appearing.   HENT:      Head: Normocephalic and atraumatic.      Mouth/Throat:      Lips: Pink.      Mouth: Mucous membranes are moist.      Tongue: Tongue does not deviate from midline.      Pharynx: Uvula midline.   Eyes:      Extraocular Movements: Extraocular movements intact.      Right eye: No nystagmus.      Left eye: No nystagmus.   Cardiovascular:      Rate and Rhythm: Normal rate and regular rhythm.      Heart sounds: Normal heart sounds. No murmur heard.  Pulmonary:      Effort: Pulmonary effort is normal. No respiratory distress.      Breath sounds: Normal breath sounds. No wheezing.   Abdominal:      General: Bowel sounds are normal. There is no distension.      Palpations: Abdomen is soft.      Tenderness: There is no abdominal tenderness.   Musculoskeletal:      Cervical back: Neck supple.      Right lower leg: No edema.      Left lower leg: No edema.   Lymphadenopathy:      Cervical: No cervical adenopathy.   Skin:     General: Skin is warm and dry.      " Coloration: Skin is not pale.      Findings: No erythema or rash.             Comments: Healing CEA scar   Neurological:      Mental Status: She is alert and oriented to person, place, and time.      Cranial Nerves: No cranial nerve deficit.      Sensory: Sensory deficit (reports LUE decreased pinky only at thsi point) present.      Gait: Gait normal.   Psychiatric:         Mood and Affect: Mood normal.         Behavior: Behavior normal. Behavior is cooperative.           Additional Data:     Lab Results:  Results from last 7 days   Lab Units 04/23/24  1514   WBC Thousand/uL 7.38   HEMOGLOBIN g/dL 12.4   HEMATOCRIT % 38.2   PLATELETS Thousands/uL 305     Results from last 7 days   Lab Units 04/23/24  1514   SODIUM mmol/L 137   POTASSIUM mmol/L 4.4   CHLORIDE mmol/L 106   CO2 mmol/L 24   BUN mg/dL 14   CREATININE mg/dL 0.60   ANION GAP mmol/L 7   CALCIUM mg/dL 8.6   GLUCOSE RANDOM mg/dL 78     Results from last 7 days   Lab Units 04/23/24  1514   INR  0.88     Results from last 7 days   Lab Units 04/23/24  1505   POC GLUCOSE mg/dl 75               Lines/Drains:  Invasive Devices       Peripheral Intravenous Line  Duration             Peripheral IV 04/23/24 Left;Proximal;Ventral (anterior) Antecubital <1 day                        Imaging: Reviewed radiology reports from this admission including: CT head  CTA stroke alert (head/neck)   Final Result by Danielle Villatoro MD (04/23 1511)      No acute vascular pathology in the head or neck.      Changes of left cervical carotid endarterectomy compared to 4/6/2024. Resolved stenosis.               I personally discussed this study with Sundeep Winkler on 4/23/2024 3:10 PM.                                          Workstation performed: [id number]         CT stroke alert brain   Final Result by Danielle Villatoro MD (04/23 1511)      No acute intracranial abnormality.  Nonemergent findings above.         IMPRESSION:      No acute intracranial abnormality.      Nonemergent findings  above.      I personally discussed this study with Sundeep Winkler on 4/23/2024 3:10 PM.         Workstation performed: [id number]         X-ray chest 1 view portable    (Results Pending)   MRI inpatient order    (Results Pending)       EKG and Other Studies Reviewed on Admission:   EKG: NSR. HR 89.    ** Please Note: This note has been constructed using a voice recognition system. **

## 2024-04-23 NOTE — TELEPHONE ENCOUNTER
Contacted patient and informed her of information and recommendations from MARS Aguirre.  Verbal understanding received.

## 2024-04-23 NOTE — ASSESSMENT & PLAN NOTE
Cholesterol panel and A1c assessed 4/6/24 during prior stroke   Given <30 days since assessment, no indication for repeat labs

## 2024-04-23 NOTE — CONSULTS
Consultation - Stroke   Shanda Quintanilla 79 y.o. female MRN: [id number]  Unit/Bed#: ED 20 Encounter: [id number]      Assessment/Plan     Stroke-like symptoms  Assessment & Plan  80 y/o female with recent prior stroke (4/6/24), carotid stenosis s/p L CEA on 4/16/24 on DAPT, HLD, MCI, who presented with stroke-like symptoms of RUE paresthesias and heaviness and L pinky tingling. Stroke alert was initiated. BP on presentation 118/82. NIHSS 1 for decreased sensation on LUE. Patient was deemed not a TNK candidate due to recent stroke and resolving symptoms. Unclear etiology at this time. Cannot fully rule out stroke, therefore, will place on formal stroke pathway for further evaluation.    Workup:  - CT head: No acute intracranial abnormality.  - CTA head and neck: No acute vascular pathology in the head or neck. Changes of left cervical carotid endarterectomy compared to 4/6/2024. Resolved stenosis.  - 4/8/24 Echo: EF 60%. No regional wall motion abnormality. Bilateral atrium size normal.  - Labs: 4/6/24 A1C 5.7, 4/6/27     Plan:  - Stroke pathway  MRI brain  Continue home regimen of aspirin 81 mg and Plavix 75 mg  Atorvastatin 40 mg  Permissive HTN, labetalol if SBP >180  Continue telemetry  PT/OT/ST  Frequent neuro checks. Continue to monitor and notify neurology with any changes.   - Medical management and supportive care per primary team. Correction of any metabolic or infectious disturbances.         Thrombolytic Decision: Patient not a candidate. Symptoms resolved/clearly non disabling. and Recent significant trauma/stroke.      Recommendations for outpatient neurological follow up have yet to be determined.    Case and treatment plan reviewed with attending neurologist, Dr. Winkler. Please see attending attestation for any further recommendations.      History of Present Illness     Reason for Consult / Principal Problem: Stroke-like symptoms  Patient last known well: 1:50 pm  Stroke alert called: 2:32  "pm  Neurology time of arrival: 2:36 pm  HPI: Shanda Quintanilla is a 79 y.o.  female with recent prior stroke, carotid stenosis s/p L CEA on 4/16/24 on DAPT, HLD, MCI, who presents with stroke-like symptoms.    Patient reports she had a L CEA last week and since then, she has been having intermittent \"cross-eyed\". When asked if she has blurry vision, she reports no. When asked if she has double vision, she reports she was \"cross-eyed\". She also has lightheadedness, which she has had all day, and this afternoon, she had onset of RUE numbness/tingling/heaviness and L pinky tingling. She presented to the ED for further evaluation. Stroke alert was initiated in ED. Upon neurology evaluation, patient reported the numbness/tingling and heaviness was then from her R elbow up to her R shoulder and the L pinky numbness was no longer present. Upon returning from CT, patient reports that her symptoms were subsiding. She denies headache, weakness, dizziness. She is unsure what symptoms she presented with last time but denies any residual symptoms from her prior stroke. She endorses compliance with medications at home.    Patient was recently admitted and evaluated by neurology at the beginning of this month. She presented with dysarthria that resolved upon arrival to ED and RUE clumsiness, which improved throughout her hospitalization. Workup noted: small acute infarct in the L precentral gyrus and possible punctate acute infarct in the L parietal region and severe stenosis at the origin of the L cervical ICA (50-69% on carotid duplex). Patient was discharged on DAPT and subsequently underwent L CEA on 4/16/24.    Consult to Neurology  Consult performed by: MARS iL  Consult ordered by: Vic Belle MD          Review of Systems  A 12 system ROS was completed. Other than the above mentioned complaints in the HPI and those commented on below, all remaining systems were negative.    Historical Information   Past Medical " History:   Diagnosis Date    Cervical strain     Changing skin lesion     Hyperlipidemia     MCI (mild cognitive impairment)     Mixed hyperlipidemia     Nonmelanoma skin cancer     BCC    Osteoporosis     Polio     Poor concentration     Stroke (HCC)     Swelling of left thumb      Past Surgical History:   Procedure Laterality Date    ANAL FISSURECTOMY      APPENDECTOMY      CATARACT EXTRACTION      COLONOSCOPY      HYSTERECTOMY      HYSTERECTOMY W/ SALPINGO-OOPHERECTOMY      PA TEAEC W/PATCH GRF CAROTID VERTB SUBCLAV NECK INC Left 4/16/2024    Procedure: ENDARTERECTOMY ARTERY CAROTID;  Surgeon: Nikolai Daniel MD;  Location: [facility];  Service: Vascular     Social History   Social History     Substance and Sexual Activity   Alcohol Use Yes    Comment: OCCASIONAL- WINE      Social History     Substance and Sexual Activity   Drug Use Not Currently     E-Cigarette/Vaping    E-Cigarette Use Never User      E-Cigarette/Vaping Substances     Social History     Tobacco Use   Smoking Status Former    Current packs/day: 2.00    Average packs/day: 2.0 packs/day for 20.0 years (40.0 ttl pk-yrs)    Types: Cigarettes   Smokeless Tobacco Never     Family History:   Family History   Problem Relation Age of Onset    Heart attack Mother     Cancer Father     Heart attack Sister        Review of previous medical records was completed.     Meds/Allergies   all current active meds have been reviewed, current meds:   No current facility-administered medications for this encounter.   , and PTA meds:   Prior to Admission Medications   Prescriptions Last Dose Informant Patient Reported? Taking?   Alpha-Lipoic Acid (LIPOIC ACID PO)  Self Yes No   Sig: Take 1 capsule by mouth   Amino Acids (L-CARNITINE PO)  Self Yes No   Sig: Take 1 tablet by mouth   Artificial Tear Solution (JUST TEARS EYE DROPS OP)  Self Yes No   Sig: Apply to eye   Ascorbic Acid (VITAMIN C) 1000 MG tablet  Self Yes No   Sig: Take 1,000 mg by mouth daily     Calcium Carb-Cholecalciferol 1000-800 MG-UNIT TABS  Self Yes No   Sig: Take by mouth   Chelated Magnesium 100 MG TABS  Self Yes No   Sig: Take by mouth Daily   Cholecalciferol (VITAMIN D3) 5000 units CAPS  Self Yes No   Sig: Take 1 capsule by mouth daily   Glycerylphosphorylcholine POWD  Self Yes No   Si tablet by Does not apply route   Iodine, Kelp, (KELP PO)  Self Yes No   Sig: Take by mouth daily   L-Carnosine POWD  Self Yes No   Sig: by Does not apply route   Menaquinone-7 (VITAMIN K2) 100 MCG CAPS  Self Yes No   Misc Natural Products (GLUCOSAMINE CHOND CMP TRIPLE PO)  Self Yes No   Sig: Take by mouth   Misc Natural Products (PANTETHINE PLUS) TABS  Self Yes No   Sig: Take by mouth   NATURAL VITAMIN E MOISTURIZING GEL  Self Yes No   Sig: Apply topically   Omega-3 Fatty Acids (FISH OIL) 1200 MG CAPS  Self Yes No   Sig: Take 1 capsule by mouth daily    PANTETHINE PO  Self Yes No   Sig: Take by mouth   PREBIOTIC PRODUCT PO  Self Yes No   Sig: Take by mouth   Probiotic Product (PROBIOTIC-10) CAPS  Self Yes No   Sig: Take by mouth   Turmeric POWD  Self Yes No   Si   Vinpocetine POWD  Self Yes No   Sig: by Does not apply route   Zinc 30 MG CAPS  Self Yes No   Sig: Take 1 capsule by mouth daily   acetaminophen (TYLENOL) 325 mg tablet   No No   Sig: Take 2 tablets (650 mg total) by mouth every 6 (six) hours as needed for mild pain   aspirin (ECOTRIN LOW STRENGTH) 81 mg EC tablet  Self No No   Sig: Take 1 tablet (81 mg total) by mouth daily   atorvastatin (LIPITOR) 40 mg tablet   No No   Sig: Take 1 tablet (40 mg total) by mouth daily with dinner   clopidogrel (PLAVIX) 75 mg tablet   No No   Sig: Take 1 tablet (75 mg total) by mouth daily   ibuprofen (MOTRIN) 800 mg tablet  Self No No   Sig: Take 1 tablet (800 mg total) by mouth 2 (two) times a day as needed for mild pain   patient supplied medication  Self Yes No   pyridoxine (VITAMIN B6) 100 mg tablet  Self Yes No   Si tab(s)   vitamin B-12  "(CYANOCOBALAMIN) 100 MCG tablet  Self Yes No   Sig: Take by mouth every other day    vitamin E, tocopherol, 1,000 units capsule  Self Yes No   Sig: Take by mouth daily       Facility-Administered Medications: None       Allergies   Allergen Reactions    Sulfa Antibiotics Other (See Comments)       Objective   Vitals:Blood pressure 118/82, pulse 82, temperature 97.8 °F (36.6 °C), temperature source Oral, resp. rate 18, height 5' 3\" (1.6 m), weight 63.9 kg (140 lb 14 oz), SpO2 100%.,Body mass index is 24.95 kg/m².  No intake or output data in the 24 hours ending 04/23/24 1543    Invasive Devices:   Invasive Devices       Peripheral Intravenous Line  Duration             Peripheral IV 04/23/24 Left;Proximal;Ventral (anterior) Antecubital <1 day                    Physical Exam  Vitals and nursing note reviewed.   Constitutional:       General: She is not in acute distress.     Appearance: She is not ill-appearing or diaphoretic.   HENT:      Head: Normocephalic.      Mouth/Throat:      Mouth: Mucous membranes are moist.      Pharynx: Oropharynx is clear.   Eyes:      General: No scleral icterus.        Right eye: No discharge.         Left eye: No discharge.      Extraocular Movements: Extraocular movements intact and EOM normal.      Conjunctiva/sclera: Conjunctivae normal.   Cardiovascular:      Rate and Rhythm: Normal rate.   Pulmonary:      Effort: Pulmonary effort is normal. No respiratory distress.   Musculoskeletal:         General: Normal range of motion.   Skin:     General: Skin is warm and dry.      Coloration: Skin is not jaundiced or pale.      Comments: L neck incision dry and intact   Neurological:      Mental Status: She is alert and oriented to person, place, and time.      Coordination: Finger-Nose-Finger Test and Heel to Shin Test normal.       Neurologic Exam     Mental Status   Oriented to person, place, and time.   Level of consciousness: alert  Able to follow commands appropriately. No aphasia or " dysarthria noted.     Cranial Nerves     CN II   Right visual field deficit: none  Left visual field deficit: none     CN III, IV, VI   Extraocular motions are normal.     CN V   Right facial sensation deficit: none  Left facial sensation deficit: cheeks and mandible    CN VII   Facial expression full, symmetric.     CN VIII   Hearing: intact    CN IX, X   Palate: symmetric    CN XI   Right trapezius strength: normal  Left trapezius strength: normal    CN XII   CN XII normal.     Motor Exam   Muscle bulk: normal  Right arm pronator drift: absent  Left arm pronator drift: absent  Able to raise bilateral UE and hold antigravity x 10 seconds without drift noted  Able to raise bilateral LE and hold antigravity x 5 seconds without drift noted     Sensory Exam   Decreased sensation to light touch in LUE, equal sensation to light touch in bilateral LE  No extinction noted     Gait, Coordination, and Reflexes     Coordination   Finger to nose coordination: normal  Heel to shin coordination: normal    Tremor   Resting tremor: absent  Intention tremor: absent      NIHSS:  1a.Level of Consciousness: 0 = Alert   1b. LOC Questions: 0 = Answers both correctly   1c. LOC Commands: 0 = Obeys both correctly   2. Best Gaze: 0 = Normal   3. Visual: 0 = No visual field loss   4. Facial Palsy: 0=Normal symmetric movement   5a. Motor Right Arm: 0=No drift, limb holds 90 (or 45) degrees for full 10 seconds   5b. Motor Left Arm: 0=No drift, limb holds 90 (or 45) degrees for full 10 seconds   6a. Motor Right Le=No drift, limb holds 90 (or 45) degrees for full 10 seconds   6b. Motor Left Le=No drift, limb holds 90 (or 45) degrees for full 10 seconds   7. Limb Ataxia:  0=Absent   8. Sensory: 1=Mild to moderate sensory loss; patient feels pinprick is less sharp or is dull on the affected side; there is a loss of superficial pain with pinprick but patient is aware She is being touched   9. Best Language:  0=No aphasia, normal   10.  "Dysarthria: 0=Normal articulation   11. Extinction and Inattention (formerly Neglect): 0=No abnormality   Total Score: 1     Time NIHSS was completed: 2:43 pm    Modified [place] Score:  1 (No significant disability. Able to carry out all usual activities, despite some symptoms)    Lab Results: I have personally reviewed pertinent reports.  , CBC:   Results from last 7 days   Lab Units 04/23/24  1514 04/19/24  0248 04/18/24  0421   WBC Thousand/uL 7.38 6.73 7.29   RBC Million/uL 4.13 3.94 3.51*   HEMOGLOBIN g/dL 12.4 11.7 10.6*   HEMATOCRIT % 38.2 37.6 33.3*   MCV fL 93 95 95   PLATELETS Thousands/uL 305 280 283   , BMP/CMP:   Results from last 7 days   Lab Units 04/23/24  1514 04/19/24  0248 04/18/24  0421   SODIUM mmol/L 137 138 139   POTASSIUM mmol/L 4.4 3.9 4.0   CHLORIDE mmol/L 106 107 110*   CO2 mmol/L 24 21 23   BUN mg/dL 14 13 12   CREATININE mg/dL 0.60 0.68 0.61   CALCIUM mg/dL 8.6 8.9 8.5   EGFR ml/min/1.73sq m 86 83 86   , Vitamin B12:   , HgBA1C:   , TSH:   , Coagulation:   Results from last 7 days   Lab Units 04/23/24  1514   INR  0.88   , Lipid Profile:   , Ammonia:   , Urinalysis:       Invalid input(s): \"URIBILINOGEN\", Drug Screen:   , Medication Drug Levels:       Invalid input(s): \"CARBAMAZEPINE\", \"OXCARBAZEPINE\"    Imaging Studies: I have personally reviewed pertinent reports.   and I have personally reviewed pertinent films in PACS  EKG, Pathology, and Other Studies: I have personally reviewed pertinent reports.    VTE Prophylaxis: Reason for no pharmacologic prophylaxis Patient in ED    Code Status: Prior  Advance Directive and Living Will: Yes    Power of : Yes  POLST:      Counseling / Coordination of Care  Total Critical Care time spent 36 minutes excluding procedures, teaching and family updates.      "

## 2024-04-23 NOTE — TELEPHONE ENCOUNTER
FYI - Patient called to inform the office that she is on her way to the [facility] with numbness in her R hand, the same side that was affected during her previous stroke. Her  is driving her.

## 2024-04-23 NOTE — ASSESSMENT & PLAN NOTE
Presented with stroke-like symptoms of RUE paresthesias and heaviness and L pinky tingling with recent prior stroke (4/6/24) as well as recent L CEA on 4/16/24 on DAPT (aspirin/plavix) and statin. Stroke alert was initiated in ER.     CTH: no acute infarct  CTA head/neck: no acute occlusion; noted left cervical carotid endarterectomy with resolved stenosis   NIHSS at presentation 1 for LUE sensory deficit, no tPA/TNK given due to recent stroke <3 months ago     Stroke pathway initiated,  Neuro consukt appreciated  Telemetry monitor   Mri with no new stroke

## 2024-04-23 NOTE — ASSESSMENT & PLAN NOTE
78 y/o female with recent prior stroke (4/6/24), carotid stenosis s/p L CEA on 4/16/24 on DAPT, HLD, MCI, who presented with stroke-like symptoms of RUE paresthesias and heaviness and L pinky tingling. Stroke alert was initiated. BP on presentation 118/82. NIHSS 1 for decreased sensation on LUE. Patient was deemed not a TNK candidate due to recent stroke and resolving symptoms. Unclear etiology at this time- possibly recrudescence? Imaging without acute infarct noted. Patient reports resolution of symptoms at this time.    Workup:  - CT head: No acute intracranial abnormality.  - CTA head and neck: No acute vascular pathology in the head or neck. Changes of left cervical carotid endarterectomy compared to 4/6/2024. Resolved stenosis.  - MRI brain:  Linear focus of restricted diffusion within the left posterior frontal lobe on series 4 image 26 is unchanged from the study performed 18 days ago, consistent with subacute infarct. No new ischemia is identified.  Minor white matter change suggestive of chronic microangiopathy.  Stable sinus disease.  - 4/8/24 Echo: EF 60%. No regional wall motion abnormality. Bilateral atrium size normal.  - Labs: 4/6/24 A1C 5.7, 4/6/27     Plan:  - Stroke pathway  Continue home regimen of aspirin 81 mg and Plavix 75 mg  Atorvastatin 40 mg  Goal normotension; avoid hypotension  Continue telemetry  PT/OT/ST  Frequent neuro checks. Continue to monitor and notify neurology with any changes.   - Medical management and supportive care per primary team. Correction of any metabolic or infectious disturbances.  - No further inpatient neurology recommendations at this time. Please call with any questions.

## 2024-04-23 NOTE — TELEPHONE ENCOUNTER
Vascular Nurse Navigator Post Op Call    Procedure: Left carotid endarterectomy with bovine pericardial patch angioplasty     Date of Procedure:  4/16/24    Surgeon: Nikolai Daniel MD     Discharge Date: 4/19/24    Discharge Disposition:  Home    Change in Vision?: No    Change in Speech?: No    Weakness?: No    Uncontrolled Pain?: No    Hoarseness?: No    Trouble Swallowing?: No    Incision Concerns?: No    Anticoagulation pt was discharged on post op?: Aspirin and Clopidogrel (Plavix)    Statin pt was discharged on post op?: Lipitor (atorvastatin)    Bleeding?: No    Fever/chills?: No      Reviewed discharge instructions and incision care with patient.      NEXT OFFICE VISIT SCHEDULED: 5/2/24 at 10 am with MARS Baker at [facility]    Transportation Available?: Yes      Any further questions/concerns?    Patient stated she is doing good since discharge.  She stated that she is monitoring her blood pressure daily.  Reviewed incision care with her - wash daily with soap and water.  Reviewed discharge medications - Aspirin and Plavix.  All questions answered.  No concerns expressed at this time.

## 2024-04-23 NOTE — ED PROVIDER NOTES
Pt Name: Shanda Quintanilla  MRN: [id number]  Birthdate 1945  Age/Sex: 79 y.o. female  Date of evaluation: 4/23/2024  PCP: Talat Ventura MD    CHIEF COMPLAINT    Chief Complaint   Patient presents with    Numbness     Pt. Presents to ER with c/o numbness to right arm and left fifth digit.   Pt. States symptoms started at 1345 while she was riding in a car.          HPI    79 y.o. female presenting with right arm numbness and weakness as well as mild numbness to the left pinky.  Patient states that she had a prior stroke with similar symptoms about 2 weeks ago, had undergone a carotid endarterectomy on the 16th of this month, all symptoms had resolved.  Today at approximately 1340, she was driving when her symptoms began again, with numbness in the right arm as well as a feeling of heaviness.  She also notes transient tingling of the left pinky.  At this time, all numbness and tingling of resolved although she states her right arm still feels slightly heavy.  She also notes that her left leg is not as strong as the right but that is a chronic condition since she had polio many years ago.  She denies fevers, numbness, weakness, change in speech or vision, trauma, other symptoms.      HPI      Past Medical and Surgical History    Past Medical History:   Diagnosis Date    Cervical strain     Changing skin lesion     Hyperlipidemia     MCI (mild cognitive impairment)     Mixed hyperlipidemia     Nonmelanoma skin cancer     BCC    Osteoporosis     Polio     Poor concentration     Stroke (HCC)     Swelling of left thumb        Past Surgical History:   Procedure Laterality Date    ANAL FISSURECTOMY      APPENDECTOMY      CATARACT EXTRACTION      COLONOSCOPY      HYSTERECTOMY      HYSTERECTOMY W/ SALPINGO-OOPHERECTOMY      HI TEAEC W/PATCH GRF CAROTID VERTB SUBCLAV NECK INC Left 4/16/2024    Procedure: ENDARTERECTOMY ARTERY CAROTID;  Surgeon: Nikolai Daniel MD;  Location: [facility];  Service: Vascular        Family History   Problem Relation Age of Onset    Heart attack Mother     Cancer Father     Heart attack Sister        Social History     Tobacco Use    Smoking status: Former     Current packs/day: 2.00     Average packs/day: 2.0 packs/day for 20.0 years (40.0 ttl pk-yrs)     Types: Cigarettes    Smokeless tobacco: Never   Vaping Use    Vaping status: Never Used   Substance Use Topics    Alcohol use: Yes     Comment: OCCASIONAL- WINE     Drug use: Not Currently           Allergies    Allergies   Allergen Reactions    Sulfa Antibiotics Other (See Comments)       Home Medications    Prior to Admission medications    Medication Sig Start Date End Date Taking? Authorizing Provider   acetaminophen (TYLENOL) 325 mg tablet Take 2 tablets (650 mg total) by mouth every 6 (six) hours as needed for mild pain 4/19/24   Peyton Sanchez PA-C   Alpha-Lipoic Acid (LIPOIC ACID PO) Take 1 capsule by mouth    Historical Provider, MD   Amino Acids (L-CARNITINE PO) Take 1 tablet by mouth    Historical Provider, MD   Artificial Tear Solution (JUST TEARS EYE DROPS OP) Apply to eye    Historical Provider, MD   Ascorbic Acid (VITAMIN C) 1000 MG tablet Take 1,000 mg by mouth daily     Historical Provider, MD   aspirin (ECOTRIN LOW STRENGTH) 81 mg EC tablet Take 1 tablet (81 mg total) by mouth daily 4/10/24   Evelio ISLAS MD   atorvastatin (LIPITOR) 40 mg tablet Take 1 tablet (40 mg total) by mouth daily with dinner 4/19/24   Ewelina Beverly PA-C   Calcium Carb-Cholecalciferol 1000-800 MG-UNIT TABS Take by mouth    Historical Provider, MD   Chelated Magnesium 100 MG TABS Take by mouth Daily    Historical Provider, MD   Cholecalciferol (VITAMIN D3) 5000 units CAPS Take 1 capsule by mouth daily    Historical Provider, MD   clopidogrel (PLAVIX) 75 mg tablet Take 1 tablet (75 mg total) by mouth daily 4/19/24   Ewelina Beverly PA-C   Glycerylphosphorylcholine POWD 1 tablet by Does not apply route    Historical Provider,  MD   ibuprofen (MOTRIN) 800 mg tablet Take 1 tablet (800 mg total) by mouth 2 (two) times a day as needed for mild pain 9/5/23   Mauricio Ramírez DO   Iodine, Kelp, (KELP PO) Take by mouth daily    Historical Provider, MD   L-Carnosine POWD by Does not apply route    Historical Provider, MD   Menaquinone-7 (VITAMIN K2) 100 MCG CAPS     Historical Provider, MD   Misc Natural Products (GLUCOSAMINE CHOND CMP TRIPLE PO) Take by mouth    Historical Provider, MD   Misc Natural Products (PANTETHINE PLUS) TABS Take by mouth    Historical Provider, MD   NATURAL VITAMIN E MOISTURIZING GEL Apply topically    Historical Provider, MD   Omega-3 Fatty Acids (FISH OIL) 1200 MG CAPS Take 1 capsule by mouth daily     Historical Provider, MD   PANTETHINE PO Take by mouth    Historical Provider, MD   patient supplied medication     Historical Provider, MD   PREBIOTIC PRODUCT PO Take by mouth    Historical Provider, MD   Probiotic Product (PROBIOTIC-10) CAPS Take by mouth    Historical Provider, MD   pyridoxine (VITAMIN B6) 100 mg tablet 1 tab(s)    Historical Provider, MD Meltonmeric POWD 1    Historical Provider, MD Antoine POWD by Does not apply route    Historical Provider, MD   vitamin B-12 (CYANOCOBALAMIN) 100 MCG tablet Take by mouth every other day     Historical Provider, MD   vitamin E, tocopherol, 1,000 units capsule Take by mouth daily     Historical Provider, MD   Zinc 30 MG CAPS Take 1 capsule by mouth daily    Historical Provider, MD           Review of Systems    Review of Systems   Constitutional:  Negative for activity change, chills and fever.   HENT:  Negative for drooling and facial swelling.    Eyes:  Negative for pain, discharge and visual disturbance.   Respiratory:  Negative for apnea, cough, chest tightness, shortness of breath and wheezing.    Cardiovascular:  Negative for chest pain and leg swelling.   Gastrointestinal:  Negative for abdominal pain, constipation, diarrhea, nausea and  vomiting.   Genitourinary:  Negative for difficulty urinating, dysuria and urgency.   Musculoskeletal:  Negative for arthralgias, back pain and gait problem.   Skin:  Negative for color change and rash.   Neurological:  Positive for weakness and numbness. Negative for dizziness, speech difficulty and headaches.   Psychiatric/Behavioral:  Negative for agitation, behavioral problems and confusion.            All other systems reviewed and negative.    Physical Exam      ED Triage Vitals [04/23/24 1430]   Temperature Pulse Respirations Blood Pressure SpO2   97.8 °F (36.6 °C) 82 18 118/82 100 %      Temp Source Heart Rate Source Patient Position - Orthostatic VS BP Location FiO2 (%)   Oral Monitor Sitting Right arm --      Pain Score       No Pain               Physical Exam  Vitals and nursing note reviewed.   Constitutional:       General: She is not in acute distress.     Appearance: She is well-developed. She is not ill-appearing, toxic-appearing or diaphoretic.   HENT:      Head: Normocephalic and atraumatic.      Right Ear: External ear normal.      Left Ear: External ear normal.      Nose: Nose normal. No congestion or rhinorrhea.      Mouth/Throat:      Mouth: Mucous membranes are moist.      Pharynx: Oropharynx is clear. No oropharyngeal exudate or posterior oropharyngeal erythema.   Eyes:      Conjunctiva/sclera: Conjunctivae normal.      Pupils: Pupils are equal, round, and reactive to light.   Cardiovascular:      Rate and Rhythm: Normal rate and regular rhythm.      Pulses: Normal pulses.      Heart sounds: Normal heart sounds.   Pulmonary:      Effort: Pulmonary effort is normal. No respiratory distress.      Breath sounds: Normal breath sounds. No wheezing or rales.   Abdominal:      General: There is no distension.      Palpations: Abdomen is soft.      Tenderness: There is no abdominal tenderness. There is no guarding or rebound.   Musculoskeletal:         General: No swelling, tenderness or deformity.  Normal range of motion.      Cervical back: Normal range of motion and neck supple.      Right lower leg: No edema.      Left lower leg: No edema.   Skin:     General: Skin is warm and dry.      Capillary Refill: Capillary refill takes less than 2 seconds.      Findings: No erythema or rash.   Neurological:      Mental Status: She is alert and oriented to person, place, and time.      Cranial Nerves: No cranial nerve deficit.      Sensory: No sensory deficit.      Motor: Weakness present.      Coordination: Coordination normal.      Gait: Gait normal.      Deep Tendon Reflexes: Reflexes normal.      Comments: Cranial nerves II through XII intact, 5 out of 5 strength in all extremities although right arm seems to have a slight waiver when testing for pronator drift, does not hit the bed, drops about an inch.  Bilateral lower extremities noted to have intact strength without drift or drop.  Sensory exam normal.  NIH stroke scale of 1 for slight weakness of the right arm.   Psychiatric:         Behavior: Behavior normal.         Thought Content: Thought content normal.         Judgment: Judgment normal.              Diagnostic Results  EKG Interpretation    Rate:  89  BPM  Rhythm:  Normal Sinus Rhythm   Axis:  Normal   Intervals: Normal, no blocks, QTc  464 ms  Q waves:  No pathologic Q waves   T waves:  Normal   ST segments:  No significant elevations or depressions     Impression:  Normal sinus rhythm without evidence of acute ischemia or significant arrhythmia      EKG for comparison: EKG dated 6 April 2020 for similar in character no major changes    EKG interpreted by me.       Labs:    Results Reviewed       Procedure Component Value Units Date/Time    Basic metabolic panel [[id number]] Collected: 04/23/24 1514    Lab Status: Final result Specimen: Blood from Arm, Left Updated: 04/23/24 1537     Sodium 137 mmol/L      Potassium 4.4 mmol/L      Chloride 106 mmol/L      CO2 24 mmol/L      ANION GAP 7 mmol/L      BUN  14 mg/dL      Creatinine 0.60 mg/dL      Glucose 78 mg/dL      Calcium 8.6 mg/dL      eGFR 86 ml/min/1.73sq m     Narrative:      National Kidney Disease Foundation guidelines for Chronic Kidney Disease (CKD):     Stage 1 with normal or high GFR (GFR > 90 mL/min/1.73 square meters)    Stage 2 Mild CKD (GFR = 60-89 mL/min/1.73 square meters)    Stage 3A Moderate CKD (GFR = 45-59 mL/min/1.73 square meters)    Stage 3B Moderate CKD (GFR = 30-44 mL/min/1.73 square meters)    Stage 4 Severe CKD (GFR = 15-29 mL/min/1.73 square meters)    Stage 5 End Stage CKD (GFR <15 mL/min/1.73 square meters)  Note: GFR calculation is accurate only with a steady state creatinine    Protime-INR [[id number]]  (Normal) Collected: 04/23/24 1514    Lab Status: Final result Specimen: Blood from Arm, Left Updated: 04/23/24 1535     Protime 12.5 seconds      INR 0.88    APTT [[id number]]  (Abnormal) Collected: 04/23/24 1514    Lab Status: Final result Specimen: Blood from Arm, Left Updated: 04/23/24 1535     PTT 19 seconds     CBC and Platelet [[id number]]  (Normal) Collected: 04/23/24 1514    Lab Status: Final result Specimen: Blood from Arm, Left Updated: 04/23/24 1520     WBC 7.38 Thousand/uL      RBC 4.13 Million/uL      Hemoglobin 12.4 g/dL      Hematocrit 38.2 %      MCV 93 fL      MCH 30.0 pg      MCHC 32.5 g/dL      RDW 13.6 %      Platelets 305 Thousands/uL      MPV 9.0 fL     HS Troponin 0hr (reflex protocol) [[id number]] Collected: 04/23/24 1514    Lab Status: In process Specimen: Blood from Arm, Left Updated: 04/23/24 1517    Fingerstick Glucose (POCT) [[id number]]  (Normal) Collected: 04/23/24 1505    Lab Status: Final result Specimen: Blood Updated: 04/23/24 1507     POC Glucose 75 mg/dl     FLU/RSV/COVID - if FLU/RSV clinically relevant [[id number]]     Lab Status: No result Specimen: Nares from Nose             All labs reviewed and utilized in the medical decision making process    Radiology:    CTA stroke alert (head/neck)   Final  Result      No acute vascular pathology in the head or neck.      Changes of left cervical carotid endarterectomy compared to 4/6/2024. Resolved stenosis.               I personally discussed this study with Sundeep Winkler on 4/23/2024 3:10 PM.                                          Workstation performed: [id number]         CT stroke alert brain   Final Result      No acute intracranial abnormality.  Nonemergent findings above.         IMPRESSION:      No acute intracranial abnormality.      Nonemergent findings above.      I personally discussed this study with Sundeep Winkler on 4/23/2024 3:10 PM.         Workstation performed: [id number]         X-ray chest 1 view portable    (Results Pending)       All radiology studies independently viewed by me and interpreted by the radiologist.    Procedure    Procedures        ED Course of Care and Re-Assessments    NIH stroke scale of 1.    Discussed case with neurology, agreed patient is not a candidate for TNK based on rapid resolution of symptoms as well as surgery on the 16th and recent stroke within the past 2 weeks.  Plan formed to admit for stroke pathway.    Medications   iohexol (OMNIPAQUE) 350 MG/ML injection (MULTI-DOSE) 85 mL (85 mL Intravenous Given 4/23/24 1448)           FINAL IMPRESSION    Final diagnoses:   Right arm numbness         DISPOSITION/PLAN    Presentation as above with right arm numbness and right arm weakness in the setting of recent stroke and carotid endarterectomy.  Vital signs reassuring, examination as above.  Patient not a candidate for TNK as above.  Do not suspect aortic dissection, sepsis, meningitis, encephalitis, other acute life-threatening alternate pathology at this time.  After consultation with stroke neurology as well as initial workup in ER, plan formed to admit for stroke pathway, hemodynamically stable and comfortable at time of admit to internal medicine.  Time reflects when diagnosis was documented in both MDM as applicable and  "the Disposition within this note       Time User Action Codes Description Comment    4/23/2024  2:32 PM Vic Belle Add [R20.0] Right arm numbness           ED Disposition       None          Follow-up Information    None           PATIENT REFERRED TO:    No follow-up provider specified.    DISCHARGE MEDICATIONS:    Patient's Medications   Discharge Prescriptions    No medications on file       No discharge procedures on file.         Vic Belle MD    Portions of the record may have been created with voice recognition software.  Occasional wrong word or \"sound alike\" substitutions may have occurred due to the inherent limitations of voice recognition software.  Please read the chart carefully and recognize, using context, where substitutions have occurred     Vic Belle MD  04/23/24 5876    "

## 2024-04-24 ENCOUNTER — TRANSITIONAL CARE MANAGEMENT (OUTPATIENT)
Dept: FAMILY MEDICINE CLINIC | Facility: CLINIC | Age: 79
End: 2024-04-24

## 2024-04-24 VITALS
DIASTOLIC BLOOD PRESSURE: 62 MMHG | HEIGHT: 63 IN | TEMPERATURE: 97.8 F | BODY MASS INDEX: 25.2 KG/M2 | HEART RATE: 71 BPM | RESPIRATION RATE: 18 BRPM | OXYGEN SATURATION: 97 % | SYSTOLIC BLOOD PRESSURE: 122 MMHG | WEIGHT: 142.2 LBS

## 2024-04-24 PROBLEM — R29.90 STROKE-LIKE SYMPTOMS: Status: RESOLVED | Noted: 2024-04-23 | Resolved: 2024-04-24

## 2024-04-24 LAB
ALBUMIN SERPL BCP-MCNC: 3.5 G/DL (ref 3.5–5)
ALP SERPL-CCNC: 61 U/L (ref 34–104)
ALT SERPL W P-5'-P-CCNC: 36 U/L (ref 7–52)
ANION GAP SERPL CALCULATED.3IONS-SCNC: 5 MMOL/L (ref 4–13)
AST SERPL W P-5'-P-CCNC: 28 U/L (ref 13–39)
ATRIAL RATE: 89 BPM
BASOPHILS # BLD AUTO: 0.03 THOUSANDS/ÂΜL (ref 0–0.1)
BASOPHILS NFR BLD AUTO: 1 % (ref 0–1)
BILIRUB SERPL-MCNC: 0.42 MG/DL (ref 0.2–1)
BUN SERPL-MCNC: 15 MG/DL (ref 5–25)
CALCIUM SERPL-MCNC: 8.8 MG/DL (ref 8.4–10.2)
CHLORIDE SERPL-SCNC: 108 MMOL/L (ref 96–108)
CHOLEST SERPL-MCNC: 150 MG/DL
CO2 SERPL-SCNC: 26 MMOL/L (ref 21–32)
CREAT SERPL-MCNC: 0.59 MG/DL (ref 0.6–1.3)
EOSINOPHIL # BLD AUTO: 0.25 THOUSAND/ÂΜL (ref 0–0.61)
EOSINOPHIL NFR BLD AUTO: 5 % (ref 0–6)
ERYTHROCYTE [DISTWIDTH] IN BLOOD BY AUTOMATED COUNT: 13.7 % (ref 11.6–15.1)
EST. AVERAGE GLUCOSE BLD GHB EST-MCNC: 120 MG/DL
GFR SERPL CREATININE-BSD FRML MDRD: 87 ML/MIN/1.73SQ M
GLUCOSE P FAST SERPL-MCNC: 111 MG/DL (ref 65–99)
GLUCOSE SERPL-MCNC: 111 MG/DL (ref 65–140)
HBA1C MFR BLD: 5.8 %
HCT VFR BLD AUTO: 34.4 % (ref 34.8–46.1)
HDLC SERPL-MCNC: 64 MG/DL
HGB BLD-MCNC: 11.2 G/DL (ref 11.5–15.4)
IMM GRANULOCYTES # BLD AUTO: 0.03 THOUSAND/UL (ref 0–0.2)
IMM GRANULOCYTES NFR BLD AUTO: 1 % (ref 0–2)
LDLC SERPL CALC-MCNC: 71 MG/DL (ref 0–100)
LYMPHOCYTES # BLD AUTO: 1.67 THOUSANDS/ÂΜL (ref 0.6–4.47)
LYMPHOCYTES NFR BLD AUTO: 34 % (ref 14–44)
MAGNESIUM SERPL-MCNC: 2 MG/DL (ref 1.9–2.7)
MCH RBC QN AUTO: 29.9 PG (ref 26.8–34.3)
MCHC RBC AUTO-ENTMCNC: 32.6 G/DL (ref 31.4–37.4)
MCV RBC AUTO: 92 FL (ref 82–98)
MONOCYTES # BLD AUTO: 0.57 THOUSAND/ÂΜL (ref 0.17–1.22)
MONOCYTES NFR BLD AUTO: 11 % (ref 4–12)
NEUTROPHILS # BLD AUTO: 2.44 THOUSANDS/ÂΜL (ref 1.85–7.62)
NEUTS SEG NFR BLD AUTO: 48 % (ref 43–75)
NRBC BLD AUTO-RTO: 0 /100 WBCS
P AXIS: 74 DEGREES
PLATELET # BLD AUTO: 274 THOUSANDS/UL (ref 149–390)
PMV BLD AUTO: 8.9 FL (ref 8.9–12.7)
POTASSIUM SERPL-SCNC: 3.8 MMOL/L (ref 3.5–5.3)
PR INTERVAL: 160 MS
PROT SERPL-MCNC: 5.9 G/DL (ref 6.4–8.4)
QRS AXIS: 57 DEGREES
QRSD INTERVAL: 76 MS
QT INTERVAL: 382 MS
QTC INTERVAL: 464 MS
RBC # BLD AUTO: 3.75 MILLION/UL (ref 3.81–5.12)
SODIUM SERPL-SCNC: 139 MMOL/L (ref 135–147)
T WAVE AXIS: 45 DEGREES
TRIGL SERPL-MCNC: 73 MG/DL
VENTRICULAR RATE: 89 BPM
WBC # BLD AUTO: 4.99 THOUSAND/UL (ref 4.31–10.16)

## 2024-04-24 PROCEDURE — 85025 COMPLETE CBC W/AUTO DIFF WBC: CPT | Performed by: PHYSICIAN ASSISTANT

## 2024-04-24 PROCEDURE — 99232 SBSQ HOSP IP/OBS MODERATE 35: CPT | Performed by: PSYCHIATRY & NEUROLOGY

## 2024-04-24 PROCEDURE — 83036 HEMOGLOBIN GLYCOSYLATED A1C: CPT | Performed by: PHYSICIAN ASSISTANT

## 2024-04-24 PROCEDURE — 83735 ASSAY OF MAGNESIUM: CPT | Performed by: PHYSICIAN ASSISTANT

## 2024-04-24 PROCEDURE — 93010 ELECTROCARDIOGRAM REPORT: CPT | Performed by: INTERNAL MEDICINE

## 2024-04-24 PROCEDURE — 80053 COMPREHEN METABOLIC PANEL: CPT | Performed by: PHYSICIAN ASSISTANT

## 2024-04-24 PROCEDURE — 99239 HOSP IP/OBS DSCHRG MGMT >30: CPT | Performed by: FAMILY MEDICINE

## 2024-04-24 PROCEDURE — 80061 LIPID PANEL: CPT | Performed by: PHYSICIAN ASSISTANT

## 2024-04-24 RX ADMIN — ASPIRIN 81 MG: 81 TABLET, COATED ORAL at 08:37

## 2024-04-24 RX ADMIN — CLOPIDOGREL 75 MG: 75 TABLET ORAL at 08:37

## 2024-04-24 NOTE — PLAN OF CARE
Problem: Neurological Deficit  Goal: Neurological status is stable or improving  Description: Interventions:  - Monitor and assess patient's level of consciousness, motor function, sensory function, and level of assistance needed for ADLs.   - Monitor and report changes from baseline. Collaborate with interdisciplinary team to initiate plan and implement interventions as ordered.   - Provide and maintain a safe environment.  - Consider seizure precautions.  - Consider fall precautions.  - Consider aspiration precautions.  - Consider bleeding precautions.  Outcome: Progressing     Problem: DISCHARGE PLANNING  Goal: Discharge to home or other facility with appropriate resources  Description: INTERVENTIONS:  - Identify barriers to discharge w/patient and caregiver  - Arrange for needed discharge resources and transportation as appropriate  - Identify discharge learning needs (meds, wound care, etc.)  - Arrange for interpretive services to assist at discharge as needed  - Refer to Case Management Department for coordinating discharge planning if the patient needs post-hospital services based on physician/advanced practitioner order or complex needs related to functional status, cognitive ability, or social support system  Outcome: Progressing

## 2024-04-24 NOTE — PHYSICAL THERAPY NOTE
PHYSICAL THERAPY SCREEN  NAME:  Shanda Quintanilla  DATE: 04/24/24    AGE:   79 y.o.  Mrn:   [id number]  ADMIT DX:  Numbness [R20.0]  Right arm numbness [R20.0]  Right arm weakness [R29.898]  Stroke-like symptoms [R29.90]  Problem List:   Patient Active Problem List   Diagnosis    Mixed hyperlipidemia    Osteoporosis    Seborrheic keratosis    Elevated C-reactive protein (CRP)    Episode of apnea    Impaired fasting glucose    Swelling of left lower extremity    Venous insufficiency of both lower extremities    Small subcortical Left MCA CVA,    Intracranial carotid stenosis, left    Carotid stenosis, symptomatic, with infarction (HCC)       Past Medical History  Past Medical History:   Diagnosis Date    Cervical strain     Changing skin lesion     Hyperlipidemia     MCI (mild cognitive impairment)     Mixed hyperlipidemia     Nonmelanoma skin cancer     BCC    Osteoporosis     Polio     Poor concentration     Stroke (HCC)     Swelling of left thumb        Past Surgical History  Past Surgical History:   Procedure Laterality Date    ANAL FISSURECTOMY      APPENDECTOMY      CATARACT EXTRACTION      COLONOSCOPY      HYSTERECTOMY      HYSTERECTOMY W/ SALPINGO-OOPHERECTOMY      VT TEAEC W/PATCH GRF CAROTID VERTB SUBCLAV NECK INC Left 4/16/2024    Procedure: ENDARTERECTOMY ARTERY CAROTID;  Surgeon: Nikolai Daniel MD;  Location: [facility];  Service: Vascular       Length Of Stay: 0  Performed at least 2 patient identifiers during session: Name and Birthday    PT orders received. Chart review performed. [facility] documented as 24 and per discussion with patients nurse, patient has been ambulatory in the room independently without AD. Patient greeted seated EOB and confirmed independence in room and states she does not have concerns related to strength, balance, mobility, and has no concerns related to d/c home. PT will complete orders, please re-consult if acute PT needs arise.     Shauna Gasca; PT, DPT

## 2024-04-24 NOTE — DISCHARGE SUMMARY
[facility]  Discharge- Shanda Quintanilla 1945, 79 y.o. female MRN: [id number]  Unit/Bed#: -01 Encounter: [id number]  Primary Care Provider: Talat Ventura MD   Date and time admitted to hospital: 4/23/2024  2:22 PM    Carotid stenosis, symptomatic, with infarction (HCC)  Assessment & Plan  Recent CVA 4/6/24 secondary to left carotid stenosis, now status post endarterectomy 4/26/23, on DAPT and statin therapy  No evidence of dissection or compromise to CEA  Continue aspirin, statin, plavix     Mixed hyperlipidemia  Assessment & Plan  Cholesterol panel and A1c assessed 4/6/24 during prior stroke   Given <30 days since assessment, no indication for repeat labs    * Stroke-like symptoms  Assessment & Plan  Presented with stroke-like symptoms of RUE paresthesias and heaviness and L pinky tingling with recent prior stroke (4/6/24) as well as recent L CEA on 4/16/24 on DAPT (aspirin/plavix) and statin. Stroke alert was initiated in ER.     CTH: no acute infarct  CTA head/neck: no acute occlusion; noted left cervical carotid endarterectomy with resolved stenosis   NIHSS at presentation 1 for LUE sensory deficit, no tPA/TNK given due to recent stroke <3 months ago     Stroke pathway initiated,  Neuro consukt appreciated  Telemetry monitor   Mri with no new stroke        Medical Problems       Resolved Problems  Date Reviewed: 4/24/2024   None       Discharging Physician / Practitioner: Omar Morales MD  PCP: Talat Ventura MD  Admission Date:   Admission Orders (From admission, onward)       Ordered        04/23/24 1604  Place in Observation  Once                          Discharge Date: 04/24/24    Consultations During Hospital Stay:  neurology    Procedures Performed:   MRI:Linear focus of restricted diffusion within the left posterior frontal lobe on series 4 image 26 is unchanged from the study performed 18 days ago, consistent with subacute infarct. No new ischemia is identified.    "  Minor white matter change suggestive of chronic microangiopathy.    Significant Findings / Test Results:   None    Incidental Findings:   none           Complications:  none    Reason for Admission: Strokelike symptoms with numbness and paresthesias    Hospital Course:   Shanda Quintanilla is a 79 y.o. female patient who originally presented to the hospital on 4/23/2024 due to numbness and tingling down the right arm    History presenting illness:Shanda Quintanilla is a 79 y.o. female with a PMH of recent punctate acute infarct in the L parietal region with severe carotid stenosis, dyslipidemia, cognitive impairment (mild), and polio who presents with stroke-like symptoms of acute onset this afternoon. Symptoms are improving at time of evaluation, only notes some sensory deficit in the left pinky now. Had RUE heaviness earlier. No speech problems.     Hospital course: Patient was admitted for above reasons.  She likely had some record essence of her previous stroke.  Nothing new noted on MRI.  Discussed with neurology and no changes to be made to the patient's medication regimen.  The patient was asking about staying on the statin and we advised her strongly to stay on it for now and then follow-up with her family doctor.  She should get a other fasting lipid profile in about 3 months.  Patient states she is feeling well today and has no complaints.  Patient denies any chest pain or shortness of breath.        Please see above list of diagnoses and related plan for additional information.     Condition at Discharge: good    Discharge Day Visit / Exam:   Subjective: Patient seen and examined.  No acute events reported.  Patient states she feels okay this morning  Vitals: Blood Pressure: 123/64 (04/24/24 0700)  Pulse: 70 (04/24/24 0700)  Temperature: (!) 97.4 °F (36.3 °C) (04/24/24 0700)  Temp Source: Oral (04/24/24 0700)  Respirations: 18 (04/24/24 0700)  Height: 5' 3\" (160 cm) (04/23/24 2058)  Weight - Scale: 64.5 kg (142 " lb 3.2 oz) (04/23/24 2058)  SpO2: 98 % (04/24/24 0700)  Exam:   Physical Exam   General Appearance:    Alert, cooperative, no distress, appears stated age                               Lungs:     Clear to auscultation bilaterally, respirations unlabored       Heart:    Regular rate and rhythm, S1 and S2 normal, no murmur, rub    or gallop   Abdomen:     Soft, non-tender, bowel sounds active all four quadrants,     no masses, no organomegaly           Extremities:   Extremities normal, atraumatic, no cyanosis or edema                         Discussion with Family: Updated  (significant other) via phone.    Discharge instructions/Information to patient and family:   See after visit summary for information provided to patient and family.      Provisions for Follow-Up Care:  See after visit summary for information related to follow-up care and any pertinent home health orders.      Mobility at time of Discharge:   Basic Mobility Inpatient Raw Score: 24  JH-HLM Goal: 8: Walk 250 feet or more  JH-HLM Achieved: 6: Walk 10 steps or more  HLM Goal NOT achieved. Continue to encourage mobility in post discharge setting.     Disposition:   Home    Planned Readmission: Not anticipated     Discharge Statement:  I spent 30 minutes discharging the patient. This time was spent on the day of discharge. I had direct contact with the patient on the day of discharge. Greater than 50% of the total time was spent examining patient, answering all patient questions, arranging and discussing plan of care with patient as well as directly providing post-discharge instructions.  Additional time then spent on discharge activities.    Discharge Medications:  See after visit summary for reconciled discharge medications provided to patient and/or family.      **Please Note: This note may have been constructed using a voice recognition system**

## 2024-04-24 NOTE — PLAN OF CARE
Problem: Neurological Deficit  Goal: Neurological status is stable or improving  Description: Interventions:  - Monitor and assess patient's level of consciousness, motor function, sensory function, and level of assistance needed for ADLs.   - Monitor and report changes from baseline. Collaborate with interdisciplinary team to initiate plan and implement interventions as ordered.   - Provide and maintain a safe environment.  - Consider seizure precautions.  - Consider fall precautions.  - Consider aspiration precautions.  - Consider bleeding precautions.  Outcome: Progressing     Problem: Activity Intolerance/Impaired Mobility  Goal: Mobility/activity is maintained at optimum level for patient  Description: Interventions:  - Assess and monitor patient  barriers to mobility and need for assistive/adaptive devices.  - Assess patient's emotional response to limitations.  - Collaborate with interdisciplinary team and initiate plans and interventions as ordered.  - Encourage independent activity per ability.  - Maintain proper body alignment.  - Perform active/passive rom as tolerated/ordered.  - Plan activities to conserve energy.  - Turn patient as appropriate  Outcome: Progressing     Problem: Communication Impairment  Goal: Ability to express needs and understand communication  Description: Assess patient's communication skills and ability to understand information.  Patient will demonstrate use of effective communication techniques, alternative methods of communication and understanding even if not able to speak.     - Encourage communication and provide alternate methods of communication as needed.  - Collaborate with case management/ for discharge needs.  - Include patient/family/caregiver in decisions related to communication.  Outcome: Progressing     Problem: Potential for Aspiration  Goal: Non-ventilated patient's risk of aspiration is minimized  Description: Assess and monitor vital signs,  respiratory status, and labs (WBC).  Monitor for signs of aspiration (tachypnea, cough, rales, wheezing, cyanosis, fever).    - Assess and monitor patient's ability to swallow.  - Place patient up in chair to eat if possible.  - HOB up at 90 degrees to eat if unable to get patient up into chair.  - Supervise patient during oral intake.   - Instruct patient/ family to take small bites.  - Instruct patient/ family to take small single sips when taking liquids.  - Follow patient-specific strategies generated by speech pathologist.  Outcome: Progressing  Goal: Ventilated patient's risk of aspiration is minimized  Description: Assess and monitor vital signs, respiratory status, airway cuff pressure, and labs (WBC).  Monitor for signs of aspiration (tachypnea, cough, rales, wheezing, cyanosis, fever).    - Elevate head of bed 30 degrees if patient has tube feeding.  - Monitor tube feeding.  Outcome: Progressing     Problem: Nutrition  Goal: Nutrition/Hydration status is improving  Description: Monitor and assess patient's nutrition/hydration status for malnutrition (ex- brittle hair, bruises, dry skin, pale skin and conjunctiva, muscle wasting, smooth red tongue, and disorientation). Collaborate with interdisciplinary team and initiate plan and interventions as ordered.  Monitor patient's weight and dietary intake as ordered or per policy. Utilize nutrition screening tool and intervene per policy. Determine patient's food preferences and provide high-protein, high-caloric foods as appropriate.     - Assist patient with eating.  - Allow adequate time for meals.  - Encourage patient to take dietary supplement as ordered.  - Collaborate with clinical nutritionist.  - Include patient/family/caregiver in decisions related to nutrition.  Outcome: Progressing     Problem: DISCHARGE PLANNING  Goal: Discharge to home or other facility with appropriate resources  Description: INTERVENTIONS:  - Identify barriers to discharge  w/patient and caregiver  - Arrange for needed discharge resources and transportation as appropriate  - Identify discharge learning needs (meds, wound care, etc.)  - Arrange for interpretive services to assist at discharge as needed  - Refer to Case Management Department for coordinating discharge planning if the patient needs post-hospital services based on physician/advanced practitioner order or complex needs related to functional status, cognitive ability, or social support system  Outcome: Progressing     Problem: Knowledge Deficit  Goal: Patient/family/caregiver demonstrates understanding of disease process, treatment plan, medications, and discharge instructions  Description: Complete learning assessment and assess knowledge base.  Interventions:  - Provide teaching at level of understanding  - Provide teaching via preferred learning methods  Outcome: Progressing

## 2024-04-24 NOTE — OCCUPATIONAL THERAPY NOTE
Occupational Therapy Screen         Patient Name: Shanda Quintanilla  Today's Date: 4/24/2024 04/24/24 1225   OT Last Visit   OT Visit Date 04/24/24   Note Type   Note type Screen   Additional Comments OT consult received and chart reviewed. Pt's [facility] noted to be 24. Per discussion with RN, pt is independent and ambulatory in the room. Pt also reports she is at her functional baseline and has no further OT needs at this time. Pt with no concerns at this time. Will complete orders. Please re-consult if pt has change in functional status         [place] OTR/L

## 2024-04-24 NOTE — PROGRESS NOTES
Progress Note - Neurology   Shanda Quintanilla 79 y.o. female [id number]  Unit/Bed#: /-01    Assessment:    * Stroke-like symptoms-resolved as of 4/24/2024  Assessment & Plan  78 y/o female with recent prior stroke (4/6/24), carotid stenosis s/p L CEA on 4/16/24 on DAPT, HLD, MCI, who presented with stroke-like symptoms of RUE paresthesias and heaviness and L pinky tingling. Stroke alert was initiated. BP on presentation 118/82. NIHSS 1 for decreased sensation on LUE. Patient was deemed not a TNK candidate due to recent stroke and resolving symptoms. Unclear etiology at this time- possibly recrudescence? Imaging without acute infarct noted. Patient reports resolution of symptoms at this time.    Workup:  - CT head: No acute intracranial abnormality.  - CTA head and neck: No acute vascular pathology in the head or neck. Changes of left cervical carotid endarterectomy compared to 4/6/2024. Resolved stenosis.  - MRI brain:  Linear focus of restricted diffusion within the left posterior frontal lobe on series 4 image 26 is unchanged from the study performed 18 days ago, consistent with subacute infarct. No new ischemia is identified.  Minor white matter change suggestive of chronic microangiopathy.  Stable sinus disease.  - 4/8/24 Echo: EF 60%. No regional wall motion abnormality. Bilateral atrium size normal.  - Labs: 4/6/24 A1C 5.7, 4/6/27     Plan:  - Stroke pathway  Continue home regimen of aspirin 81 mg and Plavix 75 mg  Atorvastatin 40 mg  Goal normotension; avoid hypotension  Continue telemetry  PT/OT/ST  Frequent neuro checks. Continue to monitor and notify neurology with any changes.   - Medical management and supportive care per primary team. Correction of any metabolic or infectious disturbances.  - No further inpatient neurology recommendations at this time. Please call with any questions.        Shanda Quintanilla will need follow up in 4-6 weeks with neurovascular attending/AP .  She will not  require outpatient neurological testing.     Case and treatment plan reviewed with attending neurologist, Dr. Winkler. Please see attending attestation for any further recommendations.    Subjective:   Patient seen and evaluated with attending neurologist. Patient reports her symptoms only lasted 1-2 minutes and have not recurred.        Past Medical History:   Diagnosis Date    Cervical strain     Changing skin lesion     Hyperlipidemia     MCI (mild cognitive impairment)     Mixed hyperlipidemia     Nonmelanoma skin cancer     BCC    Osteoporosis     Polio     Poor concentration     Stroke (HCC)     Swelling of left thumb      Past Surgical History:   Procedure Laterality Date    ANAL FISSURECTOMY      APPENDECTOMY      CATARACT EXTRACTION      COLONOSCOPY      HYSTERECTOMY      HYSTERECTOMY W/ SALPINGO-OOPHERECTOMY      NE TEAEC W/PATCH GRF CAROTID VERTB SUBCLAV NECK INC Left 4/16/2024    Procedure: ENDARTERECTOMY ARTERY CAROTID;  Surgeon: Nikolai Daniel MD;  Location: [facility];  Service: Vascular     Family History   Problem Relation Age of Onset    Heart attack Mother     Cancer Father     Heart attack Sister      Social History     Socioeconomic History    Marital status: /Civil Union     Spouse name: None    Number of children: None    Years of education: None    Highest education level: None   Occupational History    None   Tobacco Use    Smoking status: Former     Current packs/day: 2.00     Average packs/day: 2.0 packs/day for 20.0 years (40.0 ttl pk-yrs)     Types: Cigarettes    Smokeless tobacco: Never   Vaping Use    Vaping status: Never Used   Substance and Sexual Activity    Alcohol use: Yes     Comment: OCCASIONAL- WINE     Drug use: Not Currently    Sexual activity: None   Other Topics Concern    None   Social History Narrative    Per [employer]:     Self-employed          Social Determinants of Health     Financial Resource Strain: Low Risk  (8/14/2023)    Overall  Financial Resource Strain (CARDIA)     Difficulty of Paying Living Expenses: Not very hard   Food Insecurity: No Food Insecurity (4/18/2024)    Hunger Vital Sign     Worried About Running Out of Food in the Last Year: Never true     Ran Out of Food in the Last Year: Never true   Transportation Needs: No Transportation Needs (4/18/2024)    PRAPARE - Transportation     Lack of Transportation (Medical): No     Lack of Transportation (Non-Medical): No   Physical Activity: Not on file   Stress: Not on file   Social Connections: Not on file   Intimate Partner Violence: Not on file   Housing Stability: Low Risk  (4/18/2024)    Housing Stability Vital Sign     Unable to Pay for Housing in the Last Year: No     Number of Places Lived in the Last Year: 1     Unstable Housing in the Last Year: No         Medications:  All current active meds have been reviewed and current meds:  Scheduled Meds:  Current Facility-Administered Medications   Medication Dose Route Frequency Provider Last Rate    acetaminophen  650 mg Oral Q6H PRN Patsy E Mahnaz, PA-C      aluminum-magnesium hydroxide-simethicone  30 mL Oral Q6H PRN [place] Joya, PA-C      Artificial Tears  2 drop Both Eyes Q4H PRN Patsy E Mahnaz, PA-C      aspirin  81 mg Oral Daily Patsy Joya, PA-C      atorvastatin  40 mg Oral Daily With Dinner Patsy Joya, PA-C      clopidogrel  75 mg Oral Daily [place] Joya, PA-C      heparin (porcine)  5,000 Units Subcutaneous Q8H ALMAS P[place] Joya, PA-C      ondansetron  4 mg Intravenous Q6H PRN [place] Joya, PA-C      polyethylene glycol  17 g Oral Daily PRN [place] Joya, PA-C      senna-docusate sodium  2 tablet Oral BID P[place] Joya, PA-C       Continuous Infusions:   PRN Meds:.  acetaminophen    aluminum-magnesium hydroxide-simethicone    Artificial Tears    ondansetron    polyethylene glycol       ROS:   A 12 system ROS was completed. Other than the above mentioned complaints in the HPI and those commented on below, all  "remaining systems were negative.      Vitals:   /64 (BP Location: Left arm)   Pulse 70   Temp (!) 97.4 °F (36.3 °C) (Oral)   Resp 18   Ht 5' 3\" (1.6 m)   Wt 64.5 kg (142 lb 3.2 oz)   SpO2 98%   BMI 25.19 kg/m²       Physical and neurologic exam performed by attending neurologist:   Physical Exam:   Vital signs and nursing notes reviewed.     Constitutional: Appears comfortable. Well developed/well nourished. No diaphoresis. No acute distress.   HENT: Normocephalic, atraumatic. B/L external ears and nose appears normal. Oropharynx clear and moist.   Eyes: EOMs intact without nystagmus. No scleral icterus or injection. No discharge.   Neck: Incision site intact.  Cardiovascular: Regular rate.   Pulmonary: No respiratory distress. Effort normal. No stridor or wheezing evident.   Musculoskeletal: Normal ROM. No tenderness, edema, or deformities noted.  Neurological: Detailed below.   Skin: Warm and dry. No erythema or rashes. No jaundice.  Psychiatric: Normal mood and affect, normal thought process/thought content. No hallucinations. Good insight.     Neurologic Exam:  Mental Status: Patient is awake and alert. Oriented x 3. Follows all commands without difficulty. No dysarthria. No aphasia.     Cranial Nerves: Cranial nerves 2-12 intact.     Motor: 5/5 strength throughout bilateral upper and lower extremities.     Coordination: Finger to nose testing normal.     No tremors noted.     Gait: Deferred       Labs: I have personally reviewed pertinent reports.   Recent Results (from the past 24 hour(s))   Fingerstick Glucose (POCT)    Collection Time: 04/23/24  3:05 PM   Result Value Ref Range    POC Glucose 75 65 - 140 mg/dl   Basic metabolic panel    Collection Time: 04/23/24  3:14 PM   Result Value Ref Range    Sodium 137 135 - 147 mmol/L    Potassium 4.4 3.5 - 5.3 mmol/L    Chloride 106 96 - 108 mmol/L    CO2 24 21 - 32 mmol/L    ANION GAP 7 4 - 13 mmol/L    BUN 14 5 - 25 mg/dL    Creatinine 0.60 0.60 - " "1.30 mg/dL    Glucose 78 65 - 140 mg/dL    Calcium 8.6 8.4 - 10.2 mg/dL    eGFR 86 ml/min/1.73sq m   CBC and Platelet    Collection Time: 04/23/24  3:14 PM   Result Value Ref Range    WBC 7.38 4.31 - 10.16 Thousand/uL    RBC 4.13 3.81 - 5.12 Million/uL    Hemoglobin 12.4 11.5 - 15.4 g/dL    Hematocrit 38.2 34.8 - 46.1 %    MCV 93 82 - 98 fL    MCH 30.0 26.8 - 34.3 pg    MCHC 32.5 31.4 - 37.4 g/dL    RDW 13.6 11.6 - 15.1 %    Platelets 305 149 - 390 Thousands/uL    MPV 9.0 8.9 - 12.7 fL   Protime-INR    Collection Time: 04/23/24  3:14 PM   Result Value Ref Range    Protime 12.5 11.6 - 14.5 seconds    INR 0.88 0.84 - 1.19   APTT    Collection Time: 04/23/24  3:14 PM   Result Value Ref Range    PTT 19 (L) 23 - 37 seconds   HS Troponin 0hr (reflex protocol)    Collection Time: 04/23/24  3:14 PM   Result Value Ref Range    hs TnI 0hr 3 \"Refer to ACS Flowchart\"- see link ng/L   TSH, 3rd generation    Collection Time: 04/23/24  3:14 PM   Result Value Ref Range    TSH 3RD GENERATON 0.843 0.450 - 4.500 uIU/mL   HS Troponin I 2hr    Collection Time: 04/23/24  5:25 PM   Result Value Ref Range    hs TnI 2hr 3 \"Refer to ACS Flowchart\"- see link ng/L    Delta 2hr hsTnI 0 <20 ng/L   HS Troponin I 4hr    Collection Time: 04/23/24  7:24 PM   Result Value Ref Range    hs TnI 4hr 4 \"Refer to ACS Flowchart\"- see link ng/L    Delta 4hr hsTnI 1 <20 ng/L   Lipid Panel with Direct LDL reflex    Collection Time: 04/24/24  5:05 AM   Result Value Ref Range    Cholesterol 150 See Comment mg/dL    Triglycerides 73 See Comment mg/dL    HDL, Direct 64 >=50 mg/dL    LDL Calculated 71 0 - 100 mg/dL   Hemoglobin A1c w/EAG Estimation    Collection Time: 04/24/24  5:05 AM   Result Value Ref Range    Hemoglobin A1C 5.8 (H) Normal 4.0-5.6%; PreDiabetic 5.7-6.4%; Diabetic >=6.5%; Glycemic control for adults with diabetes <7.0% %     mg/dl   CBC and differential    Collection Time: 04/24/24  5:05 AM   Result Value Ref Range    WBC 4.99 4.31 - 10.16 " Thousand/uL    RBC 3.75 (L) 3.81 - 5.12 Million/uL    Hemoglobin 11.2 (L) 11.5 - 15.4 g/dL    Hematocrit 34.4 (L) 34.8 - 46.1 %    MCV 92 82 - 98 fL    MCH 29.9 26.8 - 34.3 pg    MCHC 32.6 31.4 - 37.4 g/dL    RDW 13.7 11.6 - 15.1 %    MPV 8.9 8.9 - 12.7 fL    Platelets 274 149 - 390 Thousands/uL    nRBC 0 /100 WBCs    Segmented % 48 43 - 75 %    Immature Grans % 1 0 - 2 %    Lymphocytes % 34 14 - 44 %    Monocytes % 11 4 - 12 %    Eosinophils Relative 5 0 - 6 %    Basophils Relative 1 0 - 1 %    Absolute Neutrophils 2.44 1.85 - 7.62 Thousands/µL    Absolute Immature Grans 0.03 0.00 - 0.20 Thousand/uL    Absolute Lymphocytes 1.67 0.60 - 4.47 Thousands/µL    Absolute Monocytes 0.57 0.17 - 1.22 Thousand/µL    Eosinophils Absolute 0.25 0.00 - 0.61 Thousand/µL    Basophils Absolute 0.03 0.00 - 0.10 Thousands/µL   Comprehensive metabolic panel    Collection Time: 04/24/24  5:05 AM   Result Value Ref Range    Sodium 139 135 - 147 mmol/L    Potassium 3.8 3.5 - 5.3 mmol/L    Chloride 108 96 - 108 mmol/L    CO2 26 21 - 32 mmol/L    ANION GAP 5 4 - 13 mmol/L    BUN 15 5 - 25 mg/dL    Creatinine 0.59 (L) 0.60 - 1.30 mg/dL    Glucose 111 65 - 140 mg/dL    Glucose, Fasting 111 (H) 65 - 99 mg/dL    Calcium 8.8 8.4 - 10.2 mg/dL    AST 28 13 - 39 U/L    ALT 36 7 - 52 U/L    Alkaline Phosphatase 61 34 - 104 U/L    Total Protein 5.9 (L) 6.4 - 8.4 g/dL    Albumin 3.5 3.5 - 5.0 g/dL    Total Bilirubin 0.42 0.20 - 1.00 mg/dL    eGFR 87 ml/min/1.73sq m   Magnesium    Collection Time: 04/24/24  5:05 AM   Result Value Ref Range    Magnesium 2.0 1.9 - 2.7 mg/dL       Imaging: I have personally reviewed pertinent imaging in PACS, and I have personally reviewed PACS reports.     EKG, Pathology, and Other Studies: I have personally reviewed pertinent reports.       VTE Prophylaxis: Sequential compression device (Venodyne)  and Heparin

## 2024-04-24 NOTE — UTILIZATION REVIEW
Initial Clinical Review    Admission: Date/Time/Statement:   Admission Orders (From admission, onward)       Ordered        04/23/24 1604  Place in Observation  Once                          Orders Placed This Encounter   Procedures    Place in Observation     Standing Status:   Standing     Number of Occurrences:   1     Order Specific Question:   Level of Care     Answer:   Med Surg [16]     ED Arrival Information       Expected   -    Arrival   4/23/2024 14:15    Acuity   Emergent              Means of arrival   Walk-In    Escorted by   Family Member    Service   Hospitalist    Admission type   Emergency              Arrival complaint   NUMBNESS             Chief Complaint   Patient presents with    Numbness     Pt. Presents to ER with c/o numbness to right arm and left fifth digit.   Pt. States symptoms started at 1345 while she was riding in a car.        Initial Presentation: 79 y.o. female to the ED from home with complaints of numbness to right arm. Admitted under observation for stroke like symptoms.  H/O f recent punctate acute infarct in the L parietal region with severe carotid stenosis, dyslipidemia, cognitive impairment (mild), and polio. S/P   CEA April 16 2024.  Transient tingling left pinky. Stroke alert on arrival to the ED.  CT head, CTA head and neck show no acute findings.  GCs 15.  As per neurology, not a TNK candidate as h/o recent stroke, resolving symptoms. NIHSS 1. Monitor tele.     Anticipated Length of Stay/Certification Statement: Anticipated Length of Stay: Patient will be admitted on an observation basis with an anticipated length of stay of less than 2 midnights secondary to CVA rule out.     Date:     Day 2:      ED Triage Vitals [04/23/24 1430]   Temperature Pulse Respirations Blood Pressure SpO2   97.8 °F (36.6 °C) 82 18 118/82 100 %      Temp Source Heart Rate Source Patient Position - Orthostatic VS BP Location FiO2 (%)   Oral Monitor Sitting Right arm --      Pain Score        No Pain          Wt Readings from Last 1 Encounters:   04/23/24 64.5 kg (142 lb 3.2 oz)     Additional Vital Signs: Vital Signs (last 2 days)    Date/Time Temp Pulse Resp BP MAP (mmHg) SpO2 O2 Device Patient Position - Orthostatic VS   04/24/24 0700 97.4 °F (36.3 °C) Abnormal  70 18 123/64 84 98 % None (Room air) Lying   04/24/24 0500 -- 68 18 106/47 Abnormal  67 -- -- Lying   04/24/24 0300 -- 80 18 121/51 74 -- -- Lying   04/24/24 0100 97.7 °F (36.5 °C) 79 18 125/62 83 -- -- Lying   04/23/24 2258 98 °F (36.7 °C) 77 18 123/63 83 99 % None (Room air) Lying   04/23/24 2100 97.7 °F (36.5 °C) 91 18 129/67 88 99 % None (Room air) Sitting   04/23/24 2000 97.9 °F (36.6 °C) 93 20 132/65 87 99 % None (Room air) Sitting   04/23/24 1900 98.1 °F (36.7 °C) 86 20 146/65 92 96 % None (Room air) Sitting   04/23/24 1745 98 °F (36.7 °C) 86 20 148/65 93 98 % None (Room air) Sitting   04/23/24 1715 -- 81 24 Abnormal  127/61 -- 99 % None (Room air) Lying   04/23/24 1700 97.8 °F (36.6 °C) 80 18 120/57 -- 100 % None (Room air) Sitting   04/23/24 1615 -- 87 -- 121/58 -- 98 % -- --   04/23/24 1610 -- 86 -- -- -- 98 % -- --   04/23/24 1605 -- 84 19 -- -- 99 % -- --   04/23/24 1600 -- 85 18 128/61 -- 99 % None (Room air) --   04/23/24 1555 -- 83 -- -- -- 100 % -- --   04/23/24 1550 -- 87 -- -- -- 98 % -- --   04/23/24 1545 -- 86 18 118/57 81 97 % None (Room air) Lying   04/23/24 1540 -- 83 -- -- -- 98 % -- --   04/23/24 1535 -- 79 -- -- -- 99 % -- --   04/23/24 1530 -- 79 18 140/65 -- 100 % None (Room air) Sitting   04/23/24 1525 -- 80 -- -- -- 100 % -- --   04/23/24 1520 -- 86 14 -- -- 100 % -- --   04/23/24 1515 -- 88 18 156/67 -- 100 % None (Room air) Sitting   04/23/24 1510 -- 92 12 -- -- 99 % -- --   04/23/24 1507 -- -- -- 152/68 -- -- -- --   04/23/24 1500 -- 93 18 152/68 -- 99 % None (Room air) Sitting   04/23/24 1430 97.8 °F (36.6 °C) 82 18 118/82 -- 100 % None (Room air) Sitting     Pertinent Labs/Diagnostic Test Results:   MRI  brain wo contrast   Final Result by Moise Araya DO (04/23 1956)      Linear focus of restricted diffusion within the left posterior frontal lobe on series 4 image 26 is unchanged from the study performed 18 days ago, consistent with subacute infarct. No new ischemia is identified.      Minor white matter change suggestive of chronic microangiopathy.      Stable sinus disease.      Workstation performed: [id number]         CTA stroke alert (head/neck)   Final Result by Danielle Villatoro MD (04/23 1511)      No acute vascular pathology in the head or neck.      Changes of left cervical carotid endarterectomy compared to 4/6/2024. Resolved stenosis.               I personally discussed this study with Sundeep Venturacandida on 4/23/2024 3:10 PM.                                          Workstation performed: [id number]         CT stroke alert brain   Final Result by Danielle Villatoro MD (04/23 1511)      No acute intracranial abnormality.  Nonemergent findings above.         IMPRESSION:      No acute intracranial abnormality.      Nonemergent findings above.      I personally discussed this study with Sundeep  on 4/23/2024 3:10 PM.         Workstation performed: [id number]         X-ray chest 1 view portable    (Results Pending)         Results from last 7 days   Lab Units 04/24/24  0505 04/23/24  1514 04/19/24  0248 04/18/24  0421   WBC Thousand/uL 4.99 7.38 6.73 7.29   HEMOGLOBIN g/dL 11.2* 12.4 11.7 10.6*   HEMATOCRIT % 34.4* 38.2 37.6 33.3*   PLATELETS Thousands/uL 274 305 280 283   TOTAL NEUT ABS Thousands/µL 2.44  --   --   --          Results from last 7 days   Lab Units 04/24/24  0505 04/23/24  1514 04/19/24  0248 04/18/24  0421   SODIUM mmol/L 139 137 138 139   POTASSIUM mmol/L 3.8 4.4 3.9 4.0   CHLORIDE mmol/L 108 106 107 110*   CO2 mmol/L 26 24 21 23   ANION GAP mmol/L 5 7 10 6   BUN mg/dL 15 14 13 12   CREATININE mg/dL 0.59* 0.60 0.68 0.61   EGFR ml/min/1.73sq m 87 86 83 86   CALCIUM mg/dL 8.8 8.6 8.9 8.5    MAGNESIUM mg/dL 2.0  --   --   --      Results from last 7 days   Lab Units 04/24/24  0505   AST U/L 28   ALT U/L 36   ALK PHOS U/L 61   TOTAL PROTEIN g/dL 5.9*   ALBUMIN g/dL 3.5   TOTAL BILIRUBIN mg/dL 0.42     Results from last 7 days   Lab Units 04/23/24  1505   POC GLUCOSE mg/dl 75     Results from last 7 days   Lab Units 04/24/24  0505 04/23/24  1514 04/19/24  0248 04/18/24  0421   GLUCOSE RANDOM mg/dL 111 78 100 108     Results from last 7 days   Lab Units 04/23/24  1924 04/23/24  1725 04/23/24  1514   HS TNI 0HR ng/L  --   --  3   HS TNI 2HR ng/L  --  3  --    HSTNI D2 ng/L  --  0  --    HS TNI 4HR ng/L 4  --   --    HSTNI D4 ng/L 1  --   --          Results from last 7 days   Lab Units 04/23/24  1514   PROTIME seconds 12.5   INR  0.88   PTT seconds 19*     Results from last 7 days   Lab Units 04/23/24  1514   TSH 3RD GENERATON uIU/mL 0.843       ED Treatment:   Medication Administration from 04/23/2024 1415 to 04/23/2024 1751         Date/Time Order Dose Route Action Comments     04/23/2024 1724 EDT atorvastatin (LIPITOR) tablet 40 mg 40 mg Oral Given --     04/23/2024 1725 EDT heparin (porcine) subcutaneous injection 5,000 Units 5,000 Units Subcutaneous Not Given --          Past Medical History:   Diagnosis Date    Cervical strain     Changing skin lesion     Hyperlipidemia     MCI (mild cognitive impairment)     Mixed hyperlipidemia     Nonmelanoma skin cancer     BCC    Osteoporosis     Polio     Poor concentration     Stroke (HCC)     Swelling of left thumb        Admitting Diagnosis: Numbness [R20.0]  Right arm numbness [R20.0]  Right arm weakness [R29.898]  Stroke-like symptoms [R29.90]  Age/Sex: 79 y.o. female  Admission Orders:  Scheduled Medications:  aspirin, 81 mg, Oral, Daily  atorvastatin, 40 mg, Oral, Daily With Dinner  clopidogrel, 75 mg, Oral, Daily  heparin (porcine), 5,000 Units, Subcutaneous, Q8H ALMAS  senna-docusate sodium, 2 tablet, Oral, BID      Continuous IV Infusions:     PRN  Meds:  acetaminophen, 650 mg, Oral, Q6H PRN  aluminum-magnesium hydroxide-simethicone, 30 mL, Oral, Q6H PRN  Artificial Tears, 2 drop, Both Eyes, Q4H PRN  ondansetron, 4 mg, Intravenous, Q6H PRN  polyethylene glycol, 17 g, Oral, Daily PRN        IP CONSULT TO NEUROLOGY  IP CONSULT TO CASE MANAGEMENT  IP CONSULT TO NUTRITION SERVICES    Network Utilization Review Department  ATTENTION: Please call with any questions or concerns to [phone] and carefully listen to the prompts so that you are directed to the right person. All voicemails are confidential.   For Discharge needs, contact Care Management DC Support Team at [phone] opt. 2  Send all requests for admission clinical reviews, approved or denied determinations and any other requests to dedicated fax number below belonging to the campus where the patient is receiving treatment. List of dedicated fax numbers for the Facilities:  FACILITY NAME UR FAX NUMBER   ADMISSION DENIALS (Administrative/Medical Necessity) [phone]   DISCHARGE SUPPORT TEAM (NETWORK) [phone]   PARENT CHILD HEALTH (Maternity/NICU/Pediatrics) [phone]   [facility] [phone]   [facility] [phone]   [facility] [phone]   [facility] [phone]   Critical access hospital [phone]   [facility] [phone]   [facility] [phone]   [facility] [phone]   [facility] [phone]   [facility] [phone]   [facility] [phone]   [facility] [phone]

## 2024-04-24 NOTE — SPEECH THERAPY NOTE
Speech Language/Pathology      Patient passed nursing dysphagia screen; presently tolerating oral diet.  Need for formal evaluation of swallow function is not indicated at this time.  Please re-order should status change or concerns arise.     Melissa Dunham MS, CCC-SLP  Speech-Language Pathologist  [place] #[id number]  [place] #[id number]

## 2024-04-30 ENCOUNTER — OFFICE VISIT (OUTPATIENT)
Dept: FAMILY MEDICINE CLINIC | Facility: CLINIC | Age: 79
End: 2024-04-30
Payer: COMMERCIAL

## 2024-04-30 VITALS
WEIGHT: 143.4 LBS | HEIGHT: 63 IN | TEMPERATURE: 97.8 F | BODY MASS INDEX: 25.41 KG/M2 | HEART RATE: 86 BPM | DIASTOLIC BLOOD PRESSURE: 68 MMHG | SYSTOLIC BLOOD PRESSURE: 122 MMHG

## 2024-04-30 DIAGNOSIS — R10.12 LEFT UPPER QUADRANT ABDOMINAL PAIN: ICD-10-CM

## 2024-04-30 DIAGNOSIS — I63.239 CAROTID STENOSIS, SYMPTOMATIC, WITH INFARCTION (HCC): ICD-10-CM

## 2024-04-30 DIAGNOSIS — I63.9 LEFT SIDED CEREBRAL HEMISPHERE CEREBROVASCULAR ACCIDENT (CVA) (HCC): Primary | ICD-10-CM

## 2024-04-30 PROCEDURE — 99495 TRANSJ CARE MGMT MOD F2F 14D: CPT | Performed by: FAMILY MEDICINE

## 2024-04-30 PROCEDURE — 1111F DSCHRG MED/CURRENT MED MERGE: CPT | Performed by: FAMILY MEDICINE

## 2024-04-30 NOTE — PROGRESS NOTES
Name: Shanda Quintanilla      : 1945      MRN: [id number]  Encounter Provider: Talat Ventura MD  Encounter Date: 2024   Encounter department: [facility]    Assessment & Plan     1. Small subcortical Left MCA CVA,  Continue aspirin and Plavix  I did explain to her that I recommend for her to take statin to prevent another stroke or heart attack  However she would like to stop taking this at this time.  -     Cardio IQ(R) Advanced Lipid Panel; Future  -     Ambulatory Referral to Neurology; Future    2. Carotid stenosis, symptomatic, with infarction (HCC)  -     Cardio IQ(R) Advanced Lipid Panel; Future  -     Ambulatory Referral to Neurology; Future    3. Left upper quadrant abdominal pain  -     US left upper quadrant; Future; Expected date: 2024    F/U in 6 months       Subjective     TCM [facility]  to     Shanda Quintanilla is a 79 y.o. female patient who originally presented to the hospital on 2024 due to numbness and tingling down the right arm     History presenting illness:Shanda Quintanilla is a 79 y.o. female with a PMH of recent punctate acute infarct in the L parietal region with severe carotid stenosis, dyslipidemia, cognitive impairment (mild), and polio who presents with stroke-like symptoms of acute onset this afternoon. Symptoms are improving at time of evaluation, only notes some sensory deficit in the left pinky now. Had RUE heaviness earlier. No speech problems.      Hospital course: Patient was admitted for above reasons.  She likely had some record essence of her previous stroke.  Nothing new noted on MRI.  Discussed with neurology and no changes to be made to the patient's medication regimen.  The patient was asking about staying on the statin and we advised her strongly to stay on it for now and then follow-up with her family doctor.  She should get a other fasting lipid profile in about 3 months.  Patient states she is feeling well today  and has no complaints.  Patient denies any chest pain or shortness of breath.  She states that her symptoms are now resolved.  She is taking both plavix and aspirin does not want to take statin however given potential side effects.  Also has been having LUQ pain denies any relationship with foods or breathing. No trauma to the area.        Review of Systems   Constitutional:  Negative for activity change, appetite change, fatigue and fever.   HENT:  Negative for congestion and ear discharge.    Respiratory:  Negative for cough and shortness of breath.    Cardiovascular:  Negative for chest pain and palpitations.   Gastrointestinal:  Positive for abdominal pain. Negative for diarrhea and nausea.   Musculoskeletal:  Negative for arthralgias and back pain.   Skin:  Negative for color change and rash.   Neurological:  Negative for dizziness and headaches.   Psychiatric/Behavioral:  Negative for agitation and behavioral problems.        Past Medical History:   Diagnosis Date    Cervical strain     Changing skin lesion     Hyperlipidemia     MCI (mild cognitive impairment)     Mixed hyperlipidemia     Nonmelanoma skin cancer     BCC    Osteoporosis     Polio     Poor concentration     Stroke (HCC)     Swelling of left thumb      Past Surgical History:   Procedure Laterality Date    ANAL FISSURECTOMY      APPENDECTOMY      CATARACT EXTRACTION      COLONOSCOPY      HYSTERECTOMY      HYSTERECTOMY W/ SALPINGO-OOPHERECTOMY      VA TEAEC W/PATCH GRF CAROTID VERTB SUBCLAV NECK INC Left 4/16/2024    Procedure: ENDARTERECTOMY ARTERY CAROTID;  Surgeon: Nikolai Daniel MD;  Location: [facility];  Service: Vascular     Family History   Problem Relation Age of Onset    Heart attack Mother     Cancer Father     Heart attack Sister      Social History     Socioeconomic History    Marital status: /Civil Union     Spouse name: None    Number of children: None    Years of education: None    Highest education level: None    Occupational History    None   Tobacco Use    Smoking status: Former     Current packs/day: 2.00     Average packs/day: 2.0 packs/day for 20.0 years (40.0 ttl pk-yrs)     Types: Cigarettes    Smokeless tobacco: Never   Vaping Use    Vaping status: Never Used   Substance and Sexual Activity    Alcohol use: Yes     Comment: OCCASIONAL- WINE     Drug use: Not Currently    Sexual activity: None   Other Topics Concern    None   Social History Narrative    Per [employer]:     Self-employed          Social Determinants of Health     Financial Resource Strain: Low Risk  (8/14/2023)    Overall Financial Resource Strain (CARDIA)     Difficulty of Paying Living Expenses: Not very hard   Food Insecurity: No Food Insecurity (4/24/2024)    Hunger Vital Sign     Worried About Running Out of Food in the Last Year: Never true     Ran Out of Food in the Last Year: Never true   Transportation Needs: No Transportation Needs (4/24/2024)    PRAPARE - Transportation     Lack of Transportation (Medical): No     Lack of Transportation (Non-Medical): No   Physical Activity: Not on file   Stress: Not on file   Social Connections: Not on file   Intimate Partner Violence: Not on file   Housing Stability: Low Risk  (4/24/2024)    Housing Stability Vital Sign     Unable to Pay for Housing in the Last Year: No     Number of Places Lived in the Last Year: 1     Unstable Housing in the Last Year: No     Current Outpatient Medications on File Prior to Visit   Medication Sig    acetaminophen (TYLENOL) 325 mg tablet Take 2 tablets (650 mg total) by mouth every 6 (six) hours as needed for mild pain    Alpha-Lipoic Acid (LIPOIC ACID PO) Take 1 capsule by mouth    Amino Acids (L-CARNITINE PO) Take 1 tablet by mouth    Artificial Tear Solution (JUST TEARS EYE DROPS OP) Apply to eye    Ascorbic Acid (VITAMIN C) 1000 MG tablet Take 1,000 mg by mouth daily     aspirin (ECOTRIN LOW STRENGTH) 81 mg EC tablet Take 1 tablet (81 mg total) by mouth daily  "   Calcium Carb-Cholecalciferol 1000-800 MG-UNIT TABS Take by mouth    Chelated Magnesium 100 MG TABS Take by mouth Daily    Cholecalciferol (VITAMIN D3) 5000 units CAPS Take 1 capsule by mouth daily    clopidogrel (PLAVIX) 75 mg tablet Take 1 tablet (75 mg total) by mouth daily    Glycerylphosphorylcholine POWD 1 tablet by Does not apply route    Iodine, Kelp, (KELP PO) Take by mouth daily    L-Carnosine POWD by Does not apply route    Menaquinone-7 (VITAMIN K2) 100 MCG CAPS     Misc Natural Products (GLUCOSAMINE CHOND CMP TRIPLE PO) Take by mouth    Misc Natural Products (PANTETHINE PLUS) TABS Take by mouth    NATURAL VITAMIN E MOISTURIZING GEL Apply topically    Omega-3 Fatty Acids (FISH OIL) 1200 MG CAPS Take 1 capsule by mouth daily     PANTETHINE PO Take by mouth    patient supplied medication     PREBIOTIC PRODUCT PO Take by mouth    Probiotic Product (PROBIOTIC-10) CAPS Take by mouth    pyridoxine (VITAMIN B6) 100 mg tablet 1 tab(s)    Turmeric POWD 1    Vinpocetine POWD by Does not apply route    vitamin B-12 (CYANOCOBALAMIN) 100 MCG tablet Take by mouth every other day     vitamin E, tocopherol, 1,000 units capsule Take by mouth daily     Zinc 30 MG CAPS Take 1 capsule by mouth daily    atorvastatin (LIPITOR) 40 mg tablet Take 1 tablet (40 mg total) by mouth daily with dinner (Patient not taking: Reported on 4/30/2024)     Allergies   Allergen Reactions    Sulfa Antibiotics Other (See Comments)     Immunization History   Administered Date(s) Administered    Influenza Split High Dose Preservative Free IM 11/07/2013, 10/28/2014    Pneumococcal Polysaccharide PPV23 10/28/2014    Tetanus, adsorbed 12/20/2013    Zoster 11/03/2014       Objective     /68 (BP Location: Left arm, Patient Position: Sitting)   Pulse 86   Temp 97.8 °F (36.6 °C) (Temporal)   Ht 5' 3\" (1.6 m)   Wt 65 kg (143 lb 6.4 oz)   PF 98 L/min   BMI 25.40 kg/m²     Physical Exam  Constitutional:       General: She is not in acute " distress.     Appearance: She is well-developed. She is not diaphoretic.   HENT:      Head: Normocephalic and atraumatic.      Nose: Nose normal.   Eyes:      Conjunctiva/sclera: Conjunctivae normal.      Pupils: Pupils are equal, round, and reactive to light.   Cardiovascular:      Rate and Rhythm: Normal rate and regular rhythm.      Heart sounds: Normal heart sounds. No murmur heard.  Pulmonary:      Effort: Pulmonary effort is normal. No respiratory distress.      Breath sounds: Normal breath sounds. No wheezing.   Abdominal:      General: Bowel sounds are normal. There is no distension.      Palpations: Abdomen is soft.      Tenderness: There is no abdominal tenderness.   Skin:     General: Skin is warm and dry.      Findings: No erythema or rash.   Neurological:      Mental Status: She is alert and oriented to person, place, and time.      Coordination: Coordination normal.       Talat Ventura MD

## 2024-05-01 ENCOUNTER — APPOINTMENT (OUTPATIENT)
Dept: LAB | Facility: CLINIC | Age: 79
End: 2024-05-01
Payer: COMMERCIAL

## 2024-05-01 DIAGNOSIS — I63.239 CAROTID STENOSIS, SYMPTOMATIC, WITH INFARCTION (HCC): ICD-10-CM

## 2024-05-01 DIAGNOSIS — I63.9 LEFT SIDED CEREBRAL HEMISPHERE CEREBROVASCULAR ACCIDENT (CVA) (HCC): ICD-10-CM

## 2024-05-01 PROCEDURE — 83704 LIPOPROTEIN BLD QUAN PART: CPT | Performed by: FAMILY MEDICINE

## 2024-05-01 PROCEDURE — 82172 ASSAY OF APOLIPOPROTEIN: CPT | Performed by: FAMILY MEDICINE

## 2024-05-01 PROCEDURE — 83695 ASSAY OF LIPOPROTEIN(A): CPT | Performed by: FAMILY MEDICINE

## 2024-05-01 PROCEDURE — 80061 LIPID PANEL: CPT

## 2024-05-02 ENCOUNTER — OFFICE VISIT (OUTPATIENT)
Dept: VASCULAR SURGERY | Facility: CLINIC | Age: 79
End: 2024-05-02

## 2024-05-02 VITALS
BODY MASS INDEX: 25.3 KG/M2 | DIASTOLIC BLOOD PRESSURE: 72 MMHG | HEART RATE: 73 BPM | WEIGHT: 142.8 LBS | SYSTOLIC BLOOD PRESSURE: 118 MMHG | HEIGHT: 63 IN | OXYGEN SATURATION: 98 %

## 2024-05-02 DIAGNOSIS — I63.20 CEREBRAL INFARCTION DUE TO UNSPECIFIED OCCLUSION OR STENOSIS OF UNSPECIFIED PRECEREBRAL ARTERIES (HCC): ICD-10-CM

## 2024-05-02 DIAGNOSIS — I63.239 CAROTID STENOSIS, SYMPTOMATIC, WITH INFARCTION (HCC): ICD-10-CM

## 2024-05-02 DIAGNOSIS — E78.2 MIXED HYPERLIPIDEMIA: Primary | ICD-10-CM

## 2024-05-02 DIAGNOSIS — I65.22 STENOSIS OF LEFT INTERNAL CAROTID ARTERY: ICD-10-CM

## 2024-05-02 PROCEDURE — 99024 POSTOP FOLLOW-UP VISIT: CPT | Performed by: NURSE PRACTITIONER

## 2024-05-02 RX ORDER — CLOPIDOGREL BISULFATE 75 MG/1
75 TABLET ORAL DAILY
Qty: 30 TABLET | Refills: 3 | Status: SHIPPED | OUTPATIENT
Start: 2024-05-02

## 2024-05-02 RX ORDER — ATORVASTATIN CALCIUM 40 MG/1
20 TABLET, FILM COATED ORAL
Qty: 30 TABLET | Refills: 3 | Status: SHIPPED | OUTPATIENT
Start: 2024-05-02

## 2024-05-02 NOTE — ASSESSMENT & PLAN NOTE
-Lengthy discussion with patient on statin therapy  -Advised to continue statin therapy due to significant improvement in cholesterol with statin therapy postoperatively  near 100 point reduction of LDL   -She prefers dietary modifications  -We discussed potentially lowering the dose of atorvastatin to 20 mg nightly to minimize side effects  -Will await results of lipid panel she had done yesterday at Dr. Ventura's office       Latest Reference Range & Units 04/06/24 16:01 04/24/24 05:05   Cholesterol See Comment mg/dL 267 (H) 150   Triglycerides See Comment mg/dL 138 73   HDL >=50 mg/dL 71 64   LDL Calculated 0 - 100 mg/dL 168 (H) 71   (H): Data is abnormally high

## 2024-05-02 NOTE — ASSESSMENT & PLAN NOTE
79-year-old female with HLD, BLE venous insufficiency, hx of polio, left hemispheric CVA secondary to symptomatic left carotid stenosis s/p left carotid endarterectomy with bovine patch angioplasty by Dr. Daniel 4/16/2024 with post operative hypotension, discharged POD 3     Patient presented to the ED 4/23/24 with concerns for recurrent right upper extremity weakness and left pinky sensory deficit, stroke alert initiated with no new MRI findings, CTA neck with post operative changes. Neurology recommended continued DAPT and statin therapy     Patient presents today for post operative visit     -Incision healing well  -No recurrent strokelike symptoms  -Continue aspirin and Plavix  -Patient was experiencing constipation and muscle weakness she felt related to Lipitor 40 mg.  She stopped Lipitor 2 days ago. Had BM with nyla. We had a lengthy discussion about statin therapy and reviewed most recent lipid panel which is significant improvement with initiation of statin therapy.  She is adamant not to resume statin therapy. We discussed potentially trialing atorvastatin 20 mg (half dose) and see if this minimizes side effects.  Will await results of LDL ratio done yesterday Dr. Ventura's office  -Recommended Mediterranean diet  -Carotid duplex in 3 months postop  -Follow-up with Dr. Sanchez after carotid duplex  -Postoperative VQI visit in 9 months

## 2024-05-02 NOTE — PROGRESS NOTES
Assessment/Plan:    Carotid stenosis, symptomatic, with infarction (HCC)  79-year-old female with HLD, BLE venous insufficiency, hx of polio, left hemispheric CVA secondary to symptomatic left carotid stenosis s/p left carotid endarterectomy with bovine patch angioplasty by Dr. Daniel 4/16/2024 with post operative hypotension, discharged POD 3     Patient presented to the ED 4/23/24 with concerns for recurrent right upper extremity weakness and left pinky sensory deficit, stroke alert initiated with no new MRI findings, CTA neck with post operative changes. Neurology recommended continued DAPT and statin therapy     Patient presents today for post operative visit     -Incision healing well  -No recurrent strokelike symptoms  -Continue aspirin and Plavix  -Patient was experiencing constipation and muscle weakness she felt related to Lipitor 40 mg.  She stopped Lipitor 2 days ago. Had BM with nyla. We had a lengthy discussion about statin therapy and reviewed most recent lipid panel which is significant improvement with initiation of statin therapy.  She is adamant not to resume statin therapy. We discussed potentially trialing atorvastatin 20 mg (half dose) and see if this minimizes side effects.  Will await results of LDL ratio done yesterday Dr. Ventura's office  -Recommended Mediterranean diet  -Carotid duplex in 3 months postop  -Follow-up with Dr. Sanchez after carotid duplex  -Postoperative VQI visit in 9 months    Mixed hyperlipidemia  -Lengthy discussion with patient on statin therapy  -Advised to continue statin therapy due to significant improvement in cholesterol with statin therapy postoperatively  near 100 point reduction of LDL   -She prefers dietary modifications  -We discussed potentially lowering the dose of atorvastatin to 20 mg nightly to minimize side effects  -Will await results of lipid panel she had done yesterday at Dr. Ventura's office       Latest Reference Range & Units 04/06/24 16:01  04/24/24 05:05   Cholesterol See Comment mg/dL 267 (H) 150   Triglycerides See Comment mg/dL 138 73   HDL >=50 mg/dL 71 64   LDL Calculated 0 - 100 mg/dL 168 (H) 71   (H): Data is abnormally high       Diagnoses and all orders for this visit:    Mixed hyperlipidemia    Carotid stenosis, symptomatic, with infarction (HCC)  -     VAS carotid complete study; Future    Cerebral infarction due to unspecified occlusion or stenosis of unspecified precerebral arteries (HCC)  -     VAS carotid complete study; Future      Subjective:      Patient ID: Shanda Quintanilla is a 79 y.o. female.    Patient is s/p R CEA done 4/16/24 (Fredy) presents today for post-op visit. Denies hoarseness or trouble swallowing. Denies current s/s CVA. Incision is C/D/I.    HPI  79-year-old female with HLD, BLE venous insufficiency, hx of polio, left hemispheric CVA secondary to symptomatic left carotid stenosis s/p left carotid endarterectomy with bovine patch angioplasty by Dr. Daniel 4/16/2024 with post operative hypotension, discharged POD 3. Patient presented to the ED 4/23/24 with concerns for recurrent right upper extremity weakness and left pinky sensory deficit, stroke alert initiated with no new MRI findings, CTA neck with post operative changes. Neurology recommended continued DAPT and statin therapy. Patient presents today for post operative visit. Left neck incision healing well. She feels well. No recurrent strokelike symptoms.  She does note constipation she feels related to Lipitor.  She stopped taking Lipitor 2 days ago. Since had BM with help of Mine.   The following portions of the patient's history were reviewed and updated as appropriate: allergies, current medications, past family history, past medical history, past social history, past surgical history, and problem list.  ROS reviewed     Review of Systems   Constitutional: Negative.    HENT: Negative.     Eyes: Negative.    Respiratory: Negative.     Cardiovascular:  "Negative.    Gastrointestinal: Negative.    Endocrine: Negative.    Genitourinary: Negative.    Musculoskeletal: Negative.    Skin: Negative.    Allergic/Immunologic: Negative.    Neurological: Negative.    Hematological: Negative.    Psychiatric/Behavioral: Negative.           Objective:    I have reviewed and made appropriate changes to the review of systems input by the medical assistant.    Vitals:    05/02/24 0955   BP: 118/72   BP Location: Left arm   Patient Position: Sitting   Cuff Size: Standard   Pulse: 73   SpO2: 98%   Weight: 64.8 kg (142 lb 12.8 oz)   Height: 5' 3\" (1.6 m)       Patient Active Problem List   Diagnosis    Mixed hyperlipidemia    Osteoporosis    Seborrheic keratosis    Elevated C-reactive protein (CRP)    Episode of apnea    Impaired fasting glucose    Swelling of left lower extremity    Venous insufficiency of both lower extremities    Small subcortical Left MCA CVA,    Intracranial carotid stenosis, left    Carotid stenosis, symptomatic, with infarction (HCC)    Left upper quadrant abdominal pain       Past Surgical History:   Procedure Laterality Date    ANAL FISSURECTOMY      APPENDECTOMY      CATARACT EXTRACTION      COLONOSCOPY      HYSTERECTOMY      HYSTERECTOMY W/ SALPINGO-OOPHERECTOMY      MS TEAEC W/PATCH GRF CAROTID VERTB SUBCLAV NECK INC Left 4/16/2024    Procedure: ENDARTERECTOMY ARTERY CAROTID;  Surgeon: Nikolai Daniel MD;  Location: [facility];  Service: Vascular       Family History   Problem Relation Age of Onset    Heart attack Mother     Cancer Father     Heart attack Sister        Social History     Socioeconomic History    Marital status: /Civil Union     Spouse name: Not on file    Number of children: Not on file    Years of education: Not on file    Highest education level: Not on file   Occupational History    Not on file   Tobacco Use    Smoking status: Former     Current packs/day: 2.00     Average packs/day: 2.0 packs/day for 20.0 years (40.0 " ttl pk-yrs)     Types: Cigarettes    Smokeless tobacco: Never   Vaping Use    Vaping status: Never Used   Substance and Sexual Activity    Alcohol use: Yes     Comment: OCCASIONAL- WINE     Drug use: Not Currently    Sexual activity: Not on file   Other Topics Concern    Not on file   Social History Narrative    Per [employer]:     Self-employed          Social Determinants of Health     Financial Resource Strain: Low Risk  (8/14/2023)    Overall Financial Resource Strain (CARDIA)     Difficulty of Paying Living Expenses: Not very hard   Food Insecurity: No Food Insecurity (4/24/2024)    Hunger Vital Sign     Worried About Running Out of Food in the Last Year: Never true     Ran Out of Food in the Last Year: Never true   Transportation Needs: No Transportation Needs (4/24/2024)    PRAPARE - Transportation     Lack of Transportation (Medical): No     Lack of Transportation (Non-Medical): No   Physical Activity: Not on file   Stress: Not on file   Social Connections: Not on file   Intimate Partner Violence: Not on file   Housing Stability: Low Risk  (4/24/2024)    Housing Stability Vital Sign     Unable to Pay for Housing in the Last Year: No     Number of Places Lived in the Last Year: 1     Unstable Housing in the Last Year: No       Allergies   Allergen Reactions    Sulfa Antibiotics Other (See Comments)         Current Outpatient Medications:     acetaminophen (TYLENOL) 325 mg tablet, Take 2 tablets (650 mg total) by mouth every 6 (six) hours as needed for mild pain, Disp: , Rfl:     Alpha-Lipoic Acid (LIPOIC ACID PO), Take 1 capsule by mouth, Disp: , Rfl:     Amino Acids (L-CARNITINE PO), Take 1 tablet by mouth, Disp: , Rfl:     Artificial Tear Solution (JUST TEARS EYE DROPS OP), Apply to eye, Disp: , Rfl:     Ascorbic Acid (VITAMIN C) 1000 MG tablet, Take 1,000 mg by mouth daily , Disp: , Rfl:     aspirin (ECOTRIN LOW STRENGTH) 81 mg EC tablet, Take 1 tablet (81 mg total) by mouth daily, Disp: 30 tablet,  "Rfl: 0    atorvastatin (LIPITOR) 40 mg tablet, Take 0.5 tablets (20 mg total) by mouth daily with dinner, Disp: 30 tablet, Rfl: 3    Calcium Carb-Cholecalciferol 1000-800 MG-UNIT TABS, Take by mouth, Disp: , Rfl:     Chelated Magnesium 100 MG TABS, Take by mouth Daily, Disp: , Rfl:     Cholecalciferol (VITAMIN D3) 5000 units CAPS, Take 1 capsule by mouth daily, Disp: , Rfl:     clopidogrel (PLAVIX) 75 mg tablet, Take 1 tablet (75 mg total) by mouth daily, Disp: 30 tablet, Rfl: 3    Glycerylphosphorylcholine POWD, 1 tablet by Does not apply route, Disp: , Rfl:     Iodine, Kelp, (KELP PO), Take by mouth daily, Disp: , Rfl:     L-Carnosine POWD, by Does not apply route, Disp: , Rfl:     Menaquinone-7 (VITAMIN K2) 100 MCG CAPS, , Disp: , Rfl:     Misc Natural Products (GLUCOSAMINE CHOND CMP TRIPLE PO), Take by mouth, Disp: , Rfl:     Misc Natural Products (PANTETHINE PLUS) TABS, Take by mouth, Disp: , Rfl:     NATURAL VITAMIN E MOISTURIZING GEL, Apply topically, Disp: , Rfl:     Omega-3 Fatty Acids (FISH OIL) 1200 MG CAPS, Take 1 capsule by mouth daily , Disp: , Rfl:     PANTETHINE PO, Take by mouth, Disp: , Rfl:     patient supplied medication, , Disp: , Rfl:     PREBIOTIC PRODUCT PO, Take by mouth, Disp: , Rfl:     Probiotic Product (PROBIOTIC-10) CAPS, Take by mouth, Disp: , Rfl:     pyridoxine (VITAMIN B6) 100 mg tablet, 1 tab(s), Disp: , Rfl:     Turmeric POWD, 1, Disp: , Rfl:     Vinpocetine POWD, by Does not apply route, Disp: , Rfl:     vitamin B-12 (CYANOCOBALAMIN) 100 MCG tablet, Take by mouth every other day , Disp: , Rfl:     vitamin E, tocopherol, 1,000 units capsule, Take by mouth daily , Disp: , Rfl:     Zinc 30 MG CAPS, Take 1 capsule by mouth daily, Disp: , Rfl:    /72 (BP Location: Left arm, Patient Position: Sitting, Cuff Size: Standard)   Pulse 73   Ht 5' 3\" (1.6 m)   Wt 64.8 kg (142 lb 12.8 oz)   SpO2 98%   BMI 25.30 kg/m²          Physical Exam  Vitals and nursing note reviewed. Exam " conducted with a chaperone present.   Constitutional:       Appearance: Normal appearance. She is normal weight.   HENT:      Head: Normocephalic and atraumatic.   Eyes:      Extraocular Movements: Extraocular movements intact.   Neck:      Comments: Left neck incision intact with surgical glue.  No dehiscence or drainage.  Swelling and ecchymosis resolved compared to prior photo on chart   Cardiovascular:      Heart sounds: Normal heart sounds.   Pulmonary:      Effort: Pulmonary effort is normal.   Musculoskeletal:         General: Normal range of motion.   Neurological:      General: No focal deficit present.      Mental Status: She is alert and oriented to person, place, and time.   Psychiatric:         Mood and Affect: Mood normal.         Behavior: Behavior normal.

## 2024-05-10 ENCOUNTER — TELEPHONE (OUTPATIENT)
Dept: NEUROLOGY | Facility: CLINIC | Age: 79
End: 2024-05-10

## 2024-05-10 NOTE — TELEPHONE ENCOUNTER
Patient has accepted , ALL, 7/10/2024, 3 pm, HFU.    Thank you,     Janae             HFU/ RACHID SANTACRUZ/ Stroke-like symptoms-resolved as of 4/24/2024     DC-  4/24/2024- HOME    Shanda Quintanilla will need follow up in 4-6 weeks with neurovascular attending/AP .  She will not require outpatient neurological testing.

## 2024-05-13 LAB — MISCELLANEOUS LAB TEST RESULT: NORMAL

## 2024-05-31 ENCOUNTER — APPOINTMENT (OUTPATIENT)
Dept: RADIOLOGY | Facility: CLINIC | Age: 79
End: 2024-05-31
Payer: COMMERCIAL

## 2024-05-31 ENCOUNTER — OFFICE VISIT (OUTPATIENT)
Dept: PODIATRY | Facility: CLINIC | Age: 79
End: 2024-05-31
Payer: COMMERCIAL

## 2024-05-31 VITALS
BODY MASS INDEX: 25.16 KG/M2 | WEIGHT: 142 LBS | DIASTOLIC BLOOD PRESSURE: 66 MMHG | HEIGHT: 63 IN | HEART RATE: 62 BPM | SYSTOLIC BLOOD PRESSURE: 141 MMHG

## 2024-05-31 DIAGNOSIS — M79.672 LEFT FOOT PAIN: Primary | ICD-10-CM

## 2024-05-31 DIAGNOSIS — L84 CALLUS: ICD-10-CM

## 2024-05-31 DIAGNOSIS — M20.42 HAMMER TOE OF LEFT FOOT: ICD-10-CM

## 2024-05-31 DIAGNOSIS — B35.1 TINEA UNGUIUM: ICD-10-CM

## 2024-05-31 DIAGNOSIS — M79.672 LEFT FOOT PAIN: ICD-10-CM

## 2024-05-31 PROCEDURE — 73630 X-RAY EXAM OF FOOT: CPT

## 2024-05-31 PROCEDURE — 99203 OFFICE O/P NEW LOW 30 MIN: CPT | Performed by: STUDENT IN AN ORGANIZED HEALTH CARE EDUCATION/TRAINING PROGRAM

## 2024-05-31 PROCEDURE — 11055 PARING/CUTG B9 HYPRKER LES 1: CPT | Performed by: STUDENT IN AN ORGANIZED HEALTH CARE EDUCATION/TRAINING PROGRAM

## 2024-05-31 NOTE — PROGRESS NOTES
Ambulatory Visit  Name: Shanda Quintanilla      : 1945      MRN: [id number]  Encounter Provider: Ninfa Morales DPM  Encounter Date: 2024   Encounter department: [facility] [place]    Assessment & Plan   1. Left foot pain  -     XR foot 3+ vw left; Future; Expected date: 2024  2. Hammer toe of left foot  3. Callus  4. Tinea unguium    Plan:     - Patient was counseled and educated on the condition and the diagnosis.  The diagnosis, treatment options and prognosis were discussed in detail.   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  -Patient presents with hyperkeratotic lesion that is macerated due to using Dr. Newberry's patches on the fifth toe dorsal laterally.  I informed patient this is a hyperkeratotic lesion which unfortunately with keep reoccurring.  Discussed conservative treatment options including routine callus debridements.  Hyperkeratotic lesions were sharply trimmed to normal epithelium with a 15 blade without incident. Discussed off-loading devices to decrease the growth of the calluses such as callus pads and foot ware modification. Patient was instructed to use a pumice stone at home to reduce the growth of the callus as well as OTC lotion to their feet.   - I offered patient various treatment options including topical OTC and prescription anti-fungal topicals and oral anti-fungal medications. I also explained patient risks and benefits of each treatment. Patient opted to monitor the toenail discoloration clinically.    -Addressed all questions and concerns  -Return as needed      History of Present Illness     Shanda Quintanilla is a 79 y.o. female who presents for evaluation of left fifth toe pain secondary to a callus or corn.  Patient reports she has used multiple Dr. Newberry's medicated pads to remove the  thickened skin however since recently she is having discomfort.  She presents with macerated hyperkeratotic lesion to the fifth toe on the left.  Patient reports the toe is also mildly swollen.  She has discomfort with pressure specifically wearing closed fitted shoes.  She has tried wearing slippers without much pressure that helps.  She also presents with discolored toenail on the left hallux.  No other complaints.    Review of Systems   All other systems reviewed and are negative.    Medical History Reviewed by provider this encounter:       Past Medical History   Past Medical History:   Diagnosis Date    Cervical strain     Changing skin lesion     Hyperlipidemia     MCI (mild cognitive impairment)     Mixed hyperlipidemia     Nonmelanoma skin cancer     BCC    Osteoporosis     Polio     Poor concentration     Stroke (HCC)     Swelling of left thumb      Past Surgical History:   Procedure Laterality Date    ANAL FISSURECTOMY      APPENDECTOMY      CATARACT EXTRACTION      COLONOSCOPY      HYSTERECTOMY      HYSTERECTOMY W/ SALPINGO-OOPHERECTOMY      [place] TEAEC W/PATCH GRF CAROTID VERTB SUBCLAV NECK INC Left 4/16/2024    Procedure: ENDARTERECTOMY ARTERY CAROTID;  Surgeon: Nikolai Daniel MD;  Location: [facility];  Service: Vascular     Family History   Problem Relation Age of Onset    Heart attack Mother     Cancer Father     Heart attack Sister      Current Outpatient Medications on File Prior to Visit   Medication Sig Dispense Refill    acetaminophen (TYLENOL) 325 mg tablet Take 2 tablets (650 mg total) by mouth every 6 (six) hours as needed for mild pain      Alpha-Lipoic Acid (LIPOIC ACID PO) Take 1 capsule by mouth      Amino Acids (L-CARNITINE PO) Take 1 tablet by mouth      Artificial Tear Solution (JUST TEARS EYE DROPS OP) Apply to eye      Ascorbic Acid (VITAMIN C) 1000 MG tablet Take 1,000 mg by mouth daily       aspirin (ECOTRIN LOW STRENGTH) 81 mg EC tablet Take 1 tablet (81 mg total) by  mouth daily 30 tablet 0    Calcium Carb-Cholecalciferol 1000-800 MG-UNIT TABS Take by mouth      Chelated Magnesium 100 MG TABS Take by mouth Daily      Cholecalciferol (VITAMIN D3) 5000 units CAPS Take 1 capsule by mouth daily      clopidogrel (PLAVIX) 75 mg tablet Take 1 tablet (75 mg total) by mouth daily 30 tablet 3    Glycerylphosphorylcholine POWD 1 tablet by Does not apply route      Iodine, Kelp, (KELP PO) Take by mouth daily      L-Carnosine POWD by Does not apply route      Menaquinone-7 (VITAMIN K2) 100 MCG CAPS       Misc Natural Products (GLUCOSAMINE CHOND CMP TRIPLE PO) Take by mouth      Misc Natural Products (PANTETHINE PLUS) TABS Take by mouth      NATURAL VITAMIN E MOISTURIZING GEL Apply topically      Omega-3 Fatty Acids (FISH OIL) 1200 MG CAPS Take 1 capsule by mouth daily       PANTETHINE PO Take by mouth      patient supplied medication       PREBIOTIC PRODUCT PO Take by mouth      Probiotic Product (PROBIOTIC-10) CAPS Take by mouth      pyridoxine (VITAMIN B6) 100 mg tablet 1 tab(s)      Turmeric POWD 1      Vinpocetine POWD by Does not apply route      vitamin B-12 (CYANOCOBALAMIN) 100 MCG tablet Take by mouth every other day       vitamin E, tocopherol, 1,000 units capsule Take by mouth daily       Zinc 30 MG CAPS Take 1 capsule by mouth daily      atorvastatin (LIPITOR) 40 mg tablet Take 0.5 tablets (20 mg total) by mouth daily with dinner 30 tablet 3     No current facility-administered medications on file prior to visit.     Allergies   Allergen Reactions    Sulfa Antibiotics Other (See Comments)      Current Outpatient Medications on File Prior to Visit   Medication Sig Dispense Refill    acetaminophen (TYLENOL) 325 mg tablet Take 2 tablets (650 mg total) by mouth every 6 (six) hours as needed for mild pain      Alpha-Lipoic Acid (LIPOIC ACID PO) Take 1 capsule by mouth      Amino Acids (L-CARNITINE PO) Take 1 tablet by mouth      Artificial Tear Solution (JUST TEARS EYE DROPS OP) Apply  "to eye      Ascorbic Acid (VITAMIN C) 1000 MG tablet Take 1,000 mg by mouth daily       aspirin (ECOTRIN LOW STRENGTH) 81 mg EC tablet Take 1 tablet (81 mg total) by mouth daily 30 tablet 0    Calcium Carb-Cholecalciferol 1000-800 MG-UNIT TABS Take by mouth      Chelated Magnesium 100 MG TABS Take by mouth Daily      Cholecalciferol (VITAMIN D3) 5000 units CAPS Take 1 capsule by mouth daily      clopidogrel (PLAVIX) 75 mg tablet Take 1 tablet (75 mg total) by mouth daily 30 tablet 3    Glycerylphosphorylcholine POWD 1 tablet by Does not apply route      Iodine, Kelp, (KELP PO) Take by mouth daily      L-Carnosine POWD by Does not apply route      Menaquinone-7 (VITAMIN K2) 100 MCG CAPS       Misc Natural Products (GLUCOSAMINE CHOND CMP TRIPLE PO) Take by mouth      Misc Natural Products (PANTETHINE PLUS) TABS Take by mouth      NATURAL VITAMIN E MOISTURIZING GEL Apply topically      Omega-3 Fatty Acids (FISH OIL) 1200 MG CAPS Take 1 capsule by mouth daily       PANTETHINE PO Take by mouth      patient supplied medication       PREBIOTIC PRODUCT PO Take by mouth      Probiotic Product (PROBIOTIC-10) CAPS Take by mouth      pyridoxine (VITAMIN B6) 100 mg tablet 1 tab(s)      Turmeric POWD 1      Vinpocetine POWD by Does not apply route      vitamin B-12 (CYANOCOBALAMIN) 100 MCG tablet Take by mouth every other day       vitamin E, tocopherol, 1,000 units capsule Take by mouth daily       Zinc 30 MG CAPS Take 1 capsule by mouth daily      atorvastatin (LIPITOR) 40 mg tablet Take 0.5 tablets (20 mg total) by mouth daily with dinner 30 tablet 3     No current facility-administered medications on file prior to visit.      Objective     /66   Pulse 62   Ht 5' 3\" (1.6 m)   Wt 64.4 kg (142 lb)   BMI 25.15 kg/m²     Physical Exam  Vitals reviewed.   Cardiovascular:      Pulses:           Dorsalis pedis pulses are 1+ on the right side and 1+ on the left side.        Posterior tibial pulses are 1+ on the right side " "and 1+ on the left side.   Musculoskeletal:      Left foot: Deformity present.   Feet:      Right foot:      Toenail Condition: Fungal disease present.     Left foot:      Skin integrity: Callus present.      Comments: Tenderness to touch noted to the left fifth toe with mild edema.  No erythema present.  There is a hyperkeratotic lesion i.e. callus present on the dorsal lateral aspect of the fifth toe.  Fifth toe is adductovarus under lapping fourth.    Mild tinea unguium noted on the right hallux toenail.      Lesion Destruction    Date/Time: 5/31/2024 2:45 PM    Performed by: Ninfa Morales DPM  Authorized by: Ninfa Morales DPM  Universal Protocol:  Consent: Verbal consent obtained.  Risks and benefits: risks, benefits and alternatives were discussed  Consent given by: patient  Time out: Immediately prior to procedure a \"time out\" was called to verify the correct patient, procedure, equipment, support staff and site/side marked as required.  Patient understanding: patient states understanding of the procedure being performed  Patient identity confirmed: verbally with patient    Procedure Details - Lesion Destruction:     Number of Lesions:  1  Lesion 1:     Body area:  Lower extremity    Lower extremity location:  L little toe    Malignancy: benign hyperkeratotic lesion      Destruction method: scissors used for extraction            "

## 2024-06-12 ENCOUNTER — HOSPITAL ENCOUNTER (OUTPATIENT)
Dept: ULTRASOUND IMAGING | Facility: HOSPITAL | Age: 79
Discharge: HOME | End: 2024-06-12
Attending: FAMILY MEDICINE
Payer: COMMERCIAL

## 2024-06-12 DIAGNOSIS — R10.12 LEFT UPPER QUADRANT ABDOMINAL PAIN: ICD-10-CM

## 2024-06-12 PROCEDURE — 76705 ECHO EXAM OF ABDOMEN: CPT

## 2024-06-24 ENCOUNTER — TELEPHONE (OUTPATIENT)
Dept: NEUROLOGY | Facility: CLINIC | Age: 79
End: 2024-06-24

## 2024-07-07 ENCOUNTER — PATIENT MESSAGE (OUTPATIENT)
Dept: NEUROLOGY | Facility: CLINIC | Age: 79
End: 2024-07-07

## 2024-07-08 ENCOUNTER — TELEPHONE (OUTPATIENT)
Age: 79
End: 2024-07-08

## 2024-07-08 NOTE — TELEPHONE ENCOUNTER
Patient of Dr Daniel calling for recommendation for neurologist. Please give patient a call back.

## 2024-07-08 NOTE — TELEPHONE ENCOUNTER
Patient has a mild headache and wants to know if she can take an ibuprofen.  She knows for a more severe headache she was told to take tylenol due to stroke a few years back.     If call back and does not answer, call right back.  Phone is not set up for voicemail.      Please advise.

## 2024-07-09 ENCOUNTER — OFFICE VISIT (OUTPATIENT)
Dept: FAMILY MEDICINE CLINIC | Facility: CLINIC | Age: 79
End: 2024-07-09
Payer: COMMERCIAL

## 2024-07-09 VITALS
OXYGEN SATURATION: 97 % | WEIGHT: 141.8 LBS | DIASTOLIC BLOOD PRESSURE: 74 MMHG | TEMPERATURE: 97.9 F | HEART RATE: 69 BPM | HEIGHT: 63 IN | SYSTOLIC BLOOD PRESSURE: 118 MMHG | BODY MASS INDEX: 25.12 KG/M2

## 2024-07-09 DIAGNOSIS — D64.9 ANEMIA, UNSPECIFIED TYPE: ICD-10-CM

## 2024-07-09 DIAGNOSIS — D53.9 NUTRITIONAL ANEMIA, UNSPECIFIED: ICD-10-CM

## 2024-07-09 DIAGNOSIS — I63.9 LEFT SIDED CEREBRAL HEMISPHERE CEREBROVASCULAR ACCIDENT (CVA) (HCC): Primary | ICD-10-CM

## 2024-07-09 DIAGNOSIS — E78.2 MIXED HYPERLIPIDEMIA: ICD-10-CM

## 2024-07-09 DIAGNOSIS — R35.0 URINARY FREQUENCY: ICD-10-CM

## 2024-07-09 DIAGNOSIS — Z12.31 SCREENING MAMMOGRAM FOR BREAST CANCER: ICD-10-CM

## 2024-07-09 PROBLEM — R79.82 ELEVATED C-REACTIVE PROTEIN (CRP): Status: RESOLVED | Noted: 2021-12-09 | Resolved: 2024-07-09

## 2024-07-09 PROBLEM — M79.89 SWELLING OF LEFT LOWER EXTREMITY: Status: RESOLVED | Noted: 2024-01-23 | Resolved: 2024-07-09

## 2024-07-09 PROCEDURE — 99213 OFFICE O/P EST LOW 20 MIN: CPT | Performed by: FAMILY MEDICINE

## 2024-07-09 PROCEDURE — G2211 COMPLEX E/M VISIT ADD ON: HCPCS | Performed by: FAMILY MEDICINE

## 2024-07-09 NOTE — TELEPHONE ENCOUNTER
Pt called after seeing missed call from us. Pt states she does have apt w/ Dr. Forrest but would like Dr. Daniel's recommendation re: which neurologist she should see.  She does plan on keeping apt tomorrow but would like the name of another neurologist from Dr. Daniel.     Advised I would send a message to him.

## 2024-07-09 NOTE — TELEPHONE ENCOUNTER
Pt already has scheduled appt with Neurology 7/10/24 @ 3pm with Dr. Forrest.  Also, already has an encounter open with neurology.     Attempted to leave message to remind patient she already has an appt established, no voicemail set up.

## 2024-07-09 NOTE — PROGRESS NOTES
"Ambulatory Visit  Name: Shanda Quintanilla      : 1945      MRN: [id number]  Encounter Provider: Talat Ventura MD  Encounter Date: 2024   Encounter department: [facility]    Assessment & Plan   1. Small subcortical Left MCA CVA,  She is not interested in statins  Advise to continue DAPT and discuss with Neurology    -     Cardio IQ(R) Advanced Lipid Panel; Future  -     Homocysteine; Future  -     C-reactive protein; Future  2. Screening mammogram for breast cancer  -     Mammo screening bilateral w 3d & cad; Future  3. Anemia, unspecified type  -     CBC and differential; Future  -     Iron Panel (Includes Ferritin, Iron Sat%, Iron, and TIBC); Future  4. Urinary frequency  -     Urinalysis with microscopic; Future  -     Urine culture; Future  5. Mixed hyperlipidemia  -     Cardio IQ(R) Advanced Lipid Panel; Future  -     Homocysteine; Future  -     C-reactive protein; Future  6. Nutritional anemia, unspecified  -     Homocysteine; Future    Follow up 3 months       History of Present Illness     Patient is here due to a left sided CVA. Not interested in statins prefers to take natural supplements which \"work better\". Has neurology appt tomorrow.  Also was found to have anemia denies any blood in the stool or urine.  Also has been having urinary frequency.      Review of Systems   Constitutional:  Negative for activity change, appetite change, fatigue and fever.   HENT:  Negative for congestion and ear discharge.    Respiratory:  Negative for cough and shortness of breath.    Cardiovascular:  Negative for chest pain and palpitations.   Gastrointestinal:  Negative for diarrhea and nausea.   Genitourinary:  Positive for frequency and urgency.   Musculoskeletal:  Negative for arthralgias and back pain.   Skin:  Negative for color change and rash.   Neurological:  Negative for dizziness and headaches.   Psychiatric/Behavioral:  Negative for agitation and behavioral problems.      Past " Medical History:   Diagnosis Date   • Cervical strain    • Changing skin lesion    • Hyperlipidemia    • MCI (mild cognitive impairment)    • Mixed hyperlipidemia    • Nonmelanoma skin cancer     BCC   • Osteoporosis    • Polio    • Poor concentration    • Stroke (HCC)    • Swelling of left thumb      Past Surgical History:   Procedure Laterality Date   • ANAL FISSURECTOMY     • APPENDECTOMY     • CATARACT EXTRACTION     • COLONOSCOPY     • HYSTERECTOMY     • HYSTERECTOMY W/ SALPINGO-OOPHERECTOMY     • IL TEAEC W/PATCH GRF CAROTID VERTB SUBCLAV NECK INC Left 4/16/2024    Procedure: ENDARTERECTOMY ARTERY CAROTID;  Surgeon: Nikolai Daniel MD;  Location: [facility];  Service: Vascular     Family History   Problem Relation Age of Onset   • Heart attack Mother    • Cancer Father    • Heart attack Sister      Social History     Tobacco Use   • Smoking status: Former     Current packs/day: 2.00     Average packs/day: 2.0 packs/day for 20.0 years (40.0 ttl pk-yrs)     Types: Cigarettes   • Smokeless tobacco: Never   Vaping Use   • Vaping status: Never Used   Substance and Sexual Activity   • Alcohol use: Yes     Comment: OCCASIONAL- WINE    • Drug use: Not Currently   • Sexual activity: Not on file     Current Outpatient Medications on File Prior to Visit   Medication Sig   • Alpha-Lipoic Acid (LIPOIC ACID PO) Take 1 capsule by mouth   • Amino Acids (L-CARNITINE PO) Take 1 tablet by mouth   • Artificial Tear Solution (JUST TEARS EYE DROPS OP) Apply to eye   • Ascorbic Acid (VITAMIN C) 1000 MG tablet Take 1,000 mg by mouth daily    • aspirin (ECOTRIN LOW STRENGTH) 81 mg EC tablet Take 1 tablet (81 mg total) by mouth daily   • Calcium Carb-Cholecalciferol 1000-800 MG-UNIT TABS Take by mouth   • Chelated Magnesium 100 MG TABS Take by mouth Daily   • Cholecalciferol (VITAMIN D3) 5000 units CAPS Take 1 capsule by mouth daily   • clopidogrel (PLAVIX) 75 mg tablet Take 1 tablet (75 mg total) by mouth daily   •  "Glycerylphosphorylcholine POWD 1 tablet by Does not apply route   • Iodine, Kelp, (KELP PO) Take by mouth daily   • L-Carnosine POWD by Does not apply route   • Menaquinone-7 (VITAMIN K2) 100 MCG CAPS    • Misc Natural Products (GLUCOSAMINE CHOND CMP TRIPLE PO) Take by mouth   • Misc Natural Products (PANTETHINE PLUS) TABS Take by mouth   • NATURAL VITAMIN E MOISTURIZING GEL Apply topically   • Omega-3 Fatty Acids (FISH OIL) 1200 MG CAPS Take 1 capsule by mouth daily    • PANTETHINE PO Take by mouth   • patient supplied medication    • PREBIOTIC PRODUCT PO Take by mouth   • Probiotic Product (PROBIOTIC-10) CAPS Take by mouth   • pyridoxine (VITAMIN B6) 100 mg tablet 1 tab(s)   • Turmeric POWD 1   • Vinpocetine POWD by Does not apply route   • vitamin B-12 (CYANOCOBALAMIN) 100 MCG tablet Take by mouth every other day    • vitamin E, tocopherol, 1,000 units capsule Take by mouth daily    • Zinc 30 MG CAPS Take 1 capsule by mouth daily   • acetaminophen (TYLENOL) 325 mg tablet Take 2 tablets (650 mg total) by mouth every 6 (six) hours as needed for mild pain (Patient not taking: Reported on 7/9/2024)   • atorvastatin (LIPITOR) 40 mg tablet Take 0.5 tablets (20 mg total) by mouth daily with dinner     Allergies   Allergen Reactions   • Sulfa Antibiotics Other (See Comments)     Immunization History   Administered Date(s) Administered   • Influenza Split High Dose Preservative Free IM 11/07/2013, 10/28/2014   • Pneumococcal Polysaccharide PPV23 10/28/2014   • Tetanus, adsorbed 12/20/2013   • Zoster 11/03/2014     Objective     /74 (BP Location: Left arm, Patient Position: Sitting)   Pulse 69   Temp 97.9 °F (36.6 °C) (Temporal)   Ht 5' 3\" (1.6 m)   Wt 64.3 kg (141 lb 12.8 oz)   SpO2 97%   BMI 25.12 kg/m²     Physical Exam  Vitals and nursing note reviewed.   Constitutional:       General: She is not in acute distress.     Appearance: She is well-developed.   HENT:      Head: Normocephalic and atraumatic. "   Eyes:      Conjunctiva/sclera: Conjunctivae normal.   Cardiovascular:      Rate and Rhythm: Normal rate and regular rhythm.      Heart sounds: No murmur heard.  Pulmonary:      Effort: Pulmonary effort is normal. No respiratory distress.      Breath sounds: Normal breath sounds.   Abdominal:      Palpations: Abdomen is soft.      Tenderness: There is no abdominal tenderness.   Musculoskeletal:         General: No swelling.      Cervical back: Neck supple.   Skin:     General: Skin is warm and dry.      Capillary Refill: Capillary refill takes less than 2 seconds.   Neurological:      Mental Status: She is alert.   Psychiatric:         Mood and Affect: Mood normal.       Administrative Statements   I have spent a total time of 40 minutes in caring for this patient on the day of the visit/encounter including Prognosis and Risks and benefits of tx options.

## 2024-07-10 ENCOUNTER — OFFICE VISIT (OUTPATIENT)
Dept: NEUROLOGY | Facility: CLINIC | Age: 79
End: 2024-07-10
Payer: COMMERCIAL

## 2024-07-10 VITALS
HEIGHT: 63 IN | SYSTOLIC BLOOD PRESSURE: 127 MMHG | TEMPERATURE: 97.1 F | BODY MASS INDEX: 24.88 KG/M2 | DIASTOLIC BLOOD PRESSURE: 58 MMHG | OXYGEN SATURATION: 98 % | HEART RATE: 67 BPM | WEIGHT: 140.4 LBS

## 2024-07-10 DIAGNOSIS — Z98.890 HISTORY OF CEA (CAROTID ENDARTERECTOMY): ICD-10-CM

## 2024-07-10 DIAGNOSIS — E78.2 MIXED HYPERLIPIDEMIA: ICD-10-CM

## 2024-07-10 DIAGNOSIS — I63.9 LEFT SIDED CEREBRAL HEMISPHERE CEREBROVASCULAR ACCIDENT (CVA) (HCC): Primary | ICD-10-CM

## 2024-07-10 PROCEDURE — 99215 OFFICE O/P EST HI 40 MIN: CPT | Performed by: PSYCHIATRY & NEUROLOGY

## 2024-07-10 NOTE — TELEPHONE ENCOUNTER
Patient returned our call and was provided the above recommendation. She expressed understanding, and will keep her appointment for today with Neurology.

## 2024-07-10 NOTE — PROGRESS NOTES
Patient ID: Shanda Quintanilla is a 79 y.o. female.    Assessment/Plan:    This is a 78 y/o Female with recent left precentral gyrus, left CEA 4/24 who is here as a hospital follow up for left MCA stroke.     PLAN:        Problem List Items Addressed This Visit          Cardiovascular and Mediastinum    Small subcortical Left MCA CVA, - Primary     -for secondary stroke prevention, recommend continuation of combination of aspirin, plavix and atorvastatin, will defer management of antiplatelet therapy to vascular surgery   -Blood Pressure goal < 130/80, BP   -LDL goal <70  -snoring - sleep medicine referral no needed, no snoring     Counseling/stroke education -   -I advised patient to avoid using NSAIDs for headaches or other pain and to stick to tylenol if needed  -Recommend lifestyle modifications such as mediterranean diet & regular exercise regimen atleast 4-5 times a week for 20-30 minutes.   -I educated patient/family regarding medication compliance  -encourage smoking cessation, and control of diabetes and hypertension; defer management to primary             Surgery/Wound/Pain    History of CEA (carotid endarterectomy)     Continue with aspirin, plavix for stroke prevention  Follows vascular surgery              Other    Mixed hyperlipidemia        Follow up in 1 year     I would be happy to see the patient sooner if any new questions/concerns arise.  Patient/Guardian was advised to the call the office if they have any questions and concerns in the meantime.     Patient/Guardian does understand that if they have any new stroke like symptoms such as facial droop on one side, weakness/paralysis on either side, speech trouble, numbness on one side, balance issues, any vision changes, extreme dizziness or any new headache, to call 9-1-1 immediately or to proceed to the nearest ER immediately.      I have spent a total time of 40 minutes in caring for this patient on the day of the visit/encounter including Risks and  benefits of tx options, Instructions for management, Documenting in the medical record, Reviewing / ordering tests, medicine, procedures  , Obtaining or reviewing history  , and Communicating with other healthcare professionals .     Subjective:    HPI    79 year old patient presenting to the clinic today for follow-up evaluation after hospitalization on 4/6/2024 for small subcortical left MCA stroke. Four days prior to hospital admission, patient reports that she had an episode during which she felt her right upper extremity become weak. This episode lasted for about 30 minutes before it resolved.  She then had another similar episode two days later. On the day of hospital admission, patient reports that she again felt her right upper extremity become weak, and this time her partner also noticed that her speech sounded different which prompted him to take her to the hospital. At the time of admission, her symptoms had resolved so she was not a candidate for TNK, but CTA head/neck showed 87 to 90% stenosis of the left ICA, and brain MRI report noted small acute infarct involving the left precentral gyrus. Patient was already scheduled to have an elective left CEA, which she preceded to have done on 4/16/2024. She was discharged on aspirin, Plavix, and Lipitor. On 4/23/24, she was re-admitted to the hospital for right upper extremity weakness, CTH showed no acute infarct and CTA showed no acute occlusion; noted left cervical carotid endarterectomy with resolved stenosis. MRI brain showed no changes from imaging done during original 4/6 admission.    Upon interview today, patient states that she is overall feeling well and has not since had any return of stroke-like symptoms. She notes that she has started to experience occasional headaches in the temporal region and the back of her head/neck but rates them at a 2/10 in severity and has been able to manage with OTC analgesics. She continues to take aspirin and Plavix,  however she is no longer taking Lipitor as she prefers not to take statins. She has also noticed multiple, small bruises along her arms and thighs, likely a side effect of the aspirin/Plavix. She experiences occasional lightheadedness when she stands up too quickly, but this resolves after a few seconds and she has never fallen or lost consciousness. She also endorses increased frequency of urination, but denies incontinence of bowel/bladder. She denies vertigo, unsteadiness, vision changes, hearing changes, difficulty chewing or swallowing, dysarthria, myalgia, weakness, paresthesia. She does endorse occasional word finding difficulty; she notes that she has family history of dementia and would potentially like to be evaluated for memory impairment in the future.     The following portions of the patient's history were reviewed and updated as appropriate: She  has a past medical history of Cervical strain, Changing skin lesion, Hyperlipidemia, MCI (mild cognitive impairment), Mixed hyperlipidemia, Nonmelanoma skin cancer, Osteoporosis, Polio, Poor concentration, Stroke (HCC), and Swelling of left thumb.  She   Patient Active Problem List    Diagnosis Date Noted    History of CEA (carotid endarterectomy) 07/10/2024    Anemia 07/09/2024    Left upper quadrant abdominal pain 04/30/2024    Carotid stenosis, symptomatic, with infarction (HCC) 04/16/2024    Intracranial carotid stenosis, left 04/07/2024    Small subcortical Left MCA CVA, 04/06/2024    Venous insufficiency of both lower extremities 01/23/2024    Impaired fasting glucose 01/11/2023    Episode of apnea 06/08/2022    Seborrheic keratosis 08/27/2018    Mixed hyperlipidemia 09/21/2017    Osteoporosis 12/17/2013     She  has a past surgical history that includes Anal fissurectomy; Appendectomy; Hysterectomy; Cataract extraction; Colonoscopy; HYESTERECTOMY W SALPINGO-OOPHERECTOMY; and pr teaec w/patch grf carotid vertb subclav neck inc (Left, 4/16/2024).  Her  family history includes Cancer in her father; Heart attack in her mother and sister.  She  reports that she has quit smoking. Her smoking use included cigarettes. She has a 40 pack-year smoking history. She has never used smokeless tobacco. She reports current alcohol use. She reports that she does not currently use drugs.  Current Outpatient Medications   Medication Sig Dispense Refill    Alpha-Lipoic Acid (LIPOIC ACID PO) Take 1 capsule by mouth      Amino Acids (L-CARNITINE PO) Take 1 tablet by mouth      Artificial Tear Solution (JUST TEARS EYE DROPS OP) Apply to eye      Ascorbic Acid (VITAMIN C) 1000 MG tablet Take 1,000 mg by mouth daily       aspirin (ECOTRIN LOW STRENGTH) 81 mg EC tablet Take 1 tablet (81 mg total) by mouth daily 30 tablet 0    Calcium Carb-Cholecalciferol 1000-800 MG-UNIT TABS Take by mouth      Chelated Magnesium 100 MG TABS Take by mouth Daily      Cholecalciferol (VITAMIN D3) 5000 units CAPS Take 1 capsule by mouth daily      clopidogrel (PLAVIX) 75 mg tablet Take 1 tablet (75 mg total) by mouth daily 30 tablet 3    Glycerylphosphorylcholine POWD 1 tablet by Does not apply route      Iodine, Kelp, (KELP PO) Take by mouth daily      L-Carnosine POWD by Does not apply route      Menaquinone-7 (VITAMIN K2) 100 MCG CAPS       Misc Natural Products (GLUCOSAMINE CHOND CMP TRIPLE PO) Take by mouth      Misc Natural Products (PANTETHINE PLUS) TABS Take by mouth      NATURAL VITAMIN E MOISTURIZING GEL Apply topically      Omega-3 Fatty Acids (FISH OIL) 1200 MG CAPS Take 1 capsule by mouth daily       PANTETHINE PO Take by mouth      patient supplied medication       PREBIOTIC PRODUCT PO Take by mouth      Probiotic Product (PROBIOTIC-10) CAPS Take by mouth      pyridoxine (VITAMIN B6) 100 mg tablet 1 tab(s)      Turmeric POWD 1      Vinpocetine POWD by Does not apply route      vitamin B-12 (CYANOCOBALAMIN) 100 MCG tablet Take by mouth every other day       vitamin E, tocopherol, 1,000 units  capsule Take by mouth daily       Zinc 30 MG CAPS Take 1 capsule by mouth daily      acetaminophen (TYLENOL) 325 mg tablet Take 2 tablets (650 mg total) by mouth every 6 (six) hours as needed for mild pain (Patient not taking: Reported on 7/9/2024)      atorvastatin (LIPITOR) 40 mg tablet Take 0.5 tablets (20 mg total) by mouth daily with dinner (Patient not taking: Reported on 7/10/2024) 30 tablet 3     No current facility-administered medications for this visit.     Current Outpatient Medications on File Prior to Visit   Medication Sig    Alpha-Lipoic Acid (LIPOIC ACID PO) Take 1 capsule by mouth    Amino Acids (L-CARNITINE PO) Take 1 tablet by mouth    Artificial Tear Solution (JUST TEARS EYE DROPS OP) Apply to eye    Ascorbic Acid (VITAMIN C) 1000 MG tablet Take 1,000 mg by mouth daily     aspirin (ECOTRIN LOW STRENGTH) 81 mg EC tablet Take 1 tablet (81 mg total) by mouth daily    Calcium Carb-Cholecalciferol 1000-800 MG-UNIT TABS Take by mouth    Chelated Magnesium 100 MG TABS Take by mouth Daily    Cholecalciferol (VITAMIN D3) 5000 units CAPS Take 1 capsule by mouth daily    clopidogrel (PLAVIX) 75 mg tablet Take 1 tablet (75 mg total) by mouth daily    Glycerylphosphorylcholine POWD 1 tablet by Does not apply route    Iodine, Kelp, (KELP PO) Take by mouth daily    L-Carnosine POWD by Does not apply route    Menaquinone-7 (VITAMIN K2) 100 MCG CAPS     Misc Natural Products (GLUCOSAMINE CHOND CMP TRIPLE PO) Take by mouth    Misc Natural Products (PANTETHINE PLUS) TABS Take by mouth    NATURAL VITAMIN E MOISTURIZING GEL Apply topically    Omega-3 Fatty Acids (FISH OIL) 1200 MG CAPS Take 1 capsule by mouth daily     PANTETHINE PO Take by mouth    patient supplied medication     PREBIOTIC PRODUCT PO Take by mouth    Probiotic Product (PROBIOTIC-10) CAPS Take by mouth    pyridoxine (VITAMIN B6) 100 mg tablet 1 tab(s)    Turmeric POWD 1    Vinpocetine POWD by Does not apply route    vitamin B-12 (CYANOCOBALAMIN) 100  "MCG tablet Take by mouth every other day     vitamin E, tocopherol, 1,000 units capsule Take by mouth daily     Zinc 30 MG CAPS Take 1 capsule by mouth daily    acetaminophen (TYLENOL) 325 mg tablet Take 2 tablets (650 mg total) by mouth every 6 (six) hours as needed for mild pain (Patient not taking: Reported on 7/9/2024)    atorvastatin (LIPITOR) 40 mg tablet Take 0.5 tablets (20 mg total) by mouth daily with dinner (Patient not taking: Reported on 7/10/2024)     No current facility-administered medications on file prior to visit.     She is allergic to sulfa antibiotics..    Objective:    Blood pressure 127/58, pulse 67, temperature (!) 97.1 °F (36.2 °C), temperature source Temporal, height 5' 3\" (1.6 m), weight 63.7 kg (140 lb 6.4 oz), SpO2 98%.    Physical Exam  General - patient is alert   Speech - no dysarthria noted, no aphasia noted.     Neuro:   Cranial nerves: PERRL, EOMI, facial sensation intact to soft touch in V1, V2 and V3, no facial asymmetry noted, uvula/palate midline, tongue midline.   Motor: 5/5 throughout, normal tone, no pronator drift noted.   Sensory - intact to soft touch throughout  Reflexes - 2+ throughout  Coordination - no ataxia/dysmetria noted  Gait - normal      ROS:  Reviewed ROS   Review of Systems   Constitutional: Negative.    HENT: Negative.     Eyes: Negative.    Respiratory: Negative.     Cardiovascular: Negative.    Gastrointestinal: Negative.    Endocrine: Negative.    Genitourinary: Negative.    Musculoskeletal: Negative.    Skin: Negative.    Allergic/Immunologic: Negative.    Neurological:  Positive for headaches (mild headache int he head).   Hematological: Negative.    Psychiatric/Behavioral: Negative.     All other systems reviewed and are negative.                  "

## 2024-07-10 NOTE — TELEPHONE ENCOUNTER
MD Johana Branham, RN16 hours ago (5:54 PM)       Jermaine Vaughn,    I don't know any of the neurologists personally.  I have met Dr. Lal and I like him.    Arthur

## 2024-07-11 NOTE — ASSESSMENT & PLAN NOTE
-for secondary stroke prevention, recommend continuation of combination of aspirin, plavix and atorvastatin, will defer management of antiplatelet therapy to vascular surgery   -Blood Pressure goal < 130/80, BP   -LDL goal <70  -snoring - sleep medicine referral no needed, no snoring     Counseling/stroke education -   -I advised patient to avoid using NSAIDs for headaches or other pain and to stick to tylenol if needed  -Recommend lifestyle modifications such as mediterranean diet & regular exercise regimen atleast 4-5 times a week for 20-30 minutes.   -I educated patient/family regarding medication compliance  -encourage smoking cessation, and control of diabetes and hypertension; defer management to primary

## 2024-07-12 ENCOUNTER — APPOINTMENT (OUTPATIENT)
Dept: LAB | Facility: CLINIC | Age: 79
End: 2024-07-12
Payer: COMMERCIAL

## 2024-07-12 DIAGNOSIS — R35.0 URINARY FREQUENCY: ICD-10-CM

## 2024-07-12 DIAGNOSIS — D64.9 ANEMIA, UNSPECIFIED TYPE: ICD-10-CM

## 2024-07-12 LAB
BACTERIA UR QL AUTO: NORMAL /HPF
BASOPHILS # BLD AUTO: 0.04 THOUSANDS/ÂΜL (ref 0–0.1)
BASOPHILS NFR BLD AUTO: 1 % (ref 0–1)
BILIRUB UR QL STRIP: NEGATIVE
CLARITY UR: CLEAR
COLOR UR: NORMAL
EOSINOPHIL # BLD AUTO: 0.08 THOUSAND/ÂΜL (ref 0–0.61)
EOSINOPHIL NFR BLD AUTO: 2 % (ref 0–6)
ERYTHROCYTE [DISTWIDTH] IN BLOOD BY AUTOMATED COUNT: 12.9 % (ref 11.6–15.1)
FERRITIN SERPL-MCNC: 36 NG/ML (ref 11–307)
GLUCOSE UR STRIP-MCNC: NEGATIVE MG/DL
HCT VFR BLD AUTO: 44.3 % (ref 34.8–46.1)
HGB BLD-MCNC: 13.7 G/DL (ref 11.5–15.4)
HGB UR QL STRIP.AUTO: NEGATIVE
IMM GRANULOCYTES # BLD AUTO: 0.01 THOUSAND/UL (ref 0–0.2)
IMM GRANULOCYTES NFR BLD AUTO: 0 % (ref 0–2)
IRON SATN MFR SERPL: 30 % (ref 15–50)
IRON SERPL-MCNC: 117 UG/DL (ref 50–212)
KETONES UR STRIP-MCNC: NEGATIVE MG/DL
LEUKOCYTE ESTERASE UR QL STRIP: NEGATIVE
LYMPHOCYTES # BLD AUTO: 1.66 THOUSANDS/ÂΜL (ref 0.6–4.47)
LYMPHOCYTES NFR BLD AUTO: 38 % (ref 14–44)
MCH RBC QN AUTO: 28.8 PG (ref 26.8–34.3)
MCHC RBC AUTO-ENTMCNC: 30.9 G/DL (ref 31.4–37.4)
MCV RBC AUTO: 93 FL (ref 82–98)
MONOCYTES # BLD AUTO: 0.38 THOUSAND/ÂΜL (ref 0.17–1.22)
MONOCYTES NFR BLD AUTO: 9 % (ref 4–12)
NEUTROPHILS # BLD AUTO: 2.21 THOUSANDS/ÂΜL (ref 1.85–7.62)
NEUTS SEG NFR BLD AUTO: 50 % (ref 43–75)
NITRITE UR QL STRIP: NEGATIVE
NON-SQ EPI CELLS URNS QL MICRO: NORMAL /HPF
NRBC BLD AUTO-RTO: 0 /100 WBCS
PH UR STRIP.AUTO: 6 [PH]
PLATELET # BLD AUTO: 297 THOUSANDS/UL (ref 149–390)
PMV BLD AUTO: 9.7 FL (ref 8.9–12.7)
PROT UR STRIP-MCNC: NEGATIVE MG/DL
RBC # BLD AUTO: 4.75 MILLION/UL (ref 3.81–5.12)
RBC #/AREA URNS AUTO: NORMAL /HPF
SP GR UR STRIP.AUTO: 1.02 (ref 1–1.03)
TIBC SERPL-MCNC: 388 UG/DL (ref 250–450)
UIBC SERPL-MCNC: 271 UG/DL (ref 155–355)
UROBILINOGEN UR STRIP-ACNC: <2 MG/DL
WBC # BLD AUTO: 4.38 THOUSAND/UL (ref 4.31–10.16)
WBC #/AREA URNS AUTO: NORMAL /HPF

## 2024-07-12 PROCEDURE — 83550 IRON BINDING TEST: CPT

## 2024-07-12 PROCEDURE — 87086 URINE CULTURE/COLONY COUNT: CPT

## 2024-07-12 PROCEDURE — 36415 COLL VENOUS BLD VENIPUNCTURE: CPT

## 2024-07-12 PROCEDURE — 83540 ASSAY OF IRON: CPT

## 2024-07-12 PROCEDURE — 85025 COMPLETE CBC W/AUTO DIFF WBC: CPT

## 2024-07-12 PROCEDURE — 81001 URINALYSIS AUTO W/SCOPE: CPT

## 2024-07-12 PROCEDURE — 82728 ASSAY OF FERRITIN: CPT

## 2024-07-13 LAB — BACTERIA UR CULT: NORMAL

## 2024-07-15 ENCOUNTER — TELEPHONE (OUTPATIENT)
Age: 79
End: 2024-07-15

## 2024-07-15 DIAGNOSIS — E61.8 MINERAL DEFICIENCY: Primary | ICD-10-CM

## 2024-07-15 NOTE — TELEPHONE ENCOUNTER
Pt called and was given lab results.  Pt asking if she can have a blood test for copper?  Said she was reading that it is very important to know the level of copper and would like to have this done ASAP. Please advise              Urine culture  Status: Final result     Urine culture  Order: [id number]   Status: Final result       Visible to patient: Yes (seen)       Next appt: 07/17/2024 at 02:30 PM in Radiology ([facility] 1)       Dx: Urinary frequency    Specimen Information: Urine   1 Result Note  Urine Culture No Growth <1000 cfu/mL                 Specimen Collected: 07/12/24 11:50 Last Resulted: 07/13/24 12:19       Order Details        View Encounter        Lab and Collection Details        Routing        Result History     View All Conversations on this Encounter           Result Care Coordination      Result Notes     Talat Ventura MD  7/14/2024  8:06 PM EDT Back to Top      Her blood work shows resolved anemia otherwise normal blood work and urine.     Patient Communication     Add Comments   Seen Back to Top           Ordered On 7/9/2024  3:35 PM    Ordering Provider Authorizing Provider Ordering User Ordering Department   MD Talat Ruiz MD Jose Guzman, MD [facility] Medicine Family [facility]    [phone]  [phone]  [phone]  [phone]     Other Results from 7/12/2024       Contains abnormal data CBC and differential  Order: [id number]   Status: Final result         Visible to patient: Yes (not seen)         Next appt: 07/17/2024 at 02:30 PM in Radiology ([facility] 1)         Dx: Anemia, unspecified type      1 Result Note         1 Patient Communication                Component  Ref Range & Units 7/12/24 1150 4/24/24 0505 4/23/24 1514 4/19/24 0248 4/18/24 0421 4/17/24 0512 4/7/24 0526   WBC  4.31 - 10.16 Thousand/uL 4.38 4.99 7.38 6.73 7.29 12.82 High  5.00   RBC  3.81 - 5.12 Million/uL 4.75 3.75 Low  4.13 3.94 3.51 Low   3.78 Low  4.27   Hemoglobin  11.5 - 15.4 g/dL 13.7 11.2 Low  12.4 11.7 10.6 Low  11.4 Low  12.6   Hematocrit  34.8 - 46.1 % 44.3 34.4 Low  38.2 37.6 33.3 Low  35.0 38.9   MCV  82 - 98 fL 93 92 93 95 95 93 91   MCH  26.8 - 34.3 pg 28.8 29.9 30.0 29.7 30.2 30.2 29.5   MCHC  31.4 - 37.4 g/dL 30.9 Low  32.6 32.5 31.1 Low  31.8 32.6 32.4   RDW  11.6 - 15.1 % 12.9 13.7 13.6 14.0 14.3 13.7 13.8   MPV  8.9 - 12.7 fL 9.7 8.9 9.0 9.1 9.8 9.7 8.9   Platelets  149 - 390 Thousands/uL 297 274 305 280 283 350 298   nRBC  /100 WBCs 0 0        Segmented %  43 - 75 % 50 48        Immature Grans %  0 - 2 % 0 1        Lymphocytes %  14 - 44 % 38 34        Monocytes %  4 - 12 % 9 11        Eosinophils Relative  0 - 6 % 2 5        Basophils Relative  0 - 1 % 1 1        Absolute Neutrophils  1.85 - 7.62 Thousands/µL 2.21 2.44        Absolute Immature Grans  0.00 - 0.20 Thousand/uL 0.01 0.03        Absolute Lymphocytes  0.60 - 4.47 Thousands/µL 1.66 1.67        Absolute Monocytes  0.17 - 1.22 Thousand/µL 0.38 0.57        Eosinophils Absolute  0.00 - 0.61 Thousand/µL 0.08 0.25        Basophils Absolute  0.00 - 0.10 Thousands/µL 0.04 0.03                   Specimen Collected: 07/12/24 11:50 Last Resulted: 07/12/24 18:17        Lab Flowsheet          Order Details          View Encounter          Lab and Collection Details          Routing          Result History       View All Conversations on this Encounter             Result Care Coordination        Result Notes     Talat Ventura MD  7/14/2024  8:06 PM EDT Back to Top        Her blood work shows resolved anemia otherwise normal blood work and urine.          Patient Communication     Edit Comments   Add Notifications  Back to Top      Talat Ventura MD  7/14/2024  8:06 PM EDT     Her blood work shows resolved anemia otherwise normal blood work and urine.   Written by Jacki Interiano MA on 7/15/2024 10:47 AM EDT        Ordered On 7/9/2024  3:34 PM    Ordering Provider Authorizing Provider  Ordering User Ordering Department   MD Talat Ruiz MD Jose Guzman, MD [facility] [place] [facility] Medicine Family Medicine Family [facility]    [phone]  [phone]        Urinalysis with microscopic  Order: [id number]   Status: Final result         Visible to patient: Yes (seen)         Next appt: 07/17/2024 at 02:30 PM in Radiology ([facility] 1)         Dx: Urinary frequency      1 Result Note              Component  Ref Range & Units 7/12/24 1150 3/31/21 1456 3/31/21 1456 12/28/20 1452 2/26/18 1348   Color, UA Light Yellow  Yellow Yellow light yellow   Clarity, UA Clear  Cloudy Clear clear   Specific Gravity, UA  1.003 - 1.030 1.022  1.016 1.030 1.005 R   pH, UA  4.5, 5.0, 5.5, 6.0, 6.5, 7.0, 7.5, 8.0 6.0  6.5 6.0 5.0 R   Leukocytes, UA  Negative Negative  Small Abnormal  Small Abnormal  negative R   Nitrite, UA  Negative Negative  Negative Negative negative R   Protein, UA  Negative mg/dl Negative  Negative Trace Abnormal  negative R   Glucose, UA  Negative mg/dl Negative  Negative Negative negative R   Ketones, UA  Negative mg/dl Negative  Negative 15 (1+) Abnormal  15 R   Urobilinogen, UA  <2.0 mg/dl mg/dl <2.0       Bilirubin, UA  Negative Negative  Negative Negative    Occult Blood, UA  Negative Negative  Negative Negative negative R   RBC, UA  None Seen, 1-2 /hpf None Seen None Seen R  None Seen R    WBC, UA  None Seen, 1-2 /hpf 1-2 20-30 Abnormal  R  10-20 Abnormal  R    Epithelial Cells  None Seen, Occasional /hpf Occasional None Seen  Occasional    Bacteria, UA  None Seen, Occasional /hpf None Seen None Seen  None Seen    Hyaline Casts, UA  None Seen R      Urobilinogen, UA   0.2 R 0.2 R 0.2   Ca Oxalate Erica, UA    Moderate Abnormal  R    BILIRUBIN,UA     negative              Specimen Collected: 07/12/24 11:50 Last Resulted: 07/12/24 18:19        Lab Flowsheet          Order Details          View Encounter          Lab and Collection  Details          Routing          Result History       View All Conversations on this Encounter        R=Reference range differs from displayed range          Result Care Coordination        Result Notes     Talat Ventura MD  7/14/2024  8:06 PM EDT Back to Top        Her blood work shows resolved anemia otherwise normal blood work and urine.       Patient Communication     Add Comments   Seen Back to Top           Ordered On 7/9/2024  3:35 PM    Ordering Provider Authorizing Provider Ordering User Ordering Department   MD Talat Ruiz MD Jose Guzman, MD [place]    Family Medicine Family Medicine Family Medicine Family Medicine    [phone]          TIBC Panel (incl. Iron, TIBC, % Iron Saturation)  Order: [id number] - Part of Panel Order [id number]   Status: Final result         Visible to patient: Yes (seen)         Next appt: 07/17/2024 at 02:30 PM in Radiology ([facility] 1)         Dx: Anemia, unspecified type      1 Result Note          Component  Ref Range & Units 7/12/24 1150   Iron Saturation  15 - 50 % 30   TIBC  250 - 450 ug/dL 388   Iron  50 - 212 ug/dL 117   Comment: Patients treated with metal-binding drugs (ie. Deferoxamine) may have depressed iron values.   UIBC  155 - 355 ug/dL 271              Specimen Collected: 07/12/24 11:50 Last Resulted: 07/12/24 18:47        Lab Flowsheet          Order Details          View Encounter          Lab and Collection Details          Routing          Result History       View All Conversations on this Encounter             Result Care Coordination        Result Notes     Talat Ventura MD  7/14/2024  8:06 PM EDT Back to Top        Her blood work shows resolved anemia otherwise normal blood work and urine.       Patient Communication     Add Comments   Seen Back to Top           Ordered On 7/12/2024 11:50 AM    Ordering Provider Authorizing Provider Ordering User Ordering Department   Talat Ventura MD  MD Talat Ruiz MD [place] [facility]   Family [facility] Family Medicine Family Medicine Lab    [phone]  [phone]  [phone]  [phone]          Ferritin  Order: [id number] - Part of Panel Order [id number]   Status: Final result         Visible to patient: Yes (seen)         Next appt: 07/17/2024 at 02:30 PM in Radiology ([facility] RBC 1)         Dx: Anemia, unspecified type      1 Result Note          Component  Ref Range & Units 7/12/24 1150   Ferritin  11 - 307 ng/mL 36              Specimen Collected: 07/12/24 11:50 Last Resulted: 07/12/24 19:12        Lab Flowsheet          Order Details          View Encounter          Lab and Collection Details          Routing          Result History       View All Conversations on this Encounter             Result Care Coordination        Result Notes     Talat Ventura MD  7/14/2024  8:06 PM EDT Back to Top        Her blood work shows resolved anemia otherwise normal blood work and urine.       Patient Communication     Add Comments   Seen Back to Top           Ordered On 7/12/2024 11:50 AM    Ordering Provider Authorizing Provider Ordering User Ordering Department   MD Talat Ruiz MD Jose Guzman, MD [place] [facility]    [phone]  [phone]  [phone]  [phone]     Imaging    Urine culture (Order: [id number]) - 7/12/2024  Lab Order Result Printout    Urine culture (Order #[id number]) on 7/12/24     External Results Report    Open External Results Report     Lab Component SmartPhrase Guide    Urine culture (Order #[id number]) on 7/12/24     Questions    Order Question Answer   [facility]       Release to patient through [employer] Mess[employer] Immediate              Specimen Description:     7/13/2024 12:19 PM    Specimen Information: Urine   Urine Culture No Growth <1000 cfu/mL           All Reviewers List    Tlaat Ventura MD on 7/14/2024 20:06     Lab Information    Lab   [id number]   [address] [place] [address]  [place] [address]  Tel: [phone]  : Angelo Lincoln Jr., MD,FCAP               Additional Information    Specimen ID Bill Type Client ID   [id number]              Specimen Date Taken Specimen Time Taken Specimen Received Date Specimen Received Time Result Date Result Time   Jul 12, 2024 11:50 AM Jul 12, 2024  5:38 PM Jul 13, 2024 12:19 PM     Routing History    Priority Sent On From To Message Type    7/14/2024  8:06 PM Talat Ventura MD [facility] Clinical Result Notes    7/12/2024  6:17 PM Lab, Background User Talat Ventura MD Results     Results Routing Details    Order ID: [id number]   Result contact date: 7/12/24   Result status: Final result   Outcome: Routed using routing scheme   Routing Scheme Used:  SYSTEM DEFINITION RESULTS ROUTING [[id number]]   Routing Scheme Line: Default   Resulting User: Lab, Background User [LABBACKGROUND]   Routing Instant: Sat Jul 13, 2024 12:19 PM   Current Status: Routing Complete   Status History: Result contact created Fri Jul 12, 2024 11:57 AM     Routing Complete Fri Jul 12, 2024 11:57 AM     Routing started Sat Jul 13, 2024 12:19 PM     Routing Complete Sat Jul 13, 2024 12:19 PM   In Basket Sent: Message ID: [id number]  Recipients:         Talat Ventura MD  [[id number]]              Responsible: Yes     Other Results from 7/12/2024     CBC and differential Final result 7/12/2024    Urinalysis with microscopic Final result 7/12/2024    TIBC Panel (incl. Iron, TIBC, % Iron Saturation) Final result 7/12/2024    Ferritin Final result

## 2024-07-16 ENCOUNTER — APPOINTMENT (OUTPATIENT)
Dept: LAB | Facility: CLINIC | Age: 79
End: 2024-07-16
Payer: COMMERCIAL

## 2024-07-16 DIAGNOSIS — E78.2 MIXED HYPERLIPIDEMIA: ICD-10-CM

## 2024-07-16 DIAGNOSIS — I63.9 LEFT SIDED CEREBRAL HEMISPHERE CEREBROVASCULAR ACCIDENT (CVA) (HCC): ICD-10-CM

## 2024-07-16 DIAGNOSIS — D53.9 NUTRITIONAL ANEMIA, UNSPECIFIED: ICD-10-CM

## 2024-07-16 DIAGNOSIS — E61.8 MINERAL DEFICIENCY: ICD-10-CM

## 2024-07-16 PROCEDURE — 82525 ASSAY OF COPPER: CPT

## 2024-07-16 PROCEDURE — 36415 COLL VENOUS BLD VENIPUNCTURE: CPT

## 2024-07-17 ENCOUNTER — HOSPITAL ENCOUNTER (OUTPATIENT)
Dept: MAMMOGRAPHY | Facility: CLINIC | Age: 79
Discharge: HOME | End: 2024-07-17
Payer: COMMERCIAL

## 2024-07-17 DIAGNOSIS — Z12.31 SCREENING MAMMOGRAM FOR BREAST CANCER: ICD-10-CM

## 2024-07-17 PROCEDURE — 77067 SCR MAMMO BI INCL CAD: CPT

## 2024-07-17 PROCEDURE — 77063 BREAST TOMOSYNTHESIS BI: CPT

## 2024-07-18 ENCOUNTER — HOSPITAL ENCOUNTER (OUTPATIENT)
Dept: VASCULAR ULTRASOUND | Facility: HOSPITAL | Age: 79
Discharge: HOME | End: 2024-07-18
Payer: COMMERCIAL

## 2024-07-18 DIAGNOSIS — I63.20 CEREBRAL INFARCTION DUE TO UNSPECIFIED OCCLUSION OR STENOSIS OF UNSPECIFIED PRECEREBRAL ARTERIES (HCC): ICD-10-CM

## 2024-07-18 DIAGNOSIS — I63.239 CAROTID STENOSIS, SYMPTOMATIC, WITH INFARCTION (HCC): ICD-10-CM

## 2024-07-18 PROCEDURE — 93880 EXTRACRANIAL BILAT STUDY: CPT

## 2024-07-18 PROCEDURE — 93880 EXTRACRANIAL BILAT STUDY: CPT | Performed by: SURGERY

## 2024-07-19 ENCOUNTER — TELEPHONE (OUTPATIENT)
Dept: FAMILY MEDICINE CLINIC | Facility: CLINIC | Age: 79
End: 2024-07-19

## 2024-07-19 LAB — COPPER SERPL-MCNC: 104 UG/DL (ref 80–158)

## 2024-07-19 NOTE — TELEPHONE ENCOUNTER
NA and VM not set up yet  ----- Message from Talat Ventura MD sent at 7/19/2024  8:44 AM EDT -----  Copper level was normal.

## 2024-07-29 ENCOUNTER — TELEPHONE (OUTPATIENT)
Dept: VASCULAR SURGERY | Facility: CLINIC | Age: 79
End: 2024-07-29

## 2024-07-29 NOTE — TELEPHONE ENCOUNTER
Pt calling regarding email of her appt cancellation. R/s pt for 10/21 and added to waitlist. Please call pt with any upcoming available appts.

## 2024-07-31 NOTE — PROGRESS NOTES
Assessment/Plan:      Diagnoses and all orders for this visit:    Carotid stenosis, symptomatic, with infarction (HCC)  -     VAS carotid complete study; Future    Cerebral atherosclerosis  -     VAS carotid complete study; Future        High-grade symptomatic left carotid artery stenosis status post carotid endarterectomy on April 16. Repeat duplex demonstrates wide patency of the left carotid artery. The patient does not have any stroke symptoms since that time. The patient wishes to no longer take aspirin nor Plavix. I suggested continuing aspirin however Plavix could be discontinued. She is not interested in taking statin medication per her own research. She is a non-smoker. She will have a repeat carotid artery duplex in 6 months and a repeat visit in 1 year.  Subjective:     Patient ID: Shanda Quintanilla is a 79 y.o. female.    Patient presents today to review carotid duplex s/p L CEA 4/16/24.     HPI  The patient is a pleasant 79-year-old female accompanied by her significant other.  She had a carotid endarterectomy in mid April.  She has been doing well since that time.  There is no recurrence of stroke symptoms.  She states she has some numbness around the incision at the skin level however no difficulty swallowing.  Her cranial nerve exam is intact.  She is quite interested in supplements and alternative medicine techniques and has recently switched to removing of the plastic from her food storage containers.  Though our visit was tangential at times, she is pleasant and progressing well.  We will repeat her carotid artery duplex in 6 months.  We can follow-up in office in 1 year unless there are any emergent concerns.    Review of Systems   Constitutional: Negative.    HENT: Negative.     Eyes: Negative.    Respiratory: Negative.     Cardiovascular: Negative.    Gastrointestinal: Negative.    Endocrine: Negative.    Genitourinary: Negative.    Musculoskeletal: Negative.    Skin: Negative.     Allergic/Immunologic: Negative.    Neurological: Negative.    Hematological: Negative.    Psychiatric/Behavioral: Negative.           Objective:     Physical Exam  Constitutional:       Appearance: Normal appearance.   HENT:      Head: Normocephalic and atraumatic.      Nose: No congestion or rhinorrhea.   Eyes:      Extraocular Movements: Extraocular movements intact.      Pupils: Pupils are equal, round, and reactive to light.   Cardiovascular:      Rate and Rhythm: Normal rate and regular rhythm.      Pulses:           Radial pulses are 2+ on the right side and 2+ on the left side.      Comments: Well-healed surgical incision  Pulmonary:      Effort: Pulmonary effort is normal.      Breath sounds: No stridor.   Abdominal:      General: There is no distension.      Tenderness: There is no abdominal tenderness.   Musculoskeletal:         General: No swelling. Normal range of motion.      Cervical back: Normal range of motion and neck supple.   Skin:     General: Skin is warm.      Coloration: Skin is not jaundiced.   Neurological:      General: No focal deficit present.      Mental Status: She is alert and oriented to person, place, and time.   Psychiatric:         Mood and Affect: Mood normal.         Behavior: Behavior normal.

## 2024-08-01 ENCOUNTER — OFFICE VISIT (OUTPATIENT)
Dept: VASCULAR SURGERY | Facility: CLINIC | Age: 79
End: 2024-08-01
Payer: COMMERCIAL

## 2024-08-01 VITALS
WEIGHT: 139 LBS | SYSTOLIC BLOOD PRESSURE: 120 MMHG | BODY MASS INDEX: 24.63 KG/M2 | HEART RATE: 76 BPM | DIASTOLIC BLOOD PRESSURE: 70 MMHG | HEIGHT: 63 IN

## 2024-08-01 DIAGNOSIS — I67.2 CEREBRAL ATHEROSCLEROSIS: ICD-10-CM

## 2024-08-01 DIAGNOSIS — I63.239 CAROTID STENOSIS, SYMPTOMATIC, WITH INFARCTION (HCC): Primary | ICD-10-CM

## 2024-08-01 PROCEDURE — 99214 OFFICE O/P EST MOD 30 MIN: CPT | Performed by: SURGERY

## 2024-08-01 PROCEDURE — 1159F MED LIST DOCD IN RCRD: CPT | Performed by: SURGERY

## 2024-08-01 NOTE — LETTER
August 1, 2024     Talat Ventura MD  [address]  [facility] [address]    Patient: Shanda Quintanilla   YOB: 1945   Date of Visit: 8/1/2024       Dear Dr. Ventura:    Thank you for referring Shanda Quintanilla to me for evaluation. Below are my notes for this consultation.    If you have questions, please do not hesitate to call me. I look forward to following your patient along with you.         Sincerely,        Nikolai Daniel MD        CC: Shanda Quintanilla    Nikolai Daniel MD  8/1/2024  3:54 PM  Sign when Signing Visit  Assessment/Plan:      Diagnoses and all orders for this visit:    Carotid stenosis, symptomatic, with infarction (HCC)  -     VAS carotid complete study; Future    Cerebral atherosclerosis  -     VAS carotid complete study; Future        High-grade symptomatic left carotid artery stenosis status post carotid endarterectomy on April 16. Repeat duplex demonstrates wide patency of the left carotid artery. The patient does not have any stroke symptoms since that time. The patient wishes to no longer take aspirin nor Plavix. I suggested continuing aspirin however Plavix could be discontinued. She is not interested in taking statin medication per her own research. She is a non-smoker. She will have a repeat carotid artery duplex in 6 months and a repeat visit in 1 year.  Subjective:     Patient ID: Shanda Quintanilla is a 79 y.o. female.    Patient presents today to review carotid duplex s/p L CEA 4/16/24.     HPI  The patient is a pleasant 79-year-old female accompanied by her significant other.  She had a carotid endarterectomy in mid April.  She has been doing well since that time.  There is no recurrence of stroke symptoms.  She states she has some numbness around the incision at the skin level however no difficulty swallowing.  Her cranial nerve exam is intact.  She is quite interested in supplements and alternative medicine techniques and has recently switched to  removing of the plastic from her food storage containers.  Though our visit was tangential at times, she is pleasant and progressing well.  We will repeat her carotid artery duplex in 6 months.  We can follow-up in office in 1 year unless there are any emergent concerns.    Review of Systems   Constitutional: Negative.    HENT: Negative.     Eyes: Negative.    Respiratory: Negative.     Cardiovascular: Negative.    Gastrointestinal: Negative.    Endocrine: Negative.    Genitourinary: Negative.    Musculoskeletal: Negative.    Skin: Negative.    Allergic/Immunologic: Negative.    Neurological: Negative.    Hematological: Negative.    Psychiatric/Behavioral: Negative.           Objective:     Physical Exam  Constitutional:       Appearance: Normal appearance.   HENT:      Head: Normocephalic and atraumatic.      Nose: No congestion or rhinorrhea.   Eyes:      Extraocular Movements: Extraocular movements intact.      Pupils: Pupils are equal, round, and reactive to light.   Cardiovascular:      Rate and Rhythm: Normal rate and regular rhythm.      Pulses:           Radial pulses are 2+ on the right side and 2+ on the left side.      Comments: Well-healed surgical incision  Pulmonary:      Effort: Pulmonary effort is normal.      Breath sounds: No stridor.   Abdominal:      General: There is no distension.      Tenderness: There is no abdominal tenderness.   Musculoskeletal:         General: No swelling. Normal range of motion.      Cervical back: Normal range of motion and neck supple.   Skin:     General: Skin is warm.      Coloration: Skin is not jaundiced.   Neurological:      General: No focal deficit present.      Mental Status: She is alert and oriented to person, place, and time.   Psychiatric:         Mood and Affect: Mood normal.         Behavior: Behavior normal.

## 2024-08-01 NOTE — ASSESSMENT & PLAN NOTE
High-grade symptomatic left carotid artery stenosis status post carotid endarterectomy on April 16.  Repeat duplex demonstrates wide patency of the left carotid artery.  The patient does not have any stroke symptoms since that time.  The patient wishes to no longer take aspirin nor Plavix.  I suggested continuing aspirin however Plavix could be discontinued.  She is not interested in taking statin medication per her own research.  She is a non-smoker.  She will have a repeat carotid artery duplex in 6 months and a repeat visit in 1 year.

## 2024-09-09 ENCOUNTER — TELEPHONE (OUTPATIENT)
Age: 79
End: 2024-09-09

## 2024-09-09 NOTE — TELEPHONE ENCOUNTER
Patient is requesting annual blood work be added to . She would like normal levels/labs but would like to add a check on her vitamins(to include vitamin D and magnesium). She is also looking to add Telomeres. Patient requests call when ordered.

## 2024-09-10 DIAGNOSIS — R35.0 URINARY FREQUENCY: ICD-10-CM

## 2024-09-10 DIAGNOSIS — E83.42 HYPOMAGNESEMIA: ICD-10-CM

## 2024-09-10 DIAGNOSIS — E55.9 VITAMIN D DEFICIENCY: ICD-10-CM

## 2024-09-10 DIAGNOSIS — D53.9 NUTRITIONAL ANEMIA, UNSPECIFIED: ICD-10-CM

## 2024-09-10 DIAGNOSIS — E78.2 MIXED HYPERLIPIDEMIA: ICD-10-CM

## 2024-09-10 DIAGNOSIS — D64.9 ANEMIA, UNSPECIFIED TYPE: Primary | ICD-10-CM

## 2024-09-10 DIAGNOSIS — R73.01 IMPAIRED FASTING GLUCOSE: ICD-10-CM

## 2024-09-10 DIAGNOSIS — Z13.1 SCREENING FOR DIABETES MELLITUS: ICD-10-CM

## 2024-09-10 DIAGNOSIS — R53.83 FATIGUE, UNSPECIFIED TYPE: ICD-10-CM

## 2024-09-13 ENCOUNTER — TELEPHONE (OUTPATIENT)
Age: 79
End: 2024-09-13

## 2024-09-13 NOTE — TELEPHONE ENCOUNTER
Pt called asking a couple of questions that she will be calling her insurance about as they had a lot to due with deductibles. She also stated that she needed a referral put in for her to see a chiropractor, please advise as she sounded like she was being called back for a dr sherman. And asked to be called back

## 2024-09-16 ENCOUNTER — TELEPHONE (OUTPATIENT)
Age: 79
End: 2024-09-16

## 2024-09-16 DIAGNOSIS — M54.50 LOW BACK PAIN WITHOUT SCIATICA, UNSPECIFIED BACK PAIN LATERALITY, UNSPECIFIED CHRONICITY: ICD-10-CM

## 2024-09-16 DIAGNOSIS — M54.2 NECK PAIN: Primary | ICD-10-CM

## 2024-09-19 ENCOUNTER — HOSPITAL ENCOUNTER (OUTPATIENT)
Age: 79
Discharge: HOME | End: 2024-09-19
Payer: COMMERCIAL

## 2024-09-19 VITALS — WEIGHT: 137 LBS | HEIGHT: 63 IN | BODY MASS INDEX: 24.27 KG/M2

## 2024-09-19 DIAGNOSIS — E28.39 MENOPAUSE OVARIAN FAILURE: ICD-10-CM

## 2024-09-19 PROCEDURE — 77080 DXA BONE DENSITY AXIAL: CPT

## 2024-09-25 ENCOUNTER — TELEPHONE (OUTPATIENT)
Age: 79
End: 2024-09-25

## 2024-09-25 NOTE — TELEPHONE ENCOUNTER
Pt called asking for a specific lab called [facility] to be ordered as she stated that it is a test that check for insulin response and resistance, please advise and if this can be ordered as she will be going for her labs soon. She also said that she was having a cholesterol test done, but I do not see that is ordered, please advise. Please call and let the pt know if these orders were placed.

## 2024-09-26 ENCOUNTER — APPOINTMENT (OUTPATIENT)
Dept: LAB | Facility: CLINIC | Age: 79
End: 2024-09-26
Payer: COMMERCIAL

## 2024-09-26 DIAGNOSIS — R35.0 URINARY FREQUENCY: ICD-10-CM

## 2024-09-26 DIAGNOSIS — R53.83 FATIGUE, UNSPECIFIED TYPE: ICD-10-CM

## 2024-09-26 DIAGNOSIS — D64.9 ANEMIA, UNSPECIFIED TYPE: ICD-10-CM

## 2024-09-26 DIAGNOSIS — E55.9 VITAMIN D DEFICIENCY: ICD-10-CM

## 2024-09-26 DIAGNOSIS — E83.42 HYPOMAGNESEMIA: ICD-10-CM

## 2024-09-26 DIAGNOSIS — R73.01 IMPAIRED FASTING GLUCOSE: ICD-10-CM

## 2024-09-26 DIAGNOSIS — E78.2 MIXED HYPERLIPIDEMIA: ICD-10-CM

## 2024-09-26 DIAGNOSIS — D53.9 NUTRITIONAL ANEMIA, UNSPECIFIED: ICD-10-CM

## 2024-09-26 LAB
25(OH)D3 SERPL-MCNC: 107.8 NG/ML (ref 30–100)
BASOPHILS # BLD AUTO: 0.02 THOUSANDS/ΜL (ref 0–0.1)
BASOPHILS NFR BLD AUTO: 0 % (ref 0–1)
BILIRUB UR QL STRIP: NEGATIVE
CLARITY UR: CLEAR
COLOR UR: NORMAL
EOSINOPHIL # BLD AUTO: 0.06 THOUSAND/ΜL (ref 0–0.61)
EOSINOPHIL NFR BLD AUTO: 1 % (ref 0–6)
ERYTHROCYTE [DISTWIDTH] IN BLOOD BY AUTOMATED COUNT: 13.8 % (ref 11.6–15.1)
EST. AVERAGE GLUCOSE BLD GHB EST-MCNC: 120 MG/DL
FERRITIN SERPL-MCNC: 44 NG/ML (ref 11–307)
GLUCOSE UR STRIP-MCNC: NEGATIVE MG/DL
HBA1C MFR BLD: 5.8 %
HCT VFR BLD AUTO: 42.3 % (ref 34.8–46.1)
HGB BLD-MCNC: 13.3 G/DL (ref 11.5–15.4)
HGB UR QL STRIP.AUTO: NEGATIVE
IMM GRANULOCYTES # BLD AUTO: 0.01 THOUSAND/UL (ref 0–0.2)
IMM GRANULOCYTES NFR BLD AUTO: 0 % (ref 0–2)
KETONES UR STRIP-MCNC: NEGATIVE MG/DL
LEUKOCYTE ESTERASE UR QL STRIP: NEGATIVE
LYMPHOCYTES # BLD AUTO: 1.48 THOUSANDS/ΜL (ref 0.6–4.47)
LYMPHOCYTES NFR BLD AUTO: 32 % (ref 14–44)
MCH RBC QN AUTO: 29.2 PG (ref 26.8–34.3)
MCHC RBC AUTO-ENTMCNC: 31.4 G/DL (ref 31.4–37.4)
MCV RBC AUTO: 93 FL (ref 82–98)
MONOCYTES # BLD AUTO: 0.36 THOUSAND/ΜL (ref 0.17–1.22)
MONOCYTES NFR BLD AUTO: 8 % (ref 4–12)
NEUTROPHILS # BLD AUTO: 2.77 THOUSANDS/ΜL (ref 1.85–7.62)
NEUTS SEG NFR BLD AUTO: 59 % (ref 43–75)
NITRITE UR QL STRIP: NEGATIVE
NRBC BLD AUTO-RTO: 0 /100 WBCS
PH UR STRIP.AUTO: 6.5 [PH]
PLATELET # BLD AUTO: 294 THOUSANDS/UL (ref 149–390)
PMV BLD AUTO: 10 FL (ref 8.9–12.7)
PROT UR STRIP-MCNC: NEGATIVE MG/DL
RBC # BLD AUTO: 4.55 MILLION/UL (ref 3.81–5.12)
SP GR UR STRIP.AUTO: 1.02 (ref 1–1.03)
UROBILINOGEN UR STRIP-ACNC: <2 MG/DL
WBC # BLD AUTO: 4.7 THOUSAND/UL (ref 4.31–10.16)

## 2024-09-26 PROCEDURE — 82728 ASSAY OF FERRITIN: CPT

## 2024-09-26 PROCEDURE — 83550 IRON BINDING TEST: CPT

## 2024-09-26 PROCEDURE — 82525 ASSAY OF COPPER: CPT

## 2024-09-26 PROCEDURE — 82306 VITAMIN D 25 HYDROXY: CPT

## 2024-09-26 PROCEDURE — 83036 HEMOGLOBIN GLYCOSYLATED A1C: CPT

## 2024-09-26 PROCEDURE — 85025 COMPLETE CBC W/AUTO DIFF WBC: CPT

## 2024-09-26 PROCEDURE — 83540 ASSAY OF IRON: CPT

## 2024-09-26 PROCEDURE — 83735 ASSAY OF MAGNESIUM: CPT

## 2024-09-26 PROCEDURE — 80053 COMPREHEN METABOLIC PANEL: CPT

## 2024-09-26 PROCEDURE — 81003 URINALYSIS AUTO W/O SCOPE: CPT

## 2024-09-27 LAB
ALBUMIN SERPL BCG-MCNC: 4 G/DL (ref 3.5–5)
ALP SERPL-CCNC: 92 U/L (ref 34–104)
ALT SERPL W P-5'-P-CCNC: 13 U/L (ref 7–52)
ANION GAP SERPL CALCULATED.3IONS-SCNC: 9 MMOL/L (ref 4–13)
AST SERPL W P-5'-P-CCNC: 19 U/L (ref 13–39)
BILIRUB SERPL-MCNC: 0.97 MG/DL (ref 0.2–1)
BUN SERPL-MCNC: 10 MG/DL (ref 5–25)
CALCIUM SERPL-MCNC: 9.3 MG/DL (ref 8.4–10.2)
CHLORIDE SERPL-SCNC: 108 MMOL/L (ref 96–108)
CO2 SERPL-SCNC: 27 MMOL/L (ref 21–32)
CREAT SERPL-MCNC: 0.62 MG/DL (ref 0.6–1.3)
CRP SERPL QL: <1 MG/L
GFR SERPL CREATININE-BSD FRML MDRD: 86 ML/MIN/1.73SQ M
GLUCOSE P FAST SERPL-MCNC: 93 MG/DL (ref 65–99)
HCYS SERPL-SCNC: 12.9 UMOL/L (ref 5–20)
IRON SATN MFR SERPL: 46 % (ref 15–50)
IRON SERPL-MCNC: 128 UG/DL (ref 50–212)
MAGNESIUM SERPL-MCNC: 2.2 MG/DL (ref 1.9–2.7)
POTASSIUM SERPL-SCNC: 4.5 MMOL/L (ref 3.5–5.3)
PROT SERPL-MCNC: 6.4 G/DL (ref 6.4–8.4)
SODIUM SERPL-SCNC: 144 MMOL/L (ref 135–147)
TIBC SERPL-MCNC: 280 UG/DL (ref 250–450)
UIBC SERPL-MCNC: 152 UG/DL (ref 155–355)

## 2024-10-01 LAB — COPPER SERPL-MCNC: 109 UG/DL (ref 80–158)

## 2024-10-15 LAB — MISCELLANEOUS LAB TEST RESULT: NORMAL

## 2024-10-16 ENCOUNTER — OFFICE VISIT (OUTPATIENT)
Dept: FAMILY MEDICINE CLINIC | Facility: CLINIC | Age: 79
End: 2024-10-16
Payer: COMMERCIAL

## 2024-10-16 VITALS
OXYGEN SATURATION: 98 % | DIASTOLIC BLOOD PRESSURE: 74 MMHG | TEMPERATURE: 97.5 F | HEART RATE: 84 BPM | WEIGHT: 135.8 LBS | HEIGHT: 63 IN | SYSTOLIC BLOOD PRESSURE: 120 MMHG | BODY MASS INDEX: 24.06 KG/M2

## 2024-10-16 DIAGNOSIS — E78.2 MIXED HYPERLIPIDEMIA: ICD-10-CM

## 2024-10-16 DIAGNOSIS — R63.1 INCREASED THIRST: ICD-10-CM

## 2024-10-16 DIAGNOSIS — R07.9 CHEST PAIN, UNSPECIFIED TYPE: Primary | ICD-10-CM

## 2024-10-16 DIAGNOSIS — Z00.00 MEDICARE ANNUAL WELLNESS VISIT, SUBSEQUENT: ICD-10-CM

## 2024-10-16 DIAGNOSIS — R63.1 POLYDIPSIA: ICD-10-CM

## 2024-10-16 PROBLEM — R10.12 LEFT UPPER QUADRANT ABDOMINAL PAIN: Status: RESOLVED | Noted: 2024-04-30 | Resolved: 2024-10-16

## 2024-10-16 PROCEDURE — G0439 PPPS, SUBSEQ VISIT: HCPCS | Performed by: FAMILY MEDICINE

## 2024-10-16 PROCEDURE — 99213 OFFICE O/P EST LOW 20 MIN: CPT | Performed by: FAMILY MEDICINE

## 2024-10-16 NOTE — PROGRESS NOTES
Ambulatory Visit  Name: Shanda Quintanilla      : 1945      MRN: [id number]  Encounter Provider: Talat Ventura MD  Encounter Date: 10/16/2024   Encounter department: [facility]    Assessment & Plan  Chest pain, unspecified type    Orders:    Stress test only, exercise; Future    Mixed hyperlipidemia  Continue low cholesterol diet       Medicare annual wellness visit, subsequent  See Medicare wellness note    Follow up in 6 months         Polydipsia    Orders:    Osmolality, urine; Future    Sodium, urine, 24 hour; Future    Increased thirst  See above, psychogenic vs primary polydipsia          Preventive health issues were discussed with patient, and age appropriate screening tests were ordered as noted in patient's After Visit Summary. Personalized health advice and appropriate referrals for health education or preventive services given if needed, as noted in patient's After Visit Summary.    History of Present Illness     Patient is here for a follow up.  She continues to have left sided chest pain just underneath her left breast area. On exertion and at rest. She had a left upper quadrant US done which was normal. Has borderline elevated cholesterol however not interested in taking statin medications does take aspirin almost daily.         Patient Care Team:  Talat Ventura MD as PCP - General (Family Medicine)  Jenni Bansal MD as PCP - PCP-[facility] ([facility])  Talat Ventura MD as PCP - PCP-[facility] ([facility])  MD Anton Bartlett MD    Review of Systems   Constitutional:  Negative for activity change, appetite change, fatigue and fever.   HENT:  Negative for congestion and ear discharge.    Respiratory:  Negative for cough and shortness of breath.    Cardiovascular:  Positive for chest pain. Negative for palpitations.   Gastrointestinal:  Negative for diarrhea and nausea.   Musculoskeletal:  Negative for arthralgias and back pain.   Skin:  Negative for color change  and rash.   Neurological:  Negative for dizziness and headaches.   Psychiatric/Behavioral:  Negative for agitation and behavioral problems.      Medical History Reviewed by provider this encounter:  Tobacco  Allergies  Meds  Problems  Med Hx  Surg Hx  Fam Hx       Annual Wellness Visit Questionnaire   Shanda is here for her Subsequent Wellness visit.     Health Risk Assessment:   Patient rates overall health as very good. Patient feels that their physical health rating is same. Patient is very satisfied with their life. Eyesight was rated as same. Hearing was rated as same. Patient feels that their emotional and mental health rating is same. Patients states they are never, rarely angry. Patient states they are never, rarely unusually tired/fatigued. Pain experienced in the last 7 days has been some. Patient's pain rating has been 4/10. Patient states that she has experienced no weight loss or gain in last 6 months.     Depression Screening:   PHQ-2 Score: 0      Fall Risk Screening:   In the past year, patient has experienced: no history of falling in past year      Urinary Incontinence Screening:   Patient has leaked urine accidently in the last six months. rarely if I go when I need to and don't/can't hold it in.    Home Safety:  Patient does not have trouble with stairs inside or outside of their home. Patient has working smoke alarms and has working carbon monoxide detector. Home safety hazards include: none. none    Nutrition:   Current diet is Other (please comment). healthy diet.    Medications:   Patient is currently taking over-the-counter supplements. OTC medications include: see medication list. Patient is able to manage medications. I take all good supplements.    Activities of Daily Living (ADLs)/Instrumental Activities of Daily Living (IADLs):   Walk and transfer into and out of bed and chair?: Yes  Dress and groom yourself?: Yes    Bathe or shower yourself?: Yes    Feed yourself? Yes  Do your  laundry/housekeeping?: Yes  Manage your money, pay your bills and track your expenses?: Yes  Make your own meals?: Yes    Do your own shopping?: Yes    Durable Medical Equipment Suppliers  none.    Previous Hospitalizations:   Any hospitalizations or ED visits within the last 12 months?: Yes    How many hospitalizations have you had in the last year?: 1-2    Hospitalization Comments: stroke.    Advance Care Planning:   Living will: Yes    Durable POA for healthcare: Yes    Advanced directive: Yes      PREVENTIVE SCREENINGS      Cardiovascular Screening:    General: History Lipid Disorder and Screening Current      Diabetes Screening:     General: Screening Current      Colorectal Cancer Screening:     General: Screening Current      Breast Cancer Screening:     General: Screening Current      Cervical Cancer Screening:    General: Screening Not Indicated      Osteoporosis Screening:    General: Screening Not Indicated and History Osteoporosis      Abdominal Aortic Aneurysm (AAA) Screening:        General: Screening Not Indicated      Lung Cancer Screening:     General: Screening Not Indicated      Hepatitis C Screening:    General: Screening Current    Screening, Brief Intervention, and Referral to Treatment (SBIRT)    Screening  Typical number of drinks in a day: 0  Typical number of drinks in a week: 0  Interpretation: Low risk drinking behavior.    AUDIT-C Screenin) How often did you have a drink containing alcohol in the past year? 4 or more times a week  2) How many drinks did you have on a typical day when you were drinking in the past year? 1 to 2  3) How often did you have 6 or more drinks on one occasion in the past year? never    AUDIT-C Score: 4  Interpretation: Score 3-12 (female): POSITIVE screen for alcohol misuse    AUDIT Screenin) How often during the last year have you found that you were not able to stop drinking once you had started? 0 - never  5) How often during the last year have you  failed to do what was normally expected from you because of drinking? 0 - never  6) How often during the last year have you needed a first drink in the morning to get yourself going after a heavy drinking session? 0 - never  7) How often during the last year have you had a feeling of guilt or remorse after drinking? 0 - never  8) How often during the last year have you been unable to remember what happened the night before because you had been drinking? 0 - never  9) Have you or someone else been injured as a result of your drinking? 0 - no  10) Has a relative or friend or a doctor or another health worker been concerned about your drinking or suggested you cut down? 0 - no    AUDIT Score: 4  Interpretation: Low risk alcohol consumption    Single Item Drug Screening:  How often have you used an illegal drug (including marijuana) or a prescription medication for non-medical reasons in the past year? never    Single Item Drug Screen Score: 0  Interpretation: Negative screen for possible drug use disorder    Social Determinants of Health     Financial Resource Strain: Low Risk  (8/14/2023)    Overall Financial Resource Strain ([facility])     Difficulty of Paying Living Expenses: Not very hard   Food Insecurity: No Food Insecurity (10/16/2024)    Hunger Vital Sign     Worried About Running Out of Food in the Last Year: Never true     Ran Out of Food in the Last Year: Never true   Transportation Needs: No Transportation Needs (10/16/2024)    PRAPARE - Transportation     Lack of Transportation (Medical): No     Lack of Transportation (Non-Medical): No   Housing Stability: Low Risk  (10/16/2024)    Housing Stability Vital Sign     Unable to Pay for Housing in the Last Year: No     Number of Times Moved in the Last Year: 1     Homeless in the Last Year: No   Utilities: Not At Risk (10/15/2024)    [facility] Utilities     Threatened with loss of utilities: No     No results found.    Objective     /74   Pulse 84   Temp 97.5 °F  "(36.4 °C)   Ht 5' 3\" (1.6 m)   Wt 61.6 kg (135 lb 12.8 oz)   SpO2 98%   BMI 24.06 kg/m²     Physical Exam  Vitals and nursing note reviewed.   Constitutional:       General: She is not in acute distress.     Appearance: She is well-developed.   HENT:      Head: Normocephalic and atraumatic.   Eyes:      Conjunctiva/sclera: Conjunctivae normal.   Cardiovascular:      Rate and Rhythm: Normal rate and regular rhythm.      Heart sounds: No murmur heard.  Pulmonary:      Effort: Pulmonary effort is normal. No respiratory distress.      Breath sounds: Normal breath sounds.   Abdominal:      Palpations: Abdomen is soft.      Tenderness: There is no abdominal tenderness.   Musculoskeletal:         General: No swelling.      Cervical back: Neck supple.   Skin:     General: Skin is warm and dry.      Capillary Refill: Capillary refill takes less than 2 seconds.   Neurological:      Mental Status: She is alert.   Psychiatric:         Mood and Affect: Mood normal.         "

## 2024-10-24 ENCOUNTER — HOSPITAL ENCOUNTER (OUTPATIENT)
Dept: NON INVASIVE DIAGNOSTICS | Facility: HOSPITAL | Age: 79
Discharge: HOME | End: 2024-10-24
Attending: FAMILY MEDICINE
Payer: COMMERCIAL

## 2024-10-24 VITALS — DIASTOLIC BLOOD PRESSURE: 66 MMHG | HEART RATE: 70 BPM | OXYGEN SATURATION: 97 % | SYSTOLIC BLOOD PRESSURE: 96 MMHG

## 2024-10-24 DIAGNOSIS — R07.9 CHEST PAIN, UNSPECIFIED TYPE: ICD-10-CM

## 2024-10-24 DIAGNOSIS — R94.39 POSITIVE CARDIAC STRESS TEST: Primary | ICD-10-CM

## 2024-10-24 LAB
MAX HR PERCENT: 96 %
MAX HR: 136 BPM
RATE PRESSURE PRODUCT: NORMAL
SL CV STRESS RECOVERY BP: NORMAL MMHG
SL CV STRESS RECOVERY HR: 71 BPM
SL CV STRESS RECOVERY O2 SAT: 99 %
SL CV STRESS STAGE REACHED: 2
STRESS ANGINA INDEX: 0
STRESS BASELINE BP: NORMAL MMHG
STRESS BASELINE HR: 70 BPM
STRESS DUKE TREADMILL SCORE: 1
STRESS O2 SAT REST: 97 %
STRESS PEAK HR: 136 BPM
STRESS POST ESTIMATED WORKLOAD: 7 METS
STRESS POST EXERCISE DUR MIN: 6 MIN
STRESS POST EXERCISE DUR SEC: 0 SEC
STRESS POST O2 SAT PEAK: 100 %
STRESS POST PEAK BP: 142 MMHG
STRESS ST DEPRESSION: 1 MM

## 2024-10-24 PROCEDURE — 93016 CV STRESS TEST SUPVJ ONLY: CPT | Performed by: STUDENT IN AN ORGANIZED HEALTH CARE EDUCATION/TRAINING PROGRAM

## 2024-10-24 PROCEDURE — 93017 CV STRESS TEST TRACING ONLY: CPT

## 2024-10-24 PROCEDURE — 93018 CV STRESS TEST I&R ONLY: CPT | Performed by: STUDENT IN AN ORGANIZED HEALTH CARE EDUCATION/TRAINING PROGRAM

## 2024-10-25 LAB
CHEST PAIN STATEMENT: NORMAL
MAX DIASTOLIC BP: 70 MMHG
MAX PREDICTED HEART RATE: 141 BPM
PROTOCOL NAME: NORMAL
REASON FOR TERMINATION: NORMAL
STRESS POST EXERCISE DUR MIN: 6 MIN
STRESS POST EXERCISE DUR SEC: 0 SEC
STRESS POST PEAK HR: 136 BPM
STRESS POST PEAK SYSTOLIC BP: 142 MMHG
TARGET HR FORMULA: NORMAL
TEST INDICATION: NORMAL

## 2024-10-29 ENCOUNTER — HOSPITAL ENCOUNTER (OUTPATIENT)
Dept: VASCULAR ULTRASOUND | Facility: HOSPITAL | Age: 79
Discharge: HOME | End: 2024-10-29
Attending: SURGERY
Payer: COMMERCIAL

## 2024-10-29 DIAGNOSIS — I63.239 CAROTID STENOSIS, SYMPTOMATIC, WITH INFARCTION (HCC): ICD-10-CM

## 2024-10-29 DIAGNOSIS — I67.2 CEREBRAL ATHEROSCLEROSIS: ICD-10-CM

## 2024-10-29 PROCEDURE — 93880 EXTRACRANIAL BILAT STUDY: CPT

## 2024-10-30 PROCEDURE — 93880 EXTRACRANIAL BILAT STUDY: CPT | Performed by: SURGERY

## 2024-11-01 ENCOUNTER — TELEPHONE (OUTPATIENT)
Dept: CARDIOLOGY CLINIC | Facility: CLINIC | Age: 79
End: 2024-11-01

## 2024-11-01 NOTE — TELEPHONE ENCOUNTER
Pt has routine referral, previous [facility] pt, wants to switch to Dr. No afternoon appt only. Please assist pt, thank you.

## 2024-11-04 ENCOUNTER — TRANSCRIBE ORDERS (OUTPATIENT)
Dept: ADMINISTRATIVE | Facility: HOSPITAL | Age: 79
End: 2024-11-04

## 2024-11-04 DIAGNOSIS — I63.239 CAROTID STENOSIS, SYMPTOMATIC, WITH INFARCTION (HCC): Primary | ICD-10-CM

## 2024-11-15 PROBLEM — Z00.00 MEDICARE ANNUAL WELLNESS VISIT, SUBSEQUENT: Status: RESOLVED | Noted: 2021-12-09 | Resolved: 2024-11-15

## 2024-11-19 ENCOUNTER — OFFICE VISIT (OUTPATIENT)
Dept: FAMILY MEDICINE CLINIC | Facility: CLINIC | Age: 79
End: 2024-11-19
Payer: COMMERCIAL

## 2024-11-19 VITALS
DIASTOLIC BLOOD PRESSURE: 74 MMHG | SYSTOLIC BLOOD PRESSURE: 102 MMHG | BODY MASS INDEX: 25.09 KG/M2 | HEART RATE: 67 BPM | OXYGEN SATURATION: 98 % | WEIGHT: 141.6 LBS | HEIGHT: 63 IN | TEMPERATURE: 98 F

## 2024-11-19 DIAGNOSIS — R94.39 POSITIVE CARDIAC STRESS TEST: Primary | ICD-10-CM

## 2024-11-19 DIAGNOSIS — I65.22 INTRACRANIAL CAROTID STENOSIS, LEFT: ICD-10-CM

## 2024-11-19 DIAGNOSIS — I63.9 LEFT SIDED CEREBRAL HEMISPHERE CEREBROVASCULAR ACCIDENT (CVA) (HCC): ICD-10-CM

## 2024-11-19 PROBLEM — R63.1 POLYDIPSIA: Status: RESOLVED | Noted: 2024-10-16 | Resolved: 2024-11-19

## 2024-11-19 PROBLEM — R63.1 INCREASED THIRST: Status: RESOLVED | Noted: 2024-10-16 | Resolved: 2024-11-19

## 2024-11-19 PROCEDURE — G2211 COMPLEX E/M VISIT ADD ON: HCPCS | Performed by: FAMILY MEDICINE

## 2024-11-19 PROCEDURE — 99213 OFFICE O/P EST LOW 20 MIN: CPT | Performed by: FAMILY MEDICINE

## 2024-11-19 NOTE — ASSESSMENT & PLAN NOTE
Advised to follow up with cardiology she has an appt next month will contact them  If chest pain worsens recommend going to the ER immediately  She is not interested in statin, continue aspirin daily

## 2024-11-19 NOTE — PROGRESS NOTES
Name: Shanda Quintanilla      : 1945      MRN: [id number]  Encounter Provider: Talat Ventura MD  Encounter Date: 2024   Encounter department: [facility]  :  Assessment & Plan  Positive cardiac stress test  Advised to follow up with cardiology she has an appt next month will contact them  If chest pain worsens recommend going to the ER immediately  She is not interested in statin, continue aspirin daily       Small subcortical Left MCA CVA,  No symptoms see above       Intracranial carotid stenosis, left  See above    Follow up in 6 months or as needed            History of Present Illness     Patient is here for a follow up.  She had a positive stress test recently which was done for chest pain. She is adamant not to take statin medications does take aspirin daily.  Has a hx of CVA of carotid stenosis with CEA left sided.      Review of Systems   Constitutional:  Negative for activity change, appetite change, fatigue and fever.   HENT:  Negative for congestion and ear discharge.    Respiratory:  Negative for cough and shortness of breath.    Cardiovascular:  Negative for chest pain and palpitations.   Gastrointestinal:  Negative for diarrhea and nausea.   Musculoskeletal:  Negative for arthralgias and back pain.   Skin:  Negative for color change and rash.   Neurological:  Negative for dizziness and headaches.   Psychiatric/Behavioral:  Negative for agitation and behavioral problems.      Pertinent Medical History      Medical History Reviewed by provider this encounter:  Tobacco  Allergies  Meds  Problems  Med Hx  Surg Hx  Fam Hx     .  Past Medical History   Past Medical History:   Diagnosis Date    Cervical strain     Changing skin lesion     Hyperlipidemia     MCI (mild cognitive impairment)     Mixed hyperlipidemia     Nonmelanoma skin cancer     BCC    Osteoporosis     Polio     Poor concentration     Stroke (HCC)     Swelling of left thumb      Past Surgical History:    Procedure Laterality Date    ANAL FISSURECTOMY      APPENDECTOMY      CATARACT EXTRACTION      COLONOSCOPY      HYSTERECTOMY      HYSTERECTOMY W/ SALPINGO-OOPHERECTOMY      ND TEAEC W/PATCH GRF CAROTID VERTB SUBCLAV NECK INC Left 4/16/2024    Procedure: ENDARTERECTOMY ARTERY CAROTID;  Surgeon: Nikolai Daniel MD;  Location: [facility];  Service: Vascular     Family History   Problem Relation Age of Onset    Heart attack Mother     Cancer Father     Heart attack Sister       reports that she has quit smoking. Her smoking use included cigarettes. She has a 40 pack-year smoking history. She has never used smokeless tobacco. She reports current alcohol use. She reports that she does not currently use drugs.  Current Outpatient Medications on File Prior to Visit   Medication Sig Dispense Refill    Alpha-Lipoic Acid (LIPOIC ACID PO) Take 1 capsule by mouth      Amino Acids (L-CARNITINE PO) Take 1 tablet by mouth      Artificial Tear Solution (JUST TEARS EYE DROPS OP) Apply to eye      Ascorbic Acid (VITAMIN C) 1000 MG tablet Take 1,000 mg by mouth daily       aspirin (ECOTRIN LOW STRENGTH) 81 mg EC tablet Take 1 tablet (81 mg total) by mouth daily 30 tablet 0    Calcium Carb-Cholecalciferol 1000-800 MG-UNIT TABS Take by mouth      Chelated Magnesium 100 MG TABS Take by mouth Daily      Cholecalciferol (VITAMIN D3) 5000 units CAPS Take 1 capsule by mouth daily      Glycerylphosphorylcholine POWD 1 tablet by Does not apply route      Iodine, Kelp, (KELP PO) Take by mouth daily      L-Carnosine POWD by Does not apply route      Menaquinone-7 (VITAMIN K2) 100 MCG CAPS       Misc Natural Products (GLUCOSAMINE CHOND CMP TRIPLE PO) Take by mouth      Misc Natural Products (PANTETHINE PLUS) TABS Take by mouth      Omega-3 Fatty Acids (FISH OIL) 1200 MG CAPS Take 1 capsule by mouth daily       PANTETHINE PO Take by mouth      patient supplied medication       PREBIOTIC PRODUCT PO Take by mouth      Probiotic Product  (PROBIOTIC-10) CAPS Take by mouth      pyridoxine (VITAMIN B6) 100 mg tablet 1 tab(s)      Turmeric POWD 1      Vinpocetine POWD by Does not apply route      vitamin B-12 (CYANOCOBALAMIN) 100 MCG tablet Take by mouth every other day       vitamin E, tocopherol, 1,000 units capsule Take by mouth daily       Zinc 30 MG CAPS Take 1 capsule by mouth daily      acetaminophen (TYLENOL) 325 mg tablet Take 2 tablets (650 mg total) by mouth every 6 (six) hours as needed for mild pain (Patient not taking: Reported on 11/19/2024)      atorvastatin (LIPITOR) 40 mg tablet Take 0.5 tablets (20 mg total) by mouth daily with dinner (Patient not taking: Reported on 7/10/2024) 30 tablet 3    clopidogrel (PLAVIX) 75 mg tablet Take 1 tablet (75 mg total) by mouth daily 30 tablet 3    NATURAL VITAMIN E MOISTURIZING GEL Apply topically       No current facility-administered medications on file prior to visit.     Allergies   Allergen Reactions    Sulfa Antibiotics Other (See Comments)      Current Outpatient Medications on File Prior to Visit   Medication Sig Dispense Refill    Alpha-Lipoic Acid (LIPOIC ACID PO) Take 1 capsule by mouth      Amino Acids (L-CARNITINE PO) Take 1 tablet by mouth      Artificial Tear Solution (JUST TEARS EYE DROPS OP) Apply to eye      Ascorbic Acid (VITAMIN C) 1000 MG tablet Take 1,000 mg by mouth daily       aspirin (ECOTRIN LOW STRENGTH) 81 mg EC tablet Take 1 tablet (81 mg total) by mouth daily 30 tablet 0    Calcium Carb-Cholecalciferol 1000-800 MG-UNIT TABS Take by mouth      Chelated Magnesium 100 MG TABS Take by mouth Daily      Cholecalciferol (VITAMIN D3) 5000 units CAPS Take 1 capsule by mouth daily      Glycerylphosphorylcholine POWD 1 tablet by Does not apply route      Iodine, Kelp, (KELP PO) Take by mouth daily      L-Carnosine POWD by Does not apply route      Menaquinone-7 (VITAMIN K2) 100 MCG CAPS       Misc Natural Products (GLUCOSAMINE CHOND CMP TRIPLE PO) Take by mouth      Misc Natural  "Products (PANTETHINE PLUS) TABS Take by mouth      Omega-3 Fatty Acids (FISH OIL) 1200 MG CAPS Take 1 capsule by mouth daily       PANTETHINE PO Take by mouth      patient supplied medication       PREBIOTIC PRODUCT PO Take by mouth      Probiotic Product (PROBIOTIC-10) CAPS Take by mouth      pyridoxine (VITAMIN B6) 100 mg tablet 1 tab(s)      Turmeric POWD 1      Vinpocetine POWD by Does not apply route      vitamin B-12 (CYANOCOBALAMIN) 100 MCG tablet Take by mouth every other day       vitamin E, tocopherol, 1,000 units capsule Take by mouth daily       Zinc 30 MG CAPS Take 1 capsule by mouth daily      acetaminophen (TYLENOL) 325 mg tablet Take 2 tablets (650 mg total) by mouth every 6 (six) hours as needed for mild pain (Patient not taking: Reported on 11/19/2024)      atorvastatin (LIPITOR) 40 mg tablet Take 0.5 tablets (20 mg total) by mouth daily with dinner (Patient not taking: Reported on 7/10/2024) 30 tablet 3    clopidogrel (PLAVIX) 75 mg tablet Take 1 tablet (75 mg total) by mouth daily 30 tablet 3    NATURAL VITAMIN E MOISTURIZING GEL Apply topically       No current facility-administered medications on file prior to visit.      Social History     Tobacco Use    Smoking status: Former     Current packs/day: 2.00     Average packs/day: 2.0 packs/day for 20.0 years (40.0 ttl pk-yrs)     Types: Cigarettes    Smokeless tobacco: Never   Vaping Use    Vaping status: Never Used   Substance and Sexual Activity    Alcohol use: Yes     Comment: OCCASIONAL- WINE     Drug use: Not Currently    Sexual activity: Not on file        Objective   /74 (BP Location: Left arm, Patient Position: Sitting)   Pulse 67   Temp 98 °F (36.7 °C) (Temporal)   Ht 5' 3\" (1.6 m)   Wt 64.2 kg (141 lb 9.6 oz)   SpO2 98%   BMI 25.08 kg/m²      Physical Exam  Vitals and nursing note reviewed.   Constitutional:       General: She is not in acute distress.     Appearance: She is well-developed.   HENT:      Head: Normocephalic " and atraumatic.   Eyes:      Conjunctiva/sclera: Conjunctivae normal.   Cardiovascular:      Rate and Rhythm: Normal rate and regular rhythm.      Heart sounds: No murmur heard.  Pulmonary:      Effort: Pulmonary effort is normal. No respiratory distress.      Breath sounds: Normal breath sounds.   Abdominal:      Palpations: Abdomen is soft.      Tenderness: There is no abdominal tenderness.   Musculoskeletal:         General: No swelling.      Cervical back: Neck supple.   Skin:     General: Skin is warm and dry.      Capillary Refill: Capillary refill takes less than 2 seconds.   Neurological:      Mental Status: She is alert.   Psychiatric:         Mood and Affect: Mood normal.

## 2024-11-21 ENCOUNTER — TELEPHONE (OUTPATIENT)
Dept: CARDIOLOGY CLINIC | Facility: CLINIC | Age: 79
End: 2024-11-21

## 2024-11-21 ENCOUNTER — TELEPHONE (OUTPATIENT)
Dept: FAMILY MEDICINE CLINIC | Facility: CLINIC | Age: 79
End: 2024-11-21

## 2024-11-21 DIAGNOSIS — R93.89 ABNORMAL CT OF THE CHEST: Primary | ICD-10-CM

## 2024-11-21 NOTE — TELEPHONE ENCOUNTER
Patient returned call. Relayed message. She will schedule CT scan. No further questions at this time.

## 2024-11-21 NOTE — TELEPHONE ENCOUNTER
----- Message from Cary JUAN sent at 11/20/2024  1:54 PM EST -----  Dear Provider,    Our records indicate that your patient was recommended to have a follow up exam based on a prior imaging study.    This letter is to notify you that your patient has not returned to a [facility]'s site for the recommended study. Enclosed, please find a copy of the patient report for your review. The Radiologist's recommendation can be found in the impression section at the bottom of the report.     If you have further questions, please feel free to contact us at [phone]. If you need assistance in making a scheduled appointment for your patient, please contact Central Scheduling at [phone].    Thank You,    Colin Kim MD  , Department of Radiology

## 2024-11-21 NOTE — TELEPHONE ENCOUNTER
----- Message from Malcom Quan MD sent at 11/21/2024  9:47 AM EST -----    ----- Message -----  From: Talat Ventura MD  Sent: 11/19/2024   3:47 PM EST  To: Malcom Quan MD    Hi Dr. Quan,    I just wanted to reach out to you in regards to this patient coming to see you on 12/26. She did have a positive stress test recently. She denies any chest pain at this time. Not sure if you need to see her sooner than 12/26.  Thanks,  Dr. Lorenzo Gilliland

## 2024-11-21 NOTE — TELEPHONE ENCOUNTER
11/21-Left voicemail for patient to return call to notify Referral for CT scan is in as well as Dr. Ventura is trying to see if Cardiology can see her sooner.

## 2024-11-29 ENCOUNTER — HOSPITAL ENCOUNTER (OUTPATIENT)
Dept: CT IMAGING | Facility: HOSPITAL | Age: 79
End: 2024-11-29
Attending: FAMILY MEDICINE
Payer: COMMERCIAL

## 2024-11-29 DIAGNOSIS — R93.89 ABNORMAL CT OF THE CHEST: ICD-10-CM

## 2024-11-29 PROCEDURE — 71250 CT THORAX DX C-: CPT

## 2024-12-06 ENCOUNTER — RESULTS FOLLOW-UP (OUTPATIENT)
Dept: FAMILY MEDICINE CLINIC | Facility: CLINIC | Age: 79
End: 2024-12-06

## 2024-12-08 DIAGNOSIS — R91.8 MASS OF UPPER LOBE OF RIGHT LUNG: Primary | ICD-10-CM

## 2024-12-09 ENCOUNTER — TELEPHONE (OUTPATIENT)
Age: 79
End: 2024-12-09

## 2024-12-10 ENCOUNTER — DOCUMENTATION (OUTPATIENT)
Dept: HEMATOLOGY ONCOLOGY | Facility: CLINIC | Age: 79
End: 2024-12-10

## 2024-12-10 NOTE — PROGRESS NOTES
All records needed are in patients chart. No records retrieval needed at this time.     Referral received/ Chart reviewed for work up completed     Imaging completed: [facility]   [] PET/CT   [] MRI   [x] CT chest wo contrast 11/29/24   [] US   [] Mammo   [] Bone scan   [] N/A    Pathology completed:    Date:   Location:   [x]N/A    Additional records needed:    [] Genomic report   [] Genetic testing results   [] Office Note   [] Procedure/ Operative note   [] Lab results   [x] N/A      [] Radiation Oncology records retrieval needed (PN to route to rad/onc clerical pool once scheduled)  Date:  Location:

## 2024-12-17 ENCOUNTER — CONSULT (OUTPATIENT)
Dept: CARDIAC SURGERY | Facility: CLINIC | Age: 79
End: 2024-12-17
Payer: COMMERCIAL

## 2024-12-17 VITALS
DIASTOLIC BLOOD PRESSURE: 62 MMHG | HEIGHT: 63 IN | WEIGHT: 141 LBS | BODY MASS INDEX: 24.98 KG/M2 | SYSTOLIC BLOOD PRESSURE: 130 MMHG | OXYGEN SATURATION: 98 % | HEART RATE: 64 BPM

## 2024-12-17 DIAGNOSIS — R91.8 PULMONARY NODULES/LESIONS, MULTIPLE: Primary | ICD-10-CM

## 2024-12-17 DIAGNOSIS — R91.8 MASS OF UPPER LOBE OF RIGHT LUNG: ICD-10-CM

## 2024-12-17 PROCEDURE — 99205 OFFICE O/P NEW HI 60 MIN: CPT | Performed by: THORACIC SURGERY (CARDIOTHORACIC VASCULAR SURGERY)

## 2024-12-17 NOTE — PROGRESS NOTES
Thoracic Consult  Assessment/Plan:    Pulmonary nodules/lesions, multiple  Today in the office, we discussed her pulmonary nodules in detail.  All of these appear to be mixed groundglass, however, the largest solid component is only 5 mm.  I did explain to her today that a groundglass opacity can be a marker for development of a cancer in the future.  However, because these are also small and less than 8 mm, I would just continue to watch these at this time.  I have ordered a CT scan of the chest for 6 months from now and she will follow-up with me at that time       Diagnoses and all orders for this visit:    Pulmonary nodules/lesions, multiple    Mass of upper lobe of right lung  -     Ambulatory Referral to Thoracic Surgery  -     CT chest wo contrast; Future          Thoracic History   Diagnosis:    Procedures/Surgeries:    Pathology:    Adjuvant Therapy:       Subjective:    Patient ID: Shanda Quintanilla is a 79 y.o. female.    TRUNG Land is a 79-year-old female who presents to my office with multiple pulmonary nodules.  I have personally reviewed her CT scan in PACS.  Most of these are subcentimeter or pure groundglass in the right upper lobe.  She does have a 2 cm right upper lobe groundglass opacity that has a 5 mm solid component.    Today in the office, the patient reports that she is in a good state of health.  She denies any shortness of breath or chest pain.  She denies any recent upper respiratory infection or pneumonia.  She denies a chronic cough.      She was a previous smoker having quit approximately 40 years ago.    She reports a personal history of skin cancer    She denies any family history of lung cancer    The following portions of the patient's history were reviewed and updated as appropriate: allergies, current medications, past family history, past medical history, past social history, past surgical history and problem list.    Past Medical History:   Diagnosis Date    Cancer (HCC) 2015     on forehead, caught immediately.    Cervical strain     Changing skin lesion     Hyperlipidemia     MCI (mild cognitive impairment)     Mixed hyperlipidemia     Nonmelanoma skin cancer     BCC    Osteoporosis     Polio     Poor concentration     Stroke (HCC)     Swelling of left thumb       Past Surgical History:   Procedure Laterality Date    ANAL FISSURECTOMY      APPENDECTOMY      CAROTID ENDARTERECTOMY  2024    stroke surgery?    CATARACT EXTRACTION      COLONOSCOPY      HYSTERECTOMY      HYSTERECTOMY W/ SALPINGO-OOPHERECTOMY      PERIPHERAL ANGIOGRAM  don't know    DC TEAEC W/PATCH GRF CAROTID VERTB SUBCLAV NECK INC Left 04/16/2024    Procedure: ENDARTERECTOMY ARTERY CAROTID;  Surgeon: Nikolai Daniel MD;  Location: [facility];  Service: Vascular      Family History   Problem Relation Age of Onset    Heart attack Mother     Cancer Father         throat, I recall.    Heart attack Sister       Social History     Socioeconomic History    Marital status: /Civil Union     Spouse name: Not on file    Number of children: Not on file    Years of education: Not on file    Highest education level: Not on file   Occupational History    Not on file   Tobacco Use    Smoking status: Former     Current packs/day: 2.00     Average packs/day: 2.0 packs/day for 20.0 years (40.0 ttl pk-yrs)     Types: Cigarettes    Smokeless tobacco: Never   Vaping Use    Vaping status: Never Used   Substance and Sexual Activity    Alcohol use: Not Currently     Alcohol/week: 11.0 standard drinks of alcohol     Types: 4 Glasses of wine, 7 Cans of beer per week     Comment: I no longer drink .  I stopped about 5 months ago in 7/2024.    Drug use: Not Currently    Sexual activity: Not Currently     Partners: Male     Birth control/protection: None   Other Topics Concern    Not on file   Social History Narrative    Per [employer]:     Self-employed          Social Drivers of Health     Financial Resource Strain: Low Risk   (8/14/2023)    Overall Financial Resource Strain (CARDIA)     Difficulty of Paying Living Expenses: Not very hard   Food Insecurity: No Food Insecurity (10/16/2024)    Hunger Vital Sign     Worried About Running Out of Food in the Last Year: Never true     Ran Out of Food in the Last Year: Never true   Transportation Needs: No Transportation Needs (10/16/2024)    PRAPARE - Transportation     Lack of Transportation (Medical): No     Lack of Transportation (Non-Medical): No   Physical Activity: Not on file   Stress: Not on file   Social Connections: Not on file   Intimate Partner Violence: Not on file   Housing Stability: Low Risk  (10/16/2024)    Housing Stability Vital Sign     Unable to Pay for Housing in the Last Year: No     Number of Times Moved in the Last Year: 1     Homeless in the Last Year: No      Review of Systems   Constitutional:  Negative for chills, fatigue, fever and unexpected weight change.   HENT: Negative.     Eyes: Negative.  Negative for visual disturbance.   Respiratory:  Negative for cough, shortness of breath and stridor.    Cardiovascular:  Negative for chest pain.   Gastrointestinal: Negative.    Endocrine: Negative.    Genitourinary: Negative.    Musculoskeletal: Negative.    Skin: Negative.    Neurological:  Negative for dizziness, light-headedness and headaches.   Hematological:  Negative for adenopathy.   Psychiatric/Behavioral: Negative.           Objective:   Physical Exam  Vitals and nursing note reviewed.   Constitutional:       General: She is not in acute distress.     Appearance: Normal appearance. She is well-developed. She is not diaphoretic.   HENT:      Head: Normocephalic and atraumatic.      Nose: Nose normal. No congestion or rhinorrhea.      Mouth/Throat:      Mouth: Mucous membranes are moist.      Pharynx: Oropharynx is clear. No oropharyngeal exudate.   Eyes:      General: No scleral icterus.     Pupils: Pupils are equal, round, and reactive to light.   Neck:       "Trachea: No tracheal deviation.   Cardiovascular:      Rate and Rhythm: Normal rate and regular rhythm.      Pulses: Normal pulses.      Heart sounds: Normal heart sounds. No murmur heard.  Pulmonary:      Effort: Pulmonary effort is normal. No respiratory distress.      Breath sounds: Normal breath sounds. No stridor. No wheezing or rales.   Chest:      Chest wall: No tenderness.   Abdominal:      General: Bowel sounds are normal. There is no distension.      Palpations: Abdomen is soft.      Tenderness: There is no abdominal tenderness. There is no rebound.   Musculoskeletal:         General: Normal range of motion.      Cervical back: Normal range of motion and neck supple. No muscular tenderness.   Lymphadenopathy:      Cervical: No cervical adenopathy.   Skin:     General: Skin is warm and dry.      Coloration: Skin is not jaundiced or pale.      Findings: No erythema or rash.   Neurological:      General: No focal deficit present.      Mental Status: She is alert and oriented to person, place, and time.   Psychiatric:         Mood and Affect: Mood normal.         Behavior: Behavior normal.         Thought Content: Thought content normal.         Judgment: Judgment normal.     /62 (BP Location: Right arm, Patient Position: Sitting)   Pulse 64   Ht 5' 3\" (1.6 m)   Wt 64 kg (141 lb)   SpO2 98%   BMI 24.98 kg/m²     No Chest XR results available for this patient.   CT chest wo contrast  Result Date: 12/4/2024  Narrative CT CHEST WITHOUT IV CONTRAST INDICATION: R93.89: Abnormal findings on diagnostic imaging of other specified body structures. COMPARISON: None. TECHNIQUE: CT examination of the chest was performed without intravenous contrast. Multiplanar 2D reformatted images were created from the source data. This examination, like all CT scans performed in the [facility], was performed utilizing techniques to minimize radiation dose exposure, including the use of iterative " reconstruction and automated exposure control. Radiation dose length product (DLP) for this visit: 237 mGy-cm FINDINGS: LUNGS: Unchanged triangular 5 x 3 mm right upper lobe nodule is stable since 2018 and therefore benign. Multiple right upper lobe groundglass opacities: Dominant 20 mm right upper lobe groundglass opacity with a 5 x 3 mm solid component #3/72. Posterior right upper lobe groundglass opacity with 2 adjacent 3 mm solid components #3/54. 7 mm right upper lobe groundglass opacity #3/61 without solid component. Mild central and lower lobe predominant bronchiectasis. PLEURA: Unremarkable. HEART/GREAT VESSELS: Heart is unremarkable for patient's age. No thoracic aortic aneurysm. MEDIASTINUM AND BOOKER: Unremarkable. CHEST WALL AND LOWER NECK: Unremarkable. VISUALIZED STRUCTURES IN THE UPPER ABDOMEN: Subcentimeter hepatic hypodensities likely a simple cyst. OSSEOUS STRUCTURES: No acute fracture or destructive osseous lesion.     Impression Right upper lobe groundglass densities with dominant 20 mm groundglass opacity with 5 x 3 mm solid component suspicious for an adenocarcinoma spectrum lesion. No significant change since 4/6/2024. Follow-up with repeat CT chest in 6 months. Workstation performed: [id number]     No CT Chest,Abdomen,Pelvis results available for this patient.   No NM PET CT results available for this patient.   No Barium Swallow results available for this patient.

## 2024-12-17 NOTE — ASSESSMENT & PLAN NOTE
Today in the office, we discussed her pulmonary nodules in detail.  All of these appear to be mixed groundglass, however, the largest solid component is only 5 mm.  I did explain to her today that a groundglass opacity can be a marker for development of a cancer in the future.  However, because these are also small and less than 8 mm, I would just continue to watch these at this time.  I have ordered a CT scan of the chest for 6 months from now and she will follow-up with me at that time

## 2024-12-26 ENCOUNTER — TELEPHONE (OUTPATIENT)
Age: 79
End: 2024-12-26

## 2024-12-26 ENCOUNTER — OFFICE VISIT (OUTPATIENT)
Dept: CARDIOLOGY CLINIC | Facility: CLINIC | Age: 79
End: 2024-12-26
Payer: COMMERCIAL

## 2024-12-26 VITALS
DIASTOLIC BLOOD PRESSURE: 78 MMHG | BODY MASS INDEX: 24.98 KG/M2 | HEART RATE: 67 BPM | SYSTOLIC BLOOD PRESSURE: 105 MMHG | OXYGEN SATURATION: 97 % | RESPIRATION RATE: 16 BRPM | HEIGHT: 63 IN | WEIGHT: 141 LBS

## 2024-12-26 DIAGNOSIS — Z86.73 HISTORY OF STROKE: ICD-10-CM

## 2024-12-26 DIAGNOSIS — R94.39 POSITIVE CARDIAC STRESS TEST: ICD-10-CM

## 2024-12-26 DIAGNOSIS — R07.2 PRECORDIAL PAIN: Primary | ICD-10-CM

## 2024-12-26 PROCEDURE — 99214 OFFICE O/P EST MOD 30 MIN: CPT | Performed by: INTERNAL MEDICINE

## 2024-12-26 NOTE — TELEPHONE ENCOUNTER
Caller: Shanda Quintanilla    Doctor: Dr. Quan    Call back #: [phone]    Reason for call: Patient stated that she will be in early for her appointment today. She said she will be coming in around 2-2:15pm.

## 2024-12-26 NOTE — PROGRESS NOTES
[facility] [place]  [address]  [place] [address]  Cardiology Follow Up    Shanda Quintanilla  1945  [id number]    Assessment & Plan  Positive cardiac stress test  I have reviewed the stress EKG findings.  Most likely this is a false positive changes.  However with a history of stroke in the past, I would like to proceed with a stress nuclear testing done.  The sensitivity and the specificity of the exercise stress test was explained to the patient and also to her significant other who accompanied the patient today in the office.  Precordial pain  Low index of suspicion based upon the history that this precordial pain is cardiac in nature.  History of stroke  No residual deficit.  On aspirin therapy.    Continue all medications. Previous studies reviewed with patient, medications reviewed and possible side effects discussed. Continue risk factor modification. Optimize weight, regular exercise and follow up with appropriate specialists and primary care physician as discussed.  All questions answered. Patient advised to report any problems prompting to medical attention. Return for follow up visit in X weeks or earlier if needed    Chief Complaint   Patient presents with    Follow-up       Interval History: Shanda Quintanilla is a pleasant 79-year-old female who has a left mammary region pain, localized not necessarily exacerbated by exertion or any other associated symptoms like nausea vomiting diaphoresis shortness of breath palpitations or dizziness.  She had a history of stroke many years ago with no residual deficit.  She was being evaluated by her family physician team and subsequently had the exercise stress test done that showed the evidence of positive EKG changes consistent with ischemia.    According to her she is very active individual.  He has no symptoms of having any chest tightness whenever she performs any physical activity like dancing.  There is no documented  history of coronary artery disease or congestive heart failure.      PMH:     Patient Active Problem List   Diagnosis    Mixed hyperlipidemia    Osteoporosis    Seborrheic keratosis    Episode of apnea    Impaired fasting glucose    Venous insufficiency of both lower extremities    Small subcortical Left MCA CVA,    Intracranial carotid stenosis, left    Carotid stenosis, symptomatic, with infarction (HCC)    Anemia    History of CEA (carotid endarterectomy)    Chest pain    Positive cardiac stress test    Pulmonary nodules/lesions, multiple     Past Medical History:   Diagnosis Date    Cancer (HCC) 2015    on forehead, caught immediately.    Cervical strain     Changing skin lesion     Hyperlipidemia     MCI (mild cognitive impairment)     Mixed hyperlipidemia     Nonmelanoma skin cancer     BCC    Osteoporosis     Polio     Poor concentration     Stroke (HCC)     Swelling of left thumb      Social History     Socioeconomic History    Marital status: /Civil Union     Spouse name: Not on file    Number of children: Not on file    Years of education: Not on file    Highest education level: Not on file   Occupational History    Not on file   Tobacco Use    Smoking status: Former     Current packs/day: 2.00     Average packs/day: 2.0 packs/day for 20.0 years (40.0 ttl pk-yrs)     Types: Cigarettes    Smokeless tobacco: Never   Vaping Use    Vaping status: Never Used   Substance and Sexual Activity    Alcohol use: Not Currently     Alcohol/week: 11.0 standard drinks of alcohol     Types: 4 Glasses of wine, 7 Cans of beer per week     Comment: I no longer drink .  I stopped about 5 months ago in 7/2024.    Drug use: Not Currently    Sexual activity: Not Currently     Partners: Male     Birth control/protection: None   Other Topics Concern    Not on file   Social History Narrative    Per [employer]:     Self-employed          Social Drivers of Health     Financial Resource Strain: Low Risk  (8/14/2023)     Overall Financial Resource Strain (CARDIA)     Difficulty of Paying Living Expenses: Not very hard   Food Insecurity: No Food Insecurity (10/16/2024)    Hunger Vital Sign     Worried About Running Out of Food in the Last Year: Never true     Ran Out of Food in the Last Year: Never true   Transportation Needs: No Transportation Needs (10/16/2024)    PRAPARE - Transportation     Lack of Transportation (Medical): No     Lack of Transportation (Non-Medical): No   Physical Activity: Not on file   Stress: Not on file   Social Connections: Not on file   Intimate Partner Violence: Not on file   Housing Stability: Low Risk  (10/16/2024)    Housing Stability Vital Sign     Unable to Pay for Housing in the Last Year: No     Number of Times Moved in the Last Year: 1     Homeless in the Last Year: No      Family History   Problem Relation Age of Onset    Heart attack Mother     Cancer Father         throat, I recall.    Heart attack Sister      Past Surgical History:   Procedure Laterality Date    ANAL FISSURECTOMY      APPENDECTOMY      CAROTID ENDARTERECTOMY  2024    stroke surgery?    CATARACT EXTRACTION      COLONOSCOPY      HYSTERECTOMY      HYSTERECTOMY W/ SALPINGO-OOPHERECTOMY      PERIPHERAL ANGIOGRAM  don't know    WI TEAEC W/PATCH GRF CAROTID VERTB SUBCLAV NECK INC Left 04/16/2024    Procedure: ENDARTERECTOMY ARTERY CAROTID;  Surgeon: Nikolai Daniel MD;  Location: [facility];  Service: Vascular       Current Outpatient Medications:     acetaminophen (TYLENOL) 325 mg tablet, Take 2 tablets (650 mg total) by mouth every 6 (six) hours as needed for mild pain, Disp: , Rfl:     Alpha-Lipoic Acid (LIPOIC ACID PO), Take 1 capsule by mouth, Disp: , Rfl:     Amino Acids (L-CARNITINE PO), Take 1 tablet by mouth, Disp: , Rfl:     Artificial Tear Solution (JUST TEARS EYE DROPS OP), Apply to eye, Disp: , Rfl:     Ascorbic Acid (VITAMIN C) 1000 MG tablet, Take 1,000 mg by mouth daily , Disp: , Rfl:     aspirin (ECOTRIN  LOW STRENGTH) 81 mg EC tablet, Take 1 tablet (81 mg total) by mouth daily, Disp: 30 tablet, Rfl: 0    Calcium Carb-Cholecalciferol 1000-800 MG-UNIT TABS, Take by mouth, Disp: , Rfl:     Chelated Magnesium 100 MG TABS, Take by mouth Daily, Disp: , Rfl:     Cholecalciferol (VITAMIN D3) 5000 units CAPS, Take 1 capsule by mouth daily, Disp: , Rfl:     Glycerylphosphorylcholine POWD, 1 tablet by Does not apply route, Disp: , Rfl:     Iodine, Kelp, (KELP PO), Take by mouth daily, Disp: , Rfl:     L-Carnosine POWD, by Does not apply route, Disp: , Rfl:     Menaquinone-7 (VITAMIN K2) 100 MCG CAPS, , Disp: , Rfl:     Misc Natural Products (GLUCOSAMINE CHOND CMP TRIPLE PO), Take by mouth, Disp: , Rfl:     Misc Natural Products (PANTETHINE PLUS) TABS, Take by mouth, Disp: , Rfl:     Omega-3 Fatty Acids (FISH OIL) 1200 MG CAPS, Take 1 capsule by mouth daily , Disp: , Rfl:     PANTETHINE PO, Take by mouth, Disp: , Rfl:     patient supplied medication, , Disp: , Rfl:     PREBIOTIC PRODUCT PO, Take by mouth, Disp: , Rfl:     Probiotic Product (PROBIOTIC-10) CAPS, Take by mouth, Disp: , Rfl:     pyridoxine (VITAMIN B6) 100 mg tablet, 1 tab(s), Disp: , Rfl:     Turmeric POWD, 1, Disp: , Rfl:     Vinpocetine POWD, by Does not apply route, Disp: , Rfl:     vitamin B-12 (CYANOCOBALAMIN) 100 MCG tablet, Take by mouth every other day , Disp: , Rfl:     vitamin E, tocopherol, 1,000 units capsule, Take by mouth daily , Disp: , Rfl:     Zinc 30 MG CAPS, Take 1 capsule by mouth daily, Disp: , Rfl:   Allergies   Allergen Reactions    Sulfa Antibiotics Other (See Comments)           Review of Systems:  Review of Systems   Constitutional: Negative.    HENT: Negative.     Eyes: Negative.    Respiratory: Negative.     Cardiovascular:  Positive for chest pain.   Musculoskeletal: Negative.    Neurological: Negative.    Psychiatric/Behavioral: Negative.           /78 (BP Location: Left arm, Patient Position: Sitting, Cuff Size: Standard)    "Pulse 67   Resp 16   Ht 5' 3\" (1.6 m)   Wt 64 kg (141 lb)   SpO2 97%   BMI 24.98 kg/m²     Physical Exam:  Physical Exam  Vitals reviewed.   Constitutional:       Appearance: Normal appearance.   HENT:      Head: Normocephalic.   Eyes:      General: Scleral icterus: nm stress.      Pupils: Pupils are equal, round, and reactive to light.   Cardiovascular:      Heart sounds: Normal heart sounds.   Pulmonary:      Effort: Pulmonary effort is normal.      Breath sounds: Normal breath sounds. No wheezing.   Abdominal:      General: Abdomen is flat.      Palpations: Abdomen is soft.   Musculoskeletal:      Comments: The right calf muscle is hypertrophied compared to the left side with a history of polio.   Skin:     General: Skin is warm.   Neurological:      Mental Status: She is alert.       Time spent 40 minutes in reviewing outpatient notes, reviewing and interpreting labs,  reviewing interpreting EKG and other cardiac studies, discussion and educating patient, documentation.     "

## 2024-12-26 NOTE — ASSESSMENT & PLAN NOTE
I have reviewed the stress EKG findings.  Most likely this is a false positive changes.  However with a history of stroke in the past, I would like to proceed with a stress nuclear testing done.  The sensitivity and the specificity of the exercise stress test was explained to the patient and also to her significant other who accompanied the patient today in the office.

## 2024-12-31 ENCOUNTER — PATIENT MESSAGE (OUTPATIENT)
Dept: FAMILY MEDICINE CLINIC | Facility: CLINIC | Age: 79
End: 2024-12-31

## 2025-01-07 ENCOUNTER — OFFICE VISIT (OUTPATIENT)
Dept: FAMILY MEDICINE CLINIC | Facility: CLINIC | Age: 80
End: 2025-01-07
Payer: COMMERCIAL

## 2025-01-07 ENCOUNTER — APPOINTMENT (OUTPATIENT)
Dept: RADIOLOGY | Facility: CLINIC | Age: 80
End: 2025-01-07
Payer: COMMERCIAL

## 2025-01-07 VITALS
HEIGHT: 63 IN | HEART RATE: 68 BPM | WEIGHT: 144.4 LBS | SYSTOLIC BLOOD PRESSURE: 122 MMHG | TEMPERATURE: 97.9 F | BODY MASS INDEX: 25.59 KG/M2 | OXYGEN SATURATION: 100 % | DIASTOLIC BLOOD PRESSURE: 82 MMHG

## 2025-01-07 DIAGNOSIS — S99.922A INJURY OF TOE ON LEFT FOOT, INITIAL ENCOUNTER: ICD-10-CM

## 2025-01-07 DIAGNOSIS — R94.39 POSITIVE CARDIAC STRESS TEST: ICD-10-CM

## 2025-01-07 DIAGNOSIS — R19.5 CHANGE IN STOOL CALIBER: Primary | ICD-10-CM

## 2025-01-07 PROBLEM — R07.9 CHEST PAIN: Status: RESOLVED | Noted: 2024-10-16 | Resolved: 2025-01-07

## 2025-01-07 PROCEDURE — 99214 OFFICE O/P EST MOD 30 MIN: CPT | Performed by: FAMILY MEDICINE

## 2025-01-07 PROCEDURE — 73660 X-RAY EXAM OF TOE(S): CPT

## 2025-01-07 RX ORDER — AMPICILLIN TRIHYDRATE 250 MG
50 CAPSULE ORAL
COMMUNITY

## 2025-01-07 NOTE — PROGRESS NOTES
"Name: Shanda Quintanilla      : 1945      MRN: [id number]  Encounter Provider: Talat Ventura MD  Encounter Date: 2025   Encounter department: [facility]  :  Assessment & Plan  Change in stool caliber    Orders:  •  CBC and differential; Future  •  Occult blood 1-3, stool; Future    Injury of toe on left foot, initial encounter    Orders:  •  XR toe left great min 2 views; Future    Positive cardiac stress test  F/U with stress test with contrast    Follow up in 4 months              History of Present Illness     Patient is here for a follow up.  She has been noticing a change in her stool. No leodan blood however she had diarrhea and loose stool for a few days and more recently looks like a paste.  Also she stubbed her left 1st toe going upstairs at home 1 week ago. She does have tenderness, and bruising associated with the toe.  Also had positive cardiac stress test. Denies any chest pain at this time however.      Review of Systems   Constitutional:  Negative for activity change, appetite change, fatigue and fever.   HENT:  Negative for congestion and ear discharge.    Respiratory:  Negative for cough and shortness of breath.    Cardiovascular:  Negative for chest pain and palpitations.   Gastrointestinal:  Positive for diarrhea. Negative for nausea.   Musculoskeletal:  Positive for arthralgias, joint swelling and myalgias. Negative for back pain.   Skin:  Negative for color change and rash.   Neurological:  Negative for dizziness and headaches.   Psychiatric/Behavioral:  Negative for agitation and behavioral problems.        Objective   /82 (BP Location: Left arm, Patient Position: Sitting)   Pulse 68   Temp 97.9 °F (36.6 °C) (Temporal)   Ht 5' 3\" (1.6 m)   Wt 65.5 kg (144 lb 6.4 oz)   SpO2 100%   BMI 25.58 kg/m²      Physical Exam  Vitals and nursing note reviewed.   Constitutional:       General: She is not in acute distress.     Appearance: She is well-developed. "   HENT:      Head: Normocephalic and atraumatic.   Eyes:      Conjunctiva/sclera: Conjunctivae normal.   Cardiovascular:      Rate and Rhythm: Normal rate and regular rhythm.      Heart sounds: No murmur heard.  Pulmonary:      Effort: Pulmonary effort is normal. No respiratory distress.      Breath sounds: Normal breath sounds.   Abdominal:      Palpations: Abdomen is soft.      Tenderness: There is no abdominal tenderness.   Musculoskeletal:         General: Swelling and tenderness present.      Cervical back: Neck supple.      Comments: Left first toe swelling, redness and tenderness with associated bruising.  Able to apply full weight on her toe.   Skin:     General: Skin is warm and dry.      Capillary Refill: Capillary refill takes less than 2 seconds.   Neurological:      Mental Status: She is alert.   Psychiatric:         Mood and Affect: Mood normal.

## 2025-01-08 ENCOUNTER — TELEPHONE (OUTPATIENT)
Age: 80
End: 2025-01-08

## 2025-01-08 ENCOUNTER — APPOINTMENT (OUTPATIENT)
Dept: LAB | Facility: CLINIC | Age: 80
End: 2025-01-08
Payer: COMMERCIAL

## 2025-01-08 DIAGNOSIS — R19.5 CHANGE IN STOOL CALIBER: ICD-10-CM

## 2025-01-08 LAB
BASOPHILS # BLD AUTO: 0.03 THOUSANDS/ΜL (ref 0–0.1)
BASOPHILS NFR BLD AUTO: 0 % (ref 0–1)
EOSINOPHIL # BLD AUTO: 0.08 THOUSAND/ΜL (ref 0–0.61)
EOSINOPHIL NFR BLD AUTO: 1 % (ref 0–6)
ERYTHROCYTE [DISTWIDTH] IN BLOOD BY AUTOMATED COUNT: 13 % (ref 11.6–15.1)
HCT VFR BLD AUTO: 44.3 % (ref 34.8–46.1)
HGB BLD-MCNC: 13.7 G/DL (ref 11.5–15.4)
IMM GRANULOCYTES # BLD AUTO: 0.02 THOUSAND/UL (ref 0–0.2)
IMM GRANULOCYTES NFR BLD AUTO: 0 % (ref 0–2)
LYMPHOCYTES # BLD AUTO: 2.01 THOUSANDS/ΜL (ref 0.6–4.47)
LYMPHOCYTES NFR BLD AUTO: 28 % (ref 14–44)
MCH RBC QN AUTO: 29 PG (ref 26.8–34.3)
MCHC RBC AUTO-ENTMCNC: 30.9 G/DL (ref 31.4–37.4)
MCV RBC AUTO: 94 FL (ref 82–98)
MONOCYTES # BLD AUTO: 0.47 THOUSAND/ΜL (ref 0.17–1.22)
MONOCYTES NFR BLD AUTO: 7 % (ref 4–12)
NEUTROPHILS # BLD AUTO: 4.48 THOUSANDS/ΜL (ref 1.85–7.62)
NEUTS SEG NFR BLD AUTO: 64 % (ref 43–75)
NRBC BLD AUTO-RTO: 0 /100 WBCS
PLATELET # BLD AUTO: 291 THOUSANDS/UL (ref 149–390)
PMV BLD AUTO: 9.6 FL (ref 8.9–12.7)
RBC # BLD AUTO: 4.73 MILLION/UL (ref 3.81–5.12)
WBC # BLD AUTO: 7.09 THOUSAND/UL (ref 4.31–10.16)

## 2025-01-08 PROCEDURE — 36415 COLL VENOUS BLD VENIPUNCTURE: CPT

## 2025-01-08 PROCEDURE — 85025 COMPLETE CBC W/AUTO DIFF WBC: CPT

## 2025-01-08 NOTE — TELEPHONE ENCOUNTER
Pt called, she did her stool sample wrong and messed up the kit. She is asking if she can come by in a few to  a new one. Please advise.

## 2025-01-09 ENCOUNTER — HOSPITAL ENCOUNTER (OUTPATIENT)
Dept: NON INVASIVE DIAGNOSTICS | Facility: CLINIC | Age: 80
Discharge: HOME | End: 2025-01-09
Payer: COMMERCIAL

## 2025-01-09 ENCOUNTER — RESULTS FOLLOW-UP (OUTPATIENT)
Dept: CARDIOLOGY CLINIC | Facility: CLINIC | Age: 80
End: 2025-01-09

## 2025-01-09 ENCOUNTER — RESULTS FOLLOW-UP (OUTPATIENT)
Dept: FAMILY MEDICINE CLINIC | Facility: CLINIC | Age: 80
End: 2025-01-09

## 2025-01-09 VITALS
HEART RATE: 82 BPM | BODY MASS INDEX: 25.52 KG/M2 | DIASTOLIC BLOOD PRESSURE: 84 MMHG | HEIGHT: 63 IN | SYSTOLIC BLOOD PRESSURE: 166 MMHG | OXYGEN SATURATION: 100 % | WEIGHT: 144 LBS

## 2025-01-09 DIAGNOSIS — R07.2 PRECORDIAL PAIN: ICD-10-CM

## 2025-01-09 DIAGNOSIS — Z86.73 HISTORY OF STROKE: ICD-10-CM

## 2025-01-09 DIAGNOSIS — R94.39 POSITIVE CARDIAC STRESS TEST: ICD-10-CM

## 2025-01-09 LAB
MAX HR PERCENT: 101 %
MAX HR: 142 BPM
RATE PRESSURE PRODUCT: NORMAL
SL CV REST NUCLEAR ISOTOPE DOSE: 10.11 MCI
SL CV STRESS NUCLEAR ISOTOPE DOSE: 31.1 MCI
SL CV STRESS RECOVERY BP: NORMAL MMHG
SL CV STRESS RECOVERY HR: 88 BPM
SL CV STRESS RECOVERY O2 SAT: 99 %
SL CV STRESS STAGE REACHED: 2
STRESS ANGINA INDEX: 0
STRESS BASELINE BP: NORMAL MMHG
STRESS BASELINE HR: 82 BPM
STRESS O2 SAT REST: 100 %
STRESS PEAK HR: 142 BPM
STRESS POST ESTIMATED WORKLOAD: 7 METS
STRESS POST EXERCISE DUR MIN: 6 MIN
STRESS POST O2 SAT PEAK: 99 %
STRESS POST PEAK BP: 202 MMHG

## 2025-01-09 PROCEDURE — 78452 HT MUSCLE IMAGE SPECT MULT: CPT | Performed by: INTERNAL MEDICINE

## 2025-01-09 PROCEDURE — A9502 TC99M TETROFOSMIN: HCPCS

## 2025-01-09 PROCEDURE — 93016 CV STRESS TEST SUPVJ ONLY: CPT | Performed by: INTERNAL MEDICINE

## 2025-01-09 PROCEDURE — 93018 CV STRESS TEST I&R ONLY: CPT | Performed by: INTERNAL MEDICINE

## 2025-01-09 PROCEDURE — 78452 HT MUSCLE IMAGE SPECT MULT: CPT

## 2025-01-09 PROCEDURE — 93017 CV STRESS TEST TRACING ONLY: CPT

## 2025-01-10 LAB
CHEST PAIN STATEMENT: NORMAL
MAX DIASTOLIC BP: 66 MMHG
MAX PREDICTED HEART RATE: 141 BPM
PROTOCOL NAME: NORMAL
REASON FOR TERMINATION: NORMAL
STRESS POST EXERCISE DUR MIN: 6 MIN
STRESS POST EXERCISE DUR SEC: 0 SEC
STRESS POST PEAK HR: 142 BPM
STRESS POST PEAK SYSTOLIC BP: 202 MMHG
TARGET HR FORMULA: NORMAL

## 2025-01-13 ENCOUNTER — APPOINTMENT (OUTPATIENT)
Dept: LAB | Facility: CLINIC | Age: 80
End: 2025-01-13
Payer: COMMERCIAL

## 2025-01-13 DIAGNOSIS — R19.5 CHANGE IN STOOL CALIBER: ICD-10-CM

## 2025-01-13 LAB — HEMOCCULT STL QL IA: POSITIVE

## 2025-01-13 PROCEDURE — G0328 FECAL BLOOD SCRN IMMUNOASSAY: HCPCS

## 2025-01-14 NOTE — TELEPHONE ENCOUNTER
Patient calling back about stool test. Patient states she could not get into gastroenterology until February 4th with Dr. Nguyễn but she's on a wait list. Patient wondering if she should do anything prior to the appointment or if there is anyone Dr. Ventura recommends instead of Dr. Nguyễn. Please advise.

## 2025-01-15 NOTE — RESULT ENCOUNTER NOTE
Not sure if Dr. Barone has earlier appts? Otherwise ok to wait.   Complex Repair And M Plasty Text: The defect edges were debeveled with a #15 scalpel blade.  The primary defect was closed partially with a complex linear closure.  Given the location of the remaining defect, shape of the defect and the proximity to free margins an M plasty was deemed most appropriate for complete closure of the defect.  Using a sterile surgical marker, an appropriate advancement flap was drawn incorporating the defect and placing the expected incisions within the relaxed skin tension lines where possible.    The area thus outlined was incised deep to adipose tissue with a #15 scalpel blade.  The skin margins were undermined to an appropriate distance in all directions utilizing iris scissors.

## 2025-01-16 ENCOUNTER — OFFICE VISIT (OUTPATIENT)
Dept: FAMILY MEDICINE CLINIC | Facility: CLINIC | Age: 80
End: 2025-01-16
Payer: COMMERCIAL

## 2025-01-16 VITALS
HEIGHT: 66 IN | SYSTOLIC BLOOD PRESSURE: 114 MMHG | HEART RATE: 75 BPM | BODY MASS INDEX: 22.98 KG/M2 | WEIGHT: 143 LBS | DIASTOLIC BLOOD PRESSURE: 66 MMHG | TEMPERATURE: 96.3 F | OXYGEN SATURATION: 97 %

## 2025-01-16 DIAGNOSIS — M65.331 TRIGGER MIDDLE FINGER OF RIGHT HAND: Primary | ICD-10-CM

## 2025-01-16 DIAGNOSIS — M79.675 TOE PAIN, LEFT: ICD-10-CM

## 2025-01-16 DIAGNOSIS — M81.0 AGE-RELATED OSTEOPOROSIS WITHOUT CURRENT PATHOLOGICAL FRACTURE: ICD-10-CM

## 2025-01-16 DIAGNOSIS — R73.03 PREDIABETES: ICD-10-CM

## 2025-01-16 PROBLEM — S99.922A INJURY OF LEFT TOE: Status: RESOLVED | Noted: 2025-01-07 | Resolved: 2025-01-16

## 2025-01-16 LAB — SL AMB POCT HEMOGLOBIN AIC: 5.7 (ref ?–6.5)

## 2025-01-16 PROCEDURE — 99214 OFFICE O/P EST MOD 30 MIN: CPT | Performed by: FAMILY MEDICINE

## 2025-01-16 PROCEDURE — 83036 HEMOGLOBIN GLYCOSYLATED A1C: CPT | Performed by: FAMILY MEDICINE

## 2025-01-16 NOTE — ASSESSMENT & PLAN NOTE
Discussed options for treatment - can consider OT and/or orthopedic evaluation. Patient declined at this time.

## 2025-01-16 NOTE — PROGRESS NOTES
Name: Shanda Quintanilla      : 1945      MRN: [id number]  Encounter Provider: Jenni Bansal MD  Encounter Date: 2025   Encounter department: [facility]  :  Assessment & Plan  Trigger middle finger of right hand  Discussed options for treatment - can consider OT and/or orthopedic evaluation. Patient declined at this time.        Toe pain, left  Xray is normal.  Discussed checking for gout, will check uric acid. Can see podiatry if it continues to be a problem.   Orders:  •  Uric acid; Future    Prediabetes  A1c improving, continue low carb diet  Orders:  •  POCT hemoglobin A1c    Age-related osteoporosis without current pathological fracture  Pt declines pharmacologic treatment. Continue Vit D and calcium, exercise.                 History of Present Illness     79 year old states she has several issues.    She wants to know what her iron level is.  Iron was checked in September and we reviewed these results. She was concerned about elevated ferritin because she read an article that ferritin > 1000 can increase risk of fracture.  We discussed her ferritin is 44 and is normal.    She is asking about getting a GGT test. She read it could be related to iron levels.  We reviewed what GGT is and typically is related to liver and her recent LFTs were normal.  She has been having trigger finger in multiple fingers and wondering what the cause is. We discussed trigger finger pathology and cause, treatment options  She also wants to get her A1c checked - last one 5.8%.  Has swelling of her Great L toe - had Xray was normal.  Has been to podiatry before.  Has h/o polio which affected that leg.   Reviewed DEXA which showed osteoporosis - does not want treatment.       Review of Systems   Constitutional:  Negative for activity change.   Respiratory:  Negative for chest tightness and shortness of breath.    Cardiovascular:  Negative for chest pain and leg swelling.   Musculoskeletal:  Positive  "for arthralgias.        Swelling of L great toe  Trigger finger    Neurological:  Negative for headaches.   Psychiatric/Behavioral:  Negative for dysphoric mood. The patient is not nervous/anxious.        Objective   /66   Pulse 75   Temp (!) 96.3 °F (35.7 °C)   Ht 5' 5.75\" (1.67 m)   Wt 64.9 kg (143 lb)   SpO2 97%   BMI 23.26 kg/m²      Physical Exam  Vitals and nursing note reviewed.   Constitutional:       General: She is not in acute distress.     Appearance: She is well-developed. She is not diaphoretic.   HENT:      Head: Normocephalic and atraumatic.      Right Ear: External ear normal.      Left Ear: External ear normal.   Eyes:      Conjunctiva/sclera: Conjunctivae normal.   Cardiovascular:      Rate and Rhythm: Normal rate and regular rhythm.      Heart sounds: Normal heart sounds. No murmur heard.     No friction rub. No gallop.   Pulmonary:      Effort: Pulmonary effort is normal. No respiratory distress.      Breath sounds: Normal breath sounds. No stridor. No wheezing or rales.   Chest:      Chest wall: No tenderness.   Musculoskeletal:      Right hand: Normal.      Left hand: Normal.      Left foot: Swelling and tenderness present.      Comments: L great toe is mildly swollen, slightly red. Tender   Post polio deformity of LLE   Neurological:      General: No focal deficit present.      Mental Status: She is alert.   Psychiatric:         Mood and Affect: Mood normal.         "

## 2025-01-27 ENCOUNTER — TELEPHONE (OUTPATIENT)
Age: 80
End: 2025-01-27

## 2025-01-27 NOTE — TELEPHONE ENCOUNTER
Pt called and is wondering if we can email her the detailed results from the fecal test she rec'd. She tried to download them on [employer] but wouldn't allow her to keep them once it downloaded?     She would like it to be emailed to [email or web address]    Thanks

## 2025-01-29 ENCOUNTER — TELEPHONE (OUTPATIENT)
Age: 80
End: 2025-01-29

## 2025-01-29 NOTE — TELEPHONE ENCOUNTER
Patient has OV on 2/4/25.  Wondering if can order colonoscopy now since has been having diarrhea for 2 months.  Feels fine otherwise.  Advised would need to see provider since has not been seen in over 10 years and that there is no order for procedure.  States understanding.

## 2025-02-04 ENCOUNTER — OFFICE VISIT (OUTPATIENT)
Dept: GASTROENTEROLOGY | Facility: CLINIC | Age: 80
End: 2025-02-04
Payer: COMMERCIAL

## 2025-02-04 VITALS
HEART RATE: 71 BPM | DIASTOLIC BLOOD PRESSURE: 76 MMHG | WEIGHT: 141.8 LBS | BODY MASS INDEX: 24.21 KG/M2 | HEIGHT: 64 IN | TEMPERATURE: 97.2 F | OXYGEN SATURATION: 99 % | SYSTOLIC BLOOD PRESSURE: 130 MMHG

## 2025-02-04 DIAGNOSIS — R19.5 HEME POSITIVE STOOL: Primary | ICD-10-CM

## 2025-02-04 DIAGNOSIS — Z12.11 SCREENING FOR COLON CANCER: ICD-10-CM

## 2025-02-04 PROCEDURE — 99203 OFFICE O/P NEW LOW 30 MIN: CPT | Performed by: INTERNAL MEDICINE

## 2025-02-04 NOTE — PATIENT INSTRUCTIONS
Scheduled date of colonoscopy (as of today):2/12/25  Physician performing colonoscopy:Gopi  Location of colonoscopy:[place]  Bowel prep reviewed with patient:Mary/miralax  Instructions reviewed with patient by:Antonio álvarez  Clearances:  none   The patient is a 1y4m Male complaining of lacerations.

## 2025-02-05 NOTE — PROGRESS NOTES
"Name: Shanda Quintanilla      : 1945      MRN: [id number]  Encounter Provider: Trever Nguyễn DO  Encounter Date: 2025   Encounter department: [facility] [place]  :  Assessment & Plan  Heme positive stool    Orders:    Colonoscopy; Future    Screening for colon cancer  Colonoscopy           History of Present Illness     Shanda Quintanilla is a 79 y.o. female who presents to the office to schedule her final screening colonoscopy.  She states that she did experience a stroke in 2024 with no residual neurological deficit.  She has been experiencing both constipation as well as diarrhea since 2024.  There is no family history for esophageal, stomach, or colon cancer.  She denies abdominal pain, nausea, vomiting, heartburn, dysphagia, odynophagia, bloating, gaseousness, rectal bleeding, melena, hematemesis.  She was found to have Hemoccult positive stool recently on 2025.  CBC that was performed on  demonstrated a hemoglobin level of 13.7 with an MCV of 94.  Her WBC was 7.09 and her platelet count 291.  Her last colonoscopy was performed 2015 and the result was normal.       Objective   /76 (BP Location: Right arm, Patient Position: Sitting, Cuff Size: Standard)   Pulse 71   Temp (!) 97.2 °F (36.2 °C) (Temporal)   Ht 5' 3.75\" (1.619 m)   Wt 64.3 kg (141 lb 12.8 oz)   SpO2 99%   BMI 24.53 kg/m²      Physical Exam  Vitals reviewed.   Constitutional:       General: She is not in acute distress.     Appearance: Normal appearance. She is not ill-appearing.   HENT:      Head: Normocephalic and atraumatic.   Eyes:      General: No scleral icterus.     Extraocular Movements: Extraocular movements intact.      Pupils: Pupils are equal, round, and reactive to light.   Cardiovascular:      Rate and Rhythm: Normal rate and regular rhythm.      Pulses: Normal pulses.      Heart sounds: Normal heart sounds. No murmur heard.  Pulmonary:    "   Effort: Pulmonary effort is normal.      Breath sounds: Normal breath sounds. No wheezing, rhonchi or rales.   Abdominal:      General: Bowel sounds are normal. There is no distension.      Tenderness: There is no abdominal tenderness. There is no guarding or rebound.   Musculoskeletal:         General: Normal range of motion.      Cervical back: Normal range of motion and neck supple.      Right lower leg: No edema.      Left lower leg: No edema.   Skin:     General: Skin is warm and dry.      Coloration: Skin is not jaundiced.      Findings: No erythema or rash.   Neurological:      General: No focal deficit present.      Mental Status: She is alert and oriented to person, place, and time.   Psychiatric:         Mood and Affect: Mood normal.         Behavior: Behavior normal.

## 2025-02-11 ENCOUNTER — TELEPHONE (OUTPATIENT)
Age: 80
End: 2025-02-11

## 2025-02-11 NOTE — TELEPHONE ENCOUNTER
pt is scheduled for colonoscopy tomorrow and she is questioning if she can have the make up on her face like powder, lipstick and eyebrow pencil. Nothing is on the instructions advised by nurse triage she can have it so advised pt yes

## 2025-02-12 ENCOUNTER — ANESTHESIA EVENT (OUTPATIENT)
Dept: GASTROENTEROLOGY | Facility: HOSPITAL | Age: 80
End: 2025-02-12
Payer: COMMERCIAL

## 2025-02-12 ENCOUNTER — HOSPITAL ENCOUNTER (OUTPATIENT)
Dept: GASTROENTEROLOGY | Facility: HOSPITAL | Age: 80
Setting detail: OUTPATIENT SURGERY
Discharge: HOME | End: 2025-02-12
Attending: INTERNAL MEDICINE
Payer: COMMERCIAL

## 2025-02-12 ENCOUNTER — ANESTHESIA (OUTPATIENT)
Dept: GASTROENTEROLOGY | Facility: HOSPITAL | Age: 80
End: 2025-02-12
Payer: COMMERCIAL

## 2025-02-12 ENCOUNTER — TREATMENT (OUTPATIENT)
Dept: GASTROENTEROLOGY | Facility: CLINIC | Age: 80
End: 2025-02-12

## 2025-02-12 VITALS
WEIGHT: 137.13 LBS | HEIGHT: 63 IN | DIASTOLIC BLOOD PRESSURE: 88 MMHG | OXYGEN SATURATION: 97 % | TEMPERATURE: 97.7 F | SYSTOLIC BLOOD PRESSURE: 150 MMHG | BODY MASS INDEX: 24.3 KG/M2 | HEART RATE: 80 BPM | RESPIRATION RATE: 16 BRPM

## 2025-02-12 DIAGNOSIS — R19.7 DIARRHEA, UNSPECIFIED TYPE: Primary | ICD-10-CM

## 2025-02-12 DIAGNOSIS — R19.5 HEME POSITIVE STOOL: ICD-10-CM

## 2025-02-12 PROCEDURE — 45378 DIAGNOSTIC COLONOSCOPY: CPT | Performed by: INTERNAL MEDICINE

## 2025-02-12 RX ORDER — PROPOFOL 10 MG/ML
INJECTION, EMULSION INTRAVENOUS AS NEEDED
Status: DISCONTINUED | OUTPATIENT
Start: 2025-02-12 | End: 2025-02-12

## 2025-02-12 RX ORDER — SODIUM CHLORIDE, SODIUM LACTATE, POTASSIUM CHLORIDE, CALCIUM CHLORIDE 600; 310; 30; 20 MG/100ML; MG/100ML; MG/100ML; MG/100ML
INJECTION, SOLUTION INTRAVENOUS CONTINUOUS PRN
Status: DISCONTINUED | OUTPATIENT
Start: 2025-02-12 | End: 2025-02-12

## 2025-02-12 RX ORDER — CHOLESTYRAMINE 4 G/9G
1 POWDER, FOR SUSPENSION ORAL 2 TIMES DAILY WITH MEALS
Qty: 60 PACKET | Refills: 3 | Status: SHIPPED | OUTPATIENT
Start: 2025-02-12

## 2025-02-12 RX ORDER — LIDOCAINE HYDROCHLORIDE 20 MG/ML
INJECTION, SOLUTION EPIDURAL; INFILTRATION; INTRACAUDAL; PERINEURAL AS NEEDED
Status: DISCONTINUED | OUTPATIENT
Start: 2025-02-12 | End: 2025-02-12

## 2025-02-12 RX ORDER — SODIUM CHLORIDE, SODIUM LACTATE, POTASSIUM CHLORIDE, CALCIUM CHLORIDE 600; 310; 30; 20 MG/100ML; MG/100ML; MG/100ML; MG/100ML
125 INJECTION, SOLUTION INTRAVENOUS CONTINUOUS
Status: CANCELLED | OUTPATIENT
Start: 2025-02-12

## 2025-02-12 RX ADMIN — PROPOFOL 40 MG: 10 INJECTION, EMULSION INTRAVENOUS at 14:11

## 2025-02-12 RX ADMIN — PROPOFOL 40 MG: 10 INJECTION, EMULSION INTRAVENOUS at 14:05

## 2025-02-12 RX ADMIN — PROPOFOL 40 MG: 10 INJECTION, EMULSION INTRAVENOUS at 14:07

## 2025-02-12 RX ADMIN — PROPOFOL 40 MG: 10 INJECTION, EMULSION INTRAVENOUS at 14:12

## 2025-02-12 RX ADMIN — LIDOCAINE HYDROCHLORIDE 80 MG: 20 INJECTION, SOLUTION EPIDURAL; INFILTRATION; INTRACAUDAL; PERINEURAL at 14:04

## 2025-02-12 RX ADMIN — PROPOFOL 40 MG: 10 INJECTION, EMULSION INTRAVENOUS at 14:06

## 2025-02-12 RX ADMIN — PROPOFOL 40 MG: 10 INJECTION, EMULSION INTRAVENOUS at 14:09

## 2025-02-12 RX ADMIN — PROPOFOL 40 MG: 10 INJECTION, EMULSION INTRAVENOUS at 14:04

## 2025-02-12 RX ADMIN — SODIUM CHLORIDE, SODIUM LACTATE, POTASSIUM CHLORIDE, AND CALCIUM CHLORIDE: .6; .31; .03; .02 INJECTION, SOLUTION INTRAVENOUS at 14:01

## 2025-02-12 NOTE — H&P
History and Physical -  [facility]  Shanda Quintanilla 79 y.o. female MRN: [id number]      HPI: Shanda Quintanilla is a 79 y.o. year old female who presents for evaluation of heme positive stool      REVIEW OF SYSTEMS: Per the HPI, and otherwise unremarkable.    Historical Information   Past Medical History:   Diagnosis Date    Allergic sulfa    Arthritis 2-3 years    Cancer (HCC) 2015    on forehead, caught immediately.    Cervical strain     Changing skin lesion     Hyperlipidemia     MCI (mild cognitive impairment)     Mixed hyperlipidemia     Nonmelanoma skin cancer     BCC    Osteoporosis     Polio     Poor concentration     Stroke (HCC)     Swelling of left thumb      Past Surgical History:   Procedure Laterality Date    ANAL FISSURECTOMY      APPENDECTOMY      CAROTID ENDARTERECTOMY      stroke surgery?    CATARACT EXTRACTION      COLONOSCOPY      HYSTERECTOMY      HYSTERECTOMY W/ SALPINGO-OOPHERECTOMY      PERIPHERAL ANGIOGRAM  don't know    OK TEAEC W/PATCH GRF CAROTID VERTB SUBCLAV NECK INC Left 2024    Procedure: ENDARTERECTOMY ARTERY CAROTID;  Surgeon: Nikolai Daniel MD;  Location: [facility];  Service: Vascular     Social History   Social History     Substance and Sexual Activity   Alcohol Use Yes    Alcohol/week: 11.0 standard drinks of alcohol    Types: 4 Glasses of wine, 7 Cans of beer per week    Comment: not a steady drinker but will have some on occasion     Social History     Substance and Sexual Activity   Drug Use Not Currently     Social History     Tobacco Use   Smoking Status Former    Current packs/day: 0.00    Average packs/day: 2.0 packs/day for 37.0 years (74.0 ttl pk-yrs)    Types: Cigarettes    Start date: 1963    Quit date: 1980    Years since quittin.1    Passive exposure: Past   Smokeless Tobacco Never   Tobacco Comments    HARDEST THING I EVER HAD TO DO!  BUT I DID IT.  :-)     Family History   Problem Relation Age of Onset    Heart  "attack Mother     Mental illness Mother     Cancer Father         throat, I recall.    Heart attack Sister        Meds/Allergies     Not in a hospital admission.    Allergies   Allergen Reactions    Sulfa Antibiotics Other (See Comments)       Objective     Blood pressure 138/59, pulse 77, temperature 98.4 °F (36.9 °C), resp. rate 19, height 5' 3\" (1.6 m), weight 62.2 kg (137 lb 2 oz), SpO2 99%.      PHYSICAL EXAM    Gen: NAD  CV: RRR  CHEST: Clear  ABD: soft, NT/ND  EXT: no edema      ASSESSMENT/PLAN:  This is a 79 y.o. year old female here for colonoscopy, and she is stable and optimized for her procedure.          "

## 2025-02-12 NOTE — ANESTHESIA POSTPROCEDURE EVALUATION
Post-Op Assessment Note    CV Status:  Stable         Mental Status:  Awake and alert   PONV Controlled:  None   Airway Patency:  Patent     Post Op Vitals Reviewed: Yes    No anethesia notable event occurred.    Staff: Anesthesiologist, CRNA           Last Filed PACU Vitals:  Vitals Value Taken Time   Temp 97.7    Pulse 80    /88    Resp 16    SpO2 97% at rest on RA

## 2025-02-12 NOTE — ANESTHESIA PREPROCEDURE EVALUATION
Procedure:  COLONOSCOPY    Relevant Problems   ANESTHESIA (within normal limits)      CARDIO   (+) Carotid stenosis, symptomatic, with infarction (HCC)   (+) Intracranial carotid stenosis, left   (+) Mixed hyperlipidemia   (+) Venous insufficiency of both lower extremities      ENDO (within normal limits)      GI/HEPATIC  Confirmed NPO appropriate  S/p bowel prep      /RENAL (within normal limits)      HEMATOLOGY   (+) Anemia      MUSCULOSKELETAL (within normal limits)      NEURO/PSYCH  Hx of CVA 4/2024, residual word finding difficulties on occasion but otherwise no reported deficits   (+) Small subcortical Left MCA CVA,      PULMONARY (within normal limits)   (-) URI (upper respiratory infection)      Surgery/Wound/Pain   (+) History of CEA (carotid endarterectomy)        Physical Exam    Airway    Mallampati score: II         Dental   Comment: Total mouth rehabilitation implants    Cardiovascular  Rhythm: regular, Rate: normal    Pulmonary   Breath sounds clear to auscultation    Other Findings  post-pubertal.      Anesthesia Plan  ASA Score- 3     Anesthesia Type- IV sedation with anesthesia with ASA Monitors.         Additional Monitors:     Airway Plan:     Comment: I discussed the risks and benefits of IV sedation anesthesia including the possibility of the need to convert to general anesthesia and the potential risk of awareness.  The patient was given the opportunity to ask questions, which were answered..       Plan Factors-Exercise tolerance (METS): >4 METS.    Chart reviewed. EKG reviewed.                     Induction- intravenous.    Postoperative Plan-         Informed Consent- Anesthetic plan and risks discussed with patient.  I personally reviewed this patient with the CRNA. Discussed and agreed on the Anesthesia Plan with the CRNA..      NPO Status:  No vitals data found for the desired time range.      NM Stress test 1/9/25:  Interpretation Summary    Stress Function: Left ventricular function  post-stress is normal.    Stress ECG: The stress ECG is consistent with ischemia after maximal exercise (1 mm ST depression in leads II, III, aVF, V3, V4, V5), without reproduction of symptoms.    Perfusion: There are no perfusion defects.    Stress Combined Conclusion: The ECG and SPECT imaging portions of the stress study are discordant.      Malcom Quan MD to Cardiology [place] [facility] Regarding result: NM myocardial perfusion spect (stress and/or rest)   RD    1/9/25  4:07 PM  Result Note  The patient stress test is normal.  Please call the patient with the results.

## 2025-02-13 ENCOUNTER — TELEPHONE (OUTPATIENT)
Age: 80
End: 2025-02-13

## 2025-02-13 NOTE — TELEPHONE ENCOUNTER
"Pt calling in the find out how to take questran powder. I advised \"Take 1 packet (4 g total) by mouth 2 (two) times a day with meals \" She mixes with water. No further questions   "

## 2025-02-13 NOTE — TELEPHONE ENCOUNTER
Patients GI provider:  Dr. Nguyễn    Number to return call: [phone]    Reason for call: Pt called with questions on how to take Questran medication.Call was transferred to the nurse to assist.     Scheduled procedure/appointment date if applicable: 3/26/2025

## 2025-02-28 ENCOUNTER — OFFICE VISIT (OUTPATIENT)
Dept: CARDIOLOGY CLINIC | Facility: CLINIC | Age: 80
End: 2025-02-28
Payer: COMMERCIAL

## 2025-02-28 VITALS
OXYGEN SATURATION: 96 % | HEIGHT: 63 IN | DIASTOLIC BLOOD PRESSURE: 61 MMHG | RESPIRATION RATE: 16 BRPM | HEART RATE: 81 BPM | BODY MASS INDEX: 25.34 KG/M2 | WEIGHT: 143 LBS | SYSTOLIC BLOOD PRESSURE: 130 MMHG

## 2025-02-28 DIAGNOSIS — Z98.890 HISTORY OF CEA (CAROTID ENDARTERECTOMY): ICD-10-CM

## 2025-02-28 DIAGNOSIS — R07.1 CHEST PAIN ON BREATHING: ICD-10-CM

## 2025-02-28 DIAGNOSIS — R94.39 POSITIVE CARDIAC STRESS TEST: ICD-10-CM

## 2025-02-28 DIAGNOSIS — E78.2 MIXED HYPERLIPIDEMIA: Primary | ICD-10-CM

## 2025-02-28 PROCEDURE — 99213 OFFICE O/P EST LOW 20 MIN: CPT | Performed by: INTERNAL MEDICINE

## 2025-02-28 NOTE — PROGRESS NOTES
[facility]  [address] [id number]  Cardiology Follow Up    Shanda Quintanilla  1945  [id number]    Assessment & Plan  Positive cardiac stress test  -The nuclear stress test was negative for myocardial ischemia.  Described in the previous evaluations the EKGs were falsely positive.  The sensitivity and the specificity of the stress test has been discussed with the patient at great length.  The index of suspicion the chest pain was cardiac in nature is also low.  There were no indications for any further cardiac testing at the present time.  Mixed hyperlipidemia  Last lipid profile has been reviewed.  History of CEA (carotid endarterectomy)  Has a history of stroke.  Continue with the antiplatelet therapy.  Under surveillance program with the vascular team.    Continue all medications. Previous studies reviewed with patient, medications reviewed and possible side effects discussed. Continue risk factor modification. Optimize weight, regular exercise and follow up with appropriate specialists and primary care physician as discussed.  All questions answered. Patient advised to report any problems prompting to medical attention. Return for follow up visit in 1 year or earlier if needed    Chief Complaint   Patient presents with   • Follow-up         Interval History: Shanda Quintanilla was followed up today after the echocardiogram and the stress testing that was done because of the history of chest pain with a false positive stress EKGs.  Please refer to my consultation note dated December 26, 2024 for further details.          PMH:     Patient Active Problem List   Diagnosis   • Mixed hyperlipidemia   • Osteoporosis   • Seborrheic keratosis   • Episode of apnea   • Impaired fasting glucose   • Venous insufficiency of both lower extremities   • Small subcortical Left MCA CVA,   • Intracranial carotid stenosis, left   • Carotid stenosis, symptomatic, with infarction (HCC)   • Anemia    • History of CEA (carotid endarterectomy)   • Positive cardiac stress test   • Pulmonary nodules/lesions, multiple   • Change in stool caliber   • Trigger middle finger of right hand     Past Medical History:   Diagnosis Date   • Allergic sulfa   • Arthritis 2-3 years   • Cancer (HCC) 2015    on forehead, caught immediately.   • Cervical strain    • Changing skin lesion    • Hyperlipidemia    • MCI (mild cognitive impairment)    • Mixed hyperlipidemia    • Nonmelanoma skin cancer     BCC   • Osteoporosis    • Polio    • Poor concentration    • Stroke (HCC)    • Swelling of left thumb      Social History     Socioeconomic History   • Marital status: /Civil Union     Spouse name: Not on file   • Number of children: Not on file   • Years of education: Not on file   • Highest education level: Not on file   Occupational History   • Not on file   Tobacco Use   • Smoking status: Former     Current packs/day: 0.00     Average packs/day: 2.0 packs/day for 37.0 years (74.0 ttl pk-yrs)     Types: Cigarettes     Start date: 1963     Quit date: 1980     Years since quittin.1     Passive exposure: Past   • Smokeless tobacco: Never   • Tobacco comments:     HARDEST THING I EVER HAD TO DO!  BUT I DID IT.  :-)   Vaping Use   • Vaping status: Never Used   Substance and Sexual Activity   • Alcohol use: Yes     Alcohol/week: 11.0 standard drinks of alcohol     Types: 4 Glasses of wine, 7 Cans of beer per week     Comment: not a steady drinker but will have some on occasion   • Drug use: Not Currently   • Sexual activity: Not Currently     Partners: Male     Birth control/protection: None   Other Topics Concern   • Not on file   Social History Narrative    Per [employer]:     Self-employed          Social Drivers of Health     Financial Resource Strain: Low Risk  (2023)    Overall Financial Resource Strain (CARDIA)    • Difficulty of Paying Living Expenses: Not very hard   Food Insecurity: No Food  Insecurity (10/16/2024)    Hunger Vital Sign    • Worried About Running Out of Food in the Last Year: Never true    • Ran Out of Food in the Last Year: Never true   Transportation Needs: No Transportation Needs (10/16/2024)    PRAPARE - Transportation    • Lack of Transportation (Medical): No    • Lack of Transportation (Non-Medical): No   Physical Activity: Not on file   Stress: Not on file   Social Connections: Not on file   Intimate Partner Violence: Not on file   Housing Stability: Low Risk  (10/16/2024)    Housing Stability Vital Sign    • Unable to Pay for Housing in the Last Year: No    • Number of Times Moved in the Last Year: 1    • Homeless in the Last Year: No      Family History   Problem Relation Age of Onset   • Heart attack Mother    • Mental illness Mother    • Cancer Father         throat, I recall.   • Heart attack Sister      Past Surgical History:   Procedure Laterality Date   • ANAL FISSURECTOMY     • APPENDECTOMY     • CAROTID ENDARTERECTOMY  2024    stroke surgery?   • CATARACT EXTRACTION     • COLONOSCOPY     • HYSTERECTOMY     • HYSTERECTOMY W/ SALPINGO-OOPHERECTOMY     • PERIPHERAL ANGIOGRAM  don't know   • OH TEAEC W/PATCH GRF CAROTID VERTB SUBCLAV NECK INC Left 04/16/2024    Procedure: ENDARTERECTOMY ARTERY CAROTID;  Surgeon: Nikolai Daniel MD;  Location: [facility];  Service: Vascular       Current Outpatient Medications:   •  acetaminophen (TYLENOL) 325 mg tablet, Take 2 tablets (650 mg total) by mouth every 6 (six) hours as needed for mild pain, Disp: , Rfl:   •  Alpha-Lipoic Acid (LIPOIC ACID PO), Take 1 capsule by mouth, Disp: , Rfl:   •  Amino Acids (L-CARNITINE PO), Take 1 tablet by mouth, Disp: , Rfl:   •  Artificial Tear Solution (JUST TEARS EYE DROPS OP), Apply to eye, Disp: , Rfl:   •  Ascorbic Acid (VITAMIN C) 1000 MG tablet, Take 1,000 mg by mouth daily , Disp: , Rfl:   •  aspirin (ECOTRIN LOW STRENGTH) 81 mg EC tablet, Take 1 tablet (81 mg total) by mouth daily,  "Disp: 30 tablet, Rfl: 0  •  Calcium Carb-Cholecalciferol 1000-800 MG-UNIT TABS, Take by mouth, Disp: , Rfl:   •  Chelated Magnesium 100 MG TABS, Take by mouth Daily, Disp: , Rfl:   •  Cholecalciferol (VITAMIN D3) 5000 units CAPS, Take 1 capsule by mouth daily, Disp: , Rfl:   •  cholestyramine (QUESTRAN) 4 g packet, Take 1 packet (4 g total) by mouth 2 (two) times a day with meals, Disp: 60 packet, Rfl: 3  •  Co Q-10 50 MG, Take 50 mg by mouth, Disp: , Rfl:   •  Glycerylphosphorylcholine POWD, 1 tablet by Does not apply route, Disp: , Rfl:   •  Iodine, Kelp, (KELP PO), Take by mouth daily, Disp: , Rfl:   •  L-Carnosine POWD, by Does not apply route, Disp: , Rfl:   •  Menaquinone-7 (VITAMIN K2) 100 MCG CAPS, , Disp: , Rfl:   •  Misc Natural Products (GLUCOSAMINE CHOND CMP TRIPLE PO), Take by mouth, Disp: , Rfl:   •  Misc Natural Products (PANTETHINE PLUS) TABS, Take by mouth, Disp: , Rfl:   •  Omega-3 Fatty Acids (FISH OIL) 1200 MG CAPS, Take 1 capsule by mouth daily , Disp: , Rfl:   •  PANTETHINE PO, Take by mouth, Disp: , Rfl:   •  PREBIOTIC PRODUCT PO, Take by mouth, Disp: , Rfl:   •  Probiotic Product (PROBIOTIC-10) CAPS, Take by mouth, Disp: , Rfl:   •  pyridoxine (VITAMIN B6) 100 mg tablet, 1 tab(s), Disp: , Rfl:   •  Turmeric POWD, 1, Disp: , Rfl:   •  Vinpocetine POWD, by Does not apply route, Disp: , Rfl:   •  vitamin B-12 (CYANOCOBALAMIN) 100 MCG tablet, Take by mouth every other day , Disp: , Rfl:   •  vitamin E, tocopherol, 1,000 units capsule, Take by mouth daily , Disp: , Rfl:   •  Zinc 30 MG CAPS, Take 1 capsule by mouth daily, Disp: , Rfl:   Allergies   Allergen Reactions   • Sulfa Antibiotics Other (See Comments)           Review of Systems:  Review of Systems      /61 (BP Location: Left arm, Patient Position: Sitting, Cuff Size: Standard)   Pulse 81   Resp 16   Ht 5' 3\" (1.6 m)   Wt 64.9 kg (143 lb)   SpO2 96%   BMI 25.33 kg/m²     Physical Exam:  Physical Exam    "

## 2025-02-28 NOTE — ASSESSMENT & PLAN NOTE
-The nuclear stress test was negative for myocardial ischemia.  Described in the previous evaluations the EKGs were falsely positive.  The sensitivity and the specificity of the stress test has been discussed with the patient at great length.  The index of suspicion the chest pain was cardiac in nature is also low.  There were no indications for any further cardiac testing at the present time.

## 2025-02-28 NOTE — ASSESSMENT & PLAN NOTE
Has a history of stroke.  Continue with the antiplatelet therapy.  Under surveillance program with the vascular team.

## 2025-03-01 ENCOUNTER — NURSE TRIAGE (OUTPATIENT)
Dept: OTHER | Facility: OTHER | Age: 80
End: 2025-03-01

## 2025-03-01 ENCOUNTER — TELEPHONE (OUTPATIENT)
Dept: OTHER | Facility: OTHER | Age: 80
End: 2025-03-01

## 2025-03-01 NOTE — TELEPHONE ENCOUNTER
"Pt reached answering service, she asked for the following msg to be sent to Dr. Ventura.    \"Please check the message I sent you in [employer], the diarrhea might be caused by medullary thyroid cancer because of the proteins this specific type of cancer makes. I want a test for this\"  "

## 2025-03-01 NOTE — TELEPHONE ENCOUNTER
"Patient called in asking for an xray to check thyroid. Concerned that chronic diarrhea can be a symptom of medullary thyroid cancer.     Appointment scheduled for Monday 3.3.25 at 11:40am with Dr. Ventura.       Reason for Disposition   Nursing judgment or information in reference    Answer Assessment - Initial Assessment Questions  1. REASON FOR CALL: \"What is your main concern right now?\"      Concerned about Medullary Thyroid cancer due to chronic diarrhea  2. ONSET: \"When did the symptom start?\"      On going since December 2024  3. SEVERITY: \"How bad is the diarrhea?\"      When goes down to 1 pack of Questran a day will have diarrhea.  6. TREATMENTS AND RESPONSE: \"What have you done so far to try to make this better? What medicines have you used?\"      Questran 4g packets  1 pack twice daily.    Protocols used: No Guideline Available-Adult-AH    "

## 2025-03-01 NOTE — TELEPHONE ENCOUNTER
"Reason for Disposition  • Diarrhea is a chronic symptom (recurrent or ongoing AND present > 4 weeks)    Answer Assessment - Initial Assessment Questions  1. DIARRHEA SEVERITY: \"How bad is the diarrhea?\" \"How many more stools have you had in the past 24 hours than normal?\"       2x a day    2. ONSET: \"When did the diarrhea begin?\"       3 months         4. VOMITING: \"Are you also vomiting?\" If Yes, ask: \"How many times in the past 24 hours?\"       Denies    5. ABDOMEN PAIN: \"Are you having any abdomen pain?\" If Yes, ask: \"What does it feel like?\" (e.g., crampy, dull, intermittent, constant)       Denies    7. ORAL INTAKE: If vomiting, \"Have you been able to drink liquids?\" \"How much liquids have you had in the past 24 hours?\"      Drinking plenty of fluids    8. HYDRATION: \"Any signs of dehydration?\" (e.g., dry mouth [not just dry lips], too weak to stand, dizziness, new weight loss) \"When did you last urinate?\"      Does sometimes have dizziness in the morning, but otherwise ok        10. ANTIBIOTIC USE: \"Are you taking antibiotics now or have you taken antibiotics in the past 2 months?\"        Denies    11. OTHER SYMPTOMS: \"Do you have any other symptoms?\" (e.g., fever, blood in stool)     Dizziness in morning    Pt is taking:   cholestyramine (QUESTRAN) 4 g packet (Take 1 packet (4 g total) by mouth 2 (two) times a day with meals). Pt didn't have BM for 3 days so stopped taking it two times a day and started taking it once a day. Pt then started having diarrhea.      Pt woke up with a bad headache and googled symptoms and worried she has thyroid cancer. Also having GERD symptoms. Pt would like an xray at her appt on Monday. Pt worried she has GERD and would like to be checked for that also.    Took ibuprofen and headache went away.    Protocols used: Diarrhea-Adult-AH    "

## 2025-03-01 NOTE — TELEPHONE ENCOUNTER
Patient called in asking for an xray to check thyroid. Concerned that chronic diarrhea can be a symptom of medullary thyroid cancer. She is asking for call back. Thank you.

## 2025-03-01 NOTE — TELEPHONE ENCOUNTER
"Answer Assessment - Initial Assessment Questions  1. DIARRHEA SEVERITY: \"How bad is the diarrhea?\" \"How many more stools have you had in the past 24 hours than normal?\"       *No Answer*  2. ONSET: \"When did the diarrhea begin?\"       *No Answer*  3. STOOL DESCRIPTION:  \"How loose or watery is the diarrhea?\" \"What is the stool color?\" \"Is there any blood or mucous in the stool?\"      *No Answer*  4. VOMITING: \"Are you also vomiting?\" If Yes, ask: \"How many times in the past 24 hours?\"       *No Answer*  5. ABDOMEN PAIN: \"Are you having any abdomen pain?\" If Yes, ask: \"What does it feel like?\" (e.g., crampy, dull, intermittent, constant)       *No Answer*  6. ABDOMEN PAIN SEVERITY: If present, ask: \"How bad is the pain?\"  (e.g., Scale 1-10; mild, moderate, or severe)      *No Answer*  7. ORAL INTAKE: If vomiting, \"Have you been able to drink liquids?\" \"How much liquids have you had in the past 24 hours?\"      *No Answer*  8. HYDRATION: \"Any signs of dehydration?\" (e.g., dry mouth [not just dry lips], too weak to stand, dizziness, new weight loss) \"When did you last urinate?\"      *No Answer*  9. EXPOSURE: \"Have you traveled to a foreign country recently?\" \"Have you been exposed to anyone with diarrhea?\" \"Could you have eaten any food that was spoiled?\"      *No Answer*  10. ANTIBIOTIC USE: \"Are you taking antibiotics now or have you taken antibiotics in the past 2 months?\"        *No Answer*  11. OTHER SYMPTOMS: \"Do you have any other symptoms?\" (e.g., fever, blood in stool)        *No Answer*  12. PREGNANCY: \"Is there any chance you are pregnant?\" \"When was your last menstrual period?\"        *No Answer*    Protocols used: Diarrhea-Adult-AH    "

## 2025-03-01 NOTE — TELEPHONE ENCOUNTER
"Regarding: diarrhea  ----- Message from Marlin DELUCA sent at 3/1/2025  4:02 PM EST -----  \"I called this morning but I missed the call back from the nurse and she left a message but I don't know how to get it\"    "

## 2025-03-01 NOTE — TELEPHONE ENCOUNTER
"Regarding: diarrhea  ----- Message from Marlin DELUCA sent at 3/1/2025 10:32 AM EST -----  \"I have ongoing diarrhea\"    #pt did also request I send a msg to Dr. Ventura, I did advise we could not order testing on weekend to screen for medullary thyroid cancer    "

## 2025-03-03 ENCOUNTER — APPOINTMENT (OUTPATIENT)
Dept: LAB | Facility: CLINIC | Age: 80
End: 2025-03-03
Payer: COMMERCIAL

## 2025-03-03 ENCOUNTER — OFFICE VISIT (OUTPATIENT)
Dept: FAMILY MEDICINE CLINIC | Facility: CLINIC | Age: 80
End: 2025-03-03
Payer: COMMERCIAL

## 2025-03-03 VITALS
OXYGEN SATURATION: 98 % | HEART RATE: 73 BPM | TEMPERATURE: 98.8 F | HEIGHT: 63 IN | SYSTOLIC BLOOD PRESSURE: 118 MMHG | DIASTOLIC BLOOD PRESSURE: 62 MMHG | WEIGHT: 137.8 LBS | BODY MASS INDEX: 24.41 KG/M2

## 2025-03-03 DIAGNOSIS — K52.9 CHRONIC DIARRHEA: ICD-10-CM

## 2025-03-03 DIAGNOSIS — E07.9 DISORDER OF THYROID, UNSPECIFIED: ICD-10-CM

## 2025-03-03 DIAGNOSIS — M79.675 TOE PAIN, LEFT: ICD-10-CM

## 2025-03-03 DIAGNOSIS — K52.9 CHRONIC DIARRHEA: Primary | ICD-10-CM

## 2025-03-03 DIAGNOSIS — E04.9 THYROID ENLARGEMENT: ICD-10-CM

## 2025-03-03 DIAGNOSIS — R94.6 ABNORMAL RESULTS OF THYROID FUNCTION STUDIES: ICD-10-CM

## 2025-03-03 LAB
BASOPHILS # BLD AUTO: 0.03 THOUSANDS/ÂΜL (ref 0–0.1)
BASOPHILS NFR BLD AUTO: 1 % (ref 0–1)
EOSINOPHIL # BLD AUTO: 0.1 THOUSAND/ÂΜL (ref 0–0.61)
EOSINOPHIL NFR BLD AUTO: 2 % (ref 0–6)
ERYTHROCYTE [DISTWIDTH] IN BLOOD BY AUTOMATED COUNT: 13.8 % (ref 11.6–15.1)
HCT VFR BLD AUTO: 44.5 % (ref 34.8–46.1)
HGB BLD-MCNC: 13.6 G/DL (ref 11.5–15.4)
IMM GRANULOCYTES # BLD AUTO: 0.02 THOUSAND/UL (ref 0–0.2)
IMM GRANULOCYTES NFR BLD AUTO: 0 % (ref 0–2)
LYMPHOCYTES # BLD AUTO: 1.84 THOUSANDS/ÂΜL (ref 0.6–4.47)
LYMPHOCYTES NFR BLD AUTO: 31 % (ref 14–44)
MCH RBC QN AUTO: 29.4 PG (ref 26.8–34.3)
MCHC RBC AUTO-ENTMCNC: 30.6 G/DL (ref 31.4–37.4)
MCV RBC AUTO: 96 FL (ref 82–98)
MONOCYTES # BLD AUTO: 0.49 THOUSAND/ÂΜL (ref 0.17–1.22)
MONOCYTES NFR BLD AUTO: 8 % (ref 4–12)
NEUTROPHILS # BLD AUTO: 3.43 THOUSANDS/ÂΜL (ref 1.85–7.62)
NEUTS SEG NFR BLD AUTO: 58 % (ref 43–75)
NRBC BLD AUTO-RTO: 0 /100 WBCS
PLATELET # BLD AUTO: 289 THOUSANDS/UL (ref 149–390)
PMV BLD AUTO: 9.8 FL (ref 8.9–12.7)
RBC # BLD AUTO: 4.63 MILLION/UL (ref 3.81–5.12)
T3FREE SERPL-MCNC: 2.73 PG/ML (ref 2.5–3.9)
T4 FREE SERPL-MCNC: 0.95 NG/DL (ref 0.61–1.12)
TSH SERPL DL<=0.05 MIU/L-ACNC: 1.54 UIU/ML (ref 0.45–4.5)
WBC # BLD AUTO: 5.91 THOUSAND/UL (ref 4.31–10.16)

## 2025-03-03 PROCEDURE — 84550 ASSAY OF BLOOD/URIC ACID: CPT

## 2025-03-03 PROCEDURE — 36415 COLL VENOUS BLD VENIPUNCTURE: CPT

## 2025-03-03 PROCEDURE — 99214 OFFICE O/P EST MOD 30 MIN: CPT | Performed by: FAMILY MEDICINE

## 2025-03-03 PROCEDURE — 85025 COMPLETE CBC W/AUTO DIFF WBC: CPT

## 2025-03-03 PROCEDURE — G2211 COMPLEX E/M VISIT ADD ON: HCPCS | Performed by: FAMILY MEDICINE

## 2025-03-03 PROCEDURE — 84443 ASSAY THYROID STIM HORMONE: CPT

## 2025-03-03 PROCEDURE — 84481 FREE ASSAY (FT-3): CPT

## 2025-03-03 PROCEDURE — 84439 ASSAY OF FREE THYROXINE: CPT

## 2025-03-03 PROCEDURE — 80048 BASIC METABOLIC PNL TOTAL CA: CPT

## 2025-03-03 NOTE — PROGRESS NOTES
"Name: Shanda Quintanilla      : 1945      MRN: [id number]  Encounter Provider: Talat Ventura MD  Encounter Date: 3/3/2025   Encounter department: [facility]  :  Assessment & Plan  Chronic diarrhea    Orders:  •  Stool Enteric Bacterial Panel by PCR; Future  •  C difficile Toxins A+B, EIA; Future  •  Ova and parasite examination; Future  •  Basic metabolic panel; Future  •  CBC and differential; Future  •  TSH, 3rd generation with Free T4 reflex; Future  •  T4, free; Future  •  T3, free; Future    Thyroid enlargement    Orders:  •  US thyroid; Future    Disorder of thyroid, unspecified    Orders:  •  TSH, 3rd generation with Free T4 reflex; Future    Abnormal results of thyroid function studies    Orders:  •  T4, free; Future    Follow up in 1-2 months       History of Present Illness   Patient is here due to diarrhea watery in nature that has been going on for the past 2-3 months. She had a pos occult blood stool test done which prompted a colonoscopy which was normal.  She also states that she feels something on her neck throat and feels foot stuck on her esophagus.      Review of Systems   Constitutional:  Negative for activity change, appetite change, fatigue and fever.   HENT:  Negative for congestion and ear discharge.    Respiratory:  Negative for cough and shortness of breath.    Cardiovascular:  Negative for chest pain and palpitations.   Gastrointestinal:  Positive for diarrhea. Negative for nausea.   Musculoskeletal:  Negative for arthralgias and back pain.   Skin:  Negative for color change and rash.   Neurological:  Negative for dizziness and headaches.   Psychiatric/Behavioral:  Negative for agitation and behavioral problems.        Objective   /62   Pulse 73   Temp 98.8 °F (37.1 °C)   Ht 5' 3\" (1.6 m)   Wt 62.5 kg (137 lb 12.8 oz)   SpO2 98%   BMI 24.41 kg/m²      Physical Exam  Vitals and nursing note reviewed.   Constitutional:       General: She is not in acute " distress.     Appearance: She is well-developed.   HENT:      Head: Normocephalic and atraumatic.   Eyes:      Conjunctiva/sclera: Conjunctivae normal.   Cardiovascular:      Rate and Rhythm: Normal rate and regular rhythm.      Heart sounds: No murmur heard.  Pulmonary:      Effort: Pulmonary effort is normal. No respiratory distress.      Breath sounds: Normal breath sounds.   Abdominal:      Palpations: Abdomen is soft.      Tenderness: There is no abdominal tenderness.   Musculoskeletal:         General: No swelling.      Cervical back: Neck supple.   Skin:     General: Skin is warm and dry.      Capillary Refill: Capillary refill takes less than 2 seconds.   Neurological:      Mental Status: She is alert.   Psychiatric:         Mood and Affect: Mood normal.

## 2025-03-03 NOTE — ASSESSMENT & PLAN NOTE
Orders:  •  Stool Enteric Bacterial Panel by PCR; Future  •  C difficile Toxins A+B, EIA; Future  •  Ova and parasite examination; Future  •  Basic metabolic panel; Future  •  CBC and differential; Future  •  TSH, 3rd generation with Free T4 reflex; Future  •  T4, free; Future  •  T3, free; Future

## 2025-03-03 NOTE — TELEPHONE ENCOUNTER
Called & spoke to patient. Gave patient message nathan KOCH Patient voiced understanding and had no further questions or concerns

## 2025-03-03 NOTE — TELEPHONE ENCOUNTER
Good morning, she has an appointment with her PCP this morning Dr. Ventura  Recommend that she discuss blood work and ultrasound imaging with him.  Thank you

## 2025-03-04 ENCOUNTER — RESULTS FOLLOW-UP (OUTPATIENT)
Dept: FAMILY MEDICINE CLINIC | Facility: CLINIC | Age: 80
End: 2025-03-04

## 2025-03-04 ENCOUNTER — APPOINTMENT (OUTPATIENT)
Dept: LAB | Facility: CLINIC | Age: 80
End: 2025-03-04
Payer: COMMERCIAL

## 2025-03-04 ENCOUNTER — HOSPITAL ENCOUNTER (OUTPATIENT)
Dept: ULTRASOUND IMAGING | Facility: HOSPITAL | Age: 80
Discharge: HOME | End: 2025-03-04
Attending: FAMILY MEDICINE
Payer: COMMERCIAL

## 2025-03-04 DIAGNOSIS — E04.9 THYROID ENLARGEMENT: ICD-10-CM

## 2025-03-04 DIAGNOSIS — K52.9 CHRONIC DIARRHEA: ICD-10-CM

## 2025-03-04 LAB
ANION GAP SERPL CALCULATED.3IONS-SCNC: 7 MMOL/L (ref 4–13)
BUN SERPL-MCNC: 14 MG/DL (ref 5–25)
CALCIUM SERPL-MCNC: 9.6 MG/DL (ref 8.4–10.2)
CHLORIDE SERPL-SCNC: 106 MMOL/L (ref 96–108)
CO2 SERPL-SCNC: 28 MMOL/L (ref 21–32)
CREAT SERPL-MCNC: 0.88 MG/DL (ref 0.6–1.3)
GFR SERPL CREATININE-BSD FRML MDRD: 62 ML/MIN/1.73SQ M
GLUCOSE SERPL-MCNC: 89 MG/DL (ref 65–140)
POTASSIUM SERPL-SCNC: 5.3 MMOL/L (ref 3.5–5.3)
SODIUM SERPL-SCNC: 141 MMOL/L (ref 135–147)
URATE SERPL-MCNC: 5 MG/DL (ref 2–7.5)

## 2025-03-04 PROCEDURE — 76536 US EXAM OF HEAD AND NECK: CPT

## 2025-03-04 PROCEDURE — 87209 SMEAR COMPLEX STAIN: CPT

## 2025-03-04 PROCEDURE — 87177 OVA AND PARASITES SMEARS: CPT

## 2025-03-04 PROCEDURE — 87505 NFCT AGENT DETECTION GI: CPT

## 2025-03-04 NOTE — TELEPHONE ENCOUNTER
Patient returned call to PCP office. Informed her of results, per PCP note:    Talat Ventura MD to [facility]  Regarding results: 5   3/4/25  1:11 PM  Result Note  Blood work was normal Has stool studies pending Her thyroid tests were all normal.

## 2025-03-05 LAB
C COLI+JEJUNI TUF STL QL NAA+PROBE: NEGATIVE
C DIFF TOX GENS STL QL NAA+PROBE: NEGATIVE
EC STX1+STX2 GENES STL QL NAA+PROBE: NEGATIVE
SALMONELLA SP SPAO STL QL NAA+PROBE: NEGATIVE
SHIGELLA SP+EIEC IPAH STL QL NAA+PROBE: NEGATIVE

## 2025-03-05 NOTE — TELEPHONE ENCOUNTER
Pt called to let Dr. Ventura know that her fickle test came back normal but she wants answers and more testing done to find out why she has constant diarrhea.  She's asking for a call back letting her know what testing Dr. Ventura is going to do next.

## 2025-03-06 DIAGNOSIS — R19.7 DIARRHEA, UNSPECIFIED TYPE: ICD-10-CM

## 2025-03-06 RX ORDER — CHOLESTYRAMINE 4 G/9G
POWDER, FOR SUSPENSION ORAL
Qty: 180 PACKET | Refills: 2 | Status: SHIPPED | OUTPATIENT
Start: 2025-03-06

## 2025-03-06 NOTE — RESULT ENCOUNTER NOTE
Please advise her that her parasite stool test is still pending.  Recommend to discuss with Gastro Dr. Nguyễn

## 2025-03-18 ENCOUNTER — OFFICE VISIT (OUTPATIENT)
Dept: FAMILY MEDICINE CLINIC | Facility: CLINIC | Age: 80
End: 2025-03-18
Payer: COMMERCIAL

## 2025-03-18 VITALS
HEART RATE: 64 BPM | BODY MASS INDEX: 25.09 KG/M2 | DIASTOLIC BLOOD PRESSURE: 68 MMHG | HEIGHT: 63 IN | WEIGHT: 141.6 LBS | SYSTOLIC BLOOD PRESSURE: 122 MMHG | OXYGEN SATURATION: 100 % | TEMPERATURE: 98 F

## 2025-03-18 DIAGNOSIS — K58.0 IRRITABLE BOWEL SYNDROME WITH DIARRHEA: Primary | ICD-10-CM

## 2025-03-18 PROCEDURE — 99214 OFFICE O/P EST MOD 30 MIN: CPT | Performed by: FAMILY MEDICINE

## 2025-03-18 RX ORDER — DICYCLOMINE HCL 20 MG
20 TABLET ORAL 2 TIMES DAILY PRN
Qty: 40 TABLET | Refills: 3 | Status: SHIPPED | OUTPATIENT
Start: 2025-03-18

## 2025-03-18 NOTE — ASSESSMENT & PLAN NOTE
After discussing risks and benefits of medication along with side effects will start the following:   Orders:  •  rifaximin (XIFAXAN) 550 mg tablet; Take 1 tablet (550 mg total) by mouth every 8 (eight) hours for 14 days  •  dicyclomine (BENTYL) 20 mg tablet; Take 1 tablet (20 mg total) by mouth 2 (two) times a day as needed (diarrhea)

## 2025-03-18 NOTE — PROGRESS NOTES
"Name: Shanda Quintanilla      : 1945      MRN: [id number]  Encounter Provider: Talat Ventura MD  Encounter Date: 3/18/2025   Encounter department: [facility]  :  Assessment & Plan  Irritable bowel syndrome with diarrhea  After discussing risks and benefits of medication along with side effects will start the following:   Orders:  •  rifaximin (XIFAXAN) 550 mg tablet; Take 1 tablet (550 mg total) by mouth every 8 (eight) hours for 14 days  •  dicyclomine (BENTYL) 20 mg tablet; Take 1 tablet (20 mg total) by mouth 2 (two) times a day as needed (diarrhea)    Follow up in 1 month       History of Present Illness   Patient is here to follow up for diarrhea which has been chronic for the past 2 months. No blood in the diarrhea however. Cholestyramine helping some.  Had stool studies done all negative for infection causes. Also had a colonoscopy done which was normal negative.        Review of Systems   Constitutional:  Negative for activity change, appetite change, fatigue and fever.   HENT:  Negative for congestion and ear discharge.    Respiratory:  Negative for cough and shortness of breath.    Cardiovascular:  Negative for chest pain and palpitations.   Gastrointestinal:  Positive for diarrhea. Negative for nausea.   Musculoskeletal:  Negative for arthralgias and back pain.   Skin:  Negative for color change and rash.   Neurological:  Negative for dizziness and headaches.   Psychiatric/Behavioral:  Negative for agitation and behavioral problems.        Objective   /68 (BP Location: Right arm, Patient Position: Sitting)   Pulse 64   Temp 98 °F (36.7 °C) (Temporal)   Ht 5' 3\" (1.6 m)   Wt 64.2 kg (141 lb 9.6 oz)   SpO2 100%   BMI 25.08 kg/m²      Physical Exam  Vitals and nursing note reviewed.   Constitutional:       General: She is not in acute distress.     Appearance: She is well-developed.   HENT:      Head: Normocephalic and atraumatic.   Eyes:      Conjunctiva/sclera: " Conjunctivae normal.   Cardiovascular:      Rate and Rhythm: Normal rate and regular rhythm.      Heart sounds: No murmur heard.  Pulmonary:      Effort: Pulmonary effort is normal. No respiratory distress.      Breath sounds: Normal breath sounds.   Abdominal:      Palpations: Abdomen is soft.      Tenderness: There is no abdominal tenderness.   Musculoskeletal:         General: No swelling.      Cervical back: Neck supple.   Skin:     General: Skin is warm and dry.      Capillary Refill: Capillary refill takes less than 2 seconds.   Neurological:      Mental Status: She is alert.   Psychiatric:         Mood and Affect: Mood normal.

## 2025-03-26 ENCOUNTER — OFFICE VISIT (OUTPATIENT)
Dept: GASTROENTEROLOGY | Facility: CLINIC | Age: 80
End: 2025-03-26
Payer: COMMERCIAL

## 2025-03-26 VITALS
DIASTOLIC BLOOD PRESSURE: 76 MMHG | SYSTOLIC BLOOD PRESSURE: 118 MMHG | HEIGHT: 64 IN | WEIGHT: 139 LBS | BODY MASS INDEX: 23.73 KG/M2 | OXYGEN SATURATION: 98 % | TEMPERATURE: 97.6 F | HEART RATE: 80 BPM

## 2025-03-26 DIAGNOSIS — R19.7 DIARRHEA, UNSPECIFIED TYPE: Primary | ICD-10-CM

## 2025-03-26 PROCEDURE — 99214 OFFICE O/P EST MOD 30 MIN: CPT | Performed by: INTERNAL MEDICINE

## 2025-03-27 NOTE — PROGRESS NOTES
"Name: Shanda Quintanilla      : 1945      MRN: [id number]  Encounter Provider: Trever Nguyễn DO  Encounter Date: 3/26/2025   Encounter department: [facility] [place]  :  Assessment & Plan  Diarrhea, unspecified type  I have recommended to the patient to start taking the Questran on a daily basis, however the doses should be reduced to 1 packet a day versus a half a pack in a day versus three quarters of a pack a day.  The patient was encouraged to change the dosage accordingly to achieve the best amount  Increase fiber intake on a daily basis to include fruits, vegetable, and whole-grain foods, daily      History of Present Illness     Shanda Quintanilla is a 80 y.o. female who presents to the office today with the ongoing complaint of diarrhea.  She denies any constipation.  Her last regular bowel movements were in 2024.  She states the Questran at 2 packs a day caused constipation.  She is now taking Questran only on a as needed basis and she states that it does help to solidify the stool.  She is currently taking a probiotic on a daily basis.  She does admit to gaseousness.  She denies any nausea, vomiting, rectal bleeding.  She states that most of her bowel movements are either loose or mud like.       Objective   /76 (BP Location: Right arm, Patient Position: Sitting, Cuff Size: Standard)   Pulse 80   Temp 97.6 °F (36.4 °C) (Temporal)   Ht 5' 3.5\" (1.613 m)   Wt 63 kg (139 lb)   SpO2 98%   BMI 24.24 kg/m²      Physical Exam  Vitals reviewed.   Constitutional:       General: She is not in acute distress.     Appearance: Normal appearance. She is not ill-appearing.   Eyes:      General: No scleral icterus.     Extraocular Movements: Extraocular movements intact.      Pupils: Pupils are equal, round, and reactive to light.   Cardiovascular:      Rate and Rhythm: Normal rate and regular rhythm.      Pulses: Normal pulses.      Heart sounds: Normal heart " sounds. No murmur heard.  Pulmonary:      Effort: Pulmonary effort is normal.      Breath sounds: Normal breath sounds. No wheezing, rhonchi or rales.   Abdominal:      Palpations: Abdomen is soft. There is no mass.      Tenderness: There is no abdominal tenderness. There is no guarding or rebound.   Musculoskeletal:      Right lower leg: No edema.      Left lower leg: No edema.   Skin:     General: Skin is warm and dry.      Coloration: Skin is not jaundiced.      Findings: No rash.   Neurological:      General: No focal deficit present.      Mental Status: She is alert and oriented to person, place, and time.   Psychiatric:         Mood and Affect: Mood normal.         Behavior: Behavior normal.

## 2025-03-28 ENCOUNTER — APPOINTMENT (OUTPATIENT)
Dept: RADIOLOGY | Facility: CLINIC | Age: 80
End: 2025-03-28
Payer: COMMERCIAL

## 2025-03-28 ENCOUNTER — OFFICE VISIT (OUTPATIENT)
Dept: FAMILY MEDICINE CLINIC | Facility: CLINIC | Age: 80
End: 2025-03-28
Payer: COMMERCIAL

## 2025-03-28 VITALS
HEART RATE: 85 BPM | TEMPERATURE: 96.4 F | OXYGEN SATURATION: 95 % | DIASTOLIC BLOOD PRESSURE: 60 MMHG | BODY MASS INDEX: 24.24 KG/M2 | WEIGHT: 142 LBS | HEIGHT: 64 IN | SYSTOLIC BLOOD PRESSURE: 110 MMHG

## 2025-03-28 DIAGNOSIS — M54.50 ACUTE LEFT-SIDED LOW BACK PAIN WITHOUT SCIATICA: ICD-10-CM

## 2025-03-28 DIAGNOSIS — M54.50 ACUTE LEFT-SIDED LOW BACK PAIN WITHOUT SCIATICA: Primary | ICD-10-CM

## 2025-03-28 PROCEDURE — 99213 OFFICE O/P EST LOW 20 MIN: CPT | Performed by: PHYSICIAN ASSISTANT

## 2025-03-28 PROCEDURE — 72110 X-RAY EXAM L-2 SPINE 4/>VWS: CPT

## 2025-03-28 RX ORDER — NAPROXEN 500 MG/1
500 TABLET ORAL 2 TIMES DAILY PRN
Qty: 20 TABLET | Refills: 0 | Status: SHIPPED | OUTPATIENT
Start: 2025-03-28 | End: 2025-03-28

## 2025-03-28 NOTE — PROGRESS NOTES
"Name: Shanda Quintanilla      : 1945      MRN: [id number]  Encounter Provider: Agustin Krause PA-C  Encounter Date: 3/28/2025   Encounter department: [facility]    Assessment & Plan  Acute left-sided low back pain without sciatica  Suspect lumbar strain  Rest, ice/heat, Tylenol for pain  Xr ordered  PT if persists or worsens  No red flag signs today  Orders:    XR spine lumbar minimum 4 views non injury; Future         History of Present Illness     Pt presents with acute L sided low back pain for a few days. Can radiate to the hip area. Woke with it. Hurts with bending/walking. No injuries. No LE weakness/numbness and no  sx    Of note, a chaperone, Jeanne WEIR, was present during visit due to pt previous inappropriate behavior with providers. Pt did reach for the drawstring on my scrub pants and grab they stating \"what happens if I pull these\". I stated \"do not do that\" and pt apologized      Review of Systems   Constitutional: Negative.    Respiratory: Negative.     Cardiovascular: Negative.    Musculoskeletal:  Positive for back pain and gait problem.   Neurological:  Negative for weakness and numbness.     Past Medical History:   Diagnosis Date    Allergic sulfa    Arthritis 2-3 years    Cancer (HCC) 2015    on forehead, caught immediately.    Cervical strain     Changing skin lesion     Hyperlipidemia     MCI (mild cognitive impairment)     Mixed hyperlipidemia     Nonmelanoma skin cancer     BCC    Osteoporosis     Polio     Poor concentration     Stroke (HCC)     Swelling of left thumb      Past Surgical History:   Procedure Laterality Date    ANAL FISSURECTOMY      APPENDECTOMY      CAROTID ENDARTERECTOMY      stroke surgery?    CATARACT EXTRACTION      COLONOSCOPY      HYSTERECTOMY      HYSTERECTOMY W/ SALPINGO-OOPHERECTOMY      PERIPHERAL ANGIOGRAM  don't know    GA TEAEC W/PATCH GRF CAROTID VERTB SUBCLAV NECK INC Left 2024    Procedure: ENDARTERECTOMY ARTERY " CAROTID;  Surgeon: Nikolai Daniel MD;  Location: [facility];  Service: Vascular     Family History   Problem Relation Age of Onset    Heart attack Mother     Mental illness Mother     Cancer Father         throat, I recall.    Heart attack Sister      Social History     Tobacco Use    Smoking status: Former     Current packs/day: 0.00     Average packs/day: 2.0 packs/day for 37.0 years (74.0 ttl pk-yrs)     Types: Cigarettes     Start date: 1963     Quit date: 1980     Years since quittin.2     Passive exposure: Past    Smokeless tobacco: Never    Tobacco comments:     HARDEST THING I EVER HAD TO DO!  BUT I DID IT.  :-)   Vaping Use    Vaping status: Never Used   Substance and Sexual Activity    Alcohol use: Not Currently     Alcohol/week: 11.0 standard drinks of alcohol     Types: 4 Glasses of wine, 7 Cans of beer per week     Comment: not a steady drinker but will have some on occasion    Drug use: Not Currently    Sexual activity: Not Currently     Partners: Male     Birth control/protection: None     Current Outpatient Medications on File Prior to Visit   Medication Sig    acetaminophen (TYLENOL) 325 mg tablet Take 2 tablets (650 mg total) by mouth every 6 (six) hours as needed for mild pain    Alpha-Lipoic Acid (LIPOIC ACID PO) Take 1 capsule by mouth    Amino Acids (L-CARNITINE PO) Take 1 tablet by mouth    Artificial Tear Solution (JUST TEARS EYE DROPS OP) Apply to eye    Ascorbic Acid (VITAMIN C) 1000 MG tablet Take 1,000 mg by mouth daily     aspirin (ECOTRIN LOW STRENGTH) 81 mg EC tablet Take 1 tablet (81 mg total) by mouth daily    Calcium Carb-Cholecalciferol 1000-800 MG-UNIT TABS Take by mouth    Chelated Magnesium 100 MG TABS Take by mouth Daily    Cholecalciferol (VITAMIN D3) 5000 units CAPS Take 1 capsule by mouth daily    cholestyramine (QUESTRAN) 4 g packet TAKE 1 PACKET BY MOUTH 2 TIMES A DAY WITH MEALS. (Patient taking differently: As needed)    Co Q-10 50 MG Take 50 mg  "by mouth    dicyclomine (BENTYL) 20 mg tablet Take 1 tablet (20 mg total) by mouth 2 (two) times a day as needed (diarrhea)    Glycerylphosphorylcholine POWD 1 tablet by Does not apply route    Iodine, Kelp, (KELP PO) Take by mouth daily    L-Carnosine POWD by Does not apply route    Menaquinone-7 (VITAMIN K2) 100 MCG CAPS     Misc Natural Products (GLUCOSAMINE CHOND CMP TRIPLE PO) Take by mouth    Misc Natural Products (PANTETHINE PLUS) TABS Take by mouth    Omega-3 Fatty Acids (FISH OIL) 1200 MG CAPS Take 1 capsule by mouth daily     PANTETHINE PO Take by mouth    PREBIOTIC PRODUCT PO Take by mouth    Probiotic Product (PROBIOTIC-10) CAPS Take by mouth    pyridoxine (VITAMIN B6) 100 mg tablet 1 tab(s)    rifaximin (XIFAXAN) 550 mg tablet Take 1 tablet (550 mg total) by mouth every 8 (eight) hours for 14 days    Turmeric POWD 1    Vinpocetine POWD by Does not apply route    vitamin B-12 (CYANOCOBALAMIN) 100 MCG tablet Take by mouth every other day     vitamin E, tocopherol, 1,000 units capsule Take by mouth daily     Zinc 30 MG CAPS Take 1 capsule by mouth daily     Allergies   Allergen Reactions    Sulfa Antibiotics Other (See Comments)     Immunization History   Administered Date(s) Administered    Influenza Split High Dose Preservative Free IM 11/07/2013, 10/28/2014    Pneumococcal Polysaccharide PPV23 10/28/2014    Tetanus, adsorbed 12/20/2013    Zoster 11/03/2014     Objective   /60 (BP Location: Left arm, Patient Position: Sitting, Cuff Size: Large)   Pulse 85   Temp (!) 96.4 °F (35.8 °C)   Ht 5' 3.5\" (1.613 m)   Wt 64.4 kg (142 lb)   SpO2 95%   BMI 24.76 kg/m²     Physical Exam  Vitals and nursing note reviewed. Exam conducted with a chaperone present.   Constitutional:       Appearance: Normal appearance.   HENT:      Head: Normocephalic and atraumatic.   Pulmonary:      Effort: Pulmonary effort is normal. No respiratory distress.   Musculoskeletal:      Lumbar back: Tenderness present. No " spasms or bony tenderness. Decreased range of motion. Negative right straight leg raise test and negative left straight leg raise test.   Skin:     General: Skin is warm and dry.   Neurological:      Mental Status: She is alert.      Sensory: No sensory deficit.      Motor: No weakness.

## 2025-03-30 ENCOUNTER — TELEPHONE (OUTPATIENT)
Dept: OTHER | Facility: OTHER | Age: 80
End: 2025-03-30

## 2025-03-30 NOTE — TELEPHONE ENCOUNTER
FOLLOW UP: Patient would like a call back to schedule an appointment with Dr. Lorenzo KUMAR to discuss ordering CT or MRI lumbar following xray results from 3/28.      REASON FOR CONVERSATION: Appointment    SYMPTOMS: N/A    OTHER: Advised to call back when office is open since first available for Dr. Ventura was 4/14 when I attempted to schedule. Patient expressed understanding and is agreeable.     DISPOSITION: Call PCP When Office is Open

## 2025-03-31 ENCOUNTER — OFFICE VISIT (OUTPATIENT)
Dept: FAMILY MEDICINE CLINIC | Facility: CLINIC | Age: 80
End: 2025-03-31
Payer: COMMERCIAL

## 2025-03-31 ENCOUNTER — RESULTS FOLLOW-UP (OUTPATIENT)
Dept: FAMILY MEDICINE CLINIC | Facility: CLINIC | Age: 80
End: 2025-03-31

## 2025-03-31 VITALS
OXYGEN SATURATION: 96 % | BODY MASS INDEX: 24.52 KG/M2 | TEMPERATURE: 99 F | DIASTOLIC BLOOD PRESSURE: 68 MMHG | HEIGHT: 64 IN | HEART RATE: 74 BPM | WEIGHT: 143.6 LBS | SYSTOLIC BLOOD PRESSURE: 120 MMHG

## 2025-03-31 DIAGNOSIS — K58.0 IRRITABLE BOWEL SYNDROME WITH DIARRHEA: ICD-10-CM

## 2025-03-31 DIAGNOSIS — M48.062 SPINAL STENOSIS OF LUMBAR REGION WITH NEUROGENIC CLAUDICATION: Primary | ICD-10-CM

## 2025-03-31 DIAGNOSIS — R10.11 RIGHT UPPER QUADRANT ABDOMINAL PAIN: ICD-10-CM

## 2025-03-31 DIAGNOSIS — D53.9 NUTRITIONAL ANEMIA, UNSPECIFIED: ICD-10-CM

## 2025-03-31 DIAGNOSIS — K52.9 CHRONIC DIARRHEA: ICD-10-CM

## 2025-03-31 PROCEDURE — 99214 OFFICE O/P EST MOD 30 MIN: CPT | Performed by: FAMILY MEDICINE

## 2025-03-31 NOTE — ASSESSMENT & PLAN NOTE
Advise to continue questran daily  Orders:  •  Homocysteine; Future  •  C-reactive protein; Future  •  CBC and differential; Future

## 2025-03-31 NOTE — PROGRESS NOTES
Name: Shanda Quintanilla      : 1945      MRN: [id number]  Encounter Provider: Talat Ventura MD  Encounter Date: 3/31/2025   Encounter department: [facility]  :  Assessment & Plan  Spinal stenosis of lumbar region with neurogenic claudication    Orders:  •  MRI lumbar spine wo contrast; Future    Right upper quadrant abdominal pain    Orders:  •  US right upper quadrant; Future  •  Homocysteine; Future  •  C-reactive protein; Future  •  CBC and differential; Future    Chronic diarrhea  Advise to continue questran daily  Orders:  •  Homocysteine; Future  •  C-reactive protein; Future  •  CBC and differential; Future    Irritable bowel syndrome with diarrhea  After discussing risks and benefits of medication along with side effects will start the following:   Orders:  •  rifaximin (XIFAXAN) 550 mg tablet; Take 1 tablet (550 mg total) by mouth every 8 (eight) hours for 14 days    Nutritional anemia, unspecified    Orders:  •  Homocysteine; Future    Follow up in 1-2 months       History of Present Illness   Patient is here due to lumbar spine tenderness that goes down her left lower leg. She denies any injuries to her lumbar spine. The low back pain is exacerbated by walking. Also associated with chronic diarrhea symptoms. She also has urinary incontinence symptoms associated with it.       Review of Systems   Constitutional:  Negative for activity change, appetite change, fatigue and fever.   HENT:  Negative for congestion and ear discharge.    Respiratory:  Negative for cough and shortness of breath.    Cardiovascular:  Negative for chest pain and palpitations.   Gastrointestinal:  Positive for diarrhea. Negative for nausea.   Genitourinary:  Positive for enuresis.   Musculoskeletal:  Positive for back pain. Negative for arthralgias.   Skin:  Negative for color change and rash.   Neurological:  Negative for dizziness and headaches.   Psychiatric/Behavioral:  Negative for agitation and  "behavioral problems.        Objective   /68   Pulse 74   Temp 99 °F (37.2 °C)   Ht 5' 3.5\" (1.613 m)   Wt 65.1 kg (143 lb 9.6 oz)   SpO2 96%   BMI 25.04 kg/m²      Physical Exam  Vitals and nursing note reviewed.   Constitutional:       General: She is not in acute distress.     Appearance: She is well-developed.   HENT:      Head: Normocephalic and atraumatic.   Eyes:      Conjunctiva/sclera: Conjunctivae normal.   Cardiovascular:      Rate and Rhythm: Normal rate and regular rhythm.      Heart sounds: No murmur heard.  Pulmonary:      Effort: Pulmonary effort is normal. No respiratory distress.      Breath sounds: Normal breath sounds.   Abdominal:      Palpations: Abdomen is soft.      Tenderness: There is no abdominal tenderness.   Musculoskeletal:         General: Tenderness present. No swelling.      Cervical back: Neck supple.      Comments: B/L straight leg raise produces lumbar spine  Limited ROM of lumbar spine due to pain   Skin:     General: Skin is warm and dry.      Capillary Refill: Capillary refill takes less than 2 seconds.   Neurological:      Mental Status: She is alert.   Psychiatric:         Mood and Affect: Mood normal.         " Opioid Pregnancy And Lactation Text: These medications can lead to premature delivery and should be avoided during pregnancy. These medications are also present in breast milk in small amounts.

## 2025-04-01 ENCOUNTER — TELEPHONE (OUTPATIENT)
Age: 80
End: 2025-04-01

## 2025-04-01 ENCOUNTER — APPOINTMENT (OUTPATIENT)
Dept: LAB | Facility: CLINIC | Age: 80
End: 2025-04-01
Payer: COMMERCIAL

## 2025-04-01 DIAGNOSIS — R10.11 RIGHT UPPER QUADRANT ABDOMINAL PAIN: ICD-10-CM

## 2025-04-01 DIAGNOSIS — K52.9 CHRONIC DIARRHEA: ICD-10-CM

## 2025-04-01 DIAGNOSIS — D53.9 NUTRITIONAL ANEMIA, UNSPECIFIED: ICD-10-CM

## 2025-04-01 PROCEDURE — 85025 COMPLETE CBC W/AUTO DIFF WBC: CPT

## 2025-04-01 PROCEDURE — 83090 ASSAY OF HOMOCYSTEINE: CPT

## 2025-04-01 PROCEDURE — 36415 COLL VENOUS BLD VENIPUNCTURE: CPT

## 2025-04-01 PROCEDURE — 86140 C-REACTIVE PROTEIN: CPT

## 2025-04-01 NOTE — TELEPHONE ENCOUNTER
PA for rifaximin (XIFAXAN) 550 mg tablet SUBMITTED to     via    []CMM-KEY:   [x]Surescripts-Case ID #     [id number]     []Availity-Auth ID # NDC #   []Faxed to plan   []Other website   []Phone call Case ID #     [x]PA sent as URGENT    All office notes, labs and other pertaining documents and studies sent. Clinical questions answered. Awaiting determination from insurance company.     Turnaround time for your insurance to make a decision on your Prior Authorization can take 7-21 business days.

## 2025-04-02 ENCOUNTER — RESULTS FOLLOW-UP (OUTPATIENT)
Dept: FAMILY MEDICINE CLINIC | Facility: CLINIC | Age: 80
End: 2025-04-02

## 2025-04-02 LAB
BASOPHILS # BLD AUTO: 0.05 THOUSANDS/ÂΜL (ref 0–0.1)
BASOPHILS NFR BLD AUTO: 1 % (ref 0–1)
CRP SERPL QL: 2.9 MG/L
EOSINOPHIL # BLD AUTO: 0.1 THOUSAND/ÂΜL (ref 0–0.61)
EOSINOPHIL NFR BLD AUTO: 2 % (ref 0–6)
ERYTHROCYTE [DISTWIDTH] IN BLOOD BY AUTOMATED COUNT: 13.6 % (ref 11.6–15.1)
HCT VFR BLD AUTO: 42.8 % (ref 34.8–46.1)
HCYS SERPL-SCNC: 10.6 UMOL/L (ref 5–20)
HGB BLD-MCNC: 13.2 G/DL (ref 11.5–15.4)
IMM GRANULOCYTES # BLD AUTO: 0.02 THOUSAND/UL (ref 0–0.2)
IMM GRANULOCYTES NFR BLD AUTO: 0 % (ref 0–2)
LYMPHOCYTES # BLD AUTO: 1.82 THOUSANDS/ÂΜL (ref 0.6–4.47)
LYMPHOCYTES NFR BLD AUTO: 27 % (ref 14–44)
MCH RBC QN AUTO: 29.4 PG (ref 26.8–34.3)
MCHC RBC AUTO-ENTMCNC: 30.8 G/DL (ref 31.4–37.4)
MCV RBC AUTO: 95 FL (ref 82–98)
MONOCYTES # BLD AUTO: 0.47 THOUSAND/ÂΜL (ref 0.17–1.22)
MONOCYTES NFR BLD AUTO: 7 % (ref 4–12)
NEUTROPHILS # BLD AUTO: 4.41 THOUSANDS/ÂΜL (ref 1.85–7.62)
NEUTS SEG NFR BLD AUTO: 63 % (ref 43–75)
NRBC BLD AUTO-RTO: 0 /100 WBCS
PLATELET # BLD AUTO: 289 THOUSANDS/UL (ref 149–390)
PMV BLD AUTO: 9.8 FL (ref 8.9–12.7)
RBC # BLD AUTO: 4.49 MILLION/UL (ref 3.81–5.12)
WBC # BLD AUTO: 6.87 THOUSAND/UL (ref 4.31–10.16)

## 2025-04-02 NOTE — TELEPHONE ENCOUNTER
PA for rifaximin (XIFAXAN) 550 mg tablet  DENIED    Reason:(Screenshot if applicable)        Message sent to office clinical pool Yes    Denial letter scanned into Media Yes    Appeal started No (Provider will need to decide if appeal is warranted and send clinical documentation to Prior Authorization Team for initiation.)    **Please follow up with your patient regarding denial and next steps**

## 2025-04-03 ENCOUNTER — TELEPHONE (OUTPATIENT)
Dept: FAMILY MEDICINE CLINIC | Facility: CLINIC | Age: 80
End: 2025-04-03

## 2025-04-14 ENCOUNTER — TELEPHONE (OUTPATIENT)
Age: 80
End: 2025-04-14

## 2025-04-14 NOTE — TELEPHONE ENCOUNTER
Pt is requesting if she can have copies of the images of her xrays and MRI of lumbar/spine. Pt is requesting to have these by Wednesday if possible. Please call pt to inform pt if the images will be ready by then.

## 2025-04-14 NOTE — TELEPHONE ENCOUNTER
Pt called back. Relayed message that she will need to follow up with Radiology. Provided phone number to pt.

## 2025-04-14 NOTE — TELEPHONE ENCOUNTER
Left voicemail for pt to reach out to radiology to have disc made as we cannot do this in office. Ph#: [phone].

## 2025-04-18 ENCOUNTER — OFFICE VISIT (OUTPATIENT)
Dept: FAMILY MEDICINE CLINIC | Facility: CLINIC | Age: 80
End: 2025-04-18
Payer: COMMERCIAL

## 2025-04-18 VITALS
TEMPERATURE: 97.8 F | SYSTOLIC BLOOD PRESSURE: 128 MMHG | HEIGHT: 65 IN | HEART RATE: 79 BPM | WEIGHT: 140 LBS | OXYGEN SATURATION: 98 % | BODY MASS INDEX: 23.32 KG/M2 | DIASTOLIC BLOOD PRESSURE: 84 MMHG

## 2025-04-18 DIAGNOSIS — K52.9 CHRONIC DIARRHEA: Primary | ICD-10-CM

## 2025-04-18 DIAGNOSIS — M54.50 CHRONIC MIDLINE LOW BACK PAIN WITHOUT SCIATICA: ICD-10-CM

## 2025-04-18 DIAGNOSIS — G89.29 CHRONIC MIDLINE LOW BACK PAIN WITHOUT SCIATICA: ICD-10-CM

## 2025-04-18 PROCEDURE — 99213 OFFICE O/P EST LOW 20 MIN: CPT | Performed by: FAMILY MEDICINE

## 2025-04-18 NOTE — ASSESSMENT & PLAN NOTE
Continue questran as needed  Advise to take rifaximin if symptoms continue    Orders:  •  Comprehensive metabolic panel; Future

## 2025-04-18 NOTE — PROGRESS NOTES
"Name: Shanda Quintanilla      : 1945      MRN: [id number]  Encounter Provider: Talat Ventura MD  Encounter Date: 2025   Encounter department: [facility]  :  Assessment & Plan  Chronic diarrhea  Continue questran as needed  Advise to take rifaximin if symptoms continue    Orders:  •  Comprehensive metabolic panel; Future    Chronic midline low back pain without sciatica  MRI pending    F/U in 2 months              History of Present Illness   Patient is here to follow up for chronic diarrhea. She continues to take questran prescribed by Gastro, her symptoms have been improving with this. Sometimes she gets constipation as well and has to take a break from the medications.  Has chronic low back pain, has an MRI scheduled.  She gives me an article that talks about all the great benefits of DMSO.      Review of Systems   Constitutional:  Negative for activity change, appetite change, fatigue and fever.   HENT:  Negative for congestion and ear discharge.    Respiratory:  Negative for cough and shortness of breath.    Cardiovascular:  Negative for chest pain and palpitations.   Gastrointestinal:  Positive for diarrhea. Negative for nausea.   Musculoskeletal:  Positive for back pain. Negative for arthralgias.   Skin:  Negative for color change and rash.   Neurological:  Negative for dizziness and headaches.   Psychiatric/Behavioral:  Negative for agitation and behavioral problems.        Objective   /84   Pulse 79   Temp 97.8 °F (36.6 °C)   Ht 5' 4.57\" (1.64 m)   Wt 63.5 kg (140 lb)   SpO2 98%   BMI 23.61 kg/m²      Physical Exam  Vitals and nursing note reviewed.   Constitutional:       General: She is not in acute distress.     Appearance: She is well-developed.   HENT:      Head: Normocephalic and atraumatic.   Eyes:      Conjunctiva/sclera: Conjunctivae normal.   Cardiovascular:      Rate and Rhythm: Normal rate and regular rhythm.      Heart sounds: No murmur " heard.  Pulmonary:      Effort: Pulmonary effort is normal. No respiratory distress.      Breath sounds: Normal breath sounds.   Abdominal:      Palpations: Abdomen is soft.      Tenderness: There is no abdominal tenderness.   Musculoskeletal:         General: No swelling.      Cervical back: Neck supple.   Skin:     General: Skin is warm and dry.      Capillary Refill: Capillary refill takes less than 2 seconds.   Neurological:      Mental Status: She is alert.   Psychiatric:         Mood and Affect: Mood normal.

## 2025-04-21 ENCOUNTER — APPOINTMENT (OUTPATIENT)
Dept: LAB | Facility: CLINIC | Age: 80
End: 2025-04-21
Payer: COMMERCIAL

## 2025-04-21 DIAGNOSIS — K52.9 CHRONIC DIARRHEA: ICD-10-CM

## 2025-04-21 PROCEDURE — 36415 COLL VENOUS BLD VENIPUNCTURE: CPT

## 2025-04-21 PROCEDURE — 80053 COMPREHEN METABOLIC PANEL: CPT

## 2025-04-22 ENCOUNTER — RESULTS FOLLOW-UP (OUTPATIENT)
Dept: FAMILY MEDICINE CLINIC | Facility: CLINIC | Age: 80
End: 2025-04-22

## 2025-04-22 ENCOUNTER — APPOINTMENT (OUTPATIENT)
Dept: LAB | Facility: CLINIC | Age: 80
End: 2025-04-22
Payer: COMMERCIAL

## 2025-04-22 ENCOUNTER — TELEPHONE (OUTPATIENT)
Age: 80
End: 2025-04-22

## 2025-04-22 DIAGNOSIS — R74.8 ELEVATED ALKALINE PHOSPHATASE LEVEL: Primary | ICD-10-CM

## 2025-04-22 DIAGNOSIS — R74.8 ELEVATED ALKALINE PHOSPHATASE LEVEL: ICD-10-CM

## 2025-04-22 LAB
ALBUMIN SERPL BCG-MCNC: 3.9 G/DL (ref 3.5–5)
ALBUMIN SERPL BCG-MCNC: 4 G/DL (ref 3.5–5)
ALP SERPL-CCNC: 167 U/L (ref 34–104)
ALP SERPL-CCNC: 177 U/L (ref 34–104)
ALT SERPL W P-5'-P-CCNC: 18 U/L (ref 7–52)
ALT SERPL W P-5'-P-CCNC: 18 U/L (ref 7–52)
ANION GAP SERPL CALCULATED.3IONS-SCNC: 7 MMOL/L (ref 4–13)
ANION GAP SERPL CALCULATED.3IONS-SCNC: 8 MMOL/L (ref 4–13)
AST SERPL W P-5'-P-CCNC: 20 U/L (ref 13–39)
AST SERPL W P-5'-P-CCNC: 23 U/L (ref 13–39)
BILIRUB SERPL-MCNC: 0.71 MG/DL (ref 0.2–1)
BILIRUB SERPL-MCNC: 0.71 MG/DL (ref 0.2–1)
BUN SERPL-MCNC: 10 MG/DL (ref 5–25)
BUN SERPL-MCNC: 12 MG/DL (ref 5–25)
CALCIUM SERPL-MCNC: 9.5 MG/DL (ref 8.4–10.2)
CALCIUM SERPL-MCNC: 9.6 MG/DL (ref 8.4–10.2)
CHLORIDE SERPL-SCNC: 101 MMOL/L (ref 96–108)
CHLORIDE SERPL-SCNC: 102 MMOL/L (ref 96–108)
CO2 SERPL-SCNC: 28 MMOL/L (ref 21–32)
CO2 SERPL-SCNC: 30 MMOL/L (ref 21–32)
CREAT SERPL-MCNC: 0.56 MG/DL (ref 0.6–1.3)
CREAT SERPL-MCNC: 0.62 MG/DL (ref 0.6–1.3)
GFR SERPL CREATININE-BSD FRML MDRD: 85 ML/MIN/1.73SQ M
GFR SERPL CREATININE-BSD FRML MDRD: 88 ML/MIN/1.73SQ M
GGT SERPL-CCNC: 12 U/L (ref 9–64)
GLUCOSE SERPL-MCNC: 95 MG/DL (ref 65–140)
GLUCOSE SERPL-MCNC: 98 MG/DL (ref 65–140)
POTASSIUM SERPL-SCNC: 4.5 MMOL/L (ref 3.5–5.3)
POTASSIUM SERPL-SCNC: 4.9 MMOL/L (ref 3.5–5.3)
PROT SERPL-MCNC: 6.5 G/DL (ref 6.4–8.4)
PROT SERPL-MCNC: 6.6 G/DL (ref 6.4–8.4)
SODIUM SERPL-SCNC: 137 MMOL/L (ref 135–147)
SODIUM SERPL-SCNC: 139 MMOL/L (ref 135–147)

## 2025-04-22 PROCEDURE — 84080 ASSAY ALKALINE PHOSPHATASES: CPT

## 2025-04-22 PROCEDURE — 36415 COLL VENOUS BLD VENIPUNCTURE: CPT

## 2025-04-22 PROCEDURE — 82977 ASSAY OF GGT: CPT

## 2025-04-22 PROCEDURE — 80053 COMPREHEN METABOLIC PANEL: CPT

## 2025-04-23 ENCOUNTER — HOSPITAL ENCOUNTER (OUTPATIENT)
Dept: MRI IMAGING | Facility: HOSPITAL | Age: 80
Discharge: HOME | End: 2025-04-23
Attending: FAMILY MEDICINE
Payer: COMMERCIAL

## 2025-04-23 ENCOUNTER — HOSPITAL ENCOUNTER (OUTPATIENT)
Dept: ULTRASOUND IMAGING | Facility: HOSPITAL | Age: 80
Discharge: HOME | End: 2025-04-23
Attending: FAMILY MEDICINE
Payer: COMMERCIAL

## 2025-04-23 DIAGNOSIS — M48.062 SPINAL STENOSIS OF LUMBAR REGION WITH NEUROGENIC CLAUDICATION: ICD-10-CM

## 2025-04-23 DIAGNOSIS — R10.11 RIGHT UPPER QUADRANT ABDOMINAL PAIN: ICD-10-CM

## 2025-04-23 PROCEDURE — 76705 ECHO EXAM OF ABDOMEN: CPT

## 2025-04-23 PROCEDURE — 72148 MRI LUMBAR SPINE W/O DYE: CPT

## 2025-04-25 ENCOUNTER — HOSPITAL ENCOUNTER (OUTPATIENT)
Dept: CT IMAGING | Facility: HOSPITAL | Age: 80
End: 2025-04-25
Attending: FAMILY MEDICINE
Payer: COMMERCIAL

## 2025-04-25 ENCOUNTER — RESULTS FOLLOW-UP (OUTPATIENT)
Dept: FAMILY MEDICINE CLINIC | Facility: CLINIC | Age: 80
End: 2025-04-25

## 2025-04-25 ENCOUNTER — TELEPHONE (OUTPATIENT)
Age: 80
End: 2025-04-25

## 2025-04-25 DIAGNOSIS — R93.7 ABNORMAL MRI, LUMBAR SPINE: ICD-10-CM

## 2025-04-25 DIAGNOSIS — R74.8 ELEVATED ALKALINE PHOSPHATASE LEVEL: ICD-10-CM

## 2025-04-25 DIAGNOSIS — R91.8 PULMONARY NODULES/LESIONS, MULTIPLE: ICD-10-CM

## 2025-04-25 DIAGNOSIS — M89.9 BONE LESION: ICD-10-CM

## 2025-04-25 DIAGNOSIS — M89.9 BONE LESION: Primary | ICD-10-CM

## 2025-04-25 PROCEDURE — 74177 CT ABD & PELVIS W/CONTRAST: CPT

## 2025-04-25 PROCEDURE — 71260 CT THORAX DX C+: CPT

## 2025-04-25 RX ADMIN — IOHEXOL 100 ML: 350 INJECTION, SOLUTION INTRAVENOUS at 14:49

## 2025-04-25 NOTE — TELEPHONE ENCOUNTER
Reading room at [facility] call with immediate findings for the CT CHEST ABDOMEN PELVIS W CON   Thank you

## 2025-04-25 NOTE — TELEPHONE ENCOUNTER
Pt called back stating someone called her this morning about having a Petscan done today. Pt states she is fasting and has not heard anymore.  CTS spoke with office clinical who will speak with provider and call patient back.     Pt aware and agreeable.

## 2025-04-26 LAB — ALP BONE SERPL-MCNC: 59 UG/L

## 2025-04-28 ENCOUNTER — RESULTS FOLLOW-UP (OUTPATIENT)
Dept: FAMILY MEDICINE CLINIC | Facility: CLINIC | Age: 80
End: 2025-04-28

## 2025-04-28 NOTE — TELEPHONE ENCOUNTER
Patient returned call.  Relayed provider's comments and recommendations.  Patient expressed understanding, and will call to schedule PET scan.    Patient would like the provider to call her back.  Advised he was with patients throughout the day.  Patient expressed understanding, and will wait for return call from the provider.

## 2025-04-29 ENCOUNTER — HOSPITAL ENCOUNTER (OUTPATIENT)
Dept: VASCULAR ULTRASOUND | Facility: HOSPITAL | Age: 80
Discharge: HOME | End: 2025-04-29
Attending: SURGERY
Payer: COMMERCIAL

## 2025-04-29 DIAGNOSIS — I63.239 CAROTID STENOSIS, SYMPTOMATIC, WITH INFARCTION (HCC): ICD-10-CM

## 2025-04-29 PROCEDURE — 93880 EXTRACRANIAL BILAT STUDY: CPT

## 2025-04-29 PROCEDURE — 93880 EXTRACRANIAL BILAT STUDY: CPT | Performed by: SURGERY

## 2025-05-01 ENCOUNTER — TELEPHONE (OUTPATIENT)
Age: 80
End: 2025-05-01

## 2025-05-01 NOTE — TELEPHONE ENCOUNTER
Patient received a call stating that they were calling from providers office she thought this call was scam, call was disconnected. Not seeing any notes in patient chart of a call today.

## 2025-05-05 ENCOUNTER — NURSE TRIAGE (OUTPATIENT)
Age: 80
End: 2025-05-05

## 2025-05-05 NOTE — TELEPHONE ENCOUNTER
"FOLLOW UP: appointment made for patient on Monday May 19 at 1:00pm    REASON FOR CONVERSATION: Dizziness    SYMPTOMS: Patient stated her balance seems to be getting worse, was dancing over the weekend and had some balance issues, almost had a fall. Patient also states she has dizziness at times, when she is getting up, denies dizziness at present.    OTHER: Patient would like a double book appointment with , if able to book please call patient. Patient would only like to be seen by Dr. Ventura.     DISPOSITION: See Within 2 Weeks in Office      Reason for Disposition   Dizziness not present now, but is a chronic symptom (recurrent or ongoing AND lasting > 4 weeks)    Answer Assessment - Initial Assessment Questions  1. DESCRIPTION: \"Describe your dizziness.\"      Feeling off balance when dancing 2 nights ago, feels like its happening more. Dizziness at times when getting up  2. LIGHTHEADED: \"Do you feel lightheaded?\" (e.g., somewhat faint, woozy, weak upon standing)      Felt off balance when dancing Friday evening, almost fell  3. VERTIGO: \"Do you feel like either you or the room is spinning or tilting?\" (i.e., vertigo)      denies  4. SEVERITY: \"How bad is it?\"  \"Do you feel like you are going to faint?\" \"Can you stand and walk?\"      Yes can stand and walk-went dancing last 2 nights  5. ONSET:  \"When did the dizziness begin?\"      Dizziness when getting up at times, denies dizziness now  9. RECURRENT SYMPTOM: \"Have you had dizziness before?\" If Yes, ask: \"When was the last time?\" \"What happened that time?\"      Just happens when I get up,   10. OTHER SYMPTOMS: \"Do you have any other symptoms?\" (e.g., fever, chest pain, vomiting, diarrhea, bleeding)        denies    Protocols used: Dizziness-Adult-[place]    "
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
## 2025-05-06 ENCOUNTER — HOSPITAL ENCOUNTER (OUTPATIENT)
Dept: NUCLEAR MEDICINE | Facility: HOSPITAL | Age: 80
Discharge: HOME | End: 2025-05-06
Attending: FAMILY MEDICINE
Payer: COMMERCIAL

## 2025-05-06 DIAGNOSIS — M89.9 BONE LESION: ICD-10-CM

## 2025-05-06 DIAGNOSIS — R93.7 ABNORMAL MRI, LUMBAR SPINE: ICD-10-CM

## 2025-05-06 DIAGNOSIS — R91.8 PULMONARY NODULES/LESIONS, MULTIPLE: ICD-10-CM

## 2025-05-06 DIAGNOSIS — R74.8 ELEVATED ALKALINE PHOSPHATASE LEVEL: ICD-10-CM

## 2025-05-06 LAB — GLUCOSE SERPL-MCNC: 78 MG/DL (ref 65–140)

## 2025-05-06 PROCEDURE — 78815 PET IMAGE W/CT SKULL-THIGH: CPT

## 2025-05-06 PROCEDURE — 82948 REAGENT STRIP/BLOOD GLUCOSE: CPT

## 2025-05-06 PROCEDURE — A9552 F18 FDG: HCPCS

## 2025-05-07 DIAGNOSIS — R91.8 MASS OF UPPER LOBE OF RIGHT LUNG: Primary | ICD-10-CM

## 2025-05-08 ENCOUNTER — PREP FOR PROCEDURE (OUTPATIENT)
Dept: INTERVENTIONAL RADIOLOGY/VASCULAR | Facility: HOSPITAL | Age: 80
End: 2025-05-08

## 2025-05-08 ENCOUNTER — RESULTS FOLLOW-UP (OUTPATIENT)
Dept: FAMILY MEDICINE CLINIC | Facility: CLINIC | Age: 80
End: 2025-05-08

## 2025-05-08 DIAGNOSIS — R91.8 PULMONARY NODULES/LESIONS, MULTIPLE: Primary | ICD-10-CM

## 2025-05-12 NOTE — PROGRESS NOTES
Left message for patient to arrive at 7:30 am for Lung biopsy 5/20, nothing to eat or drink after midnight, hold aspirin for 5 days prior, and arrange a ride home related to sedation but plan on staying several hours bedrest post procedure.

## 2025-05-14 ENCOUNTER — TELEPHONE (OUTPATIENT)
Age: 80
End: 2025-05-14

## 2025-05-15 ENCOUNTER — TELEPHONE (OUTPATIENT)
Age: 80
End: 2025-05-15

## 2025-05-15 ENCOUNTER — TELEPHONE (OUTPATIENT)
Dept: CARDIAC SURGERY | Facility: CLINIC | Age: 80
End: 2025-05-15

## 2025-05-15 NOTE — TELEPHONE ENCOUNTER
Called and spoke to the patient regarding her PET CT scan. She has a lot of questions and prefers not to undergo a biopsy with IR. I offered for her to come in and speak to me in person so that we could regroup of come up with a plan together and she prefers that. I will get her an appt with me sooner than her scheduled follow up to discuss.    Gladys Funez MD  Thoracic Surgery  Office: [phone]

## 2025-05-15 NOTE — TELEPHONE ENCOUNTER
Patient called to state that she will discuss cancellation of her lung biopsy at her Monday appointment. Her and Dr. Funez have come to an agreement on how to move forward.

## 2025-05-19 ENCOUNTER — OFFICE VISIT (OUTPATIENT)
Dept: FAMILY MEDICINE CLINIC | Facility: CLINIC | Age: 80
End: 2025-05-19
Payer: COMMERCIAL

## 2025-05-19 ENCOUNTER — APPOINTMENT (OUTPATIENT)
Dept: LAB | Facility: CLINIC | Age: 80
End: 2025-05-19
Payer: COMMERCIAL

## 2025-05-19 VITALS
WEIGHT: 141 LBS | SYSTOLIC BLOOD PRESSURE: 116 MMHG | DIASTOLIC BLOOD PRESSURE: 62 MMHG | BODY MASS INDEX: 23.49 KG/M2 | OXYGEN SATURATION: 98 % | HEART RATE: 70 BPM | TEMPERATURE: 97.6 F | HEIGHT: 65 IN

## 2025-05-19 DIAGNOSIS — R41.3 MEMORY LOSS: ICD-10-CM

## 2025-05-19 DIAGNOSIS — R91.8 MASS OF UPPER LOBE OF RIGHT LUNG: Primary | ICD-10-CM

## 2025-05-19 DIAGNOSIS — R73.03 PREDIABETES: ICD-10-CM

## 2025-05-19 DIAGNOSIS — Z82.0 FAMILY HISTORY OF ALZHEIMER DISEASE: ICD-10-CM

## 2025-05-19 DIAGNOSIS — R47.89 WORD FINDING DIFFICULTY: ICD-10-CM

## 2025-05-19 DIAGNOSIS — R53.83 OTHER FATIGUE: ICD-10-CM

## 2025-05-19 LAB
BASOPHILS # BLD AUTO: 0.03 THOUSANDS/ÂΜL (ref 0–0.1)
BASOPHILS NFR BLD AUTO: 1 % (ref 0–1)
EOSINOPHIL # BLD AUTO: 0.05 THOUSAND/ÂΜL (ref 0–0.61)
EOSINOPHIL NFR BLD AUTO: 1 % (ref 0–6)
ERYTHROCYTE [DISTWIDTH] IN BLOOD BY AUTOMATED COUNT: 13.3 % (ref 11.6–15.1)
HCT VFR BLD AUTO: 43.8 % (ref 34.8–46.1)
HGB BLD-MCNC: 13.8 G/DL (ref 11.5–15.4)
IMM GRANULOCYTES # BLD AUTO: 0.02 THOUSAND/UL (ref 0–0.2)
IMM GRANULOCYTES NFR BLD AUTO: 0 % (ref 0–2)
LYMPHOCYTES # BLD AUTO: 1.87 THOUSANDS/ÂΜL (ref 0.6–4.47)
LYMPHOCYTES NFR BLD AUTO: 35 % (ref 14–44)
MCH RBC QN AUTO: 29.6 PG (ref 26.8–34.3)
MCHC RBC AUTO-ENTMCNC: 31.5 G/DL (ref 31.4–37.4)
MCV RBC AUTO: 94 FL (ref 82–98)
MONOCYTES # BLD AUTO: 0.44 THOUSAND/ÂΜL (ref 0.17–1.22)
MONOCYTES NFR BLD AUTO: 8 % (ref 4–12)
NEUTROPHILS # BLD AUTO: 2.99 THOUSANDS/ÂΜL (ref 1.85–7.62)
NEUTS SEG NFR BLD AUTO: 55 % (ref 43–75)
NRBC BLD AUTO-RTO: 0 /100 WBCS
PLATELET # BLD AUTO: 311 THOUSANDS/UL (ref 149–390)
PMV BLD AUTO: 9.8 FL (ref 8.9–12.7)
RBC # BLD AUTO: 4.66 MILLION/UL (ref 3.81–5.12)
WBC # BLD AUTO: 5.4 THOUSAND/UL (ref 4.31–10.16)

## 2025-05-19 PROCEDURE — 99214 OFFICE O/P EST MOD 30 MIN: CPT | Performed by: FAMILY MEDICINE

## 2025-05-19 PROCEDURE — 36415 COLL VENOUS BLD VENIPUNCTURE: CPT

## 2025-05-19 PROCEDURE — 85025 COMPLETE CBC W/AUTO DIFF WBC: CPT

## 2025-05-19 PROCEDURE — 83036 HEMOGLOBIN GLYCOSYLATED A1C: CPT

## 2025-05-19 PROCEDURE — 80048 BASIC METABOLIC PNL TOTAL CA: CPT

## 2025-05-19 NOTE — PROGRESS NOTES
Name: Shanda Quintanilla      : 1945      MRN: [id number]  Encounter Provider: Talat Ventura MD  Encounter Date: 2025   Encounter department: [facility]  :  Assessment & Plan  Mass of upper lobe of right lung  Advised to follow up with thoracic surgery       Prediabetes    Orders:  •  Hemoglobin A1C; Future  •  Basic metabolic panel; Future    Other fatigue    Orders:  •  CBC and differential; Future    Word finding difficulty    Orders:  •  Ambulatory Referral to Neurology; Future  •  Ambulatory Referral to Neuropsychology; Future    Memory loss    Orders:  •  Ambulatory Referral to Neurology; Future  •  Ambulatory Referral to Neuropsychology; Future    Family history of Alzheimer disease    Orders:  •  Ambulatory Referral to Neurology; Future  •  Ambulatory Referral to Neuropsychology; Future    Follow up in 1 month       History of Present Illness   Patient is here to discuss a right upper lung nodule measuring  1.5 x 1.5 cm  which demonstrates mild FDG uptake on PET scan, most concerning for an adenocarcinoma spectrum lesion. Also it showed hypermetabolic bilateral hilar and mediastinal adenopathy is present. She was scheduled to have a biopsy done by IR however it was canceled as she would like to discuss other options with thoracic surgeon Dr. Funez. She denies any cough or coughing blood or shortness of breath.  Also she has pre-diabetes denies any symptoms related to this.  Has word finding difficulty and short term memory loss. Does have a Hx of stroke as well as a family history significant for Alzheimer dementia.        Review of Systems   Constitutional:  Negative for activity change, appetite change, fatigue and fever.   HENT:  Negative for congestion and ear discharge.    Respiratory:  Negative for cough and shortness of breath.    Cardiovascular:  Negative for chest pain and palpitations.   Gastrointestinal:  Negative for diarrhea and nausea.   Musculoskeletal:   "Negative for arthralgias and back pain.   Skin:  Negative for color change and rash.   Neurological:  Negative for dizziness and headaches.   Psychiatric/Behavioral:  Positive for behavioral problems and confusion. Negative for agitation.        Objective   /62   Pulse 70   Temp 97.6 °F (36.4 °C)   Ht 5' 4.57\" (1.64 m)   Wt 64 kg (141 lb)   SpO2 98%   BMI 23.78 kg/m²      Physical Exam  Vitals and nursing note reviewed.   Constitutional:       General: She is not in acute distress.     Appearance: She is well-developed.   HENT:      Head: Normocephalic and atraumatic.     Eyes:      Conjunctiva/sclera: Conjunctivae normal.       Cardiovascular:      Rate and Rhythm: Normal rate and regular rhythm.      Heart sounds: No murmur heard.  Pulmonary:      Effort: Pulmonary effort is normal. No respiratory distress.      Breath sounds: Normal breath sounds.   Abdominal:      Palpations: Abdomen is soft.      Tenderness: There is no abdominal tenderness.     Musculoskeletal:         General: No swelling.      Cervical back: Neck supple.     Skin:     General: Skin is warm and dry.      Capillary Refill: Capillary refill takes less than 2 seconds.     Neurological:      Mental Status: She is alert.     Psychiatric:         Mood and Affect: Mood normal.       Administrative Statements   I have spent a total time of 45 minutes in caring for this patient on the day of the visit/encounter including Diagnostic results, Prognosis, Instructions for management, Patient and family education, Impressions, Counseling / Coordination of care, Documenting in the medical record, Reviewing/placing orders in the medical record (including tests, medications, and/or procedures), Obtaining or reviewing history  , and Communicating with other healthcare professionals .  "

## 2025-05-19 NOTE — ASSESSMENT & PLAN NOTE
Orders:  •  Ambulatory Referral to Neurology; Future  •  Ambulatory Referral to Neuropsychology; Future

## 2025-05-20 ENCOUNTER — HOSPITAL ENCOUNTER (OUTPATIENT)
Dept: CT IMAGING | Facility: HOSPITAL | Age: 80
Discharge: HOME | End: 2025-05-20
Attending: RADIOLOGY

## 2025-05-20 LAB
ANION GAP SERPL CALCULATED.3IONS-SCNC: 7 MMOL/L (ref 4–13)
BUN SERPL-MCNC: 14 MG/DL (ref 5–25)
CALCIUM SERPL-MCNC: 9.6 MG/DL (ref 8.4–10.2)
CHLORIDE SERPL-SCNC: 104 MMOL/L (ref 96–108)
CO2 SERPL-SCNC: 29 MMOL/L (ref 21–32)
CREAT SERPL-MCNC: 0.58 MG/DL (ref 0.6–1.3)
EST. AVERAGE GLUCOSE BLD GHB EST-MCNC: 117 MG/DL
GFR SERPL CREATININE-BSD FRML MDRD: 87 ML/MIN/1.73SQ M
GLUCOSE P FAST SERPL-MCNC: 101 MG/DL (ref 65–99)
HBA1C MFR BLD: 5.7 %
POTASSIUM SERPL-SCNC: 4.6 MMOL/L (ref 3.5–5.3)
SODIUM SERPL-SCNC: 140 MMOL/L (ref 135–147)

## 2025-05-21 ENCOUNTER — TELEPHONE (OUTPATIENT)
Age: 80
End: 2025-05-21

## 2025-05-21 NOTE — TELEPHONE ENCOUNTER
Patient is requesting a call back from Florina. She says Florina was working on obtaining some test results for lungs in 2018 and 2024. She is wanting an update on this. Please advise.

## 2025-05-30 ENCOUNTER — HOSPITAL ENCOUNTER (OUTPATIENT)
Dept: CT IMAGING | Facility: HOSPITAL | Age: 80
Discharge: HOME | End: 2025-05-30
Attending: THORACIC SURGERY (CARDIOTHORACIC VASCULAR SURGERY)
Payer: COMMERCIAL

## 2025-05-30 DIAGNOSIS — R91.8 MASS OF UPPER LOBE OF RIGHT LUNG: ICD-10-CM

## 2025-05-30 PROCEDURE — 71250 CT THORAX DX C-: CPT

## 2025-06-03 ENCOUNTER — OFFICE VISIT (OUTPATIENT)
Dept: CARDIAC SURGERY | Facility: CLINIC | Age: 80
End: 2025-06-03
Payer: COMMERCIAL

## 2025-06-03 VITALS
HEIGHT: 63 IN | WEIGHT: 141 LBS | BODY MASS INDEX: 24.98 KG/M2 | SYSTOLIC BLOOD PRESSURE: 118 MMHG | OXYGEN SATURATION: 96 % | HEART RATE: 77 BPM | DIASTOLIC BLOOD PRESSURE: 72 MMHG

## 2025-06-03 DIAGNOSIS — R91.8 PULMONARY NODULES/LESIONS, MULTIPLE: Primary | ICD-10-CM

## 2025-06-03 PROCEDURE — 99213 OFFICE O/P EST LOW 20 MIN: CPT | Performed by: THORACIC SURGERY (CARDIOTHORACIC VASCULAR SURGERY)

## 2025-06-03 NOTE — ASSESSMENT & PLAN NOTE
At this time, the patient like to hold off on a biopsy.  I did tell her that we would present her case at our lung nodule conference to see if she is a candidate for navigation bronchoscopy.  In the interim, we will watch this nodule.  I have placed order for CT scan 3 months from now and she will follow-up with me at that time.  I did order the CT scan to be performed as a navigation bronchoscopy protocol.  Orders:    CT chest wo contrast; Future

## 2025-06-03 NOTE — PROGRESS NOTES
Name: Shanda Quintanilla      : 1945      MRN: [id number]  Encounter Provider: Gladys Funez MD  Encounter Date: 6/3/2025   Encounter department: [facility]  :  Assessment & Plan  Pulmonary nodules/lesions, multiple  At this time, the patient like to hold off on a biopsy.  I did tell her that we would present her case at our lung nodule conference to see if she is a candidate for navigation bronchoscopy.  In the interim, we will watch this nodule.  I have placed order for CT scan 3 months from now and she will follow-up with me at that time.  I did order the CT scan to be performed as a navigation bronchoscopy protocol.  Orders:    CT chest wo contrast; Future        Thoracic History     No problems updated.     History of Present Illness   HPI  Shanda Quintanilla is a 80 y.o. female who is known to me.  She has multiple lung nodules, but the most prominent is a groundglass opacity in the right upper lobe.  This is approximately 15 mm with a 4 mm solid component.  I have personally reviewed her CT scan in PACS.  She had an updated CT scan on 2025.  This nodule appears stable to me.  There was no final read on the CT scan at the time of this appointment yet.    Today in the office, she has had no changes since her last visit.  She denies any changes in her health.        Review of Systems   Constitutional:  Negative for chills, fatigue, fever and unexpected weight change.   HENT: Negative.     Eyes: Negative.  Negative for visual disturbance.   Respiratory:  Negative for cough, shortness of breath and stridor.    Cardiovascular:  Negative for chest pain.   Gastrointestinal: Negative.    Endocrine: Negative.    Genitourinary: Negative.    Musculoskeletal: Negative.    Skin: Negative.    Neurological:  Negative for dizziness, light-headedness and headaches.   Hematological:  Negative for adenopathy.   Psychiatric/Behavioral: Negative.        Medical History Reviewed by  provider this encounter:     .  Past Medical History   Past Medical History[1]  Past Surgical History[2]  Family History[3]   reports that she quit smoking about 45 years ago. Her smoking use included cigarettes. She started smoking about 62 years ago. She has a 74 pack-year smoking history. She has been exposed to tobacco smoke. She has never used smokeless tobacco. She reports current alcohol use of about 11.0 standard drinks of alcohol per week. She reports that she does not currently use drugs.  Current Outpatient Medications   Medication Instructions    acetaminophen (TYLENOL) 650 mg, Oral, Every 6 hours PRN    Alpha-Lipoic Acid (LIPOIC ACID PO) 1 capsule    Amino Acids (L-CARNITINE PO) 1 tablet    Artificial Tear Solution (JUST TEARS EYE DROPS OP) Apply to eye    aspirin (ECOTRIN LOW STRENGTH) 81 mg, Oral, Daily    Calcium Carb-Cholecalciferol 1000-800 MG-UNIT TABS Take by mouth    Chelated Magnesium 100 MG TABS Daily    Cholecalciferol (VITAMIN D3) 5000 units CAPS 1 capsule, Daily    cholestyramine (QUESTRAN) 4 g packet TAKE 1 PACKET BY MOUTH 2 TIMES A DAY WITH MEALS.    Co Q-10 50 mg    dicyclomine (BENTYL) 20 mg, Oral, 2 times daily PRN    Glycerylphosphorylcholine POWD 1 tablet    Iodine, Kelp, (KELP PO) Daily    L-Carnosine POWD Use    Menaquinone-7 (VITAMIN K2) 100 MCG CAPS No dose, route, or frequency recorded.    Misc Natural Products (GLUCOSAMINE CHOND CMP TRIPLE PO) Take by mouth    Misc Natural Products (PANTETHINE PLUS) TABS Take by mouth    Omega-3 Fatty Acids (FISH OIL) 1200 MG CAPS 1 capsule, Daily    PANTETHINE PO Take by mouth    PREBIOTIC PRODUCT PO Take by mouth    Probiotic Product (PROBIOTIC-10) CAPS Take by mouth    pyridoxine (VITAMIN B6) 100 mg tablet     Turmeric POWD     Vinpocetine POWD Use    vitamin B-12 (CYANOCOBALAMIN) 100 MCG tablet Every other day    vitamin C 1,000 mg, Daily    vitamin E, tocopherol, 1,000 units capsule Daily    Zinc 30 MG CAPS 1 capsule, Daily   Allergies[4]  "  Medications Ordered Prior to Encounter[5]   Social History[6]     Objective   /72 (BP Location: Left arm, Patient Position: Sitting)   Pulse 77   Ht 5' 4\" (1.626 m)   Wt 64 kg (141 lb)   SpO2 96%   BMI 24.20 kg/m²     Pain Screening:     ECOG    Physical Exam  Vitals and nursing note reviewed.   Constitutional:       General: She is not in acute distress.     Appearance: Normal appearance. She is well-developed. She is not diaphoretic.   HENT:      Head: Normocephalic and atraumatic.      Nose: Nose normal. No congestion or rhinorrhea.      Mouth/Throat:      Mouth: Mucous membranes are moist.      Pharynx: Oropharynx is clear. No oropharyngeal exudate.     Eyes:      General: No scleral icterus.     Pupils: Pupils are equal, round, and reactive to light.     Neck:      Trachea: No tracheal deviation.     Cardiovascular:      Rate and Rhythm: Normal rate and regular rhythm.      Pulses: Normal pulses.      Heart sounds: Normal heart sounds. No murmur heard.  Pulmonary:      Effort: Pulmonary effort is normal. No respiratory distress.      Breath sounds: Normal breath sounds. No stridor. No wheezing or rales.   Chest:      Chest wall: No tenderness.   Abdominal:      General: Bowel sounds are normal. There is no distension.      Palpations: Abdomen is soft.      Tenderness: There is no abdominal tenderness. There is no rebound.     Musculoskeletal:         General: Normal range of motion.      Cervical back: Normal range of motion and neck supple. No muscular tenderness.   Lymphadenopathy:      Cervical: No cervical adenopathy.     Skin:     General: Skin is warm and dry.      Coloration: Skin is not jaundiced or pale.      Findings: No erythema or rash.     Neurological:      General: No focal deficit present.      Mental Status: She is alert and oriented to person, place, and time.     Psychiatric:         Mood and Affect: Mood normal.         Behavior: Behavior normal.         Thought Content: Thought " content normal.         Judgment: Judgment normal.         Labs:   Results for orders placed or performed in visit on 05/19/25   Hemoglobin A1C   Result Value Ref Range    Hemoglobin A1C 5.7 (H) Normal 4.0-5.6%; PreDiabetic 5.7-6.4%; Diabetic >=6.5%; Glycemic control for adults with diabetes <7.0% %     mg/dl   Basic metabolic panel   Result Value Ref Range    Sodium 140 135 - 147 mmol/L    Potassium 4.6 3.5 - 5.3 mmol/L    Chloride 104 96 - 108 mmol/L    CO2 29 21 - 32 mmol/L    ANION GAP 7 4 - 13 mmol/L    BUN 14 5 - 25 mg/dL    Creatinine 0.58 (L) 0.60 - 1.30 mg/dL    Glucose, Fasting 101 (H) 65 - 99 mg/dL    Calcium 9.6 8.4 - 10.2 mg/dL    eGFR 87 ml/min/1.73sq m   CBC and differential   Result Value Ref Range    WBC 5.40 4.31 - 10.16 Thousand/uL    RBC 4.66 3.81 - 5.12 Million/uL    Hemoglobin 13.8 11.5 - 15.4 g/dL    Hematocrit 43.8 34.8 - 46.1 %    MCV 94 82 - 98 fL    MCH 29.6 26.8 - 34.3 pg    MCHC 31.5 31.4 - 37.4 g/dL    RDW 13.3 11.6 - 15.1 %    MPV 9.8 8.9 - 12.7 fL    Platelets 311 149 - 390 Thousands/uL    nRBC 0 /100 WBCs    Segmented % 55 43 - 75 %    Immature Grans % 0 0 - 2 %    Lymphocytes % 35 14 - 44 %    Monocytes % 8 4 - 12 %    Eosinophils Relative 1 0 - 6 %    Basophils Relative 1 0 - 1 %    Absolute Neutrophils 2.99 1.85 - 7.62 Thousands/µL    Absolute Immature Grans 0.02 0.00 - 0.20 Thousand/uL    Absolute Lymphocytes 1.87 0.60 - 4.47 Thousands/µL    Absolute Monocytes 0.44 0.17 - 1.22 Thousand/µL    Eosinophils Absolute 0.05 0.00 - 0.61 Thousand/µL    Basophils Absolute 0.03 0.00 - 0.10 Thousands/µL        Radiology Results Review : I have reviewed images/report studies in PACS as described above (in the HPI).  Pathology: I have reviewed pathology reports described above.           [1]   Past Medical History:  Diagnosis Date    Allergic sulfa    Arthritis 2-3 years    Cancer (HCC) 2015    on forehead, caught immediately.    Cervical strain     Changing skin lesion      Hyperlipidemia     MCI (mild cognitive impairment)     Mixed hyperlipidemia     Nonmelanoma skin cancer     BCC    Osteoarthritis     Osteoporosis     Polio     Poor concentration     Stroke (HCC)     Swelling of left thumb     Tinnitus    [2]   Past Surgical History:  Procedure Laterality Date    ANAL FISSURECTOMY      APPENDECTOMY      CAROTID ENDARTERECTOMY      stroke surgery?    CATARACT EXTRACTION      COLONOSCOPY      HYSTERECTOMY      HYSTERECTOMY W/ SALPINGO-OOPHERECTOMY      PERIPHERAL ANGIOGRAM  don't know    IN TEAEC W/PATCH GRF CAROTID VERTB SUBCLAV NECK INC Left 2024    Procedure: ENDARTERECTOMY ARTERY CAROTID;  Surgeon: Nikolai Daniel MD;  Location: [facility];  Service: Vascular   [3]   Family History  Problem Relation Name Age of Onset    Heart attack Mother MARY ELLEN,      Mental illness Mother MARY ELLEN,      Cancer Father father         throat, I recall.    Heart attack Sister     [4]   Allergies  Allergen Reactions    Sulfa Antibiotics Other (See Comments)   [5]   Current Outpatient Medications on File Prior to Visit   Medication Sig Dispense Refill    acetaminophen (TYLENOL) 325 mg tablet Take 2 tablets (650 mg total) by mouth every 6 (six) hours as needed for mild pain      Alpha-Lipoic Acid (LIPOIC ACID PO) Take 1 capsule by mouth      Amino Acids (L-CARNITINE PO) Take 1 tablet by mouth      Artificial Tear Solution (JUST TEARS EYE DROPS OP) Apply to eye      Ascorbic Acid (VITAMIN C) 1000 MG tablet Take 1,000 mg by mouth in the morning.      Calcium Carb-Cholecalciferol 1000-800 MG-UNIT TABS Take by mouth      Chelated Magnesium 100 MG TABS Take by mouth in the morning.      Cholecalciferol (VITAMIN D3) 5000 units CAPS Take 1 capsule by mouth in the morning.      Co Q-10 50 MG Take 50 mg by mouth      Glycerylphosphorylcholine POWD Use 1 tablet      Iodine, Kelp, (KELP PO) Take by mouth in the morning.      L-Carnosine POWD Use      Menaquinone-7 (VITAMIN  K2) 100 MCG CAPS       Misc Natural Products (GLUCOSAMINE CHOND CMP TRIPLE PO) Take by mouth      Misc Natural Products (PANTETHINE PLUS) TABS Take by mouth      Omega-3 Fatty Acids (FISH OIL) 1200 MG CAPS Take 1 capsule by mouth in the morning.      PANTETHINE PO Take by mouth      PREBIOTIC PRODUCT PO Take by mouth      Probiotic Product (PROBIOTIC-10) CAPS Take by mouth      pyridoxine (VITAMIN B6) 100 mg tablet       Turmeric POWD       Vinpocetine POWD Use      vitamin B-12 (CYANOCOBALAMIN) 100 MCG tablet Take by mouth every other day      vitamin E, tocopherol, 1,000 units capsule Take by mouth in the morning.      Zinc 30 MG CAPS Take 1 capsule by mouth in the morning.      aspirin (ECOTRIN LOW STRENGTH) 81 mg EC tablet Take 1 tablet (81 mg total) by mouth daily (Patient not taking: Reported on 2025) 30 tablet 0    cholestyramine (QUESTRAN) 4 g packet TAKE 1 PACKET BY MOUTH 2 TIMES A DAY WITH MEALS. (Patient not taking: No sig reported) 180 packet 2    dicyclomine (BENTYL) 20 mg tablet Take 1 tablet (20 mg total) by mouth 2 (two) times a day as needed (diarrhea) (Patient not taking: Reported on 2025) 40 tablet 3     No current facility-administered medications on file prior to visit.   [6]   Social History  Tobacco Use    Smoking status: Former     Current packs/day: 0.00     Average packs/day: 2.0 packs/day for 37.0 years (74.0 ttl pk-yrs)     Types: Cigarettes     Start date: 1963     Quit date: 1980     Years since quittin.4     Passive exposure: Past    Smokeless tobacco: Never    Tobacco comments:     HARDEST THING I EVER HAD TO DO!  BUT I DID IT.  :-)   Vaping Use    Vaping status: Never Used   Substance and Sexual Activity    Alcohol use: Yes     Alcohol/week: 11.0 standard drinks of alcohol     Types: 4 Glasses of wine, 7 Cans of beer per week     Comment: not a steady drinker but will have some on occasion    Drug use: Not Currently    Sexual activity: Not Currently      Partners: Male     Birth control/protection: None

## 2025-06-04 ENCOUNTER — OFFICE VISIT (OUTPATIENT)
Dept: GASTROENTEROLOGY | Facility: CLINIC | Age: 80
End: 2025-06-04
Payer: COMMERCIAL

## 2025-06-04 VITALS
SYSTOLIC BLOOD PRESSURE: 112 MMHG | BODY MASS INDEX: 24.63 KG/M2 | WEIGHT: 139 LBS | TEMPERATURE: 99 F | HEIGHT: 63 IN | OXYGEN SATURATION: 99 % | HEART RATE: 76 BPM | DIASTOLIC BLOOD PRESSURE: 65 MMHG

## 2025-06-04 DIAGNOSIS — K21.9 GASTROESOPHAGEAL REFLUX DISEASE WITHOUT ESOPHAGITIS: Primary | ICD-10-CM

## 2025-06-04 PROCEDURE — 99214 OFFICE O/P EST MOD 30 MIN: CPT | Performed by: INTERNAL MEDICINE

## 2025-06-04 RX ORDER — CHOLESTYRAMINE 4 G/9G
1 POWDER, FOR SUSPENSION ORAL
Qty: 31 PACKET | Refills: 6 | Status: SHIPPED | OUTPATIENT
Start: 2025-06-04

## 2025-06-04 NOTE — PATIENT INSTRUCTIONS
Scheduled date of EGD(as of today):6/25/25  Physician performing EGD:Gopi  Location of EGD:[place]  Instructions reviewed with patient by:Antonio álvarez  Clearances:  none

## 2025-06-05 DIAGNOSIS — E78.2 MIXED HYPERLIPIDEMIA: Primary | ICD-10-CM

## 2025-06-05 NOTE — PROGRESS NOTES
Name: Shanda Quintanilla      : 1945      MRN: [id number]  Encounter Provider: Trever Nguyễn DO  Encounter Date: 2025   Encounter department: [facility] [place]    :  Assessment & Plan  Gastroesophageal reflux disease without esophagitis    Orders:    cholestyramine (QUESTRAN) 4 g packet; Take 1 packet (4 g total) by mouth 3 (three) times a day with meals    EGD; Future      Assessment & Plan  1. Diarrhea:  - Continue cholestyramine with a prescription for one packet daily.  - Adjust dosage as needed based on symptoms, using half or a third of a packet if necessary.    2. Acid Reflux:  - Schedule upper endoscopy to investigate symptoms further.  - Continue using baking soda with water for symptom relief as needed.  - Consider medication to reduce stomach acid if endoscopy shows inflammation or other issues.      History of Present Illness     History of Present Illness  The patient is an 80-year-old female who presents for evaluation of diarrhea and acid reflux.    She was last seen on 2025, at which time she was experiencing diarrhea. Cholestyramine was prescribed, which initially improved her symptoms. Consequently, she reduced the dosage to one packet, then half, and finally a third. However, the symptoms recurred after a month-long discontinuation of the medication, following a day of high sugar intake. She resumed taking half a packet of cholestyramine on Saturday, and today, she noticed a brown worm-like substance in her stool. She reports no abdominal pain, rectal bleeding, nausea, or vomiting.     She recalls an incident on 2025, when she woke up with severe pain and sought emergency care. An x-ray revealed compressed vertebrae, which she believes may be contributing to her diarrhea. She has been receiving chiropractic treatment from Dr. Portillo in [place], which has alleviated her pain by approximately 90 percent.    She experiences heartburn  "at night if she consumes excessive junk food during the day. This symptom has been present for 6 months to a year. She manages it with baking soda and water. Occasionally, she experiences painful swallowing, which resolves spontaneously. She has not taken any medications for this issue and is interested in identifying the underlying cause. She prefers to avoid medication and would like to try dietary modifications first. She has not experienced these symptoms for the past 1 to 2 weeks.       Objective   /65 (BP Location: Left arm, Patient Position: Sitting, Cuff Size: Standard)   Pulse 76   Temp 99 °F (37.2 °C) (Tympanic)   Ht 5' 3\" (1.6 m)   Wt 63 kg (139 lb)   SpO2 99%   BMI 24.62 kg/m²     Physical Exam  Heart: Regular rate and rhythm, no murmurs noted.  Lungs: Clear to auscultation bilaterally.  Abdomen: No tenderness on palpation.  Physical Exam  Vitals reviewed.   Constitutional:       General: She is not in acute distress.     Appearance: Normal appearance. She is not ill-appearing.     Eyes:      General: No scleral icterus.     Extraocular Movements: Extraocular movements intact.      Pupils: Pupils are equal, round, and reactive to light.       Cardiovascular:      Rate and Rhythm: Normal rate and regular rhythm.      Pulses: Normal pulses.      Heart sounds: Normal heart sounds. No murmur heard.  Pulmonary:      Effort: Pulmonary effort is normal.      Breath sounds: Normal breath sounds. No wheezing, rhonchi or rales.   Abdominal:      Palpations: Abdomen is soft. There is no mass.      Tenderness: There is no abdominal tenderness. There is no guarding or rebound.     Musculoskeletal:      Right lower leg: No edema.      Left lower leg: No edema.     Skin:     General: Skin is warm and dry.      Coloration: Skin is not jaundiced.      Findings: No rash.     Neurological:      General: No focal deficit present.      Mental Status: She is alert and oriented to person, place, and time. "     Psychiatric:         Mood and Affect: Mood normal.         Behavior: Behavior normal.

## 2025-06-06 ENCOUNTER — PATIENT OUTREACH (OUTPATIENT)
Dept: CARDIAC SURGERY | Facility: CLINIC | Age: 80
End: 2025-06-06

## 2025-06-06 ENCOUNTER — DOCUMENTATION (OUTPATIENT)
Dept: CARDIAC SURGERY | Facility: CLINIC | Age: 80
End: 2025-06-06

## 2025-06-06 ENCOUNTER — APPOINTMENT (OUTPATIENT)
Dept: LAB | Facility: CLINIC | Age: 80
End: 2025-06-06
Payer: COMMERCIAL

## 2025-06-06 DIAGNOSIS — E78.2 MIXED HYPERLIPIDEMIA: ICD-10-CM

## 2025-06-06 PROCEDURE — 80061 LIPID PANEL: CPT

## 2025-06-06 PROCEDURE — 82172 ASSAY OF APOLIPOPROTEIN: CPT

## 2025-06-06 PROCEDURE — 83704 LIPOPROTEIN BLD QUAN PART: CPT

## 2025-06-06 PROCEDURE — 83695 ASSAY OF LIPOPROTEIN(A): CPT

## 2025-06-06 PROCEDURE — 36415 COLL VENOUS BLD VENIPUNCTURE: CPT

## 2025-06-06 NOTE — PROGRESS NOTES
LUNG NODULE BOARD CONFERENCE DISCUSSION    DATE REVIEWED:  06/05/25    DATE OF CHEST CT: 05/30/25    REFERRING: Dr. Funez    BOARD RECOMMENDATIONS:    Imaging Studies: 3 month CT Chest with Navigation Protocol    Referrals: N/A    Procedures: N/A    PATIENT COMMUNICATION / INSTRUCTIONS: Patient was add on by Dr. Funez to Lung Nodule Conference on 06/05/25.           DISCLAIMERS:    TO THE TREATING/REFERRING PHYSICIAN:  This conference is a meeting of clinicians who evaluate and discuss patients whom have been identified as having concerning findings on chest CT (LUNG RADS 3/4). Please note that the above opinion was a consensus of the conference attendees and is intended only to assist in quality care of the discussed patient.  The responsibility for follow up on the input given during the conference, along with any final decisions regarding plan of care, is that of the patient, patient's primary provider and specialists who will be involved in care plan moving forward. Nodule Nurse Navigators will assist in coordination of necessary appointments as outlined above.      TO THE PATIENT:  This summary is a brief record of care recommendations regarding abnormal CT findings. You may choose to share a copy with any of your doctors or nurses. However, this is not a detailed or comprehensive record of your care.

## 2025-06-06 NOTE — PROGRESS NOTES
Patient was an add on by Dr. Funez to Lung Nodule Conference 06/05/25.  Spoke with patient.  Introduced myself and explained my role. Reviewed recommendations with patient of 3 month follow up CT chest with Navigation Protocol already scheduled for 09/03/25 at 3:30 pm and a follow up visit with Dr. Funez scheduled 09/16/25 at 2:30 pm in [place] office. Confirmed dates and times with patient. All questions answered.  Patient acknowledged understanding and was appreciative of follow up call.

## 2025-06-11 ENCOUNTER — TELEPHONE (OUTPATIENT)
Age: 80
End: 2025-06-11

## 2025-06-11 ENCOUNTER — OFFICE VISIT (OUTPATIENT)
Dept: FAMILY MEDICINE CLINIC | Facility: CLINIC | Age: 80
End: 2025-06-11
Payer: COMMERCIAL

## 2025-06-11 VITALS
BODY MASS INDEX: 23.87 KG/M2 | HEART RATE: 73 BPM | TEMPERATURE: 97.3 F | WEIGHT: 139.8 LBS | HEIGHT: 64 IN | OXYGEN SATURATION: 98 % | DIASTOLIC BLOOD PRESSURE: 64 MMHG | SYSTOLIC BLOOD PRESSURE: 116 MMHG

## 2025-06-11 DIAGNOSIS — K52.9 CHRONIC DIARRHEA: ICD-10-CM

## 2025-06-11 DIAGNOSIS — R41.3 MEMORY LOSS: Primary | ICD-10-CM

## 2025-06-11 DIAGNOSIS — R47.89 WORD FINDING DIFFICULTY: ICD-10-CM

## 2025-06-11 DIAGNOSIS — Z82.0 FAMILY HISTORY OF ALZHEIMER DISEASE: ICD-10-CM

## 2025-06-11 DIAGNOSIS — R91.8 PULMONARY NODULES/LESIONS, MULTIPLE: ICD-10-CM

## 2025-06-11 PROCEDURE — 99214 OFFICE O/P EST MOD 30 MIN: CPT | Performed by: FAMILY MEDICINE

## 2025-06-11 NOTE — TELEPHONE ENCOUNTER
06/11/25    Patient called office today requesting to schedule an appt with   Dr. Bustos for Memory Loss.    Patient was referred.    Patient Established with Dr. Forrest, LOV: 07/10/24.    Patient is scheduled for an MRI Related to the Memory Loss for 07/12/25.    [facility] with [facility] on 08/21/25.      However, patient would like to see neurology for her current Memory Problem and Dr. Bustos due that she was Referred to her.    Patient aware that she last saw Dr. Forrest.    I MADE NO PROMISES, but that message will be sent for advice to appropriately schedule her.    Patient UNDERSTOOD.    Did inform patient not to be Alarm if she is Advice to stay with her [facility] appt for her Memory Problem.    Patient UNDERSTOOD and AGREED.      Hi Dr. Forrest and Dr. Bustos,     Please Review and Advice.    Thank You.    My Apologies for any Trouble.       NOTE:  Patient is requesting a call back with any status of the request.    And it looks like Dr. Wilson saw patient for her Memory Problem a few years ago.

## 2025-06-11 NOTE — ASSESSMENT & PLAN NOTE
Orders:  •  MRI brain NeuroQuant wo contrast; Future  •  Ambulatory Referral to Neurology; Future

## 2025-06-11 NOTE — PROGRESS NOTES
"Name: Shanda Quintanilla      : 1945      MRN: [id number]  Encounter Provider: Talat Ventura MD  Encounter Date: 2025   Encounter department: [facility]  :  Assessment & Plan  Memory loss    Orders:  •  MRI brain NeuroQuant wo contrast; Future  •  Ambulatory Referral to Neurology; Future    Word finding difficulty    Orders:  •  MRI brain NeuroQuant wo contrast; Future  •  Ambulatory Referral to Neurology; Future    Family history of Alzheimer disease    Orders:  •  MRI brain NeuroQuant wo contrast; Future  •  Ambulatory Referral to Neurology; Future    Chronic diarrhea  Continue cholestyramine       Pulmonary nodules/lesions, multiple  Repeat CT scan, ordered.    Follow up in 2-3 months              History of Present Illness   Patient is here to follow up for several issues. Has word finding difficulty and short term memory loss. Has a Hx of Dementia, Alzheimer's type.  Also has chronic diarrhea and saw gastro who prescribed cholestyramine as needed.  Also she brought me an article which discussed increased risk for cancer which I took the time and reviewed.   Also has multiple pulmonary nodules, saw cardiothoracic surgery Dr. Funez, biopsy was deferred repeat imaging ordered.      Review of Systems   Constitutional:  Negative for activity change, appetite change, fatigue and fever.   HENT:  Negative for congestion and ear discharge.    Respiratory:  Negative for cough and shortness of breath.    Cardiovascular:  Negative for chest pain and palpitations.   Gastrointestinal:  Negative for diarrhea and nausea.   Musculoskeletal:  Negative for arthralgias and back pain.   Skin:  Negative for color change and rash.   Neurological:  Negative for dizziness and headaches.   Psychiatric/Behavioral:  Positive for confusion. Negative for agitation and behavioral problems.        Objective   /64   Pulse 73   Temp (!) 97.3 °F (36.3 °C)   Ht 5' 4.17\" (1.63 m)   Wt 63.4 kg (139 lb 12.8 " oz)   SpO2 98%   BMI 23.87 kg/m²      Physical Exam  Vitals and nursing note reviewed.   Constitutional:       General: She is not in acute distress.     Appearance: She is well-developed.   HENT:      Head: Normocephalic and atraumatic.     Eyes:      Conjunctiva/sclera: Conjunctivae normal.       Cardiovascular:      Rate and Rhythm: Normal rate and regular rhythm.      Heart sounds: No murmur heard.  Pulmonary:      Effort: Pulmonary effort is normal. No respiratory distress.      Breath sounds: Normal breath sounds.   Abdominal:      Palpations: Abdomen is soft.      Tenderness: There is no abdominal tenderness.     Musculoskeletal:         General: No swelling.      Cervical back: Neck supple.     Skin:     General: Skin is warm and dry.      Capillary Refill: Capillary refill takes less than 2 seconds.     Neurological:      Mental Status: She is alert.     Psychiatric:         Mood and Affect: Mood normal.

## 2025-06-16 ENCOUNTER — RESULTS FOLLOW-UP (OUTPATIENT)
Dept: FAMILY MEDICINE CLINIC | Facility: CLINIC | Age: 80
End: 2025-06-16

## 2025-06-16 LAB
APO B SERPL-MCNC: 106 MG/DL
CHOLEST SERPL-MCNC: 259 MG/DL
CHOLEST/HDLC SERPL: 3.1 CALC
HDLC SERPL-MCNC: 84 MG/DL
HLD.LARGE SERPL-SCNC: 6270 NMOL/L
LDL SERPL QN: 224.5 ANGSTROM
LDL SERPL-SCNC: 1415 NMOL/L
LDL SMALL SERPL-SCNC: 166 NMOL/L
LDLC REAL SIZE PAT SERPL: ABNORMAL PATTERN
LDLC SERPL CALC-MCNC: 150 MG/DL
LPA SERPL-SCNC: 29 NMOL/L
NONHDLC SERPL-MCNC: 175 MG/DL
SL AMB LDL MEDIUM: 282 NMOL/L
TRIGL SERPL-MCNC: 124 MG/DL

## 2025-06-16 NOTE — TELEPHONE ENCOUNTER
06/16/25    Before calling patient, I looked into Dr. Bustos Schedule, and Unfortunately nothing available not even in the following year 2026.    Please Review, if possible and Appropriate assist with an appt with   Dr. Bustos.    Patient would need an OVL / 60-Minute appt due that Dr. Bustos has never seen patient before.     Please and Thank You.       NOTE:  Contact Patient with accommodations.    My Apologies for any trouble.

## 2025-06-17 NOTE — TELEPHONE ENCOUNTER
Pt called back to schedule.  has nothing available and booking out to July 2026.  schedule is not open further. Appt was put on wait list.     Please assist once template is open.

## 2025-07-11 ENCOUNTER — TELEPHONE (OUTPATIENT)
Age: 80
End: 2025-07-11

## 2025-07-11 NOTE — TELEPHONE ENCOUNTER
Patient called     Patient wants to know if she has had in the past a comprehensive stool analysis - wanted to know what the Butyrate level -- IBS.    Patient stated she is taking  a supplement that supports the lining of the stomach for gas and with diarrhea.    Patient is requesting to please call her back to let her know today.    Please advise and notify patient, thank you    Shanda --  [phone]

## 2025-07-12 ENCOUNTER — HOSPITAL ENCOUNTER (OUTPATIENT)
Dept: MRI IMAGING | Facility: HOSPITAL | Age: 80
Discharge: HOME | End: 2025-07-12
Attending: FAMILY MEDICINE
Payer: COMMERCIAL

## 2025-07-12 DIAGNOSIS — R41.3 MEMORY LOSS: ICD-10-CM

## 2025-07-12 DIAGNOSIS — R47.89 WORD FINDING DIFFICULTY: ICD-10-CM

## 2025-07-12 DIAGNOSIS — Z82.0 FAMILY HISTORY OF ALZHEIMER DISEASE: ICD-10-CM

## 2025-07-12 PROCEDURE — 70551 MRI BRAIN STEM W/O DYE: CPT

## 2025-07-12 NOTE — TELEPHONE ENCOUNTER
Please explain to her that this is a test we do not perform for the stool Moreover its not even on our epic system thus impossible for me to order. She can certainly discuss this and her symptoms with gastro.

## 2025-07-24 ENCOUNTER — TELEPHONE (OUTPATIENT)
Age: 80
End: 2025-07-24

## 2025-07-24 DIAGNOSIS — M79.673 PAIN OF FOOT, UNSPECIFIED LATERALITY: Primary | ICD-10-CM

## 2025-07-24 NOTE — TELEPHONE ENCOUNTER
[facility]  Please advise when placed.  Thank you.

## 2025-08-04 ENCOUNTER — TRANSCRIBE ORDERS (OUTPATIENT)
Dept: ADMINISTRATIVE | Facility: HOSPITAL | Age: 80
End: 2025-08-04

## 2025-08-04 ENCOUNTER — OFFICE VISIT (OUTPATIENT)
Dept: VASCULAR SURGERY | Facility: CLINIC | Age: 80
End: 2025-08-04
Payer: COMMERCIAL

## 2025-08-04 VITALS
HEIGHT: 63 IN | BODY MASS INDEX: 24.63 KG/M2 | HEART RATE: 81 BPM | WEIGHT: 139 LBS | DIASTOLIC BLOOD PRESSURE: 74 MMHG | SYSTOLIC BLOOD PRESSURE: 122 MMHG

## 2025-08-04 DIAGNOSIS — I65.22 INTRACRANIAL CAROTID STENOSIS, LEFT: ICD-10-CM

## 2025-08-04 DIAGNOSIS — I63.239 CAROTID STENOSIS, SYMPTOMATIC, WITH INFARCTION (HCC): ICD-10-CM

## 2025-08-04 DIAGNOSIS — Z98.890 HISTORY OF CEA (CAROTID ENDARTERECTOMY): Primary | ICD-10-CM

## 2025-08-04 DIAGNOSIS — I63.9 LEFT SIDED CEREBRAL HEMISPHERE CEREBROVASCULAR ACCIDENT (CVA) (HCC): Primary | ICD-10-CM

## 2025-08-04 DIAGNOSIS — Z98.890 HISTORY OF CEA (CAROTID ENDARTERECTOMY): ICD-10-CM

## 2025-08-04 DIAGNOSIS — M65.341 TRIGGER RING FINGER OF RIGHT HAND: ICD-10-CM

## 2025-08-04 PROCEDURE — 99214 OFFICE O/P EST MOD 30 MIN: CPT | Performed by: SURGERY

## 2025-08-11 ENCOUNTER — OFFICE VISIT (OUTPATIENT)
Dept: OBGYN CLINIC | Facility: CLINIC | Age: 80
End: 2025-08-11
Payer: COMMERCIAL

## 2025-08-11 PROBLEM — M65.341 TRIGGER FINGER, RIGHT RING FINGER: Status: ACTIVE | Noted: 2025-01-16

## 2025-08-12 ENCOUNTER — TELEPHONE (OUTPATIENT)
Age: 80
End: 2025-08-12

## 2025-08-18 ENCOUNTER — OFFICE VISIT (OUTPATIENT)
Dept: FAMILY MEDICINE CLINIC | Facility: CLINIC | Age: 80
End: 2025-08-18
Payer: COMMERCIAL

## 2025-08-18 VITALS
SYSTOLIC BLOOD PRESSURE: 112 MMHG | DIASTOLIC BLOOD PRESSURE: 64 MMHG | HEIGHT: 64 IN | WEIGHT: 138 LBS | TEMPERATURE: 96.8 F | BODY MASS INDEX: 23.56 KG/M2 | OXYGEN SATURATION: 96 % | HEART RATE: 66 BPM

## 2025-08-18 DIAGNOSIS — E46 PROTEIN-CALORIE MALNUTRITION, UNSPECIFIED SEVERITY (HCC): ICD-10-CM

## 2025-08-18 DIAGNOSIS — R51.9 FRONTAL HEADACHE: Primary | ICD-10-CM

## 2025-08-18 DIAGNOSIS — R53.83 OTHER FATIGUE: ICD-10-CM

## 2025-08-18 DIAGNOSIS — E55.9 VITAMIN D DEFICIENCY: ICD-10-CM

## 2025-08-18 DIAGNOSIS — R19.5 CHANGE IN STOOL CALIBER: ICD-10-CM

## 2025-08-18 PROCEDURE — 99214 OFFICE O/P EST MOD 30 MIN: CPT | Performed by: FAMILY MEDICINE

## 2025-08-19 ENCOUNTER — APPOINTMENT (OUTPATIENT)
Dept: LAB | Facility: CLINIC | Age: 80
End: 2025-08-19
Payer: COMMERCIAL

## 2025-08-19 DIAGNOSIS — E46 PROTEIN-CALORIE MALNUTRITION, UNSPECIFIED SEVERITY (HCC): ICD-10-CM

## 2025-08-19 DIAGNOSIS — R53.83 OTHER FATIGUE: ICD-10-CM

## 2025-08-19 DIAGNOSIS — E55.9 VITAMIN D DEFICIENCY: ICD-10-CM

## 2025-08-19 LAB
25(OH)D3 SERPL-MCNC: 53.5 NG/ML (ref 30–100)
CRP SERPL QL: <1 MG/L
HCYS SERPL-SCNC: 10 UMOL/L (ref 5–20)

## 2025-08-19 PROCEDURE — 86140 C-REACTIVE PROTEIN: CPT

## 2025-08-19 PROCEDURE — 82306 VITAMIN D 25 HYDROXY: CPT

## 2025-08-19 PROCEDURE — 83090 ASSAY OF HOMOCYSTEINE: CPT

## 2025-08-19 PROCEDURE — 36415 COLL VENOUS BLD VENIPUNCTURE: CPT

## (undated) DEVICE — DECANTER: Brand: UNBRANDED

## (undated) DEVICE — GLOVE SRG BIOGEL 7.5

## (undated) DEVICE — PAD GROUNDING DUAL ADULT

## (undated) DEVICE — SYRINGE 1ML SLIP TIP

## (undated) DEVICE — ADHESIVE SKIN HIGH VISCOSITY EXOFIN 1ML

## (undated) DEVICE — SUT SILK 2-0 18 IN A185H

## (undated) DEVICE — SUT SILK 3-0 18 IN A184H

## (undated) DEVICE — 3M™ IOBAN™ 2 ANTIMICROBIAL INCISE DRAPE 6640EZ: Brand: IOBAN™ 2

## (undated) DEVICE — CAROTID ARTERY SHUNT KIT,RADIOPAQUE LINE, STRAIGHT: Brand: ARGYLE

## (undated) DEVICE — NEEDLE 25G X 1 1/2

## (undated) DEVICE — SUT MONOCRYL 4-0 PS-2 18 IN Y496G

## (undated) DEVICE — SURGICEL FIBRILLAR 1 X 2

## (undated) DEVICE — SUT PROLENE 6-0 BV130 30 IN 8709H

## (undated) DEVICE — THYROID SHEET: Brand: CONVERTORS

## (undated) DEVICE — BETHL CAROTID ENDARTERECTOMY: Brand: CARDINAL HEALTH

## (undated) DEVICE — LIGACLIP MCA MULTIPLE CLIP APPLIERS, 20 MEDIUM CLIPS: Brand: LIGACLIP

## (undated) DEVICE — PETRI DISH STERILE

## (undated) DEVICE — DRAPE SURGIKIT SADDLE BAG

## (undated) DEVICE — PROVE COVER: Brand: UNBRANDED

## (undated) DEVICE — SURGICEL 4 X 8IN

## (undated) DEVICE — SUT MONOCRYL 3-0 SH 27 IN Y416H